# Patient Record
Sex: MALE | Race: ASIAN | NOT HISPANIC OR LATINO
[De-identification: names, ages, dates, MRNs, and addresses within clinical notes are randomized per-mention and may not be internally consistent; named-entity substitution may affect disease eponyms.]

---

## 2017-01-10 ENCOUNTER — APPOINTMENT (OUTPATIENT)
Dept: NEUROLOGY | Facility: CLINIC | Age: 77
End: 2017-01-10

## 2017-01-27 ENCOUNTER — APPOINTMENT (OUTPATIENT)
Dept: NEUROLOGY | Facility: CLINIC | Age: 77
End: 2017-01-27

## 2017-01-27 VITALS
WEIGHT: 148 LBS | DIASTOLIC BLOOD PRESSURE: 77 MMHG | HEIGHT: 65 IN | SYSTOLIC BLOOD PRESSURE: 152 MMHG | BODY MASS INDEX: 24.66 KG/M2 | HEART RATE: 71 BPM

## 2017-01-27 DIAGNOSIS — M48.06 SPINAL STENOSIS, LUMBAR REGION: ICD-10-CM

## 2018-08-09 ENCOUNTER — RESULT REVIEW (OUTPATIENT)
Age: 78
End: 2018-08-09

## 2018-08-09 ENCOUNTER — OUTPATIENT (OUTPATIENT)
Dept: OUTPATIENT SERVICES | Facility: HOSPITAL | Age: 78
LOS: 1 days | End: 2018-08-09
Payer: MEDICARE

## 2018-08-09 ENCOUNTER — APPOINTMENT (OUTPATIENT)
Dept: CT IMAGING | Facility: IMAGING CENTER | Age: 78
End: 2018-08-09
Payer: MEDICARE

## 2018-08-09 DIAGNOSIS — Z00.8 ENCOUNTER FOR OTHER GENERAL EXAMINATION: ICD-10-CM

## 2018-08-09 PROCEDURE — 74177 CT ABD & PELVIS W/CONTRAST: CPT

## 2018-08-09 PROCEDURE — 74177 CT ABD & PELVIS W/CONTRAST: CPT | Mod: 26

## 2018-08-09 PROCEDURE — 82565 ASSAY OF CREATININE: CPT

## 2018-09-28 ENCOUNTER — APPOINTMENT (OUTPATIENT)
Dept: HEMATOLOGY ONCOLOGY | Facility: CLINIC | Age: 78
End: 2018-09-28
Payer: MEDICARE

## 2018-09-28 ENCOUNTER — LABORATORY RESULT (OUTPATIENT)
Age: 78
End: 2018-09-28

## 2018-09-28 VITALS
WEIGHT: 148 LBS | SYSTOLIC BLOOD PRESSURE: 122 MMHG | BODY MASS INDEX: 24.66 KG/M2 | DIASTOLIC BLOOD PRESSURE: 78 MMHG | HEART RATE: 72 BPM | OXYGEN SATURATION: 97 % | HEIGHT: 65 IN

## 2018-09-28 DIAGNOSIS — I10 ESSENTIAL (PRIMARY) HYPERTENSION: ICD-10-CM

## 2018-09-28 DIAGNOSIS — Z82.49 FAMILY HISTORY OF ISCHEMIC HEART DISEASE AND OTHER DISEASES OF THE CIRCULATORY SYSTEM: ICD-10-CM

## 2018-09-28 PROCEDURE — 85025 COMPLETE CBC W/AUTO DIFF WBC: CPT

## 2018-09-28 PROCEDURE — 99205 OFFICE O/P NEW HI 60 MIN: CPT

## 2019-09-19 ENCOUNTER — INPATIENT (INPATIENT)
Facility: HOSPITAL | Age: 79
LOS: 9 days | Discharge: HOME | End: 2019-09-29
Attending: INTERNAL MEDICINE | Admitting: INTERNAL MEDICINE
Payer: MEDICARE

## 2019-09-19 VITALS
HEART RATE: 89 BPM | TEMPERATURE: 98 F | RESPIRATION RATE: 19 BRPM | SYSTOLIC BLOOD PRESSURE: 154 MMHG | OXYGEN SATURATION: 100 % | DIASTOLIC BLOOD PRESSURE: 87 MMHG

## 2019-09-19 NOTE — ED ADULT TRIAGE NOTE - CHIEF COMPLAINT QUOTE
pt sent in by cardiologist for elevated wbc (161) and low hgb from labs drawn today, patient states that he has been feeling weak (generalized weakness) over the past few days with leg swelling over past few months, no chest pain, sob, dizziness, n/v/fevers. pmh CLL 2012, htn.

## 2019-09-20 DIAGNOSIS — R14.0 ABDOMINAL DISTENSION (GASEOUS): ICD-10-CM

## 2019-09-20 DIAGNOSIS — C91.90 LYMPHOID LEUKEMIA, UNSPECIFIED NOT HAVING ACHIEVED REMISSION: ICD-10-CM

## 2019-09-20 DIAGNOSIS — Z87.19 PERSONAL HISTORY OF OTHER DISEASES OF THE DIGESTIVE SYSTEM: ICD-10-CM

## 2019-09-20 DIAGNOSIS — Z29.9 ENCOUNTER FOR PROPHYLACTIC MEASURES, UNSPECIFIED: ICD-10-CM

## 2019-09-20 DIAGNOSIS — E87.1 HYPO-OSMOLALITY AND HYPONATREMIA: ICD-10-CM

## 2019-09-20 DIAGNOSIS — R60.0 LOCALIZED EDEMA: ICD-10-CM

## 2019-09-20 DIAGNOSIS — D72.829 ELEVATED WHITE BLOOD CELL COUNT, UNSPECIFIED: ICD-10-CM

## 2019-09-20 DIAGNOSIS — I10 ESSENTIAL (PRIMARY) HYPERTENSION: ICD-10-CM

## 2019-09-20 DIAGNOSIS — K74.60 UNSPECIFIED CIRRHOSIS OF LIVER: ICD-10-CM

## 2019-09-20 LAB
ALBUMIN SERPL ELPH-MCNC: 2.6 G/DL — LOW (ref 3.3–5)
ALBUMIN SERPL ELPH-MCNC: 3.1 G/DL — LOW (ref 3.3–5)
ALP SERPL-CCNC: 160 U/L — HIGH (ref 40–120)
ALP SERPL-CCNC: 186 U/L — HIGH (ref 40–120)
ALT FLD-CCNC: 66 U/L — HIGH (ref 4–41)
ALT FLD-CCNC: 78 U/L — HIGH (ref 4–41)
AMMONIA BLD-MCNC: 41 UMOL/L — SIGNIFICANT CHANGE UP (ref 11–55)
ANION GAP SERPL CALC-SCNC: 10 MMO/L — SIGNIFICANT CHANGE UP (ref 7–14)
ANION GAP SERPL CALC-SCNC: 14 MMO/L — SIGNIFICANT CHANGE UP (ref 7–14)
ANISOCYTOSIS BLD QL: SLIGHT — SIGNIFICANT CHANGE UP
APTT BLD: 27.9 SEC — SIGNIFICANT CHANGE UP (ref 27.5–36.3)
APTT BLD: 28.6 SEC — SIGNIFICANT CHANGE UP (ref 27.5–36.3)
AST SERPL-CCNC: 50 U/L — HIGH (ref 4–40)
AST SERPL-CCNC: 67 U/L — HIGH (ref 4–40)
BASE EXCESS BLDV CALC-SCNC: -2.4 MMOL/L — SIGNIFICANT CHANGE UP
BASE EXCESS BLDV CALC-SCNC: 2 MMOL/L — SIGNIFICANT CHANGE UP
BASOPHILS # BLD AUTO: 0.08 K/UL — SIGNIFICANT CHANGE UP (ref 0–0.2)
BASOPHILS NFR BLD AUTO: 0.1 % — SIGNIFICANT CHANGE UP (ref 0–2)
BASOPHILS NFR SPEC: 0 % — SIGNIFICANT CHANGE UP (ref 0–2)
BILIRUB SERPL-MCNC: 0.7 MG/DL — SIGNIFICANT CHANGE UP (ref 0.2–1.2)
BILIRUB SERPL-MCNC: 0.7 MG/DL — SIGNIFICANT CHANGE UP (ref 0.2–1.2)
BLD GP AB SCN SERPL QL: NEGATIVE — SIGNIFICANT CHANGE UP
BLOOD GAS VENOUS - CREATININE: 0.82 MG/DL — SIGNIFICANT CHANGE UP (ref 0.5–1.3)
BLOOD GAS VENOUS - FIO2: 21 — SIGNIFICANT CHANGE UP
BUN SERPL-MCNC: 12 MG/DL — SIGNIFICANT CHANGE UP (ref 7–23)
BUN SERPL-MCNC: 14 MG/DL — SIGNIFICANT CHANGE UP (ref 7–23)
CALCIUM SERPL-MCNC: 7.4 MG/DL — LOW (ref 8.4–10.5)
CALCIUM SERPL-MCNC: 8.4 MG/DL — SIGNIFICANT CHANGE UP (ref 8.4–10.5)
CHLORIDE BLDV-SCNC: 95 MMOL/L — LOW (ref 96–108)
CHLORIDE SERPL-SCNC: 89 MMOL/L — LOW (ref 98–107)
CHLORIDE SERPL-SCNC: 92 MMOL/L — LOW (ref 98–107)
CHLORIDE UR-SCNC: < 20 MMOL/L — SIGNIFICANT CHANGE UP
CO2 SERPL-SCNC: 22 MMOL/L — SIGNIFICANT CHANGE UP (ref 22–31)
CO2 SERPL-SCNC: 22 MMOL/L — SIGNIFICANT CHANGE UP (ref 22–31)
CREAT SERPL-MCNC: 0.68 MG/DL — SIGNIFICANT CHANGE UP (ref 0.5–1.3)
CREAT SERPL-MCNC: 0.75 MG/DL — SIGNIFICANT CHANGE UP (ref 0.5–1.3)
DACRYOCYTES BLD QL SMEAR: SLIGHT — SIGNIFICANT CHANGE UP
EOSINOPHIL # BLD AUTO: 0.04 K/UL — SIGNIFICANT CHANGE UP (ref 0–0.5)
EOSINOPHIL NFR BLD AUTO: 0 % — SIGNIFICANT CHANGE UP (ref 0–6)
EOSINOPHIL NFR FLD: 0 % — SIGNIFICANT CHANGE UP (ref 0–6)
FIBRINOGEN PPP-MCNC: 544.7 MG/DL — HIGH (ref 350–510)
GAS PNL BLDV: 122 MMOL/L — LOW (ref 136–146)
GAS PNL BLDV: 123 MMOL/L — LOW (ref 136–146)
GLUCOSE BLDV-MCNC: 135 MG/DL — HIGH (ref 70–99)
GLUCOSE BLDV-MCNC: 160 MG/DL — HIGH (ref 70–99)
GLUCOSE SERPL-MCNC: 131 MG/DL — HIGH (ref 70–99)
GLUCOSE SERPL-MCNC: 161 MG/DL — HIGH (ref 70–99)
HAPTOGLOB SERPL-MCNC: 157 MG/DL — SIGNIFICANT CHANGE UP (ref 34–200)
HCO3 BLDV-SCNC: 22 MMOL/L — SIGNIFICANT CHANGE UP (ref 20–27)
HCO3 BLDV-SCNC: 25 MMOL/L — SIGNIFICANT CHANGE UP (ref 20–27)
HCT VFR BLD CALC: 23.4 % — LOW (ref 39–50)
HCT VFR BLD CALC: 26.4 % — LOW (ref 39–50)
HCT VFR BLDV CALC: 22.5 % — LOW (ref 39–51)
HCT VFR BLDV CALC: 24.6 % — LOW (ref 39–51)
HGB BLD-MCNC: 6.8 G/DL — CRITICAL LOW (ref 13–17)
HGB BLD-MCNC: 7.7 G/DL — LOW (ref 13–17)
HGB BLDV-MCNC: 7.2 G/DL — LOW (ref 13–17)
HGB BLDV-MCNC: 7.9 G/DL — LOW (ref 13–17)
HYPOCHROMIA BLD QL: SIGNIFICANT CHANGE UP
IMM GRANULOCYTES NFR BLD AUTO: 0.3 % — SIGNIFICANT CHANGE UP (ref 0–1.5)
INR BLD: 1.18 — HIGH (ref 0.88–1.17)
INR BLD: 1.25 — HIGH (ref 0.88–1.17)
LACTATE BLDV-MCNC: 1 MMOL/L — SIGNIFICANT CHANGE UP (ref 0.5–2)
LACTATE BLDV-MCNC: 2.4 MMOL/L — HIGH (ref 0.5–2)
LDH SERPL L TO P-CCNC: 158 U/L — SIGNIFICANT CHANGE UP (ref 135–225)
LDH SERPL L TO P-CCNC: 236 U/L — HIGH (ref 135–225)
LYMPHOCYTES # BLD AUTO: 131.9 K/UL — HIGH (ref 1–3.3)
LYMPHOCYTES # BLD AUTO: 93.5 % — HIGH (ref 13–44)
LYMPHOCYTES NFR SPEC AUTO: 87 % — HIGH (ref 13–44)
MACROCYTES BLD QL: SLIGHT — SIGNIFICANT CHANGE UP
MAGNESIUM SERPL-MCNC: 1.8 MG/DL — SIGNIFICANT CHANGE UP (ref 1.6–2.6)
MANUAL SMEAR VERIFICATION: SIGNIFICANT CHANGE UP
MCHC RBC-ENTMCNC: 21.1 PG — LOW (ref 27–34)
MCHC RBC-ENTMCNC: 21.2 PG — LOW (ref 27–34)
MCHC RBC-ENTMCNC: 29.1 % — LOW (ref 32–36)
MCHC RBC-ENTMCNC: 29.2 % — LOW (ref 32–36)
MCV RBC AUTO: 72.4 FL — LOW (ref 80–100)
MCV RBC AUTO: 72.7 FL — LOW (ref 80–100)
MICROCYTES BLD QL: SLIGHT — SIGNIFICANT CHANGE UP
MONOCYTES # BLD AUTO: 0.94 K/UL — HIGH (ref 0–0.9)
MONOCYTES NFR BLD AUTO: 0.7 % — LOW (ref 2–14)
MONOCYTES NFR BLD: 1 % — LOW (ref 2–9)
MORPHOLOGY BLD-IMP: SIGNIFICANT CHANGE UP
NEUTROPHIL AB SER-ACNC: 7 % — LOW (ref 43–77)
NEUTROPHILS # BLD AUTO: 7.66 K/UL — HIGH (ref 1.8–7.4)
NEUTROPHILS NFR BLD AUTO: 5.4 % — LOW (ref 43–77)
NRBC # BLD: 0 /100WBC — SIGNIFICANT CHANGE UP
NRBC # FLD: 0 K/UL — SIGNIFICANT CHANGE UP (ref 0–0)
NRBC # FLD: 0.02 K/UL — SIGNIFICANT CHANGE UP (ref 0–0)
OSMOLALITY UR: 701 MOSMO/KG — SIGNIFICANT CHANGE UP (ref 50–1200)
OTHER - HEMATOLOGY %: 5 — SIGNIFICANT CHANGE UP
PCO2 BLDV: 40 MMHG — LOW (ref 41–51)
PCO2 BLDV: 43 MMHG — SIGNIFICANT CHANGE UP (ref 41–51)
PH BLDV: 7.36 PH — SIGNIFICANT CHANGE UP (ref 7.32–7.43)
PH BLDV: 7.4 PH — SIGNIFICANT CHANGE UP (ref 7.32–7.43)
PHOSPHATE SERPL-MCNC: 2.2 MG/DL — LOW (ref 2.5–4.5)
PLATELET # BLD AUTO: 180 K/UL — SIGNIFICANT CHANGE UP (ref 150–400)
PLATELET # BLD AUTO: 222 K/UL — SIGNIFICANT CHANGE UP (ref 150–400)
PLATELET COUNT - ESTIMATE: NORMAL — SIGNIFICANT CHANGE UP
PMV BLD: 12.2 FL — SIGNIFICANT CHANGE UP (ref 7–13)
PMV BLD: SIGNIFICANT CHANGE UP FL (ref 7–13)
PO2 BLDV: 26 MMHG — LOW (ref 35–40)
PO2 BLDV: < 24 MMHG — LOW (ref 35–40)
POIKILOCYTOSIS BLD QL AUTO: SLIGHT — SIGNIFICANT CHANGE UP
POLYCHROMASIA BLD QL SMEAR: SLIGHT — SIGNIFICANT CHANGE UP
POTASSIUM BLDV-SCNC: 3.9 MMOL/L — SIGNIFICANT CHANGE UP (ref 3.4–4.5)
POTASSIUM BLDV-SCNC: 4.1 MMOL/L — SIGNIFICANT CHANGE UP (ref 3.4–4.5)
POTASSIUM SERPL-MCNC: 4.7 MMOL/L — SIGNIFICANT CHANGE UP (ref 3.5–5.3)
POTASSIUM SERPL-MCNC: 5.1 MMOL/L — SIGNIFICANT CHANGE UP (ref 3.5–5.3)
POTASSIUM SERPL-SCNC: 4.7 MMOL/L — SIGNIFICANT CHANGE UP (ref 3.5–5.3)
POTASSIUM SERPL-SCNC: 5.1 MMOL/L — SIGNIFICANT CHANGE UP (ref 3.5–5.3)
POTASSIUM UR-SCNC: 66.3 MMOL/L — SIGNIFICANT CHANGE UP
PROT SERPL-MCNC: 5.5 G/DL — LOW (ref 6–8.3)
PROT SERPL-MCNC: 6.5 G/DL — SIGNIFICANT CHANGE UP (ref 6–8.3)
PROTHROM AB SERPL-ACNC: 13.2 SEC — HIGH (ref 9.8–13.1)
PROTHROM AB SERPL-ACNC: 14 SEC — HIGH (ref 9.8–13.1)
RBC # BLD: 3.23 M/UL — LOW (ref 4.2–5.8)
RBC # BLD: 3.63 M/UL — LOW (ref 4.2–5.8)
RBC # FLD: 18.2 % — HIGH (ref 10.3–14.5)
RBC # FLD: 18.4 % — HIGH (ref 10.3–14.5)
RH IG SCN BLD-IMP: POSITIVE — SIGNIFICANT CHANGE UP
RH IG SCN BLD-IMP: POSITIVE — SIGNIFICANT CHANGE UP
SAO2 % BLDV: 20.7 % — LOW (ref 60–85)
SAO2 % BLDV: 32.5 % — LOW (ref 60–85)
SCHISTOCYTES BLD QL AUTO: SLIGHT — SIGNIFICANT CHANGE UP
SMUDGE CELLS # BLD: PRESENT — SIGNIFICANT CHANGE UP
SODIUM SERPL-SCNC: 124 MMOL/L — LOW (ref 135–145)
SODIUM SERPL-SCNC: 125 MMOL/L — LOW (ref 135–145)
SODIUM UR-SCNC: 38 MMOL/L — SIGNIFICANT CHANGE UP
TARGETS BLD QL SMEAR: SLIGHT — SIGNIFICANT CHANGE UP
URATE SERPL-MCNC: 3 MG/DL — LOW (ref 3.4–8.8)
WBC # BLD: 141 K/UL — CRITICAL HIGH (ref 3.8–10.5)
WBC # BLD: 97.11 K/UL — CRITICAL HIGH (ref 3.8–10.5)
WBC # FLD AUTO: 141 K/UL — CRITICAL HIGH (ref 3.8–10.5)
WBC # FLD AUTO: 97.11 K/UL — CRITICAL HIGH (ref 3.8–10.5)

## 2019-09-20 PROCEDURE — 88189 FLOWCYTOMETRY/READ 16 & >: CPT

## 2019-09-20 PROCEDURE — 99222 1ST HOSP IP/OBS MODERATE 55: CPT | Mod: GC

## 2019-09-20 PROCEDURE — 71046 X-RAY EXAM CHEST 2 VIEWS: CPT | Mod: 26

## 2019-09-20 PROCEDURE — 93970 EXTREMITY STUDY: CPT | Mod: 26

## 2019-09-20 PROCEDURE — 74177 CT ABD & PELVIS W/CONTRAST: CPT | Mod: 26

## 2019-09-20 PROCEDURE — 99223 1ST HOSP IP/OBS HIGH 75: CPT

## 2019-09-20 RX ORDER — ACETAMINOPHEN 500 MG
650 TABLET ORAL ONCE
Refills: 0 | Status: COMPLETED | OUTPATIENT
Start: 2019-09-20 | End: 2019-09-20

## 2019-09-20 RX ORDER — METRONIDAZOLE 500 MG
500 TABLET ORAL EVERY 8 HOURS
Refills: 0 | Status: DISCONTINUED | OUTPATIENT
Start: 2019-09-20 | End: 2019-09-20

## 2019-09-20 RX ORDER — METRONIDAZOLE 500 MG
TABLET ORAL
Refills: 0 | Status: DISCONTINUED | OUTPATIENT
Start: 2019-09-20 | End: 2019-09-20

## 2019-09-20 RX ORDER — FERROUS SULFATE 325(65) MG
325 TABLET ORAL DAILY
Refills: 0 | Status: DISCONTINUED | OUTPATIENT
Start: 2019-09-20 | End: 2019-09-27

## 2019-09-20 RX ORDER — FAMOTIDINE 10 MG/ML
20 INJECTION INTRAVENOUS ONCE
Refills: 0 | Status: COMPLETED | OUTPATIENT
Start: 2019-09-20 | End: 2019-09-20

## 2019-09-20 RX ORDER — ONDANSETRON 8 MG/1
4 TABLET, FILM COATED ORAL ONCE
Refills: 0 | Status: COMPLETED | OUTPATIENT
Start: 2019-09-20 | End: 2019-09-20

## 2019-09-20 RX ORDER — FUROSEMIDE 40 MG
20 TABLET ORAL ONCE
Refills: 0 | Status: DISCONTINUED | OUTPATIENT
Start: 2019-09-20 | End: 2019-09-20

## 2019-09-20 RX ORDER — ALLOPURINOL 300 MG
300 TABLET ORAL DAILY
Refills: 0 | Status: DISCONTINUED | OUTPATIENT
Start: 2019-09-21 | End: 2019-09-23

## 2019-09-20 RX ORDER — PANTOPRAZOLE SODIUM 20 MG/1
40 TABLET, DELAYED RELEASE ORAL DAILY
Refills: 0 | Status: DISCONTINUED | OUTPATIENT
Start: 2019-09-20 | End: 2019-09-24

## 2019-09-20 RX ORDER — SODIUM CHLORIDE 9 MG/ML
1000 INJECTION, SOLUTION INTRAVENOUS
Refills: 0 | Status: DISCONTINUED | OUTPATIENT
Start: 2019-09-20 | End: 2019-09-24

## 2019-09-20 RX ORDER — LACTOBACILLUS ACIDOPHILUS 100MM CELL
1 CAPSULE ORAL
Refills: 0 | Status: DISCONTINUED | OUTPATIENT
Start: 2019-09-20 | End: 2019-09-29

## 2019-09-20 RX ORDER — INFLUENZA VIRUS VACCINE 15; 15; 15; 15 UG/.5ML; UG/.5ML; UG/.5ML; UG/.5ML
0.5 SUSPENSION INTRAMUSCULAR ONCE
Refills: 0 | Status: DISCONTINUED | OUTPATIENT
Start: 2019-09-20 | End: 2019-09-29

## 2019-09-20 RX ORDER — MORPHINE SULFATE 50 MG/1
4 CAPSULE, EXTENDED RELEASE ORAL ONCE
Refills: 0 | Status: DISCONTINUED | OUTPATIENT
Start: 2019-09-20 | End: 2019-09-20

## 2019-09-20 RX ORDER — HEPARIN SODIUM 5000 [USP'U]/ML
5000 INJECTION INTRAVENOUS; SUBCUTANEOUS EVERY 8 HOURS
Refills: 0 | Status: DISCONTINUED | OUTPATIENT
Start: 2019-09-20 | End: 2019-09-29

## 2019-09-20 RX ORDER — FUROSEMIDE 40 MG
40 TABLET ORAL ONCE
Refills: 0 | Status: COMPLETED | OUTPATIENT
Start: 2019-09-20 | End: 2019-09-20

## 2019-09-20 RX ORDER — ONDANSETRON 8 MG/1
4 TABLET, FILM COATED ORAL EVERY 6 HOURS
Refills: 0 | Status: DISCONTINUED | OUTPATIENT
Start: 2019-09-20 | End: 2019-09-29

## 2019-09-20 RX ORDER — ALLOPURINOL 300 MG
300 TABLET ORAL ONCE
Refills: 0 | Status: COMPLETED | OUTPATIENT
Start: 2019-09-20 | End: 2019-09-20

## 2019-09-20 RX ORDER — METRONIDAZOLE 500 MG
500 TABLET ORAL ONCE
Refills: 0 | Status: DISCONTINUED | OUTPATIENT
Start: 2019-09-20 | End: 2019-09-20

## 2019-09-20 RX ORDER — FUROSEMIDE 40 MG
40 TABLET ORAL DAILY
Refills: 0 | Status: DISCONTINUED | OUTPATIENT
Start: 2019-09-20 | End: 2019-09-25

## 2019-09-20 RX ORDER — LOSARTAN POTASSIUM 100 MG/1
50 TABLET, FILM COATED ORAL DAILY
Refills: 0 | Status: DISCONTINUED | OUTPATIENT
Start: 2019-09-20 | End: 2019-09-29

## 2019-09-20 RX ORDER — CIPROFLOXACIN LACTATE 400MG/40ML
400 VIAL (ML) INTRAVENOUS EVERY 12 HOURS
Refills: 0 | Status: DISCONTINUED | OUTPATIENT
Start: 2019-09-20 | End: 2019-09-24

## 2019-09-20 RX ORDER — SODIUM CHLORIDE 9 MG/ML
2000 INJECTION INTRAMUSCULAR; INTRAVENOUS; SUBCUTANEOUS ONCE
Refills: 0 | Status: COMPLETED | OUTPATIENT
Start: 2019-09-20 | End: 2019-09-20

## 2019-09-20 RX ORDER — PANTOPRAZOLE SODIUM 20 MG/1
40 TABLET, DELAYED RELEASE ORAL
Refills: 0 | Status: DISCONTINUED | OUTPATIENT
Start: 2019-09-20 | End: 2019-09-20

## 2019-09-20 RX ORDER — METRONIDAZOLE 500 MG
500 TABLET ORAL EVERY 12 HOURS
Refills: 0 | Status: DISCONTINUED | OUTPATIENT
Start: 2019-09-20 | End: 2019-09-24

## 2019-09-20 RX ADMIN — Medication 650 MILLIGRAM(S): at 03:11

## 2019-09-20 RX ADMIN — PANTOPRAZOLE SODIUM 40 MILLIGRAM(S): 20 TABLET, DELAYED RELEASE ORAL at 11:38

## 2019-09-20 RX ADMIN — MORPHINE SULFATE 4 MILLIGRAM(S): 50 CAPSULE, EXTENDED RELEASE ORAL at 06:03

## 2019-09-20 RX ADMIN — MORPHINE SULFATE 4 MILLIGRAM(S): 50 CAPSULE, EXTENDED RELEASE ORAL at 05:48

## 2019-09-20 RX ADMIN — HEPARIN SODIUM 5000 UNIT(S): 5000 INJECTION INTRAVENOUS; SUBCUTANEOUS at 22:16

## 2019-09-20 RX ADMIN — SODIUM CHLORIDE 75 MILLILITER(S): 9 INJECTION, SOLUTION INTRAVENOUS at 11:13

## 2019-09-20 RX ADMIN — Medication 63.75 MILLIMOLE(S): at 11:38

## 2019-09-20 RX ADMIN — Medication 650 MILLIGRAM(S): at 02:11

## 2019-09-20 RX ADMIN — Medication 40 MILLIGRAM(S): at 17:36

## 2019-09-20 RX ADMIN — ONDANSETRON 4 MILLIGRAM(S): 8 TABLET, FILM COATED ORAL at 02:11

## 2019-09-20 RX ADMIN — Medication 30 MILLILITER(S): at 02:11

## 2019-09-20 RX ADMIN — HEPARIN SODIUM 5000 UNIT(S): 5000 INJECTION INTRAVENOUS; SUBCUTANEOUS at 17:36

## 2019-09-20 RX ADMIN — Medication 300 MILLIGRAM(S): at 04:32

## 2019-09-20 RX ADMIN — SODIUM CHLORIDE 2000 MILLILITER(S): 9 INJECTION INTRAMUSCULAR; INTRAVENOUS; SUBCUTANEOUS at 03:20

## 2019-09-20 RX ADMIN — Medication 200 MILLIGRAM(S): at 17:36

## 2019-09-20 RX ADMIN — FAMOTIDINE 20 MILLIGRAM(S): 10 INJECTION INTRAVENOUS at 02:11

## 2019-09-20 RX ADMIN — Medication 100 MILLIGRAM(S): at 13:02

## 2019-09-20 NOTE — PROGRESS NOTE ADULT - SUBJECTIVE AND OBJECTIVE BOX
Patient is a 79y old  Male who presents with a chief complaint of abdominal pain, nausea, abdominal distention, rising leukocytosis (20 Sep 2019 10:26)      SUBJECTIVE / OVERNIGHT EVENTS:  C/o nausea and belching  Review of Systems:   CONSTITUTIONAL: No fever, weight loss, or fatigue  EYES: No eye pain, visual disturbances, or discharge  ENMT:  No difficulty hearing, tinnitus, vertigo; No sinus or throat pain  NECK: No pain or stiffness  BREASTS: No pain, masses, or nipple discharge  RESPIRATORY: No cough, wheezing, chills or hemoptysis; No shortness of breath  CARDIOVASCULAR: No chest pain, palpitations, dizziness, or leg swelling  GASTROINTESTINAL: No abdominal or epigastric pain. No vomiting, or hematemesis; No diarrhea or constipation. No melena or hematochezia.  GENITOURINARY: No dysuria, frequency, hematuria, or incontinence  NEUROLOGICAL: No headaches, memory loss, loss of strength, numbness, or tremors  SKIN: No itching, burning, rashes, or lesions   LYMPH NODES: No enlarged glands  ENDOCRINE: No heat or cold intolerance; No hair loss  MUSCULOSKELETAL: No joint pain or swelling; No muscle, back, or extremity pain  PSYCHIATRIC: No depression, anxiety, mood swings, or difficulty sleeping  HEME/LYMPH: No easy bruising, or bleeding gums  ALLERY AND IMMUNOLOGIC: No hives or eczema    MEDICATIONS  (STANDING):  ciprofloxacin   IVPB 400 milliGRAM(s) IV Intermittent every 12 hours  dextrose 5% + sodium chloride 0.9%. 1000 milliLiter(s) (75 mL/Hr) IV Continuous <Continuous>  ferrous    sulfate 325 milliGRAM(s) Oral daily  furosemide    Tablet 40 milliGRAM(s) Oral daily  heparin  Injectable 5000 Unit(s) SubCutaneous every 8 hours  lactobacillus acidophilus 1 Tablet(s) Oral two times a day  losartan 50 milliGRAM(s) Oral daily  metroNIDAZOLE  IVPB 500 milliGRAM(s) IV Intermittent every 12 hours  pantoprazole  Injectable 40 milliGRAM(s) IV Push daily    MEDICATIONS  (PRN):  ondansetron Injectable 4 milliGRAM(s) IV Push every 6 hours PRN Nausea      PHYSICAL EXAM:  Vital Signs Last 24 Hrs  T(C): 37 (20 Sep 2019 15:10), Max: 37 (20 Sep 2019 01:29)  T(F): 98.6 (20 Sep 2019 15:10), Max: 98.6 (20 Sep 2019 01:29)  HR: 81 (20 Sep 2019 15:10) (74 - 92)  BP: 147/79 (20 Sep 2019 15:10) (129/64 - 175/73)  BP(mean): --  RR: 20 (20 Sep 2019 15:10) (16 - 20)  SpO2: 100% (20 Sep 2019 15:10) (96% - 100%)  I&O's Summary    GENERAL: NAD, well-developed  HEAD:  Atraumatic, Normocephalic  EYES: EOMI, PERRLA, conjunctiva and sclera clear  NECK: Supple, No JVD  CHEST/LUNG: Clear to auscultation bilaterally; No wheeze  HEART: Regular rate and rhythm; No murmurs, rubs, or gallops  ABDOMEN: Soft, Nontender, distended; Bowel sounds present  EXTREMITIES:  2+ Peripheral Pulses, No clubbing, cyanosis, or edema  PSYCH: AAOx3  NEUROLOGY: non-focal  SKIN: No rashes or lesions    LABS:  CAPILLARY BLOOD GLUCOSE                              6.8    97.11 )-----------( 180      ( 20 Sep 2019 06:35 )             23.4     09-20    124<L>  |  92<L>  |  12  ----------------------------<  131<H>  4.7   |  22  |  0.68    Ca    7.4<L>      20 Sep 2019 06:32  Phos  2.2     09-20  Mg     1.8     09-20    TPro  5.5<L>  /  Alb  2.6<L>  /  TBili  0.7  /  DBili  x   /  AST  50<H>  /  ALT  66<H>  /  AlkPhos  160<H>  09-20    PT/INR - ( 20 Sep 2019 06:35 )   PT: 14.0 SEC;   INR: 1.25          PTT - ( 20 Sep 2019 06:35 )  PTT:27.9 SEC          RADIOLOGY & ADDITIONAL TESTS:    Imaging Personally Reviewed:    Consultant(s) Notes Reviewed:      Care Discussed with Consultants/Other Providers:

## 2019-09-20 NOTE — ED PROVIDER NOTE - CLINICAL SUMMARY MEDICAL DECISION MAKING FREE TEXT BOX
[facility] PGY2- hx of CLL for years, concern for conversion to ALL, with recent faituge and large interval increase in leukocytes   VSS, appears well clinically  will admit

## 2019-09-20 NOTE — H&P ADULT - PROBLEM SELECTOR PLAN 8
chronic, last serum Na 131 on 8/21/19  trend Na, check urine lytes/osm, c/w lasix, avoid hypotonic fluid

## 2019-09-20 NOTE — H&P ADULT - PROBLEM SELECTOR PLAN 1
-pt with abdominal distention and possible ileus w/o obstruction on CT  -NPO, IVF, IV Protonix, zofran prn  -GI consult (emailed)  -check GI PCR and c. diff (pt reports intermittent diarrhea, last BM soft yesterday)    -he

## 2019-09-20 NOTE — ED ADULT NURSE NOTE - NSIMPLEMENTINTERV_GEN_ALL_ED
Implemented All Fall Risk Interventions:  [place] to call system. Call bell, personal items and telephone within reach. Instruct patient to call for assistance. Room bathroom lighting operational. Non-slip footwear when patient is off stretcher. Physically safe environment: no spills, clutter or unnecessary equipment. Stretcher in lowest position, wheels locked, appropriate side rails in place. Provide visual cue, wrist band, yellow gown, etc. Monitor gait and stability. Monitor for mental status changes and reorient to person, place, and time. Review medications for side effects contributing to fall risk. Reinforce activity limits and safety measures with patient and family.

## 2019-09-20 NOTE — CONSULT NOTE ADULT - ASSESSMENT
80 y/o male with h/o HTN, gastritis, liver cirrhosis, CLL diagnosed in 2012 (hematologist Dr. Dean, on observation, not on chemo), who presents with 1 day history of abdominal distention, mid-left sided abdominal pain, nausea, CT no obstruction, possible focal ileus, WBC elevated to 141K, now down trending to 97K. Acute rise in WBC r/o sepsis vs. progression of CLL. 80 y/o male with h/o HTN, gastritis, liver cirrhosis, CLL diagnosed in 2012 (hematologist Dr. Dean, on observation, not on chemo), who presents with 1 day history of abdominal distention, mid-left sided abdominal pain, nausea, with CT consistent with possible focal ileus, WBC elevated to 141K, now down trending to 97K. Acute rise in WBC r/o sepsis vs. progression of CLL.     1. Abdominal Distension, Pain, Nausea, secondary to paralytic ileus vs mechanical obstruction (unlikely), due to CLL related reason, vs electrolyte disturbance, vs infectious etiology  -Trend electrolytes  -R/o infection: pending CDiff, GI PCR,   -Conservative management: IV Fluids, NPO, IV Protonix, Zofran PRN    2. CLL  -Recommendations as per Dr. Dean    3. Liver cirrhosis  -elevated PT/INR, hypoalbuminemia and elevated LFT d/t chronic liver disease  -has remote  h/o occasional wine 10 years ago  -no ascites on CT, check viral hepatitis panel 78 y/o male with h/o HTN, gastritis, liver cirrhosis, CLL diagnosed in 2012 (hematologist Dr. Dean, on observation, not on chemo), who presents with 1 day history of abdominal distention, mid-left sided abdominal pain, nausea, with CT consistent with possible focal ileus, WBC elevated to 141K, now down trending to 97K. Acute rise in WBC r/o sepsis vs. progression of CLL.     1. Abdominal Distension, Pain, Nausea, secondary to paralytic ileus. Mechanical obstruction unlikely.  Ileus likely due to CLL related etiology vs electrolyte disturbance, vs infectious etiology  2. Hyponatremia.  3. CLL.  4. Cirrhosis on imaging, remote alcohol use.  May represent infiltrative process.  Rule out viral, autoimmune etiology.    Recs:  - Monitor CBC, CMP, mag, phos daily.  - Correct hyponatremia, Keep K>4, Mag>2  - Follow up C.diff  - NPO with IV fluids  - Serial abdominal exams--> if improving, can start clear liquids  - If nausea or vomiting, consider NG tube for decompression  - Ambulation as tolerated  - Check viral hepatitis serologies, SHERMAN, anti-smooth muscle, antimitochondrial antibodies  - will follow

## 2019-09-20 NOTE — H&P ADULT - HISTORY OF PRESENT ILLNESS
78 y/o male with h/o HTN, gastritis, liver cirrhosis, CLL diagnosed in 2012 (hematologist Dr. Dean, on observation, not on chemo), who presents with 1 day history of abdominal distention, mid-left sided abdominal pain, nausea without vomiting, denies fever/chills/rectal bleeding. Patient reports he went to his cardiologist Dr. Burch to evaluate his leg edema, had echo done yesterday 9/19 which was normal LVEF, but blood work showed rising WBC and was advised to come to the hospital. He denies headache, visual disturbance. He reports fatigue, feeling weak and poor intake for the past day. He denies chest pain/sob/dizziness on presentation, but felt a little dizzy after he was given morphine for pain. He reports he had colonoscopy about 10 years ago that was unremarkable, last EGD 2 years ago showed gastritis.  He reports his last BM was yesterday, soft, but he has intermittent loose BM.     In the hospital, WBC noted to be 141K -->97K, anemia Hgb 7.7-->6.8, elevated PT/INR 1.5, elevated LFT noted ast/alt 50/66, lactate 2.4-->1, CT showed possible focal ileus left hemiabdomen, no obstruction or inflammation. Patient was seen by his hematologist Dr. Dean who recommended GI consult and 2 units of blood transfusion.       Patient's wife at bedside, states pt has thalassemia, his normal WBC is around 40-60K, his blood work from 8/21/19 showed WBC 69K, hgb 9.1, b12>2000, ferritin 193 , tsat 31%, haptoglobin 91, retic count 2.1, elevated LFT ast/alt 50/66, alk phos 169, albumin 3.5, also hyponatremia Na 131, b12>2000.

## 2019-09-20 NOTE — H&P ADULT - PROBLEM SELECTOR PLAN 6
-denies h/o viral hepatitis, remote  h/o occasional wine 10 years ago  -no ascites on CT  -outpt f/u GI/PCP, case d/w patient's PCP Dr. Carballo -elevated PT/INR, hypoalbuminemia and elevated LFT d/t chronic liver disease  -denies h/o viral hepatitis, remote  h/o occasional wine 10 years ago  -no ascites on CT, check viral hepatitis panel  -outpt f/u GI/PCP, case d/w patient's PCP Dr. Carballo

## 2019-09-20 NOTE — H&P ADULT - ASSESSMENT
80 y/o male with h/o HTN, gastritis, liver cirrhosis, CLL diagnosed in 2012 (hematologist Dr. Dean, on observation, not on chemo), who presents with 1 day history of abdominal distention, mid-left sided abdominal pain, nausea, CT no obstruction, possible focal ileus, WBC elevated to 141K, now down trending to 97K. Acute rise in WBC r/o sepsis vs. progression of CLL.

## 2019-09-20 NOTE — CONSULT NOTE ADULT - SUBJECTIVE AND OBJECTIVE BOX
Patient is a 79y old  Male who presents with a chief complaint of increasing abdominal distension, pain abd for a few days, nausea yesterday, no visible bleeding, had a BM yesterday, no fevers or chills. Has melissa weaker for 3 weeks, stopped going for a walk, eating less for last couple of weeks. No CP, SOB, palpitations, dizziness or lightheadedness. Rest of the detailed ROS unremarkable.    Was sent her by cardiologist with WBC 160K (baseline 50-60K)    PAST MEDICAL & SURGICAL HISTORY:  CLL (chronic lymphocytic leukemia) - has been on observation, never treated (saw Dr Rossi for a 2nd opinion)  Cirrhosis of the liver    SHx: Never smoked, vegetarian, used to drink wine frequently.          No Known Allergies      FAMILY HISTORY: None applicable      Vital Signs Last 24 Hrs  T(C): 37 (20 Sep 2019 05:47), Max: 37 (20 Sep 2019 01:29)  T(F): 98.6 (20 Sep 2019 05:47), Max: 98.6 (20 Sep 2019 01:29)  HR: 74 (20 Sep 2019 07:32) (74 - 92)  BP: 129/64 (20 Sep 2019 07:32) (129/64 - 175/73)  BP(mean): --  RR: 18 (20 Sep 2019 07:32) (16 - 19)  SpO2: 100% (20 Sep 2019 07:32) (96% - 100%)                          6.8    97.11 )-----------( 180      ( 20 Sep 2019 06:35 )             23.4       09-20    124<L>  |  92<L>  |  12  ----------------------------<  131<H>  4.7   |  22  |  0.68    Ca    7.4<L>      20 Sep 2019 06:32  Phos  2.2     09-20  Mg     1.8     09-20    TPro  5.5<L>  /  Alb  2.6<L>  /  TBili  0.7  /  DBili  x   /  AST  50<H>  /  ALT  66<H>  /  AlkPhos  160<H>  09-20      PT/INR - ( 20 Sep 2019 06:35 )   PT: 14.0 SEC;   INR: 1.25          PTT - ( 20 Sep 2019 06:35 )  PTT:27.9 SEC      CT a/p: FINDINGS:    LOWER CHEST: Small leftand trace right pleural effusions with adjacent   compressive atelectasis. Coronary artery calcifications.    LIVER: Nodule contour suggestive of cirrhosis.  BILE DUCTS: Normal caliber.  GALLBLADDER: Within normal limits.  SPLEEN: Within normal limits.  PANCREAS: Within normal limits.  ADRENALS: Within normal limits.  KIDNEYS/URETERS: Within normal limits.    BLADDER: Within normal limits.  REPRODUCTIVE ORGANS: Prostate is small in size.    BOWEL: No bowel obstruction or inflammation. Focal, mildly distended   loops of small bowel in the left hemiabdomen without transition point may   represent a focal paralytic ileus. Colonic diverticulosis without   diverticulitis. Appendix is normal.  PERITONEUM: No ascites.  VESSELS Atherosclerotic changes of the aorta and its branches.  RETROPERITONEUM/LYMPH NODES: No lymphadenopathy.    ABDOMINAL WALL: Small fat-containing umbilical hernia.  BONES: Degenerative changes of the spine.    IMPRESSION:     No bowel obstruction or inflammation. Focal, mildly distended loops of   small bowel in the left hemiabdomen may represent a focal paralytic   ileus.               CRISTIAN MUIR M.D., RADIOLOGY RESIDENT  This document has been electronically signed.  TAYLOR WOODS M.D., ATTENDING RADIOLOGIST  Thisdocument has been electronically signed. Sep 20 2019  5:52AM Patient is a 79y old  Male who presents with a chief complaint of increasing abdominal distension, pain abd for a few days, nausea yesterday, no visible bleeding, had a BM yesterday, no fevers or chills. Has melissa weaker for 3 weeks, stopped going for a walk, eating less for last couple of weeks. No CP, SOB, palpitations, dizziness or lightheadedness. Rest of the detailed ROS unremarkable.    Was sent her by cardiologist with WBC 160K (baseline 50-60K)    PAST MEDICAL & SURGICAL HISTORY:  CLL (chronic lymphocytic leukemia) - has been on observation, never treated (saw Dr Rossi for a 2nd opinion)  Cirrhosis of the liver    SHx: Never smoked, vegetarian, used to drink wine frequently.          No Known Allergies      FAMILY HISTORY: None applicable      Vital Signs Last 24 Hrs  T(C): 37 (20 Sep 2019 05:47), Max: 37 (20 Sep 2019 01:29)  T(F): 98.6 (20 Sep 2019 05:47), Max: 98.6 (20 Sep 2019 01:29)  HR: 74 (20 Sep 2019 07:32) (74 - 92)  BP: 129/64 (20 Sep 2019 07:32) (129/64 - 175/73)  BP(mean): --  RR: 18 (20 Sep 2019 07:32) (16 - 19)  SpO2: 100% (20 Sep 2019 07:32) (96% - 100%)                          6.8    97.11 )-----------( 180      ( 20 Sep 2019 06:35 )             23.4       09-20    124<L>  |  92<L>  |  12  ----------------------------<  131<H>  4.7   |  22  |  0.68    Ca    7.4<L>      20 Sep 2019 06:32  Phos  2.2     09-20  Mg     1.8     09-20    TPro  5.5<L>  /  Alb  2.6<L>  /  TBili  0.7  /  DBili  x   /  AST  50<H>  /  ALT  66<H>  /  AlkPhos  160<H>  09-20      PT/INR - ( 20 Sep 2019 06:35 )   PT: 14.0 SEC;   INR: 1.25          PTT - ( 20 Sep 2019 06:35 )  PTT:27.9 SEC      CT a/p: FINDINGS:    LOWER CHEST: Small leftand trace right pleural effusions with adjacent   compressive atelectasis. Coronary artery calcifications.    LIVER: Nodule contour suggestive of cirrhosis.  BILE DUCTS: Normal caliber.  GALLBLADDER: Within normal limits.  SPLEEN: Within normal limits.  PANCREAS: Within normal limits.  ADRENALS: Within normal limits.  KIDNEYS/URETERS: Within normal limits.    BLADDER: Within normal limits.  REPRODUCTIVE ORGANS: Prostate is small in size.    BOWEL: No bowel obstruction or inflammation. Focal, mildly distended   loops of small bowel in the left hemiabdomen without transition point may   represent a focal paralytic ileus. Colonic diverticulosis without   diverticulitis. Appendix is normal.  PERITONEUM: No ascites.  VESSELS Atherosclerotic changes of the aorta and its branches.  RETROPERITONEUM/LYMPH NODES: No lymphadenopathy.    ABDOMINAL WALL: Small fat-containing umbilical hernia.  BONES: Degenerative changes of the spine.    IMPRESSION:     No bowel obstruction or inflammation. Focal, mildly distended loops of   small bowel in the left hemiabdomen may represent a focal paralytic   ileus.               CRISTIAN MUIR M.D., RADIOLOGY RESIDENT  This document has been electronically signed.  TAYLOR WOODS M.D., ATTENDING RADIOLOGIST  Thisdocument has been electronically signed. Sep 20 2019  5:52AM      PBS: RBCs are mainly microcytic, hypochromic, moderate polychromasia, some cells with basophilic stippling, mild rouleoux formation, a couple of tear drop cells, no nucleated red cells.  WBCs increased, very many smudge cells, other cells are increased mature and small lymphocytes, no blasts and no significant large cells seen. no immature cells.  plts adequate

## 2019-09-20 NOTE — CONSULT NOTE ADULT - SUBJECTIVE AND OBJECTIVE BOX
Chief Complaint:  Patient is a 79y old  Male who presents with a three week history of lethargy, decreased appetite, and nausea, abd pain and distension. His abd distension and pain started yesterday night and worsened this morning. Past medical history is positive for CLL, diagnosed in 2012?, and Cirrhosis. He localized his pain to his LLQ and LUQ, and characterizes it as crampy and consistent throughout the night. He tried to take Tylenol for this pain but that did not provide relief, and he said once he received morphine in the ED, his  pain has subsided somewhat. He has been having a decreased appetite, but denied weight loss, for the past few weeks. He has also had nausea for the past three weeks, but he has not vomited. His last bowel movement was yesterday afternoon, he noted that it was small, soft, and non bloody. He denies chest pain, fevers, chills, diarrhea, vomiting. He follows with a GI: Dr. Anthony, and he notes his last colonoscopy was over ten years ago, was normal, and his last EGD was two years ago showing gastritis.      HPI:    Allergies:  No Known Allergies      Home Medications:  Losartan  Furosemide    Hospital Medications:  ciprofloxacin   IVPB 400 milliGRAM(s) IV Intermittent every 12 hours  dextrose 5% + sodium chloride 0.9%. 1000 milliLiter(s) IV Continuous <Continuous>  ferrous    sulfate 325 milliGRAM(s) Oral daily  furosemide    Tablet 40 milliGRAM(s) Oral daily  furosemide   Injectable 40 milliGRAM(s) IV Push once  heparin  Injectable 5000 Unit(s) SubCutaneous every 8 hours  losartan 50 milliGRAM(s) Oral daily  metroNIDAZOLE  IVPB      ondansetron Injectable 4 milliGRAM(s) IV Push every 6 hours PRN  pantoprazole    Tablet 40 milliGRAM(s) Oral before breakfast      PMHX/PSHX:  HTN (hypertension)  H/O gastritis  CLL (chronic lymphocytic leukemia)  No significant past surgical history      Family history:  FH: CHF (congestive heart failure)      Social History:     ROS:     General:  No wt loss, fevers, chills, night sweats, fatigue,   Eyes:  Good vision, no reported pain  ENT:  No sore throat, pain, runny nose, dysphagia  CV:  No pain, palpitations, hypo/hypertension  Resp:  No dyspnea, cough, tachypnea, wheezing  GI:  See HPI  Endocrine:  No polyuria, polydipsia, cold/heat intolerance  Skin:  No rash, edema      PHYSICAL EXAM:     Vital Signs:  Vital Signs Last 24 Hrs  T(C): 37 (20 Sep 2019 05:47), Max: 37 (20 Sep 2019 01:29)  T(F): 98.6 (20 Sep 2019 05:47), Max: 98.6 (20 Sep 2019 01:29)  HR: 74 (20 Sep 2019 07:32) (74 - 92)  BP: 129/64 (20 Sep 2019 07:32) (129/64 - 175/73)  BP(mean): --  RR: 18 (20 Sep 2019 07:32) (16 - 19)  SpO2: 100% (20 Sep 2019 07:32) (96% - 100%)  Daily     Daily     GENERAL:  Appears stated age, well-groomed, well-nourished, no distress  CHEST:  Full & symmetric excursion, no increased effort, breath sounds clear  HEART:  Regular rhythm, S1, S2, no murmur/rub/S3/S4  ABDOMEN:  Hard, distended, tender to palpation in LUQ and LLQ, hypoactive bowel sounds in all four quadrants, no masses      LABS:                        6.8    97.11 )-----------( 180      ( 20 Sep 2019 06:35 )             23.4     09-20    124<L>  |  92<L>  |  12  ----------------------------<  131<H>  4.7   |  22  |  0.68    Ca    7.4<L>      20 Sep 2019 06:32  Phos  2.2     09-20  Mg     1.8     09-20    TPro  5.5<L>  /  Alb  2.6<L>  /  TBili  0.7  /  DBili  x   /  AST  50<H>  /  ALT  66<H>  /  AlkPhos  160<H>  09-20    LIVER FUNCTIONS - ( 20 Sep 2019 06:32 )  Alb: 2.6 g/dL / Pro: 5.5 g/dL / ALK PHOS: 160 u/L / ALT: 66 u/L / AST: 50 u/L / GGT: x           PT/INR - ( 20 Sep 2019 06:35 )   PT: 14.0 SEC;   INR: 1.25          PTT - ( 20 Sep 2019 06:35 )  PTT:27.9 SEC        Imaging:    BOWEL: No bowel obstruction or inflammation. Focal, mildly distended   loops of small bowel in the left hemiabdomen without transition point may   represent a focal paralytic ileus. Colonic diverticulosis without   diverticulitis. Appendix is normal.  PERITONEUM: No ascites.  VESSELS Atherosclerotic changes of the aorta and its branches.  RETROPERITONEUM/LYMPH NODES: No lymphadenopathy.    ABDOMINAL WALL: Small fat-containing umbilical hernia.  BONES: Degenerative changes of the spine.    IMPRESSION:     No bowel obstruction or inflammation. Focal, mildly distended loops of   small bowel in the left hemiabdomen may represent a focal paralytic   ileus. Chief Complaint: abdominal pain      HPI:  Patient is a 79y old  Male who presents with a three week history of lethargy, decreased appetite, and nausea, abd pain and distension. His abd distension and pain started yesterday night and worsened this morning. Past medical history is positive for CLL, diagnosed in 2012?, and Cirrhosis. He localized his pain to his LLQ and LUQ, and characterizes it as crampy and consistent throughout the night. He tried to take Tylenol for this pain but that did not provide relief, and he said once he received morphine in the ED, his  pain has subsided somewhat. He has been having a decreased appetite, but denied weight loss, for the past few weeks. He has also had nausea for the past three weeks, but he has not vomited. His last bowel movement was yesterday afternoon, he noted that it was small, soft, and non bloody. He denies chest pain, fevers, chills, diarrhea, vomiting. He follows with a GI: Dr. Anthony, and he notes his last colonoscopy was over ten years ago, was normal, and his last EGD was two years ago showing gastritis.  Allergies:  No Known Allergies      Home Medications:  Losartan  Furosemide    Hospital Medications:  ciprofloxacin   IVPB 400 milliGRAM(s) IV Intermittent every 12 hours  dextrose 5% + sodium chloride 0.9%. 1000 milliLiter(s) IV Continuous <Continuous>  ferrous    sulfate 325 milliGRAM(s) Oral daily  furosemide    Tablet 40 milliGRAM(s) Oral daily  furosemide   Injectable 40 milliGRAM(s) IV Push once  heparin  Injectable 5000 Unit(s) SubCutaneous every 8 hours  losartan 50 milliGRAM(s) Oral daily  metroNIDAZOLE  IVPB      ondansetron Injectable 4 milliGRAM(s) IV Push every 6 hours PRN  pantoprazole    Tablet 40 milliGRAM(s) Oral before breakfast      PMHX/PSHX:  HTN (hypertension)  H/O gastritis  CLL (chronic lymphocytic leukemia)  No significant past surgical history      Family history:  FH: CHF (congestive heart failure)      Social History: no smoking     ROS:     General:  No wt loss, fevers, chills, night sweats, fatigue,   Eyes:  Good vision, no reported pain  ENT:  No sore throat, pain, runny nose, dysphagia  CV:  No pain, palpitations, hypo/hypertension  Resp:  No dyspnea, cough, tachypnea, wheezing  GI:  See HPI  Endocrine:  No polyuria, polydipsia, cold/heat intolerance  Skin:  No rash, edema      PHYSICAL EXAM:     Vital Signs:  Vital Signs Last 24 Hrs  T(C): 37 (20 Sep 2019 05:47), Max: 37 (20 Sep 2019 01:29)  T(F): 98.6 (20 Sep 2019 05:47), Max: 98.6 (20 Sep 2019 01:29)  HR: 74 (20 Sep 2019 07:32) (74 - 92)  BP: 129/64 (20 Sep 2019 07:32) (129/64 - 175/73)  BP(mean): --  RR: 18 (20 Sep 2019 07:32) (16 - 19)  SpO2: 100% (20 Sep 2019 07:32) (96% - 100%)  Daily     Daily     GENERAL:  Appears stated age, well-groomed, well-nourished, no distress  CHEST:  Full & symmetric excursion, no increased effort, breath sounds clear  HEART:  Regular rhythm, S1, S2, no murmur/rub/S3/S4  ABDOMEN:  Hard, distended, tender to palpation in LUQ and LLQ, hypoactive bowel sounds in all four quadrants, no masses      LABS:                        6.8    97.11 )-----------( 180      ( 20 Sep 2019 06:35 )             23.4     09-20    124<L>  |  92<L>  |  12  ----------------------------<  131<H>  4.7   |  22  |  0.68    Ca    7.4<L>      20 Sep 2019 06:32  Phos  2.2     09-20  Mg     1.8     09-20    TPro  5.5<L>  /  Alb  2.6<L>  /  TBili  0.7  /  DBili  x   /  AST  50<H>  /  ALT  66<H>  /  AlkPhos  160<H>  09-20    LIVER FUNCTIONS - ( 20 Sep 2019 06:32 )  Alb: 2.6 g/dL / Pro: 5.5 g/dL / ALK PHOS: 160 u/L / ALT: 66 u/L / AST: 50 u/L / GGT: x           PT/INR - ( 20 Sep 2019 06:35 )   PT: 14.0 SEC;   INR: 1.25          PTT - ( 20 Sep 2019 06:35 )  PTT:27.9 SEC        Imaging:    BOWEL: No bowel obstruction or inflammation. Focal, mildly distended   loops of small bowel in the left hemiabdomen without transition point may   represent a focal paralytic ileus. Colonic diverticulosis without   diverticulitis. Appendix is normal.  PERITONEUM: No ascites.  VESSELS Atherosclerotic changes of the aorta and its branches.  RETROPERITONEUM/LYMPH NODES: No lymphadenopathy.    ABDOMINAL WALL: Small fat-containing umbilical hernia.  BONES: Degenerative changes of the spine.    IMPRESSION:     No bowel obstruction or inflammation. Focal, mildly distended loops of   small bowel in the left hemiabdomen may represent a focal paralytic   ileus. Chief Complaint: abdominal pain      HPI:  Patient is a 79y old  Male who presents with a three week history of lethargy, decreased appetite, and nausea, abd pain and distension. His abd distension and pain started yesterday night and worsened this morning. Past medical history is positive for CLL, diagnosed in 2012?, and Cirrhosis. He localized his pain to his LLQ and LUQ, and characterizes it as crampy and consistent throughout the night. He tried to take Tylenol for this pain but that did not provide relief, and he said once he received morphine in the ED, his  pain has subsided somewhat. He has been having a decreased appetite, but denied weight loss, for the past few weeks. He has also had nausea for the past three weeks, but he has not vomited. His last bowel movement was yesterday afternoon, he noted that it was small, soft, and non bloody. He denies chest pain, fevers, chills, diarrhea, vomiting. He follows with a GI: Dr. Anthony, and he notes his last colonoscopy was over ten years ago, was normal, and his last EGD was two years ago showing gastritis.  Allergies:  No Known Allergies      Home Medications:  Losartan  Furosemide    Hospital Medications:  ciprofloxacin   IVPB 400 milliGRAM(s) IV Intermittent every 12 hours  dextrose 5% + sodium chloride 0.9%. 1000 milliLiter(s) IV Continuous <Continuous>  ferrous    sulfate 325 milliGRAM(s) Oral daily  furosemide    Tablet 40 milliGRAM(s) Oral daily  furosemide   Injectable 40 milliGRAM(s) IV Push once  heparin  Injectable 5000 Unit(s) SubCutaneous every 8 hours  losartan 50 milliGRAM(s) Oral daily  metroNIDAZOLE  IVPB      ondansetron Injectable 4 milliGRAM(s) IV Push every 6 hours PRN  pantoprazole    Tablet 40 milliGRAM(s) Oral before breakfast      PMHX/PSHX:  HTN (hypertension)  H/O gastritis  CLL (chronic lymphocytic leukemia)  No significant past surgical history      Family history:  FH: CHF (congestive heart failure)      Social History: no smoking, illicit drugs or alcohol use    ROS:     General:  No wt loss, fevers, chills, night sweats, fatigue,   Eyes:  Good vision, no reported pain  ENT:  No sore throat, pain, runny nose, dysphagia  CV:  No pain, palpitations, hypo/hypertension  Resp:  No dyspnea, cough, tachypnea, wheezing  GI:  See HPI  Endocrine:  No polyuria, polydipsia, cold/heat intolerance  Skin:  No rash, edema  Pertinent ROS as per HPI, otherwise unremarkable      PHYSICAL EXAM:     Vital Signs:  Vital Signs Last 24 Hrs  T(C): 37 (20 Sep 2019 05:47), Max: 37 (20 Sep 2019 01:29)  T(F): 98.6 (20 Sep 2019 05:47), Max: 98.6 (20 Sep 2019 01:29)  HR: 74 (20 Sep 2019 07:32) (74 - 92)  BP: 129/64 (20 Sep 2019 07:32) (129/64 - 175/73)  BP(mean): --  RR: 18 (20 Sep 2019 07:32) (16 - 19)  SpO2: 100% (20 Sep 2019 07:32) (96% - 100%)      GENERAL:  Appears stated age, well-groomed, well-nourished, no distress  HEENT: NCAT  NECK: supple  CHEST:  Full & symmetric excursion, no increased effort, breath sounds clear  HEART:  Regular rhythm, S1, S2, no murmur/rub/S3/S4  ABDOMEN:  Firm, distended, tender to palpation in LUQ and LLQ, hypoactive bowel sounds in all four quadrants, no masses  EXT: no clubbing, cyanosis  SKIN: no rash, normal turgor  NEURO: No focal deficits, A&O x 3  PSYCH: Normal affect      LABS:                        6.8    97.11 )-----------( 180      ( 20 Sep 2019 06:35 )             23.4     09-20    124<L>  |  92<L>  |  12  ----------------------------<  131<H>  4.7   |  22  |  0.68    Ca    7.4<L>      20 Sep 2019 06:32  Phos  2.2     09-20  Mg     1.8     09-20    TPro  5.5<L>  /  Alb  2.6<L>  /  TBili  0.7  /  DBili  x   /  AST  50<H>  /  ALT  66<H>  /  AlkPhos  160<H>  09-20    LIVER FUNCTIONS - ( 20 Sep 2019 06:32 )  Alb: 2.6 g/dL / Pro: 5.5 g/dL / ALK PHOS: 160 u/L / ALT: 66 u/L / AST: 50 u/L / GGT: x           PT/INR - ( 20 Sep 2019 06:35 )   PT: 14.0 SEC;   INR: 1.25          PTT - ( 20 Sep 2019 06:35 )  PTT:27.9 SEC        Imaging:    BOWEL: No bowel obstruction or inflammation. Focal, mildly distended   loops of small bowel in the left hemiabdomen without transition point may   represent a focal paralytic ileus. Colonic diverticulosis without   diverticulitis. Appendix is normal.  PERITONEUM: No ascites.  VESSELS Atherosclerotic changes of the aorta and its branches.  RETROPERITONEUM/LYMPH NODES: No lymphadenopathy.    ABDOMINAL WALL: Small fat-containing umbilical hernia.  BONES: Degenerative changes of the spine.    IMPRESSION:     No bowel obstruction or inflammation. Focal, mildly distended loops of   small bowel in the left hemiabdomen may represent a focal paralytic   ileus.

## 2019-09-20 NOTE — H&P ADULT - NSHPREVIEWOFSYSTEMS_GEN_ALL_CORE
CONSTITUTIONAL: No fever, weight loss, + Fatigue  EYES: No eye pain, visual disturbances, or discharge  ENMT:  No difficulty hearing, tinnitus, vertigo; No sinus or throat pain  NECK: No pain or stiffness  BREASTS: No pain, masses, or nipple discharge  RESPIRATORY: No cough, wheezing, chills or hemoptysis; No shortness of breath  CARDIOVASCULAR: No chest pain, palpitations, dizziness, or leg swelling  GASTROINTESTINAL: + abdominal or epigastric pain. +nausea, NO vomiting, or hematemesis; Intermittent loose BM, No diarrhea or constipation. No melena or hematochezia.  GENITOURINARY: No dysuria, frequency, hematuria, or incontinence  NEUROLOGICAL: No headaches, memory loss, loss of strength, numbness, or tremors  SKIN: No itching, burning, rashes, or lesions   LYMPH NODES: No enlarged glands  ENDOCRINE: No heat or cold intolerance; No hair loss  MUSCULOSKELETAL: No muscle or back pain  PSYCHIATRIC: No depression, anxiety, mood swings, or difficulty sleeping  HEME/LYMPH: No easy bruising, or bleeding gums  ALLERGY AND IMMUNOLOGIC: No hives or eczema

## 2019-09-20 NOTE — ED PROVIDER NOTE - PHYSICAL EXAMINATION
PHYSICAL EXAM:  GENERAL: NAD, well-groomed, well-developed  HEAD:  Atraumatic, Normocephalic  EYES: EOMI, PERRLA, conjunctiva and sclera clear  ENMT: No tonsillar erythema, exudates, or enlargement; Moist mucous membranes  NECK: Supple, No JVD  HEART: Regular rate and rhythm; No murmurs, rubs, or gallops  RESPIRATORY: CTA B/L, No W/R/R  ABDOMEN: soft, mild distension, no masses   BACK: no cva or midline tenderness, normal ROM  NEURO: A&Ox3, nonfocal, moving all extremities  EXTREMITIES: 1+ edema, b/l, normal ROM, no cyanosis, no wounds   SKIN: warm, dry, normal color, no rash PHYSICAL EXAM:  GENERAL: NAD, well-groomed, well-developed  HEAD:  Atraumatic, Normocephalic  EYES: EOMI, PERRLA, conjunctiva and sclera clear  ENMT: No tonsillar erythema, exudates, or enlargement; Moist mucous membranes  NECK: Supple, No JVD  HEART: Regular rate and rhythm; No murmurs, rubs, or gallops  RESPIRATORY: CTA B/L, No W/R/R  ABDOMEN: soft, mild distension, no masses, mild diffuse tenderness  BACK: no cva or midline tenderness, normal ROM  NEURO: A&Ox3, nonfocal, moving all extremities  EXTREMITIES: 1+ edema, b/l, normal ROM, no cyanosis, no wounds   SKIN: warm, dry, normal color, no rash

## 2019-09-20 NOTE — PROGRESS NOTE ADULT - ASSESSMENT
78 y/o male with h/o HTN, gastritis, liver cirrhosis, CLL diagnosed in 2012 (hematologist Dr. Dean, on observation, not on chemo), who presents with 1 day history of abdominal distention, mid-left sided abdominal pain, nausea, CT no obstruction, possible focal ileus, WBC elevated to 141K, now down trending to 97K. Acute rise in WBC r/o sepsis vs. progression of CLL.

## 2019-09-20 NOTE — CONSULT NOTE ADULT - ASSESSMENT
79M with CLL, on observation, here with acute increase in WBC, LD slightly high, abdominal symptoms as above, no significant adenopathy on exam or CT a/p, no ascites, WBC decreasing suggesting reactive nature from some inflammatory response, decreasing hgb. will recommend:  _ NPO  - on allopurinol  - on IVF  - transfuse 2 units of LD, irradiated PRBCs  - keep Hgb ~ 8  - obtain stool guaiac, iron studies, B12, folate, retic, hapto  - GI evaluation  - flow cytometry on PB - took the blood to the lab  - venodynes for DVT prophylaxis at this time  - all other supportive Rx  - spoke to the tele PA [id number]. and Dr Reddy, pt and his wife  - please call for any questions [phone]

## 2019-09-20 NOTE — CHART NOTE - NSCHARTNOTEFT_GEN_A_CORE
DIscussed patient care with attending Dr. Reddy, NGT placed successfully. patient feels nauseous but currently no signs of vomiting. +bowel sounds, +abdomen distention, abdomen xray ordered to confirm placement.

## 2019-09-20 NOTE — ED PROVIDER NOTE - ATTENDING CONTRIBUTION TO CARE
80 y/o M with h/o CLL sent for leukocytosis.  Pt reports about 1 month ago, he started to develop lower extremity edema.  He was seen by his PMD at the time, started on lasix and referred to cardiology.  He reports 3 weeks of progressive fatigue and generalized weakness.  For the past 2 days reports poor appetite in setting of nausea and abd pain.  No fever, chills, cp, sob, cough, back pain, vomiting, diarrhea, urinary sxs, rash.  No dark or bloody stools.  Pt is lying comfortably in stretcher, awake and alert, nontoxic.  VSS.  Lungs cta bl.  Cards nl S1/S2, RRR, no MRG.  Abd soft distended diffusely tender no rebound or guarding.  2+ pitting pedal edema, no unilateral calf swelling.  Pt with known CLL - WBC 1 month prior in 60s, now >100.  Plan for labs, ekg, ct abd pel, admit for heme work up.

## 2019-09-20 NOTE — ED PROVIDER NOTE - PROGRESS NOTE DETAILS
Ean PGY2- WBC 141k, call from Dr. Dean, requesting IVF, allupurinol, LDH, will see in am, admit to Dr. Lennon, requesting call on cell when CT abd resulted- [phone] Ean PGY2- admitted to [facility] Reddy, Dr. Dean aware of CT and LDH results, no acute disease on CT

## 2019-09-20 NOTE — ED PROVIDER NOTE - OBJECTIVE STATEMENT
79 yoM, PMHx of CLL for 7 years presents to ED from cardiology office with concern for leukocytosis 160k. Last known WBC was 60k. Was being worked up by cardiologist for fatigue and maybe BLE edema, normal echo today but lab work resulted this evening and wife was called to being pt in. Feels tired but no pain. No fever or cough. Mild abd bloating. No neuro sx.  Heme: Dr Stahl

## 2019-09-20 NOTE — H&P ADULT - PROBLEM SELECTOR PLAN 3
-acute rise in WBC, case d/w Dr. Dean  -allopurinol, trend uric acid   -anemic 7.7-->6.8, last hgb 9.1 (8/21/19 outpt)  -transfuse 2 units of pRBC (irradiated, leukocyte depleted), iv lasix 40 mg x1 in between units

## 2019-09-20 NOTE — H&P ADULT - PROBLEM SELECTOR PLAN 7
c/w lasix  pt reports he had echo done yesterday 9/19 unremarkable, denies h/o chf  check leg duplex c/w lasix, could be d/t liver cirrhosis/hypoalbuminemia  pt reports he had echo done yesterday 9/19 unremarkable, denies h/o chf  check leg duplex

## 2019-09-20 NOTE — H&P ADULT - NSHPOUTPATIENTPROVIDERS_GEN_ALL_CORE
PCP Dr. Carballo [phone] (work), cell [phone], cardiologist Dr. Dandy Burch, hematologist Dr. Dean

## 2019-09-20 NOTE — H&P ADULT - NSHPLABSRESULTS_GEN_ALL_CORE
LABS:                        6.8    97.11 )-----------( 180      ( 20 Sep 2019 06:35 )             23.4     09-20    124<L>  |  92<L>  |  12  ----------------------------<  131<H>  4.7   |  22  |  0.68    Ca    7.4<L>      20 Sep 2019 06:32  Phos  2.2     09-20  Mg     1.8     09-20    TPro  5.5<L>  /  Alb  2.6<L>  /  TBili  0.7  /  DBili  x   /  AST  50<H>  /  ALT  66<H>  /  AlkPhos  160<H>  09-20      PT/INR - ( 20 Sep 2019 06:35 )   PT: 14.0 SEC;   INR: 1.25          PTT - ( 20 Sep 2019 06:35 )  PTT:27.9 SEC      [facility] 09-20 @ 06:35  pH: 7.36/pCO2: 40 /pO2: 26/HCO3: 22/lactate: 1.0  [facility] 09-20 @ 01:06  pH: 7.40/pCO2: 43 /pO2: < 24/HCO3: 25/lactate: 2.4      EKG:    RADIOLOGY & ADDITIONAL TESTS:  < from: CT Abdomen and Pelvis w/ IV Cont (09.20.19 @ 05:11) >      LOWER CHEST: Small leftand trace right pleural effusions with adjacent   compressive atelectasis. Coronary artery calcifications.    LIVER: Nodule contour suggestive of cirrhosis.  BILE DUCTS: Normal caliber.  GALLBLADDER: Within normal limits.  SPLEEN: Within normal limits.  PANCREAS: Within normal limits.  ADRENALS: Within normal limits.  KIDNEYS/URETERS: Within normal limits.    BLADDER: Within normal limits.  REPRODUCTIVE ORGANS: Prostate is small in size.    BOWEL: No bowel obstruction or inflammation. Focal, mildly distended   loops of small bowel in the left hemiabdomen without transition point may   represent a focal paralytic ileus. Colonic diverticulosis without   diverticulitis. Appendix is normal.  PERITONEUM: No ascites.  VESSELS Atherosclerotic changes of the aorta and its branches.  RETROPERITONEUM/LYMPH NODES: No lymphadenopathy.    ABDOMINAL WALL: Small fat-containing umbilical hernia.  BONES: Degenerative changes of the spine.    IMPRESSION:     No bowel obstruction or inflammation. Focal, mildly distended loops of   small bowel in the left hemiabdomen may represent a focal paralytic   ileus.

## 2019-09-20 NOTE — H&P ADULT - PROBLEM SELECTOR PLAN 2
-acute rise in WBC from 69K on 8/21/19 to 141K on 9/20  -r/o reactive process or sepsis vs. CLL progression, send blood/urine cultures, CXR, GI PCR  -case d/w Dr. Dean, peripheral flow cytometry doesn't suggest CLL progression, need to r/o sepsis/reactive process  -lactate 2.4 -->1, however, pt is afebrile and nontoxic appearing  -empiric cipro/flagyl for now

## 2019-09-20 NOTE — H&P ADULT - NSHPSOCIALHISTORY_GEN_ALL_CORE
Substance abuse: DENIES  Tobacco: DENIES, NEVER SMOKER  Alcohol use: DRANK OCCASIONAL WINE IN PAST, QUIT ALCOHOL 10 YEARS AGO   with wife

## 2019-09-20 NOTE — H&P ADULT - NSHPPHYSICALEXAM_GEN_ALL_CORE
Vital Signs Last 24 Hrs  T(C): 37 (20 Sep 2019 05:47), Max: 37 (20 Sep 2019 01:29)  T(F): 98.6 (20 Sep 2019 05:47), Max: 98.6 (20 Sep 2019 01:29)  HR: 74 (20 Sep 2019 07:32) (74 - 92)  BP: 129/64 (20 Sep 2019 07:32) (129/64 - 175/73)  BP(mean): --  RR: 18 (20 Sep 2019 07:32) (16 - 19)  SpO2: 100% (20 Sep 2019 07:32) (96% - 100%)  CAPILLARY BLOOD GLUCOSE        I&O's Summary      PHYSICAL EXAM:  GENERAL: NAD, well-developed  HEAD:  Atraumatic, Normocephalic  EYES: EOMI, PERRLA, conjunctiva and sclera clear  NECK: Supple, No JVD  CHEST/LUNG: Clear to auscultation bilaterally; No wheeze  HEART: Regular rate and rhythm; No murmurs, rubs, or gallops  ABDOMEN: Soft, distended, mid-left sided mild tenderness to palpation, Bowel sounds present  EXTREMITIES:  2+ Peripheral Pulses, No clubbing, cyanosis, 2+ pitting leg edema  PSYCH: AAOx3  NEUROLOGY: non-focal  SKIN: No rashes or lesions

## 2019-09-20 NOTE — ED ADULT NURSE NOTE - OBJECTIVE STATEMENT
Patient received with wife for weakness and states that his "WBC is high and RBC are low." Denies any pain/cp/sob. Patient warm to touch, afebrile. Skin intact. Will ctm. LAC#[id number] Report endorsed to primary RN.

## 2019-09-21 LAB
ALBUMIN SERPL ELPH-MCNC: 2.8 G/DL — LOW (ref 3.3–5)
ALP SERPL-CCNC: 140 U/L — HIGH (ref 40–120)
ALT FLD-CCNC: 56 U/L — HIGH (ref 4–41)
AMMONIA BLD-MCNC: 38 UMOL/L — SIGNIFICANT CHANGE UP (ref 11–55)
ANION GAP SERPL CALC-SCNC: 10 MMO/L — SIGNIFICANT CHANGE UP (ref 7–14)
APPEARANCE UR: CLEAR — SIGNIFICANT CHANGE UP
AST SERPL-CCNC: 40 U/L — SIGNIFICANT CHANGE UP (ref 4–40)
BACTERIA # UR AUTO: NEGATIVE — SIGNIFICANT CHANGE UP
BASOPHILS # BLD AUTO: 0.08 K/UL — SIGNIFICANT CHANGE UP (ref 0–0.2)
BASOPHILS NFR BLD AUTO: 0.1 % — SIGNIFICANT CHANGE UP (ref 0–2)
BILIRUB SERPL-MCNC: 1.8 MG/DL — HIGH (ref 0.2–1.2)
BILIRUB UR-MCNC: NEGATIVE — SIGNIFICANT CHANGE UP
BLOOD UR QL VISUAL: NEGATIVE — SIGNIFICANT CHANGE UP
BUN SERPL-MCNC: 13 MG/DL — SIGNIFICANT CHANGE UP (ref 7–23)
CALCIUM SERPL-MCNC: 7.9 MG/DL — LOW (ref 8.4–10.5)
CHLORIDE SERPL-SCNC: 93 MMOL/L — LOW (ref 98–107)
CO2 SERPL-SCNC: 22 MMOL/L — SIGNIFICANT CHANGE UP (ref 22–31)
COLOR SPEC: YELLOW — SIGNIFICANT CHANGE UP
CREAT SERPL-MCNC: 0.72 MG/DL — SIGNIFICANT CHANGE UP (ref 0.5–1.3)
EOSINOPHIL # BLD AUTO: 0.05 K/UL — SIGNIFICANT CHANGE UP (ref 0–0.5)
EOSINOPHIL NFR BLD AUTO: 0 % — SIGNIFICANT CHANGE UP (ref 0–6)
FERRITIN SERPL-MCNC: 337.3 NG/ML — SIGNIFICANT CHANGE UP (ref 30–400)
FOLATE SERPL-MCNC: > 20 NG/ML — HIGH (ref 4.7–20)
GLUCOSE SERPL-MCNC: 133 MG/DL — HIGH (ref 70–99)
GLUCOSE UR-MCNC: NEGATIVE — SIGNIFICANT CHANGE UP
HAPTOGLOB SERPL-MCNC: 152 MG/DL — SIGNIFICANT CHANGE UP (ref 34–200)
HAV IGM SER-ACNC: NONREACTIVE — SIGNIFICANT CHANGE UP
HBV CORE IGM SER-ACNC: REACTIVE — SIGNIFICANT CHANGE UP
HBV SURFACE AG SER-ACNC: REACTIVE — SIGNIFICANT CHANGE UP
HCT VFR BLD CALC: 30.2 % — LOW (ref 39–50)
HCV AB S/CO SERPL IA: 0.17 S/CO — SIGNIFICANT CHANGE UP (ref 0–0.99)
HCV AB SERPL-IMP: SIGNIFICANT CHANGE UP
HGB BLD-MCNC: 9.7 G/DL — LOW (ref 13–17)
HYALINE CASTS # UR AUTO: NEGATIVE — SIGNIFICANT CHANGE UP
IMM GRANULOCYTES NFR BLD AUTO: 0.2 % — SIGNIFICANT CHANGE UP (ref 0–1.5)
IRON SATN MFR SERPL: 213 UG/DL — SIGNIFICANT CHANGE UP (ref 155–535)
IRON SATN MFR SERPL: 72 UG/DL — SIGNIFICANT CHANGE UP (ref 45–165)
KETONES UR-MCNC: NEGATIVE — SIGNIFICANT CHANGE UP
LEUKOCYTE ESTERASE UR-ACNC: NEGATIVE — SIGNIFICANT CHANGE UP
LYMPHOCYTES # BLD AUTO: 92 % — HIGH (ref 13–44)
LYMPHOCYTES # BLD AUTO: 98.88 K/UL — HIGH (ref 1–3.3)
MANUAL SMEAR VERIFICATION: SIGNIFICANT CHANGE UP
MCHC RBC-ENTMCNC: 23.7 PG — LOW (ref 27–34)
MCHC RBC-ENTMCNC: 32.1 % — SIGNIFICANT CHANGE UP (ref 32–36)
MCV RBC AUTO: 73.7 FL — LOW (ref 80–100)
MONOCYTES # BLD AUTO: 1.09 K/UL — HIGH (ref 0–0.9)
MONOCYTES NFR BLD AUTO: 1 % — LOW (ref 2–14)
NEUTROPHILS # BLD AUTO: 7.14 K/UL — SIGNIFICANT CHANGE UP (ref 1.8–7.4)
NEUTROPHILS NFR BLD AUTO: 6.7 % — LOW (ref 43–77)
NITRITE UR-MCNC: NEGATIVE — SIGNIFICANT CHANGE UP
NRBC # FLD: 0 K/UL — SIGNIFICANT CHANGE UP (ref 0–0)
PH UR: 6 — SIGNIFICANT CHANGE UP (ref 5–8)
PLATELET # BLD AUTO: 145 K/UL — LOW (ref 150–400)
PMV BLD: SIGNIFICANT CHANGE UP FL (ref 7–13)
POTASSIUM SERPL-MCNC: 4.2 MMOL/L — SIGNIFICANT CHANGE UP (ref 3.5–5.3)
POTASSIUM SERPL-SCNC: 4.2 MMOL/L — SIGNIFICANT CHANGE UP (ref 3.5–5.3)
PROT SERPL-MCNC: 6.1 G/DL — SIGNIFICANT CHANGE UP (ref 6–8.3)
PROT UR-MCNC: 30 — SIGNIFICANT CHANGE UP
RBC # BLD: 4.1 M/UL — LOW (ref 4.2–5.8)
RBC # FLD: 19.7 % — HIGH (ref 10.3–14.5)
RBC CASTS # UR COMP ASSIST: HIGH (ref 0–?)
RETICS #: 98 K/UL — SIGNIFICANT CHANGE UP (ref 25–125)
RETICS/RBC NFR: 2.4 % — SIGNIFICANT CHANGE UP (ref 0.5–2.5)
SODIUM SERPL-SCNC: 125 MMOL/L — LOW (ref 135–145)
SP GR SPEC: 1.03 — SIGNIFICANT CHANGE UP (ref 1–1.04)
SPECIMEN SOURCE: SIGNIFICANT CHANGE UP
SPECIMEN SOURCE: SIGNIFICANT CHANGE UP
SQUAMOUS # UR AUTO: SIGNIFICANT CHANGE UP
UIBC SERPL-MCNC: 141.1 UG/DL — SIGNIFICANT CHANGE UP (ref 110–370)
UROBILINOGEN FLD QL: NORMAL — SIGNIFICANT CHANGE UP
VIT B12 SERPL-MCNC: 2000 PG/ML — HIGH (ref 200–900)
WBC # BLD: 107.5 K/UL — CRITICAL HIGH (ref 3.8–10.5)
WBC # FLD AUTO: 107.5 K/UL — CRITICAL HIGH (ref 3.8–10.5)
WBC UR QL: SIGNIFICANT CHANGE UP (ref 0–?)

## 2019-09-21 PROCEDURE — 71045 X-RAY EXAM CHEST 1 VIEW: CPT | Mod: 26

## 2019-09-21 RX ORDER — FUROSEMIDE 40 MG
40 TABLET ORAL ONCE
Refills: 0 | Status: COMPLETED | OUTPATIENT
Start: 2019-09-21 | End: 2019-09-22

## 2019-09-21 RX ORDER — TAMSULOSIN HYDROCHLORIDE 0.4 MG/1
0.4 CAPSULE ORAL AT BEDTIME
Refills: 0 | Status: DISCONTINUED | OUTPATIENT
Start: 2019-09-21 | End: 2019-09-29

## 2019-09-21 RX ADMIN — HEPARIN SODIUM 5000 UNIT(S): 5000 INJECTION INTRAVENOUS; SUBCUTANEOUS at 14:11

## 2019-09-21 RX ADMIN — PANTOPRAZOLE SODIUM 40 MILLIGRAM(S): 20 TABLET, DELAYED RELEASE ORAL at 14:12

## 2019-09-21 RX ADMIN — HEPARIN SODIUM 5000 UNIT(S): 5000 INJECTION INTRAVENOUS; SUBCUTANEOUS at 05:46

## 2019-09-21 RX ADMIN — HEPARIN SODIUM 5000 UNIT(S): 5000 INJECTION INTRAVENOUS; SUBCUTANEOUS at 23:04

## 2019-09-21 RX ADMIN — SODIUM CHLORIDE 75 MILLILITER(S): 9 INJECTION, SOLUTION INTRAVENOUS at 14:19

## 2019-09-21 RX ADMIN — Medication 100 MILLIGRAM(S): at 14:12

## 2019-09-21 RX ADMIN — Medication 200 MILLIGRAM(S): at 19:04

## 2019-09-21 RX ADMIN — Medication 100 MILLIGRAM(S): at 01:15

## 2019-09-21 RX ADMIN — SODIUM CHLORIDE 75 MILLILITER(S): 9 INJECTION, SOLUTION INTRAVENOUS at 19:03

## 2019-09-21 RX ADMIN — Medication 200 MILLIGRAM(S): at 05:46

## 2019-09-21 NOTE — PROGRESS NOTE ADULT - ASSESSMENT
79M with CLL, on observation, here with acute increase in WBC, LD slightly high, abdominal symptoms as above, no significant adenopathy on exam or CT a/p, no ascites, WBC decreasing suggesting reactive nature from some inflammatory response, decreasing hgb. will recommend:  _ NPO, discussed in detail with him and his wife, will retry NGT if he has n/v  - on allopurinol  - Hgb slightly better after transfusion and pt is feeling a bit stronger  - keep Hgb ~ 8  - obtain stool guaiac,  - GI evaluation appreciated, f/u recommendations  - flow cytometry on PB - no evidence of transformation to Serrato's  - venodynes for DVT prophylaxis at this time  - obtain CT chest  for above chest symptoms  -  evaluation for urinary symptoms  - monitor CBC/diff and CMP (LFTs)  - all other supportive Rx  - spoke to the NP, pt and his wife in detail  - please call for any questions [phone]

## 2019-09-21 NOTE — PROGRESS NOTE ADULT - SUBJECTIVE AND OBJECTIVE BOX
Pt is slightly better but has new symptoms of increasing hesitancy during urination, cough with little phlegm, and some SOB. Could not tolerate the NGT and does not want it. No fevers or chills and no n/v at this time. No BM or flatus. Rest of the ROS unchanged to unremarkable.       Meds:  allopurinol 300 milliGRAM(s) Oral daily  ciprofloxacin   IVPB 400 milliGRAM(s) IV Intermittent every 12 hours  dextrose 5% + sodium chloride 0.9%. 1000 milliLiter(s) IV Continuous <Continuous>  ferrous    sulfate 325 milliGRAM(s) Oral daily  furosemide    Tablet 40 milliGRAM(s) Oral daily  heparin  Injectable 5000 Unit(s) SubCutaneous every 8 hours  influenza   Vaccine 0.5 milliLiter(s) IntraMuscular once  lactobacillus acidophilus 1 Tablet(s) Oral two times a day  losartan 50 milliGRAM(s) Oral daily  metroNIDAZOLE  IVPB 500 milliGRAM(s) IV Intermittent every 12 hours  ondansetron Injectable 4 milliGRAM(s) IV Push every 6 hours PRN  pantoprazole  Injectable 40 milliGRAM(s) IV Push daily      Vital Signs Last 24 Hrs  T(C): 36.7 (21 Sep 2019 10:14), Max: 37.6 (20 Sep 2019 23:30)  T(F): 98 (21 Sep 2019 10:14), Max: 99.6 (20 Sep 2019 23:30)  HR: 79 (21 Sep 2019 10:14) (77 - 91)  BP: 159/87 (21 Sep 2019 10:14) (130/54 - 159/87)  BP(mean): --  RR: 17 (21 Sep 2019 10:14) (16 - 20)  SpO2: 98% (21 Sep 2019 10:14) (97% - 100%)                          9.7    107.50 )-----------( 145      ( 21 Sep 2019 03:20 )             30.2     Hapto, B12, folate, retic all acceptable, ferritin > 200.      09-21    125<L>  |  93<L>  |  13  ----------------------------<  133<H>  4.2   |  22  |  0.72    Ca    7.9<L>      21 Sep 2019 03:20  Phos  2.2     09-20  Mg     1.8     09-20    TPro  6.1  /  Alb  2.8<L>  /  TBili  1.8<H>  /  DBili  x   /  AST  40  /  ALT  56<H>  /  AlkPhos  140<H>  09-21              PT/INR - ( 20 Sep 2019 06:35 )   PT: 14.0 SEC;   INR: 1.25          PTT - ( 20 Sep 2019 06:35 )  PTT:27.9 SEC    venous dopplers: IMPRESSION:     No evidence of deep venous thrombosis in either lower extremity.                        CELIA HILARIO M.D., ATTENDING RADIOLOGIST  This document has been electronically signed. Sep 21 2019  8:12AM    Flow cytometry on PB: Discussed in detail with Dr Schmidt yesterday : TM Interpretation:   Flow Cytometry Final Report  ________________________________________________________________________  Specimen: Peripheral blood  Collected: 09/20/2019 12:30  Received: 09/20/2019 13:07  Processed: 09/20/2019 13:15  Reported: 09/20/2019 17:20  Accession #: [id number]  Surgical Pathology Number:  ________________________________________________________________________  CLINICAL DATA: R/O CLL    ________________________________________________________________________  CD45/Side Scatter Differential  ________________________________________________________________________  DIAGNOSIS:  Peripheral blood:       - Monotypic B-cells (80% of total), positive for dim kappa, CD20, CD19, CD5, CD23; negative  FMC-7, CD38 and CD10 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    Please see interpretation.    INTERPRETATION:  MORPHOLOGY: SMEAR: Not available for review at this time.    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  Suggest CLL FISH panel which includes FISH for t(11;14) and to  evaluate for prognostic factors associated with CLL.    Note: The case was discussed with Dr. Dean on 9/20/19  ___    ______________________________________________________________________  Viability ................. 80 %    Values reported are based on the lymphocyte gate. (Bright CD45 positive; low side scatter, low  forward scatter).  85% of cells.    CD45 .......... 100 %  CD2 ........... 2 %  CD3 ........... 1 %  CD5 ........... 99 %  CD7 ........... 1 %  CD4 ........... 1 %  CD8 ........... <1 %  CD16 .......... 1 %  CD56........... 1 %  CD57 .......... <1 %  CD10 .......... 1 %  CD19 .......... 99 %  CD20 .......... 99%  CD23 .......... 95 %  FMC-7 ......... 3 %  CD5+/CD19+ .... 99 %  CD38+/CD19+ .... <1 %  CD19/kappa ......... 94 %  CD19/lambda ........ <1 %  HLA-DR ........ 99 %  CD38 .......... 2 %  TCR A/B ....... 1 %  TCR G/D ....... <1 %    Results reported are based on an open gate.  FMC-7 ......... .  CD14 .......... <1 %  CD64 .......... <1 %  CD34 .......... <1 %   ......... <1 %  CD11b ......... 11 %  CD13 .......... 11 %  CD15 .......... 2 %  CD33 .......... 2 %        Verified By: Sharon Schmidt M.D., MD  (Electronic Signature)    This test was developed and its performance characteristics determined by the Flow Cytometry  Laboratory at [facility]. It has not been cleared or approved by the U.S. Food and Drug  Administration.  The FDA has determined that such clearance or approval is not necessary. This test is used for  clinical purposes. It should not be regarded as investigational or for research. This laboratory is  certified under the Clinical Laboratory Improvement Amendment of 1988 ("CLIA") as qualified to  perform high complexity clinical testing.    _ (09.20.19 @ 13:07)

## 2019-09-21 NOTE — CHART NOTE - NSCHARTNOTEFT_GEN_A_CORE
as per Dr. Dean : urology for urine hesitancy: Bladder scan < 50 flomax added : no need to call urology at this time as per medical attending   9/21 : NGT replaced f/u CXR   if no improvement please consult sx as per medical attending

## 2019-09-21 NOTE — PROGRESS NOTE ADULT - SUBJECTIVE AND OBJECTIVE BOX
Patient is a 79y old  Male who presents with a chief complaint of abdominal pain, nausea, abdominal distention, rising leukocytosis (20 Sep 2019 10:26)      SUBJECTIVE / OVERNIGHT EVENTS:  NGT in place. Afebrile  Review of Systems:   CONSTITUTIONAL: No fever, weight loss, or fatigue  EYES: No eye pain, visual disturbances, or discharge  ENMT:  No difficulty hearing, tinnitus, vertigo; No sinus or throat pain  NECK: No pain or stiffness  BREASTS: No pain, masses, or nipple discharge  RESPIRATORY: No cough, wheezing, chills or hemoptysis; No shortness of breath  CARDIOVASCULAR: No chest pain, palpitations, dizziness, or leg swelling  GASTROINTESTINAL: No abdominal or epigastric pain. No vomiting, or hematemesis; No diarrhea or constipation. No melena or hematochezia.  GENITOURINARY: No dysuria, frequency, hematuria, or incontinence  NEUROLOGICAL: No headaches, memory loss, loss of strength, numbness, or tremors  SKIN: No itching, burning, rashes, or lesions   LYMPH NODES: No enlarged glands  ENDOCRINE: No heat or cold intolerance; No hair loss  MUSCULOSKELETAL: No joint pain or swelling; No muscle, back, or extremity pain  PSYCHIATRIC: No depression, anxiety, mood swings, or difficulty sleeping  HEME/LYMPH: No easy bruising, or bleeding gums  ALLERY AND IMMUNOLOGIC: No hives or eczema    MEDICATIONS  (STANDING):  ciprofloxacin   IVPB 400 milliGRAM(s) IV Intermittent every 12 hours  dextrose 5% + sodium chloride 0.9%. 1000 milliLiter(s) (75 mL/Hr) IV Continuous <Continuous>  ferrous    sulfate 325 milliGRAM(s) Oral daily  furosemide    Tablet 40 milliGRAM(s) Oral daily  heparin  Injectable 5000 Unit(s) SubCutaneous every 8 hours  lactobacillus acidophilus 1 Tablet(s) Oral two times a day  losartan 50 milliGRAM(s) Oral daily  metroNIDAZOLE  IVPB 500 milliGRAM(s) IV Intermittent every 12 hours  pantoprazole  Injectable 40 milliGRAM(s) IV Push daily    MEDICATIONS  (PRN):  ondansetron Injectable 4 milliGRAM(s) IV Push every 6 hours PRN Nausea      PHYSICAL EXAM:  Vital Signs Last 24 Hrs  T(C): 37 (20 Sep 2019 15:10), Max: 37 (20 Sep 2019 01:29)  T(F): 98.6 (20 Sep 2019 15:10), Max: 98.6 (20 Sep 2019 01:29)  HR: 81 (20 Sep 2019 15:10) (74 - 92)  BP: 147/79 (20 Sep 2019 15:10) (129/64 - 175/73)  BP(mean): --  RR: 20 (20 Sep 2019 15:10) (16 - 20)  SpO2: 100% (20 Sep 2019 15:10) (96% - 100%)  I&O's Summary    GENERAL: NAD, well-developed  HEAD:  Atraumatic, Normocephalic  EYES: EOMI, PERRLA, conjunctiva and sclera clear  NECK: Supple, No JVD  CHEST/LUNG: Clear to auscultation bilaterally; No wheeze  HEART: Regular rate and rhythm; No murmurs, rubs, or gallops  ABDOMEN: Soft, Nontender, distended; Bowel sounds present  EXTREMITIES:  2+ Peripheral Pulses, No clubbing, cyanosis, or edema  PSYCH: AAOx3  NEUROLOGY: non-focal  SKIN: No rashes or lesions    LABS:  CAPILLARY BLOOD GLUCOSE                              6.8    97.11 )-----------( 180      ( 20 Sep 2019 06:35 )             23.4     09-20    124<L>  |  92<L>  |  12  ----------------------------<  131<H>  4.7   |  22  |  0.68    Ca    7.4<L>      20 Sep 2019 06:32  Phos  2.2     09-20  Mg     1.8     09-20    TPro  5.5<L>  /  Alb  2.6<L>  /  TBili  0.7  /  DBili  x   /  AST  50<H>  /  ALT  66<H>  /  AlkPhos  160<H>  09-20    PT/INR - ( 20 Sep 2019 06:35 )   PT: 14.0 SEC;   INR: 1.25          PTT - ( 20 Sep 2019 06:35 )  PTT:27.9 SEC          RADIOLOGY & ADDITIONAL TESTS:    Imaging Personally Reviewed:    Consultant(s) Notes Reviewed:      Care Discussed with Consultants/Other Providers:

## 2019-09-22 LAB
ALBUMIN SERPL ELPH-MCNC: 2.7 G/DL — LOW (ref 3.3–5)
ALP SERPL-CCNC: 144 U/L — HIGH (ref 40–120)
ALT FLD-CCNC: 51 U/L — HIGH (ref 4–41)
ANION GAP SERPL CALC-SCNC: 10 MMO/L — SIGNIFICANT CHANGE UP (ref 7–14)
AST SERPL-CCNC: 49 U/L — HIGH (ref 4–40)
BACTERIA UR CULT: SIGNIFICANT CHANGE UP
BILIRUB SERPL-MCNC: 1.3 MG/DL — HIGH (ref 0.2–1.2)
BUN SERPL-MCNC: 15 MG/DL — SIGNIFICANT CHANGE UP (ref 7–23)
CALCIUM SERPL-MCNC: 7.8 MG/DL — LOW (ref 8.4–10.5)
CHLORIDE SERPL-SCNC: 98 MMOL/L — SIGNIFICANT CHANGE UP (ref 98–107)
CO2 SERPL-SCNC: 23 MMOL/L — SIGNIFICANT CHANGE UP (ref 22–31)
CREAT SERPL-MCNC: 0.77 MG/DL — SIGNIFICANT CHANGE UP (ref 0.5–1.3)
GLUCOSE SERPL-MCNC: 109 MG/DL — HIGH (ref 70–99)
HCT VFR BLD CALC: 33.4 % — LOW (ref 39–50)
HGB BLD-MCNC: 10.3 G/DL — LOW (ref 13–17)
MAGNESIUM SERPL-MCNC: 1.9 MG/DL — SIGNIFICANT CHANGE UP (ref 1.6–2.6)
MCHC RBC-ENTMCNC: 23 PG — LOW (ref 27–34)
MCHC RBC-ENTMCNC: 30.8 % — LOW (ref 32–36)
MCV RBC AUTO: 74.6 FL — LOW (ref 80–100)
NRBC # FLD: 0 K/UL — SIGNIFICANT CHANGE UP (ref 0–0)
PHOSPHATE SERPL-MCNC: 2 MG/DL — LOW (ref 2.5–4.5)
PLATELET # BLD AUTO: 174 K/UL — SIGNIFICANT CHANGE UP (ref 150–400)
PMV BLD: SIGNIFICANT CHANGE UP FL (ref 7–13)
POTASSIUM SERPL-MCNC: 4.4 MMOL/L — SIGNIFICANT CHANGE UP (ref 3.5–5.3)
POTASSIUM SERPL-SCNC: 4.4 MMOL/L — SIGNIFICANT CHANGE UP (ref 3.5–5.3)
PROT SERPL-MCNC: 6.1 G/DL — SIGNIFICANT CHANGE UP (ref 6–8.3)
RBC # BLD: 4.48 M/UL — SIGNIFICANT CHANGE UP (ref 4.2–5.8)
RBC # FLD: 20.3 % — HIGH (ref 10.3–14.5)
SODIUM SERPL-SCNC: 131 MMOL/L — LOW (ref 135–145)
SPECIMEN SOURCE: SIGNIFICANT CHANGE UP
WBC # BLD: 115.92 K/UL — CRITICAL HIGH (ref 3.8–10.5)
WBC # FLD AUTO: 115.92 K/UL — CRITICAL HIGH (ref 3.8–10.5)

## 2019-09-22 PROCEDURE — 71250 CT THORAX DX C-: CPT | Mod: 26

## 2019-09-22 RX ORDER — BENZOCAINE AND MENTHOL 5; 1 G/100ML; G/100ML
1 LIQUID ORAL EVERY 8 HOURS
Refills: 0 | Status: DISCONTINUED | OUTPATIENT
Start: 2019-09-22 | End: 2019-09-29

## 2019-09-22 RX ORDER — POTASSIUM PHOSPHATE, MONOBASIC POTASSIUM PHOSPHATE, DIBASIC 236; 224 MG/ML; MG/ML
15 INJECTION, SOLUTION INTRAVENOUS ONCE
Refills: 0 | Status: COMPLETED | OUTPATIENT
Start: 2019-09-22 | End: 2019-09-22

## 2019-09-22 RX ADMIN — POTASSIUM PHOSPHATE, MONOBASIC POTASSIUM PHOSPHATE, DIBASIC 62.5 MILLIMOLE(S): 236; 224 INJECTION, SOLUTION INTRAVENOUS at 11:56

## 2019-09-22 RX ADMIN — Medication 100 MILLIGRAM(S): at 19:00

## 2019-09-22 RX ADMIN — TAMSULOSIN HYDROCHLORIDE 0.4 MILLIGRAM(S): 0.4 CAPSULE ORAL at 21:14

## 2019-09-22 RX ADMIN — Medication 40 MILLIGRAM(S): at 01:09

## 2019-09-22 RX ADMIN — HEPARIN SODIUM 5000 UNIT(S): 5000 INJECTION INTRAVENOUS; SUBCUTANEOUS at 13:43

## 2019-09-22 RX ADMIN — Medication 100 MILLIGRAM(S): at 07:03

## 2019-09-22 RX ADMIN — BENZOCAINE AND MENTHOL 1 LOZENGE: 5; 1 LIQUID ORAL at 09:50

## 2019-09-22 RX ADMIN — BENZOCAINE AND MENTHOL 1 LOZENGE: 5; 1 LIQUID ORAL at 19:10

## 2019-09-22 RX ADMIN — Medication 100 MILLIGRAM(S): at 01:16

## 2019-09-22 RX ADMIN — HEPARIN SODIUM 5000 UNIT(S): 5000 INJECTION INTRAVENOUS; SUBCUTANEOUS at 07:02

## 2019-09-22 RX ADMIN — Medication 325 MILLIGRAM(S): at 22:09

## 2019-09-22 RX ADMIN — PANTOPRAZOLE SODIUM 40 MILLIGRAM(S): 20 TABLET, DELAYED RELEASE ORAL at 12:00

## 2019-09-22 RX ADMIN — Medication 200 MILLIGRAM(S): at 07:02

## 2019-09-22 RX ADMIN — HEPARIN SODIUM 5000 UNIT(S): 5000 INJECTION INTRAVENOUS; SUBCUTANEOUS at 21:10

## 2019-09-22 NOTE — PROGRESS NOTE ADULT - SUBJECTIVE AND OBJECTIVE BOX
Patient is a 79y old  Male who presents with a chief complaint of abdominal pain, nausea, abdominal distention, rising leukocytosis (20 Sep 2019 10:26)      SUBJECTIVE / OVERNIGHT EVENTS:  NGT to intermittent suctioning, 500 cc fluid was evacuated.  Abdomen is distended  Review of Systems:   CONSTITUTIONAL: No fever, weight loss, or fatigue  EYES: No eye pain, visual disturbances, or discharge  ENMT:  No difficulty hearing, tinnitus, vertigo; No sinus or throat pain  NECK: No pain or stiffness  BREASTS: No pain, masses, or nipple discharge  RESPIRATORY: No cough, wheezing, chills or hemoptysis; No shortness of breath  CARDIOVASCULAR: No chest pain, palpitations, dizziness, or leg swelling  GASTROINTESTINAL: No abdominal or epigastric pain. No vomiting, or hematemesis; No diarrhea or constipation. No melena or hematochezia. passing gas, but is less in frequency  GENITOURINARY: No dysuria, frequency, hematuria, or incontinence  NEUROLOGICAL: No headaches, memory loss, loss of strength, numbness, or tremors  SKIN: No itching, burning, rashes, or lesions   LYMPH NODES: No enlarged glands  ENDOCRINE: No heat or cold intolerance; No hair loss  MUSCULOSKELETAL: No joint pain or swelling; No muscle, back, or extremity pain  PSYCHIATRIC: No depression, anxiety, mood swings, or difficulty sleeping  HEME/LYMPH: No easy bruising, or bleeding gums  ALLERY AND IMMUNOLOGIC: No hives or eczema    MEDICATIONS  (STANDING):  ciprofloxacin   IVPB 400 milliGRAM(s) IV Intermittent every 12 hours  dextrose 5% + sodium chloride 0.9%. 1000 milliLiter(s) (75 mL/Hr) IV Continuous <Continuous>  ferrous    sulfate 325 milliGRAM(s) Oral daily  furosemide    Tablet 40 milliGRAM(s) Oral daily  heparin  Injectable 5000 Unit(s) SubCutaneous every 8 hours  lactobacillus acidophilus 1 Tablet(s) Oral two times a day  losartan 50 milliGRAM(s) Oral daily  metroNIDAZOLE  IVPB 500 milliGRAM(s) IV Intermittent every 12 hours  pantoprazole  Injectable 40 milliGRAM(s) IV Push daily    MEDICATIONS  (PRN):  ondansetron Injectable 4 milliGRAM(s) IV Push every 6 hours PRN Nausea      PHYSICAL EXAM:  Vital Signs Last 24 Hrs  T(C): 37 (20 Sep 2019 15:10), Max: 37 (20 Sep 2019 01:29)  T(F): 98.6 (20 Sep 2019 15:10), Max: 98.6 (20 Sep 2019 01:29)  HR: 81 (20 Sep 2019 15:10) (74 - 92)  BP: 147/79 (20 Sep 2019 15:10) (129/64 - 175/73)  BP(mean): --  RR: 20 (20 Sep 2019 15:10) (16 - 20)  SpO2: 100% (20 Sep 2019 15:10) (96% - 100%)  I&O's Summary    GENERAL: NAD, well-developed  HEAD:  Atraumatic, Normocephalic  EYES: EOMI, PERRLA, conjunctiva and sclera clear  NECK: Supple, No JVD  CHEST/LUNG: Clear to auscultation bilaterally; No wheeze  HEART: Regular rate and rhythm; No murmurs, rubs, or gallops  ABDOMEN: Soft, Nontender, distended; Bowel sounds sluggish  EXTREMITIES:  2+ Peripheral Pulses, No clubbing, cyanosis, or edema  PSYCH: AAOx3  NEUROLOGY: non-focal  SKIN: No rashes or lesions    LABS:  CAPILLARY BLOOD GLUCOSE                              6.8    97.11 )-----------( 180      ( 20 Sep 2019 06:35 )             23.4     09-20    124<L>  |  92<L>  |  12  ----------------------------<  131<H>  4.7   |  22  |  0.68    Ca    7.4<L>      20 Sep 2019 06:32  Phos  2.2     09-20  Mg     1.8     09-20    TPro  5.5<L>  /  Alb  2.6<L>  /  TBili  0.7  /  DBili  x   /  AST  50<H>  /  ALT  66<H>  /  AlkPhos  160<H>  09-20    PT/INR - ( 20 Sep 2019 06:35 )   PT: 14.0 SEC;   INR: 1.25          PTT - ( 20 Sep 2019 06:35 )  PTT:27.9 SEC          RADIOLOGY & ADDITIONAL TESTS:    Imaging Personally Reviewed:    Consultant(s) Notes Reviewed:      Care Discussed with Consultants/Other Providers:

## 2019-09-22 NOTE — CONSULT NOTE ADULT - ASSESSMENT
80 y/o male with h/o HTN, gastritis, liver cirrhosis, CLL diagnosed in 2012 (hematologist Dr. Dean, on observation, not on chemo), who presents with 1 day history of abdominal distention, mid-left sided abdominal pain, nausea, with CT consistent with possible focal ileus, cirrhotic liver.  NG tube was placed and abdominal distension has improved.  Work up of cirrhosis was sent including Hepatitis profile- Hepatitis BsAg came back positive, Hep B core IgM also reactive   Patient and his wife said he was diagnosed with cirrhosis 2 years ago.    Labs also showed: INR: 1.25, PLT: 174, ALP: 144, AST: 49, ALT: 51, T. 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 DDx: CLL related etiology vs electrolyte disturbance, vs infectious etiology  # Hyponatremia, hypophosphatemia .  # CLL.    Recommendations:  - trend CBC, INR, CMP   - send hepatitis B PCR, hepatitis BeAg and Hepatitis BeAb  - check Hepatitis D serology,    - avoid all hepatotoxic agents  - obtain abdominal x ray  - serial abdominal exams  -  continue with NG tube for decompression  - ambulation as tolerated  - follow up, SHERMAN, anti-smooth muscle, antimitochondrial antibodies results  - will follow 80 y/o male with h/o HTN, gastritis, liver cirrhosis, CLL diagnosed in 2012 (hematologist Dr. Dean, on observation, not on chemo), who presents with 1 day history of abdominal distention, mid-left sided abdominal pain, nausea, with CT consistent with possible focal ileus, cirrhotic liver.  NG tube was placed and abdominal distension has improved.  Work up of cirrhosis was sent including Hepatitis profile- Hepatitis BsAg came back positive, Hep B core IgM also reactive   Patient and his wife said he was diagnosed with cirrhosis 2 years ago.    Labs also showed: INR: 1.25, PLT: 174, ALP: 144, AST: 49, ALT: 51, T. Bili: 1.3    Impression:  # Hepatitis B  # Cirrhosis: Suspect chronic Hepatitis B infection vs. autoimmune disease. Not alcoholic liver disease. MELD-Na _21        - varices: unknown        - ascites: none        - SPE: none        - HCC: CT scan negative  # Paralytic ileus. DDx: CLL related etiology vs electrolyte disturbance, vs infectious etiology  # Hyponatremia, hypophosphatemia .  # CLL: Not currently on chemotherapy.     Recommendations:  - send hepatitis B PCR, Hepatitis BeAg and Hepatitis BAb, Hepatitis D serology  - trend CBC, INR, CMP daily for MELD-Na   - serial abdominal exams and continue with NG tube for decompression  - follow up, SHERMAN, anti-smooth muscle, antimitochondrial antibodies results  - avoid all hepatotoxic agents  - will follow continue Hepatology

## 2019-09-22 NOTE — PHYSICAL THERAPY INITIAL EVALUATION ADULT - PERTINENT HX OF CURRENT PROBLEM, REHAB EVAL
patient presents with abdominal distention. PMH includes HTN, gastritis, liver cirrhosis, CLL diagnosed in 2012

## 2019-09-22 NOTE — CONSULT NOTE ADULT - SUBJECTIVE AND OBJECTIVE BOX
Chief Complaint:      HPI:  79 year old man with medical history of HTN, gastritis, liver cirrhosis, CLL diagnosed in  (hematologist Dr. Dean, on observation, not on chemo), who presents with 1 day history of abdominal distention, mid-left sided abdominal pain, nausea without vomiting.    Patient reports he went to his cardiologist Dr. Burch to evaluate his leg edema, had echo done yesterday  which was normal LVEF, but blood work showed rising WBC and was advised to come to the hospital. He denies headache, visual disturbance. He reports fatigue, feeling weak and poor intake for the past day. He denies chest pain/sob/dizziness on presentation, but felt a little dizzy after he was given morphine for pain. He reports he had colonoscopy about 10 years ago that was unremarkable, last EGD 2 years ago showed gastritis.  He reports his last BM was yesterday, soft, but he has intermittent loose BM.     In the hospital, WBC noted to be 141K -->97K, anemia Hgb 7.7-->6.8, elevated PT/INR 1.5, elevated LFT noted ast/alt 50/66, lactate 2.4-->1, CT showed possible focal ileus left wade abdomen, no obstruction or inflammation. Patient was seen by his hematologist Dr. Dean who recommended GI consult and 2 units of blood transfusion.       Patient's wife at bedside, states pt has thalassemia, his normal WBC is around 40-60K, his blood work from 19 showed WBC 69K, hgb 9.1, b12>2000, ferritin 193 , tsat 31%, haptoglobin 91, retic count 2.1, elevated LFT ast/alt 50/66, alk phos 169, albumin 3.5, also hyponatremia Na 131, b12>2000.  Allergies:    Last Colonoscopy: as above  Last Endoscopy: as above    Allergies:  No Known Allergies      Home Medications:    Hospital Medications:  allopurinol 300 milliGRAM(s) Oral daily  benzocaine 15 mG/menthol 3.6 mG (Sugar-Free) Lozenge 1 Lozenge Oral every 8 hours PRN  ciprofloxacin   IVPB 400 milliGRAM(s) IV Intermittent every 12 hours  dextrose 5% + sodium chloride 0.9%. 1000 milliLiter(s) IV Continuous <Continuous>  ferrous    sulfate 325 milliGRAM(s) Oral daily  furosemide    Tablet 40 milliGRAM(s) Oral daily  guaiFENesin    Syrup 200 milliGRAM(s) Oral every 6 hours PRN  heparin  Injectable 5000 Unit(s) SubCutaneous every 8 hours  influenza   Vaccine 0.5 milliLiter(s) IntraMuscular once  lactobacillus acidophilus 1 Tablet(s) Oral two times a day  losartan 50 milliGRAM(s) Oral daily  metroNIDAZOLE  IVPB 500 milliGRAM(s) IV Intermittent every 12 hours  ondansetron Injectable 4 milliGRAM(s) IV Push every 6 hours PRN  pantoprazole  Injectable 40 milliGRAM(s) IV Push daily  tamsulosin 0.4 milliGRAM(s) Oral at bedtime      PMHX/PSHX:  HTN (hypertension)  H/O gastritis  CLL (chronic lymphocytic leukemia)  s/p EGD      Family history:  FH: CHF (congestive heart failure)      Social History: no smoking    ROS:   General:  No fevers, chills or night sweats.  ENT:  No sore throat or dysphagia  CV:  No pain or palpitations  Resp:  No dyspnea, cough, wheezing  GI:  as above  Skin:  No rash or edema      PHYSICAL EXAM:   GENERAL:  NAD, Appears stated age  HEENT:  NC/AT,  conjunctivae clear and pink, sclera -anicteric  CHEST:  CTA B/L, Normal effort  HEART:  RRR S1/S2, No murmurs  ABDOMEN:  Soft, non-tender, distended, normoactive bowel sounds,  no masses   EXTREMITIES:  Edema  SKIN:  Warm & Dry. No rash or erythema  NEURO:  Alert, oriented    Vital Signs:  Vital Signs Last 24 Hrs  T(C): 36.8 (22 Sep 2019 21:14), Max: 36.9 (22 Sep 2019 06:08)  T(F): 98.2 (22 Sep 2019 21:14), Max: 98.4 (22 Sep 2019 06:08)  HR: 85 (22 Sep 2019 21:14) (83 - 89)  BP: 172/88 (22 Sep 2019 21:14) (157/95 - 172/88)  BP(mean): --  RR: 17 (22 Sep 2019 21:14) (17 - 21)  SpO2: 97% (22 Sep 2019 21:14) (97% - 99%)  Daily Height in cm: 165.1 (22 Sep 2019 10:08)    Daily     LABS:                        10.3   115.92 )-----------( 174      ( 22 Sep 2019 06:00 )             33.4     Mean Cell Volume: 74.6 fL (- @ 06:00)        131<L>  |  98  |  15  ----------------------------<  109<H>  4.4   |  23  |  0.77    Ca    7.8<L>      22 Sep 2019 06:00  Phos  2.0       Mg     1.9         TPro  6.1  /  Alb  2.7<L>  /  TBili  1.3<H>  /  DBili  x   /  AST  49<H>  /  ALT  51<H>  /  AlkPhos  144<H>      LIVER FUNCTIONS - ( 22 Sep 2019 06:00 )  Alb: 2.7 g/dL / Pro: 6.1 g/dL / ALK PHOS: 144 u/L / ALT: 51 u/L / AST: 49 u/L / GGT: x             Urinalysis Basic - ( 21 Sep 2019 04:05 )    Color: YELLOW / Appearance: CLEAR / S.031 / pH: 6.0  Gluc: NEGATIVE / Ketone: NEGATIVE  / Bili: NEGATIVE / Urobili: NORMAL   Blood: NEGATIVE / Protein: 30 / Nitrite: NEGATIVE   Leuk Esterase: NEGATIVE / RBC: 6-10 / WBC 3-5   Sq Epi: OCC / Non Sq Epi: x / Bacteria: NEGATIVE                              10.3   115.92 )-----------( 174      ( 22 Sep 2019 06:00 )             33.4                         9.7    107.50 )-----------( 145      ( 21 Sep 2019 03:20 )             30.2                         6.8    97.11 )-----------( 180      ( 20 Sep 2019 06:35 )             23.4                         7.7    141.00 )-----------( 222      ( 20 Sep 2019 01:06 )             26.4     Imaging:

## 2019-09-22 NOTE — CONSULT NOTE ADULT - ATTENDING COMMENTS
Patient was seen and examined with Hepatology team on rounds today. Agree with above.   An 78 y/o man with h/o HTN, gastritis, liver cirrhosis Dx 2 years ago, CLL diagnosed in 2012 not on chemo presented with abdominal distention, mid-left sided abdominal pain, nausea, with CT consistent with possible focal ileus. Abdominal distention improved after NGT decompression. Hepatitis BsAg result was positive.   Labs and abdominal imaging reviewed.   Would recommend-HBV DNA, start anti-HBV viral therapy given patient is cirrhotic, serial abdominal X-ray, daily MELD-Na
I have seen and evaluated the patient with the GI Fellow and GI Team.  I agree with the findings, formulation and plan of care as documented in the resident / fellows note and edited where appropriate.

## 2019-09-22 NOTE — PROGRESS NOTE ADULT - SUBJECTIVE AND OBJECTIVE BOX
Events since yesterday noted, pt has NGT, feels a little better and less pressure over the abdomen, no n/v, passed very little gas, no fevers or chills, feels tired still and can not sit in the chair for long. ROS is otherwise OK.      Meds:  allopurinol 300 milliGRAM(s) Oral daily  benzocaine 15 mG/menthol 3.6 mG (Sugar-Free) Lozenge 1 Lozenge Oral every 8 hours PRN  ciprofloxacin   IVPB 400 milliGRAM(s) IV Intermittent every 12 hours  dextrose 5% + sodium chloride 0.9%. 1000 milliLiter(s) IV Continuous <Continuous>  ferrous    sulfate 325 milliGRAM(s) Oral daily  furosemide    Tablet 40 milliGRAM(s) Oral daily  heparin  Injectable 5000 Unit(s) SubCutaneous every 8 hours  influenza   Vaccine 0.5 milliLiter(s) IntraMuscular once  lactobacillus acidophilus 1 Tablet(s) Oral two times a day  losartan 50 milliGRAM(s) Oral daily  metroNIDAZOLE  IVPB 500 milliGRAM(s) IV Intermittent every 12 hours  ondansetron Injectable 4 milliGRAM(s) IV Push every 6 hours PRN  pantoprazole  Injectable 40 milliGRAM(s) IV Push daily  tamsulosin 0.4 milliGRAM(s) Oral at bedtime      Vital Signs Last 24 Hrs  T(C): 36.8 (22 Sep 2019 10:08), Max: 37.2 (21 Sep 2019 17:56)  T(F): 98.3 (22 Sep 2019 10:08), Max: 99 (21 Sep 2019 17:56)  HR: 88 (22 Sep 2019 10:08) (77 - 95)  BP: 160/83 (22 Sep 2019 10:08) (147/79 - 160/83)  BP(mean): --  RR: 18 (22 Sep 2019 10:08) (17 - 21)  SpO2: 98% (22 Sep 2019 10:08) (97% - 98%)                          10.3   115.92 )-----------( 174      ( 22 Sep 2019 06:00 )             33.4       09-22    131<L>  |  98  |  15  ----------------------------<  109<H>  4.4   |  23  |  0.77    Ca    7.8<L>      22 Sep 2019 06:00  Phos  2.0     09-22  Mg     1.9     09-22    TPro  6.1  /  Alb  2.7<L>  /  TBili  1.3<H>  /  DBili  x   /  AST  49<H>  /  ALT  51<H>  /  AlkPhos  144<H>  09-22    Hepatitis B S ag and core ab ++    CxR: IMPRESSION:  Enteric tube inserted extending below diaphragm into left hemiabdomen   with distal end curved toward midline and tip over mid epigastric region.    Low lung volumes with bibasilar consolidative changes left greater than   right, infection/pneumonia in the proper clinical context cannot be   excluded.    Grossly clear upper lungs. No pneumothorax.    Stable cardiac and mediastinal silhouettes.    Trachea midline.                  LEOBARDO OWENS M.D., ATTENDING RADIOLOGIST  This document has been electronically signed. Sep 22 2019 11:27AM

## 2019-09-22 NOTE — PROGRESS NOTE ADULT - ASSESSMENT
79M with CLL, on observation, here with acute increase in WBC, LD slightly high, abdominal symptoms as above, no significant adenopathy on exam or CT a/p, no ascites, WBC decreasing suggesting reactive nature from some inflammatory response, decreasing hgb. will recommend:  - obtain hepatology consult - no prior such history and no h/o transfusion in the past  - CT chest if PNA, change abx and call ID  - no evidence of Serrato's transformation  _ NPO, NGT  - on allopurinol  - Hgb slightly better after transfusion and pt is feeling a bit stronger  - keep Hgb ~ 8  - obtain stool guaiac - no bowel movement since admission  - GI f/u  - venodynes for DVT prophylaxis at this time  -  evaluation for urinary symptoms  - monitor CBC/diff and CMP (LFTs)  - all other supportive Rx  - spoke to the NP, pt and his wife in detail  - please call for any questions [phone]

## 2019-09-23 LAB
ALBUMIN SERPL ELPH-MCNC: 2.7 G/DL — LOW (ref 3.3–5)
ALP SERPL-CCNC: 142 U/L — HIGH (ref 40–120)
ALT FLD-CCNC: 45 U/L — HIGH (ref 4–41)
ANION GAP SERPL CALC-SCNC: 11 MMO/L — SIGNIFICANT CHANGE UP (ref 7–14)
AST SERPL-CCNC: 44 U/L — HIGH (ref 4–40)
BASOPHILS # BLD AUTO: 0.15 K/UL — SIGNIFICANT CHANGE UP (ref 0–0.2)
BASOPHILS NFR BLD AUTO: 0.2 % — SIGNIFICANT CHANGE UP (ref 0–2)
BILIRUB SERPL-MCNC: 1 MG/DL — SIGNIFICANT CHANGE UP (ref 0.2–1.2)
BUN SERPL-MCNC: 18 MG/DL — SIGNIFICANT CHANGE UP (ref 7–23)
CALCIUM SERPL-MCNC: 7.8 MG/DL — LOW (ref 8.4–10.5)
CHLORIDE SERPL-SCNC: 102 MMOL/L — SIGNIFICANT CHANGE UP (ref 98–107)
CO2 SERPL-SCNC: 22 MMOL/L — SIGNIFICANT CHANGE UP (ref 22–31)
CREAT SERPL-MCNC: 0.74 MG/DL — SIGNIFICANT CHANGE UP (ref 0.5–1.3)
EOSINOPHIL # BLD AUTO: 0.04 K/UL — SIGNIFICANT CHANGE UP (ref 0–0.5)
EOSINOPHIL NFR BLD AUTO: 0 % — SIGNIFICANT CHANGE UP (ref 0–6)
GLUCOSE SERPL-MCNC: 107 MG/DL — HIGH (ref 70–99)
HBV SURFACE AB SER-ACNC: 7.2 MLU/ML — LOW
HBV SURFACE AB SER-ACNC: NONREACTIVE — SIGNIFICANT CHANGE UP
HCT VFR BLD CALC: 32.9 % — LOW (ref 39–50)
HGB BLD-MCNC: 10.3 G/DL — LOW (ref 13–17)
IMM GRANULOCYTES NFR BLD AUTO: 0.2 % — SIGNIFICANT CHANGE UP (ref 0–1.5)
LYMPHOCYTES # BLD AUTO: 84.06 K/UL — HIGH (ref 1–3.3)
LYMPHOCYTES # BLD AUTO: 93.8 % — HIGH (ref 13–44)
MAGNESIUM SERPL-MCNC: 2.1 MG/DL — SIGNIFICANT CHANGE UP (ref 1.6–2.6)
MANUAL SMEAR VERIFICATION: SIGNIFICANT CHANGE UP
MCHC RBC-ENTMCNC: 23.7 PG — LOW (ref 27–34)
MCHC RBC-ENTMCNC: 31.3 % — LOW (ref 32–36)
MCV RBC AUTO: 75.6 FL — LOW (ref 80–100)
MONOCYTES # BLD AUTO: 0.13 K/UL — SIGNIFICANT CHANGE UP (ref 0–0.9)
MONOCYTES NFR BLD AUTO: 0.1 % — LOW (ref 2–14)
NEUTROPHILS # BLD AUTO: 5.09 K/UL — SIGNIFICANT CHANGE UP (ref 1.8–7.4)
NEUTROPHILS NFR BLD AUTO: 5.7 % — LOW (ref 43–77)
NRBC # FLD: 0.21 K/UL — SIGNIFICANT CHANGE UP (ref 0–0)
PHOSPHATE SERPL-MCNC: 2 MG/DL — LOW (ref 2.5–4.5)
PLATELET # BLD AUTO: 137 K/UL — LOW (ref 150–400)
PMV BLD: 10.3 FL — SIGNIFICANT CHANGE UP (ref 7–13)
POTASSIUM SERPL-MCNC: 4 MMOL/L — SIGNIFICANT CHANGE UP (ref 3.5–5.3)
POTASSIUM SERPL-SCNC: 4 MMOL/L — SIGNIFICANT CHANGE UP (ref 3.5–5.3)
PROT SERPL-MCNC: 6 G/DL — SIGNIFICANT CHANGE UP (ref 6–8.3)
RBC # BLD: 4.35 M/UL — SIGNIFICANT CHANGE UP (ref 4.2–5.8)
RBC # FLD: 20.9 % — HIGH (ref 10.3–14.5)
SODIUM SERPL-SCNC: 135 MMOL/L — SIGNIFICANT CHANGE UP (ref 135–145)
WBC # BLD: 89.61 K/UL — CRITICAL HIGH (ref 3.8–10.5)
WBC # FLD AUTO: 89.61 K/UL — CRITICAL HIGH (ref 3.8–10.5)

## 2019-09-23 PROCEDURE — 99223 1ST HOSP IP/OBS HIGH 75: CPT | Mod: GC

## 2019-09-23 PROCEDURE — 99232 SBSQ HOSP IP/OBS MODERATE 35: CPT | Mod: GC

## 2019-09-23 RX ORDER — IPRATROPIUM/ALBUTEROL SULFATE 18-103MCG
3 AEROSOL WITH ADAPTER (GRAM) INHALATION ONCE
Refills: 0 | Status: COMPLETED | OUTPATIENT
Start: 2019-09-23 | End: 2019-09-23

## 2019-09-23 RX ORDER — LANOLIN ALCOHOL/MO/W.PET/CERES
5 CREAM (GRAM) TOPICAL AT BEDTIME
Refills: 0 | Status: DISCONTINUED | OUTPATIENT
Start: 2019-09-23 | End: 2019-09-23

## 2019-09-23 RX ORDER — LANOLIN ALCOHOL/MO/W.PET/CERES
3 CREAM (GRAM) TOPICAL AT BEDTIME
Refills: 0 | Status: DISCONTINUED | OUTPATIENT
Start: 2019-09-23 | End: 2019-09-29

## 2019-09-23 RX ADMIN — Medication 51 MILLIMOLE(S): at 20:14

## 2019-09-23 RX ADMIN — TAMSULOSIN HYDROCHLORIDE 0.4 MILLIGRAM(S): 0.4 CAPSULE ORAL at 21:32

## 2019-09-23 RX ADMIN — HEPARIN SODIUM 5000 UNIT(S): 5000 INJECTION INTRAVENOUS; SUBCUTANEOUS at 21:32

## 2019-09-23 RX ADMIN — Medication 40 MILLIGRAM(S): at 05:47

## 2019-09-23 RX ADMIN — Medication 3 MILLIGRAM(S): at 21:32

## 2019-09-23 RX ADMIN — Medication 3 MILLILITER(S): at 09:50

## 2019-09-23 RX ADMIN — Medication 100 MILLIGRAM(S): at 18:30

## 2019-09-23 RX ADMIN — Medication 100 MILLIGRAM(S): at 05:48

## 2019-09-23 RX ADMIN — HEPARIN SODIUM 5000 UNIT(S): 5000 INJECTION INTRAVENOUS; SUBCUTANEOUS at 13:43

## 2019-09-23 RX ADMIN — LOSARTAN POTASSIUM 50 MILLIGRAM(S): 100 TABLET, FILM COATED ORAL at 05:48

## 2019-09-23 RX ADMIN — Medication 1 TABLET(S): at 05:48

## 2019-09-23 RX ADMIN — PANTOPRAZOLE SODIUM 40 MILLIGRAM(S): 20 TABLET, DELAYED RELEASE ORAL at 12:35

## 2019-09-23 RX ADMIN — HEPARIN SODIUM 5000 UNIT(S): 5000 INJECTION INTRAVENOUS; SUBCUTANEOUS at 05:47

## 2019-09-23 RX ADMIN — BENZOCAINE AND MENTHOL 1 LOZENGE: 5; 1 LIQUID ORAL at 05:47

## 2019-09-23 NOTE — CONSULT NOTE ADULT - SUBJECTIVE AND OBJECTIVE BOX
Patient is a 79y old  Male who presents with a chief complaint of abdominal pain, nausea, abdominal distention, rising leukocytosis (23 Sep 2019 07:27)      HPI:  Mr. Soria is an79 y/o gentleman with h/o HTN, gastritis, liver cirrhosis, CLL diagnosed in 2012 (hematologist Dr. Dean, on observation, not on chemo), who presents with 1 day history of abdominal distention, mid-left sided abdominal pain, nausea without vomiting, denies fever/chills/rectal bleeding. Patient reports he went to his cardiologist Dr. Burch to evaluate his leg edema, had echo done yesterday 9/19 which was normal LVEF, but blood work showed rising WBC and was advised to come to the hospital. He denies headache, visual disturbance. He reports fatigue, feeling weak and poor intake for the past day. He denies chest pain/sob/dizziness on presentation, but felt a little dizzy after he was given morphine for pain. He reports he had colonoscopy about 10 years ago that was unremarkable, last EGD 2 years ago showed gastritis.  He reports his last BM was yesterday, soft, but he has intermittent loose BM.     In the hospital, WBC noted to be 141K -->97K, anemia Hgb 7.7-->6.8, elevated PT/INR 1.5, elevated LFT noted ast/alt 50/66, lactate 2.4-->1, CT showed possible focal ileus left hemiabdomen, no obstruction or inflammation. Patient was seen by his hematologist Dr. Dean who recommended GI consult and 2 units of blood transfusion.       Patient's wife at bedside, states pt has thalassemia, his normal WBC is around 40-60K, his blood work from 8/21/19 showed WBC 69K, hgb 9.1, b12>2000, ferritin 193 , tsat 31%, haptoglobin 91, retic count 2.1, elevated LFT ast/alt 50/66, alk phos 169, albumin 3.5, also hyponatremia Na 131, b12>2000. (20 Sep 2019 09:51). Nephrology consulted for hyponatremia and hypophosphatemia.       PAST MEDICAL & SURGICAL HISTORY:  HTN (hypertension)  H/O gastritis  CLL (chronic lymphocytic leukemia)  No significant past surgical history      MEDICATIONS  (STANDING):  ciprofloxacin   IVPB 400 milliGRAM(s) IV Intermittent every 12 hours  dextrose 5% + sodium chloride 0.9%. 1000 milliLiter(s) (75 mL/Hr) IV Continuous <Continuous>  ferrous    sulfate 325 milliGRAM(s) Oral daily  furosemide    Tablet 40 milliGRAM(s) Oral daily  heparin  Injectable 5000 Unit(s) SubCutaneous every 8 hours  influenza   Vaccine 0.5 milliLiter(s) IntraMuscular once  lactobacillus acidophilus 1 Tablet(s) Oral two times a day  losartan 50 milliGRAM(s) Oral daily  melatonin 3 milliGRAM(s) Oral at bedtime  metroNIDAZOLE  IVPB 500 milliGRAM(s) IV Intermittent every 12 hours  pantoprazole  Injectable 40 milliGRAM(s) IV Push daily  tamsulosin 0.4 milliGRAM(s) Oral at bedtime      Allergies  No Known Allergies    SOCIAL HISTORY:  Denies alcohol or tobacco abuse. Retired pharmacist and researcher PhD. Son a pharmacist in [employer], [place].     FAMILY HISTORY:  FH: CHF (congestive heart failure): MOTHER HAD CHF  No kidney disease in family.    REVIEW OF SYSTEMS:  CONSTITUTIONAL: Has weakness, no fevers or no chills  EYES/ENT: No visual changes  NECK: No pain or stiffness  RESPIRATORY: No cough, wheezing, hemoptysis. No shortness of breath  CARDIOVASCULAR: No chest pain or palpitations  GASTROINTESTINAL: Has abdominal or epigastric pain. Has nausea, vomiting, or hematemesis. No diarrhea or constipation. No melena or hematochezia  GENITOURINARY: No dysuria, frequency or hematuria. No stones or infection  NEUROLOGICAL: No numbness or weakness  SKIN: No itching, burning, rashes, or lesions   All other review of systems is negative unless indicated above    VITAL:  T(C): , Max: 37 (09-23-19 @ 05:45)  T(F): , Max: 98.6 (09-23-19 @ 05:45)  HR: 90 (09-23-19 @ 09:44)  BP: 155/77 (09-23-19 @ 09:44)  RR: 17 (09-23-19 @ 09:44)  SpO2: 95% (09-23-19 @ 09:44)    PHYSICAL EXAM:  General: NAD, Alert, Pleasant.   HEENT: NCAT, PERRLA  Neck: Supple, No JVD  Respiratory: CTA-b/l  Cardiovascular: RRR s1s2, no m/r/g  Gastrointestinal: +BS, soft, NT/ND  Extremities: No peripheral edema b/l  Neurological: No focal deficits; strength grossly intact  Psychiatric: Normal mood, normal affect  Back: no CVAT b/l  Skin: No rashes, no nevi      LABS:                        10.3   89.61 )-----------( 137      ( 23 Sep 2019 06:25 )             32.9     Na(135)/K(4.0)/Cl(102)/HCO3(22)/BUN(18)/Cr(0.74)Glu(107)/Ca(7.8)/Mg(2.1)/PO4(2.0)    09-23 @ 06:25  Na(131)/K(4.4)/Cl(98)/HCO3(23)/BUN(15)/Cr(0.77)Glu(109)/Ca(7.8)/Mg(1.9)/PO4(2.0)    09-22 @ 06:00  Na(125)/K(4.2)/Cl(93)/HCO3(22)/BUN(13)/Cr(0.72)Glu(133)/Ca(7.9)/Mg(--)/PO4(--)    09-21 @ 03:20      09-23    135  |  102  |  18  ----------------------------<  107<H>  4.0   |  22  |  0.74    Ca    7.8<L>      23 Sep 2019 06:25  Phos  2.0     09-23  Mg     2.1     09-23    TPro  6.0  /  Alb  2.7<L>  /  TBili  1.0  /  DBili  x   /  AST  44<H>  /  ALT  45<H>  /  AlkPhos  142<H>  09-23          IMAGING:  XAM:  XR CHEST PORTABLE URGENT 1V      EXAM:  XR CHEST PORTABLE URGENT 1V        PROCEDURE DATE:  Sep 21 2019         INTERPRETATION:  CLINICAL INDICATION: gastritis; ileus; enteric tube   placement    EXAM:  Portable frontal views of the chest from 9/21/2019 at 1824 and 2234.   Compared to prior study from 9/20/2019.    IMPRESSION:  Enteric tube inserted extending below diaphragm into left hemiabdomen   with distal end curved toward midline and tip over mid epigastric region.    Low lung volumes with bibasilar consolidative changes left greater than   right, infection/pneumonia in the proper clinical context cannot be   excluded.    Grossly clear upper lungs. No pneumothorax.    Stable cardiac and mediastinal silhouettes.    Trachea midline.        LEOBARDO OWENS M.D., ATTENDING RADIOLOGIST  This document has been electronically signed. Sep 22 2019 11:27AM

## 2019-09-23 NOTE — CONSULT NOTE ADULT - ASSESSMENT
ASSESSMENT:  Mr. Soria is an 80 y/o gentleman with h/o HTN, gastritis, liver cirrhosis, CLL diagnosed in 2012 (hematologist Dr. Dean, on observation, not on chemo), who presents with 1 day history of abdominal distention, mid-left sided abdominal pain, nausea, with CT consistent with possible focal ileus, WBC elevated to 141K, now down trending to 97K. Acute rise in WBC r/o sepsis vs. progression of CLL. Nephrology consulted for hyponatremia and hypophosphatemia.     1. Hyponatremia resolved. Chronic, last serum Na 131 on 8/21/19. On 9/22 Serum sodium was 131.  2. Hypophosphatemia.   3. Euvolemic.   4. Abdominal distension. SBO suspected. NPO, IVF, IV Protonix, Zofran. NGT is out.       PLAN:  1. Continue with Dextrose 5% + NaCl 0.9%. 75 mL/HrIV Continuous.   2. Trend sodium,  Check urine sodium and urine osm if his serum sodium goes low.   3. BMP daily.   4. Give sodium phosphate 15 mmol x 1 dose. Ordered.   5. Defer to team for abdominal distention.   6. Avoid hypotonic Saline.       Thank you for involving [facility] in this patient's care.    With warm regards,    Keyla Knox, DO  [employer]  [phone]

## 2019-09-23 NOTE — PROGRESS NOTE ADULT - ASSESSMENT
78 y/o male with h/o HTN, gastritis, liver cirrhosis, CLL diagnosed in 2012 (hematologist Dr. Dean, on observation, not on chemo), who presents with 1 day history of abdominal distention, mid-left sided abdominal pain, nausea, with CT consistent with possible focal ileus. Pt also found to have leukocytosis    1. Abdominal Distension, Pain, Nausea- Likely secondary to paralytic ileus. CT negative for mechanical obstruction.   2. Hyponatremia.  3. CLL.  4. Cirrhosis on imaging 78 y/o male with h/o HTN, gastritis, liver cirrhosis, CLL diagnosed in 2012 (hematologist Dr. Dean, on observation, not on chemo), who presents with 1 day history of abdominal distention, mid-left sided abdominal pain, nausea, with CT consistent with possible focal ileus. Pt also found to have leukocytosis    1. Abdominal Distension, Pain, Nausea- Likely secondary to paralytic ileus. CT negative for mechanical obstruction.   2. Hyponatremia- Resolved  3. CLL.  4. Cirrhosis on imaging    Recommendations:  -Check daily CBC, BMP  -Correct electrolyte abnormalities  -Serial abdominal exam  -Encourage ambulation, out of bed to chair  -Hold iron supplementation for now as it is constipating  -Can start Miralax BID   -Monitor BMs (please document)

## 2019-09-23 NOTE — PROVIDER CONTACT NOTE (OTHER) - ASSESSMENT
anterior coarse lung sounds, generalized weakness and fatigue w/ movement, O2 sats WDL, NGT dislodged

## 2019-09-23 NOTE — CHART NOTE - NSCHARTNOTEFT_GEN_A_CORE
Patient seen and examined today by Hepatology Fellow and Attending - initially evaluated by On-call Gastroenterology Fellow on 09/22/2019. NGT unintentionally removed this morning. Patient reports abdominal distention improved and he is passing some gas/belching. No nausea or vomiting today however patient remains NPO. No leg swelling or shortness of breath. On exam, abdomen is distended but soft, non-tender with adequate bowel sounds in all 4 quadrants. Please note following updates to recommendations:    - okay to advance diet to clear liquids  - continue serial abdominal exams  - send hepatitis B PCR to determine viral load  - if viral load is elevated and all other etiologies of cirrhosis are ruled out, recommend treatment with Viread for chronic Hepatitis B         Jacqueline Lundberg PGY-4  Gastroenterology Fellow  Pager #[phone] or [phone]  Pager #[phone] 5pm-7am on weekdays, and on weekends Patient seen and examined today by Hepatology Fellow and Attending - initially evaluated by On-call Gastroenterology Fellow on 09/22/2019. NGT unintentionally removed this morning. Patient reports abdominal distention improved and he is passing some gas/belching. No nausea or vomiting today however patient remains NPO. No leg swelling or shortness of breath. On exam, abdomen is distended but soft, non-tender with adequate bowel sounds in all 4 quadrants. Please note following updates to recommendations:    - okay to advance diet to clear liquids  - continue serial abdominal exams  - send hepatitis B PCR to determine viral load  - if viral load is detected, and all other etiologies of cirrhosis are ruled out, recommend treatment with Viread for chronic Hepatitis B         Jacqueline Lundberg PGY-4  Gastroenterology Fellow  Pager #[phone] or [phone]  Pager #[phone] 5pm-7am on weekdays, and on weekends

## 2019-09-23 NOTE — PROGRESS NOTE ADULT - ASSESSMENT
79M with CLL, on observation, here with acute increase in WBC, LD slightly high, abdominal symptoms as above, no significant adenopathy on exam or CT a/p, no ascites, WBC decreasing suggesting reactive nature from some inflammatory response, decreasing hgb. will recommend:  - obtain hepatology consult - no prior such history and no h/o transfusion in the past  - f/u on CT chest if PNA, change abx and call ID  - no evidence of Serrato's transformation  _ NPO, NGT - remove NGT when possible  - discontinue allopurinol for now  - melatonin for sleeping.  - Hgb slightly better after transfusion and pt is feeling a bit stronger  - keep Hgb ~ 8  - obtain stool guaiac - no bowel movement since admission  - GI f/u  - venodynes for DVT prophylaxis at this time  -  evaluation for urinary symptoms, if they continue  - monitor CBC/diff and CMP (LFTs)  - all other supportive Rx  - spoke to the NP, pt and his wife in detail yesterday and also Dr Reddy  - please call for any questions [phone]

## 2019-09-23 NOTE — PROGRESS NOTE ADULT - SUBJECTIVE AND OBJECTIVE BOX
Patient is a 79y old  Male who presents with a chief complaint of abdominal pain, nausea, abdominal distention, rising leukocytosis (20 Sep 2019 10:26)      SUBJECTIVE / OVERNIGHT EVENTS:  Abdomen distension is less today  Review of Systems:   CONSTITUTIONAL: No fever, weight loss, or fatigue  EYES: No eye pain, visual disturbances, or discharge  ENMT:  No difficulty hearing, tinnitus, vertigo; No sinus or throat pain  NECK: No pain or stiffness  BREASTS: No pain, masses, or nipple discharge  RESPIRATORY: No cough, wheezing, chills or hemoptysis; No shortness of breath  CARDIOVASCULAR: No chest pain, palpitations, dizziness, or leg swelling  GASTROINTESTINAL: No abdominal or epigastric pain. No vomiting, or hematemesis; No diarrhea or constipation. No melena or hematochezia. passing gas, but is less in frequency  GENITOURINARY: No dysuria, frequency, hematuria, or incontinence  NEUROLOGICAL: No headaches, memory loss, loss of strength, numbness, or tremors  SKIN: No itching, burning, rashes, or lesions   LYMPH NODES: No enlarged glands  ENDOCRINE: No heat or cold intolerance; No hair loss  MUSCULOSKELETAL: No joint pain or swelling; No muscle, back, or extremity pain  PSYCHIATRIC: No depression, anxiety, mood swings, or difficulty sleeping  HEME/LYMPH: No easy bruising, or bleeding gums  ALLERY AND IMMUNOLOGIC: No hives or eczema    MEDICATIONS  (STANDING):  ciprofloxacin   IVPB 400 milliGRAM(s) IV Intermittent every 12 hours  dextrose 5% + sodium chloride 0.9%. 1000 milliLiter(s) (75 mL/Hr) IV Continuous <Continuous>  ferrous    sulfate 325 milliGRAM(s) Oral daily  furosemide    Tablet 40 milliGRAM(s) Oral daily  heparin  Injectable 5000 Unit(s) SubCutaneous every 8 hours  lactobacillus acidophilus 1 Tablet(s) Oral two times a day  losartan 50 milliGRAM(s) Oral daily  metroNIDAZOLE  IVPB 500 milliGRAM(s) IV Intermittent every 12 hours  pantoprazole  Injectable 40 milliGRAM(s) IV Push daily    MEDICATIONS  (PRN):  ondansetron Injectable 4 milliGRAM(s) IV Push every 6 hours PRN Nausea      PHYSICAL EXAM:  Vital Signs Last 24 Hrs  T(C): 37 (20 Sep 2019 15:10), Max: 37 (20 Sep 2019 01:29)  T(F): 98.6 (20 Sep 2019 15:10), Max: 98.6 (20 Sep 2019 01:29)  HR: 81 (20 Sep 2019 15:10) (74 - 92)  BP: 147/79 (20 Sep 2019 15:10) (129/64 - 175/73)  BP(mean): --  RR: 20 (20 Sep 2019 15:10) (16 - 20)  SpO2: 100% (20 Sep 2019 15:10) (96% - 100%)  I&O's Summary    GENERAL: NAD, well-developed  HEAD:  Atraumatic, Normocephalic  EYES: EOMI, PERRLA, conjunctiva and sclera clear  NECK: Supple, No JVD  CHEST/LUNG: Clear to auscultation bilaterally; No wheeze  HEART: Regular rate and rhythm; No murmurs, rubs, or gallops  ABDOMEN: Soft, Nontender, distended; Bowel sounds sluggish  EXTREMITIES:  2+ Peripheral Pulses, No clubbing, cyanosis, or edema  PSYCH: AAOx3  NEUROLOGY: non-focal  SKIN: No rashes or lesions    LABS:  CAPILLARY BLOOD GLUCOSE                              6.8    97.11 )-----------( 180      ( 20 Sep 2019 06:35 )             23.4     09-20    124<L>  |  92<L>  |  12  ----------------------------<  131<H>  4.7   |  22  |  0.68    Ca    7.4<L>      20 Sep 2019 06:32  Phos  2.2     09-20  Mg     1.8     09-20    TPro  5.5<L>  /  Alb  2.6<L>  /  TBili  0.7  /  DBili  x   /  AST  50<H>  /  ALT  66<H>  /  AlkPhos  160<H>  09-20    PT/INR - ( 20 Sep 2019 06:35 )   PT: 14.0 SEC;   INR: 1.25          PTT - ( 20 Sep 2019 06:35 )  PTT:27.9 SEC          RADIOLOGY & ADDITIONAL TESTS:    Imaging Personally Reviewed:    Consultant(s) Notes Reviewed:      Care Discussed with Consultants/Other Providers:

## 2019-09-23 NOTE — PROGRESS NOTE ADULT - SUBJECTIVE AND OBJECTIVE BOX
Chief Complaint:  Patient is a 79y old  Male who presents with a chief complaint of abdominal pain, nausea, abdominal distention, rising leukocytosis (23 Sep 2019 10:15)      Interval Events: Pt continues to be passing gas but otherwise no BMs. NGT yielded about 500cc. over 24 hours. Pt denies nausea, vomiting, abd pain.   ROS: All 12 point system except listed above were otherwise negative.    Allergies:  No Known Allergies        Hospital Medications:  benzocaine 15 mG/menthol 3.6 mG (Sugar-Free) Lozenge 1 Lozenge Oral every 8 hours PRN  ciprofloxacin   IVPB 400 milliGRAM(s) IV Intermittent every 12 hours  dextrose 5% + sodium chloride 0.9%. 1000 milliLiter(s) IV Continuous <Continuous>  ferrous    sulfate 325 milliGRAM(s) Oral daily  furosemide    Tablet 40 milliGRAM(s) Oral daily  guaiFENesin    Syrup 200 milliGRAM(s) Oral every 6 hours PRN  heparin  Injectable 5000 Unit(s) SubCutaneous every 8 hours  influenza   Vaccine 0.5 milliLiter(s) IntraMuscular once  lactobacillus acidophilus 1 Tablet(s) Oral two times a day  losartan 50 milliGRAM(s) Oral daily  melatonin 3 milliGRAM(s) Oral at bedtime  metroNIDAZOLE  IVPB 500 milliGRAM(s) IV Intermittent every 12 hours  ondansetron Injectable 4 milliGRAM(s) IV Push every 6 hours PRN  pantoprazole  Injectable 40 milliGRAM(s) IV Push daily  sodium phosphate IVPB 15 milliMole(s) IV Intermittent once  tamsulosin 0.4 milliGRAM(s) Oral at bedtime      PMHX/PSHX:  HTN (hypertension)  H/O gastritis  CLL (chronic lymphocytic leukemia)  No significant past surgical history      Family history:  FH: CHF (congestive heart failure)    There is no family history of peptic ulcer disease, gastric cancer, colon polyps, colon cancer, celiac disease, biliary, hepatic, or pancreatic disease.  None of the female relatives have breast, uterine, or ovarian cancer.     PHYSICAL EXAM:   Vital Signs:  Vital Signs Last 24 Hrs  T(C): 36.7 (23 Sep 2019 09:44), Max: 37 (23 Sep 2019 05:45)  T(F): 98 (23 Sep 2019 09:44), Max: 98.6 (23 Sep 2019 05:45)  HR: 90 (23 Sep 2019 09:44) (85 - 90)  BP: 155/77 (23 Sep 2019 09:44) (150/74 - 172/88)  BP(mean): --  RR: 17 (23 Sep 2019 09:44) (16 - 18)  SpO2: 95% (23 Sep 2019 09:44) (95% - 99%)  Daily     Daily     GENERAL:  Appears stated age, well-groomed  HEENT:  NC/AT,  conjunctivae clear and pink, sclera -anicteric  CHEST:  Full & symmetric excursion, no increased effort, breath sounds clear  HEART:  Regular rhythm, S1, S2, no murmur/rub/S3/S4, no abdominal bruit, no edema  ABDOMEN:  Soft, non-tender, distended, hypoactive bowel sounds in all four quadrants,  no masses   EXTREMITIES:  no cyanosis,clubbing or edema  SKIN:  No rash/erythema/ecchymoses  NEURO:  Alert, oriented, no asterixis  PSYCH: Alert and oriented    LABS:                        10.3   89.61 )-----------( 137      ( 23 Sep 2019 06:25 )             32.9     Mean Cell Volume: 75.6 fL (09-23-19 @ 06:25)    09-23    135  |  102  |  18  ----------------------------<  107<H>  4.0   |  22  |  0.74    Ca    7.8<L>      23 Sep 2019 06:25  Phos  2.0     09-23  Mg     2.1     09-23    TPro  6.0  /  Alb  2.7<L>  /  TBili  1.0  /  DBili  x   /  AST  44<H>  /  ALT  45<H>  /  AlkPhos  142<H>  09-23    LIVER FUNCTIONS - ( 23 Sep 2019 06:25 )  Alb: 2.7 g/dL / Pro: 6.0 g/dL / ALK PHOS: 142 u/L / ALT: 45 u/L / AST: 44 u/L / GGT: x                                       10.3   89.61 )-----------( 137      ( 23 Sep 2019 06:25 )             32.9                         10.3   115.92 )-----------( 174      ( 22 Sep 2019 06:00 )             33.4                         9.7    107.50 )-----------( 145      ( 21 Sep 2019 03:20 )             30.2     Imaging:

## 2019-09-23 NOTE — OCCUPATIONAL THERAPY INITIAL EVALUATION ADULT - PERTINENT HX OF CURRENT PROBLEM, REHAB EVAL
78 y/o male with h/o HTN, gastritis, liver cirrhosis, CLL diagnosed in 2012 (hematologist Dr. Dean, on observation, not on chemo), who presents with 1 day history of abdominal distention, mid-left sided abdominal pain, nausea without vomiting. Pt reports he went to his cardiologist to evaluate his leg edema, had echo done yesterday 9/19 which was normal LVEF, but blood work showed rising WBC and was advised to come to the [facility].

## 2019-09-23 NOTE — PROGRESS NOTE ADULT - SUBJECTIVE AND OBJECTIVE BOX
Events since yesterday noted, pt feels OK, passed a little gas and went for CT chest late at night. No fevers or chills, very slight abdominal discomfort, no n/v, still has cough, no wheezing and ROS is otherwise unremarkable to unchanged. Can not sleep.      Meds:  allopurinol 300 milliGRAM(s) Oral daily  benzocaine 15 mG/menthol 3.6 mG (Sugar-Free) Lozenge 1 Lozenge Oral every 8 hours PRN  ciprofloxacin   IVPB 400 milliGRAM(s) IV Intermittent every 12 hours  dextrose 5% + sodium chloride 0.9%. 1000 milliLiter(s) IV Continuous <Continuous>  ferrous    sulfate 325 milliGRAM(s) Oral daily  furosemide    Tablet 40 milliGRAM(s) Oral daily  guaiFENesin    Syrup 200 milliGRAM(s) Oral every 6 hours PRN  heparin  Injectable 5000 Unit(s) SubCutaneous every 8 hours  influenza   Vaccine 0.5 milliLiter(s) IntraMuscular once  lactobacillus acidophilus 1 Tablet(s) Oral two times a day  losartan 50 milliGRAM(s) Oral daily  metroNIDAZOLE  IVPB 500 milliGRAM(s) IV Intermittent every 12 hours  ondansetron Injectable 4 milliGRAM(s) IV Push every 6 hours PRN  pantoprazole  Injectable 40 milliGRAM(s) IV Push daily  tamsulosin 0.4 milliGRAM(s) Oral at bedtime      Vital Signs Last 24 Hrs  T(C): 37 (23 Sep 2019 05:45), Max: 37 (23 Sep 2019 05:45)  T(F): 98.6 (23 Sep 2019 05:45), Max: 98.6 (23 Sep 2019 05:45)  HR: 86 (23 Sep 2019 05:45) (85 - 89)  BP: 160/84 (23 Sep 2019 05:45) (150/74 - 172/88)  BP(mean): --  RR: 16 (23 Sep 2019 05:45) (16 - 18)  SpO2: 98% (23 Sep 2019 05:45) (95% - 99%)                          10.3   115.92 )-----------( 174      ( 22 Sep 2019 06:00 )             33.4       09-22    131<L>  |  98  |  15  ----------------------------<  109<H>  4.4   |  23  |  0.77    Ca    7.8<L>      22 Sep 2019 06:00  Phos  2.0     09-22  Mg     1.9     09-22    TPro  6.1  /  Alb  2.7<L>  /  TBili  1.3<H>  /  DBili  x   /  AST  49<H>  /  ALT  51<H>  /  AlkPhos  144<H>  09-22          CT chest: pending

## 2019-09-24 LAB
ALBUMIN SERPL ELPH-MCNC: 2.3 G/DL — LOW (ref 3.3–5)
ALP SERPL-CCNC: 112 U/L — SIGNIFICANT CHANGE UP (ref 40–120)
ALT FLD-CCNC: 33 U/L — SIGNIFICANT CHANGE UP (ref 4–41)
ANION GAP SERPL CALC-SCNC: 11 MMO/L — SIGNIFICANT CHANGE UP (ref 7–14)
ANISOCYTOSIS BLD QL: SLIGHT — SIGNIFICANT CHANGE UP
AST SERPL-CCNC: 42 U/L — HIGH (ref 4–40)
BASOPHILS # BLD AUTO: 0.15 K/UL — SIGNIFICANT CHANGE UP (ref 0–0.2)
BASOPHILS NFR BLD AUTO: 0.2 % — SIGNIFICANT CHANGE UP (ref 0–2)
BASOPHILS NFR SPEC: 1 % — SIGNIFICANT CHANGE UP (ref 0–2)
BILIRUB SERPL-MCNC: 0.8 MG/DL — SIGNIFICANT CHANGE UP (ref 0.2–1.2)
BUN SERPL-MCNC: 17 MG/DL — SIGNIFICANT CHANGE UP (ref 7–23)
CALCIUM SERPL-MCNC: 7.7 MG/DL — LOW (ref 8.4–10.5)
CHLORIDE SERPL-SCNC: 103 MMOL/L — SIGNIFICANT CHANGE UP (ref 98–107)
CO2 SERPL-SCNC: 23 MMOL/L — SIGNIFICANT CHANGE UP (ref 22–31)
CREAT SERPL-MCNC: 0.8 MG/DL — SIGNIFICANT CHANGE UP (ref 0.5–1.3)
DACRYOCYTES BLD QL SMEAR: SLIGHT — SIGNIFICANT CHANGE UP
EOSINOPHIL # BLD AUTO: 0.07 K/UL — SIGNIFICANT CHANGE UP (ref 0–0.5)
EOSINOPHIL NFR BLD AUTO: 0.1 % — SIGNIFICANT CHANGE UP (ref 0–6)
EOSINOPHIL NFR FLD: 0 % — SIGNIFICANT CHANGE UP (ref 0–6)
GLUCOSE SERPL-MCNC: 114 MG/DL — HIGH (ref 70–99)
HCT VFR BLD CALC: 28.8 % — LOW (ref 39–50)
HGB BLD-MCNC: 9 G/DL — LOW (ref 13–17)
HYPOCHROMIA BLD QL: SIGNIFICANT CHANGE UP
IMM GRANULOCYTES NFR BLD AUTO: 0.2 % — SIGNIFICANT CHANGE UP (ref 0–1.5)
LYMPHOCYTES # BLD AUTO: 61.06 K/UL — HIGH (ref 1–3.3)
LYMPHOCYTES # BLD AUTO: 93.4 % — HIGH (ref 13–44)
LYMPHOCYTES NFR SPEC AUTO: 88 % — HIGH (ref 13–44)
MAGNESIUM SERPL-MCNC: 1.9 MG/DL — SIGNIFICANT CHANGE UP (ref 1.6–2.6)
MANUAL SMEAR VERIFICATION: SIGNIFICANT CHANGE UP
MCHC RBC-ENTMCNC: 23.7 PG — LOW (ref 27–34)
MCHC RBC-ENTMCNC: 31.3 % — LOW (ref 32–36)
MCV RBC AUTO: 76 FL — LOW (ref 80–100)
MICROCYTES BLD QL: SIGNIFICANT CHANGE UP
MITOCHONDRIA AB SER-ACNC: SIGNIFICANT CHANGE UP
MONOCYTES # BLD AUTO: 0.11 K/UL — SIGNIFICANT CHANGE UP (ref 0–0.9)
MONOCYTES NFR BLD AUTO: 0.2 % — LOW (ref 2–14)
MONOCYTES NFR BLD: 2 % — SIGNIFICANT CHANGE UP (ref 2–9)
NEUTROPHIL AB SER-ACNC: 6 % — LOW (ref 43–77)
NEUTROPHILS # BLD AUTO: 3.87 K/UL — SIGNIFICANT CHANGE UP (ref 1.8–7.4)
NEUTROPHILS NFR BLD AUTO: 5.9 % — LOW (ref 43–77)
NRBC # BLD: 0 /100WBC — SIGNIFICANT CHANGE UP
NRBC # FLD: 0 K/UL — SIGNIFICANT CHANGE UP (ref 0–0)
OVALOCYTES BLD QL SMEAR: SLIGHT — SIGNIFICANT CHANGE UP
PHOSPHATE SERPL-MCNC: 2.4 MG/DL — LOW (ref 2.5–4.5)
PLATELET # BLD AUTO: 126 K/UL — LOW (ref 150–400)
PLATELET COUNT - ESTIMATE: SIGNIFICANT CHANGE UP
PMV BLD: 10.4 FL — SIGNIFICANT CHANGE UP (ref 7–13)
POIKILOCYTOSIS BLD QL AUTO: SLIGHT — SIGNIFICANT CHANGE UP
POTASSIUM SERPL-MCNC: 3.8 MMOL/L — SIGNIFICANT CHANGE UP (ref 3.5–5.3)
POTASSIUM SERPL-SCNC: 3.8 MMOL/L — SIGNIFICANT CHANGE UP (ref 3.5–5.3)
PROT SERPL-MCNC: 5.4 G/DL — LOW (ref 6–8.3)
RBC # BLD: 3.79 M/UL — LOW (ref 4.2–5.8)
RBC # FLD: 21.2 % — HIGH (ref 10.3–14.5)
SMOOTH MUSCLE AB SER-ACNC: SIGNIFICANT CHANGE UP
SMUDGE CELLS # BLD: PRESENT — SIGNIFICANT CHANGE UP
SODIUM SERPL-SCNC: 137 MMOL/L — SIGNIFICANT CHANGE UP (ref 135–145)
VARIANT LYMPHS # BLD: 3 % — SIGNIFICANT CHANGE UP
WBC # BLD: 65.37 K/UL — CRITICAL HIGH (ref 3.8–10.5)
WBC # FLD AUTO: 65.37 K/UL — CRITICAL HIGH (ref 3.8–10.5)

## 2019-09-24 PROCEDURE — 99232 SBSQ HOSP IP/OBS MODERATE 35: CPT | Mod: GC

## 2019-09-24 RX ORDER — IPRATROPIUM/ALBUTEROL SULFATE 18-103MCG
3 AEROSOL WITH ADAPTER (GRAM) INHALATION ONCE
Refills: 0 | Status: COMPLETED | OUTPATIENT
Start: 2019-09-24 | End: 2019-09-24

## 2019-09-24 RX ORDER — FUROSEMIDE 40 MG
20 TABLET ORAL DAILY
Refills: 0 | Status: DISCONTINUED | OUTPATIENT
Start: 2019-09-24 | End: 2019-09-25

## 2019-09-24 RX ORDER — SODIUM,POTASSIUM PHOSPHATES 278-250MG
1 POWDER IN PACKET (EA) ORAL
Refills: 0 | Status: COMPLETED | OUTPATIENT
Start: 2019-09-24 | End: 2019-09-24

## 2019-09-24 RX ORDER — PANTOPRAZOLE SODIUM 20 MG/1
40 TABLET, DELAYED RELEASE ORAL
Refills: 0 | Status: DISCONTINUED | OUTPATIENT
Start: 2019-09-24 | End: 2019-09-29

## 2019-09-24 RX ORDER — FUROSEMIDE 40 MG
20 TABLET ORAL ONCE
Refills: 0 | Status: COMPLETED | OUTPATIENT
Start: 2019-09-24 | End: 2019-09-24

## 2019-09-24 RX ORDER — BENZOCAINE AND MENTHOL 5; 1 G/100ML; G/100ML
1 LIQUID ORAL
Refills: 0 | Status: DISCONTINUED | OUTPATIENT
Start: 2019-09-24 | End: 2019-09-24

## 2019-09-24 RX ADMIN — TAMSULOSIN HYDROCHLORIDE 0.4 MILLIGRAM(S): 0.4 CAPSULE ORAL at 21:31

## 2019-09-24 RX ADMIN — Medication 1 TABLET(S): at 05:12

## 2019-09-24 RX ADMIN — Medication 1 TABLET(S): at 13:16

## 2019-09-24 RX ADMIN — Medication 200 MILLIGRAM(S): at 05:13

## 2019-09-24 RX ADMIN — HEPARIN SODIUM 5000 UNIT(S): 5000 INJECTION INTRAVENOUS; SUBCUTANEOUS at 05:12

## 2019-09-24 RX ADMIN — Medication 40 MILLIGRAM(S): at 05:12

## 2019-09-24 RX ADMIN — HEPARIN SODIUM 5000 UNIT(S): 5000 INJECTION INTRAVENOUS; SUBCUTANEOUS at 21:30

## 2019-09-24 RX ADMIN — Medication 100 MILLIGRAM(S): at 05:12

## 2019-09-24 RX ADMIN — Medication 1 TABLET(S): at 18:05

## 2019-09-24 RX ADMIN — LOSARTAN POTASSIUM 50 MILLIGRAM(S): 100 TABLET, FILM COATED ORAL at 05:12

## 2019-09-24 RX ADMIN — PANTOPRAZOLE SODIUM 40 MILLIGRAM(S): 20 TABLET, DELAYED RELEASE ORAL at 13:19

## 2019-09-24 RX ADMIN — HEPARIN SODIUM 5000 UNIT(S): 5000 INJECTION INTRAVENOUS; SUBCUTANEOUS at 13:17

## 2019-09-24 RX ADMIN — Medication 3 MILLIGRAM(S): at 21:30

## 2019-09-24 RX ADMIN — Medication 20 MILLIGRAM(S): at 17:10

## 2019-09-24 RX ADMIN — Medication 200 MILLIGRAM(S): at 05:21

## 2019-09-24 RX ADMIN — Medication 3 MILLILITER(S): at 16:42

## 2019-09-24 NOTE — PROGRESS NOTE ADULT - SUBJECTIVE AND OBJECTIVE BOX
Chief Complaint:  Patient is a 79y old  Male who presents with a chief complaint of abdominal pain, nausea, abdominal distention, rising leukocytosis (24 Sep 2019 12:54)      Interval Events: Pt had no BMs but continues to be passing gas. Pt denies nausea, vomiting, abd pain. PT had clear liquid diet and tolerated that well.   ROS: All 12 point system except listed above were otherwise negative.    Allergies:  No Known Allergies        Hospital Medications:  benzocaine 15 mG/menthol 3.6 mG (Sugar-Free) Lozenge 1 Lozenge Oral every 8 hours PRN  ciprofloxacin   IVPB 400 milliGRAM(s) IV Intermittent every 12 hours  dextrose 5% + sodium chloride 0.9%. 1000 milliLiter(s) IV Continuous <Continuous>  ferrous    sulfate 325 milliGRAM(s) Oral daily  furosemide    Tablet 40 milliGRAM(s) Oral daily  guaiFENesin    Syrup 200 milliGRAM(s) Oral every 6 hours PRN  heparin  Injectable 5000 Unit(s) SubCutaneous every 8 hours  influenza   Vaccine 0.5 milliLiter(s) IntraMuscular once  lactobacillus acidophilus 1 Tablet(s) Oral two times a day  losartan 50 milliGRAM(s) Oral daily  melatonin 3 milliGRAM(s) Oral at bedtime  metroNIDAZOLE  IVPB 500 milliGRAM(s) IV Intermittent every 12 hours  ondansetron Injectable 4 milliGRAM(s) IV Push every 6 hours PRN  pantoprazole    Tablet 40 milliGRAM(s) Oral before breakfast  potassium acid phosphate/sodium acid phosphate tablet (K-PHOS No. 2) 1 Tablet(s) Oral four times a day with meals  tamsulosin 0.4 milliGRAM(s) Oral at bedtime      PMHX/PSHX:  HTN (hypertension)  H/O gastritis  CLL (chronic lymphocytic leukemia)  No significant past surgical history      Family history:  FH: CHF (congestive heart failure)    There is no family history of peptic ulcer disease, gastric cancer, colon polyps, colon cancer, celiac disease, biliary, hepatic, or pancreatic disease.  None of the female relatives have breast, uterine, or ovarian cancer.     PHYSICAL EXAM:   Vital Signs:  Vital Signs Last 24 Hrs  T(C): 36.7 (24 Sep 2019 14:30), Max: 37.3 (24 Sep 2019 05:09)  T(F): 98 (24 Sep 2019 14:30), Max: 99.2 (24 Sep 2019 05:09)  HR: 100 (24 Sep 2019 14:30) (81 - 100)  BP: 149/72 (24 Sep 2019 14:30) (139/69 - 158/81)  BP(mean): --  RR: 18 (24 Sep 2019 14:30) (16 - 20)  SpO2: 97% (24 Sep 2019 14:30) (95% - 99%)  Daily     Daily     GENERAL:  Appears stated age, well-groomed  HEENT:  NC/AT,  conjunctivae clear and pink, sclera -anicteric  CHEST:  Full & symmetric excursion, no increased effort, breath sounds clear  HEART:  Regular rhythm, S1, S2, no murmur/rub/S3/S4, no abdominal bruit, no edema  ABDOMEN:  Soft, non-tender, distended, hypoactive bowel sounds in all four quadrants,  no masses   EXTREMITIES:  no cyanosis,clubbing or edema  SKIN:  No rash/erythema/ecchymoses  NEURO:  Alert, oriented, no asterixis  PSYCH: Alert and oriented    LABS:                        9.0    65.37 )-----------( 126      ( 24 Sep 2019 07:30 )             28.8     Mean Cell Volume: 76.0 fL (09-24-19 @ 07:30)    09-24    137  |  103  |  17  ----------------------------<  114<H>  3.8   |  23  |  0.80    Ca    7.7<L>      24 Sep 2019 07:30  Phos  2.4     09-24  Mg     1.9     09-24    TPro  5.4<L>  /  Alb  2.3<L>  /  TBili  0.8  /  DBili  x   /  AST  42<H>  /  ALT  33  /  AlkPhos  112  09-24    LIVER FUNCTIONS - ( 24 Sep 2019 07:30 )  Alb: 2.3 g/dL / Pro: 5.4 g/dL / ALK PHOS: 112 u/L / ALT: 33 u/L / AST: 42 u/L / GGT: x                                       9.0    65.37 )-----------( 126      ( 24 Sep 2019 07:30 )             28.8                         10.3   89.61 )-----------( 137      ( 23 Sep 2019 06:25 )             32.9                         10.3   115.92 )-----------( 174      ( 22 Sep 2019 06:00 )             33.4     Imaging:

## 2019-09-24 NOTE — PROGRESS NOTE ADULT - ASSESSMENT
ASSESSMENT/PLAN  ASSESSMENT:  Mr. Soria is an 80 y/o gentleman with h/o HTN, gastritis, liver cirrhosis, CLL diagnosed in 2012 (hematologist Dr. Dean, on observation, not on chemo), who presents with 1 day history of abdominal distention, mid-left sided abdominal pain, nausea, with CT consistent with possible focal ileus, WBC elevated to 141K, now down trending to 65.37K. Acute rise in WBC r/o sepsis vs. progression of CLL. Nephrology consulted for hyponatremia and hypophosphatemia.     1. Hyponatremia resolved. Chronic, last serum Na 131 on 8/21/19. On 9/22 Serum sodium was 131.Today his serum sodium is 137.   2. Hypophosphatemia. Now 2.4 after 15 mmol of sodium phosphate in 250 cc of NS.   3. Euvolemic.   4. Abdominal distension. SBO suspected. on clears, IVF, IV Protonix, Zofran. NGT is out.       PLAN:  1. Continue with Dextrose 5% + NaCl 0.9%. 75 mL/Hr till he starts eating regular diet. May discontinue in am on 9/25/19.   2. Trend sodium, check urine sodium and urine osm if his serum sodium goes low. Currently doing well.   3. BMP daily.   4. Will give again sodium phosphate 30 mmol x 1 dose in NS of 250 cc special order. Ordered.   5. Defer to team for abdominal distention.   6. Avoid hypotonic saline.  7. Will continue to follow until his electrolyte derangements resolve. So far hyponatremia resolved.        Thank you for involving [employer] in this patient's care.    With warm regards,    Keyla Knox, DO  [facility]  [phone]

## 2019-09-24 NOTE — PROGRESS NOTE ADULT - SUBJECTIVE AND OBJECTIVE BOX
No pain, no shortness of breath. Slightly confused.     Review of systems: All 10 points ROS was obtained except as above.     benzocaine 15 mG/menthol 3.6 mG (Sugar-Free) Lozenge 1 Lozenge Oral every 8 hours PRN  ciprofloxacin   IVPB 400 milliGRAM(s) IV Intermittent every 12 hours  dextrose 5% + sodium chloride 0.9%. 1000 milliLiter(s) IV Continuous <Continuous>  ferrous    sulfate 325 milliGRAM(s) Oral daily  furosemide    Tablet 40 milliGRAM(s) Oral daily  guaiFENesin    Syrup 200 milliGRAM(s) Oral every 6 hours PRN  heparin  Injectable 5000 Unit(s) SubCutaneous every 8 hours  influenza   Vaccine 0.5 milliLiter(s) IntraMuscular once  lactobacillus acidophilus 1 Tablet(s) Oral two times a day  losartan 50 milliGRAM(s) Oral daily  melatonin 3 milliGRAM(s) Oral at bedtime  metroNIDAZOLE  IVPB 500 milliGRAM(s) IV Intermittent every 12 hours  ondansetron Injectable 4 milliGRAM(s) IV Push every 6 hours PRN  pantoprazole  Injectable 40 milliGRAM(s) IV Push daily  potassium acid phosphate/sodium acid phosphate tablet (K-PHOS No. 2) 1 Tablet(s) Oral four times a day with meals  tamsulosin 0.4 milliGRAM(s) Oral at bedtime      VITAL:  T(C): , Max: 37.3 (09-24-19 @ 05:09)  T(F): , Max: 99.2 (09-24-19 @ 05:09)  HR: 81 (09-24-19 @ 10:19)  BP: 139/69 (09-24-19 @ 10:19)  RR: 18 (09-24-19 @ 10:19)  SpO2: 95% (09-24-19 @ 10:19)    09-23-19 @ 07:01  -  09-24-19 @ 07:00  --------------------------------------------------------  IN: 2150 mL / OUT: 1280 mL / NET: 870 mL    09-24-19 @ 07:01  -  09-24-19 @ 12:22  --------------------------------------------------------  IN: 300 mL / OUT: 200 mL / NET: 100 mL        PHYSICAL EXAM:  General: NAD; Alert  HEENT:  NCAT; PERRLA  Neck: No JVD; supple  Respiratory: CTA-b/l  Cardiac: RRR s1s2  Gastrointestinal: BS+, soft, NT/ND  Urologic: No veloz  Extremities: No peripheral edema      LABS:                          9.0    65.37 )-----------( 126      ( 24 Sep 2019 07:30 )             28.8     Na(137)/K(3.8)/Cl(103)/HCO3(23)/BUN(17)/Cr(0.80)Glu(114)/Ca(7.7)/Mg(1.9)/PO4(2.4)    09-24 @ 07:30  Na(135)/K(4.0)/Cl(102)/HCO3(22)/BUN(18)/Cr(0.74)Glu(107)/Ca(7.8)/Mg(2.1)/PO4(2.0)    09-23 @ 06:25  Na(131)/K(4.4)/Cl(98)/HCO3(23)/BUN(15)/Cr(0.77)Glu(109)/Ca(7.8)/Mg(1.9)/PO4(2.0)    09-22 @ 06:00      09-24    137  |  103  |  17  ----------------------------<  114<H>  3.8   |  23  |  0.80    Ca    7.7<L>      24 Sep 2019 07:30  Phos  2.4     09-24  Mg     1.9     09-24    TPro  5.4<L>  /  Alb  2.3<L>  /  TBili  0.8  /  DBili  x   /  AST  42<H>  /  ALT  33  /  AlkPhos  112  09-24

## 2019-09-24 NOTE — PROGRESS NOTE ADULT - SUBJECTIVE AND OBJECTIVE BOX
Patient is a 79y old  Male who presents with a chief complaint of abdominal pain, nausea, abdominal distention, rising leukocytosis (20 Sep 2019 10:26)      SUBJECTIVE / OVERNIGHT EVENTS:  Abdomen distension is less today  ambulatory  Tolerating fluids  Review of Systems:   CONSTITUTIONAL: No fever, weight loss, or fatigue  EYES: No eye pain, visual disturbances, or discharge  ENMT:  No difficulty hearing, tinnitus, vertigo; No sinus or throat pain  NECK: No pain or stiffness  BREASTS: No pain, masses, or nipple discharge  RESPIRATORY: No cough, wheezing, chills or hemoptysis; No shortness of breath  CARDIOVASCULAR: No chest pain, palpitations, dizziness, or leg swelling  GASTROINTESTINAL: No abdominal or epigastric pain. No vomiting, or hematemesis; No diarrhea or constipation. No melena or hematochezia. passing gas,  Distension improved slightly.  GENITOURINARY: No dysuria, frequency, hematuria, or incontinence  NEUROLOGICAL: No headaches, memory loss, loss of strength, numbness, or tremors  SKIN: No itching, burning, rashes, or lesions   LYMPH NODES: No enlarged glands  ENDOCRINE: No heat or cold intolerance; No hair loss  MUSCULOSKELETAL: No joint pain or swelling; No muscle, back, or extremity pain  PSYCHIATRIC: No depression, anxiety, mood swings, or difficulty sleeping  HEME/LYMPH: No easy bruising, or bleeding gums  ALLERY AND IMMUNOLOGIC: No hives or eczema    MEDICATIONS  (STANDING):  ciprofloxacin   IVPB 400 milliGRAM(s) IV Intermittent every 12 hours  dextrose 5% + sodium chloride 0.9%. 1000 milliLiter(s) (75 mL/Hr) IV Continuous <Continuous>  ferrous    sulfate 325 milliGRAM(s) Oral daily  furosemide    Tablet 40 milliGRAM(s) Oral daily  heparin  Injectable 5000 Unit(s) SubCutaneous every 8 hours  lactobacillus acidophilus 1 Tablet(s) Oral two times a day  losartan 50 milliGRAM(s) Oral daily  metroNIDAZOLE  IVPB 500 milliGRAM(s) IV Intermittent every 12 hours  pantoprazole  Injectable 40 milliGRAM(s) IV Push daily    MEDICATIONS  (PRN):  ondansetron Injectable 4 milliGRAM(s) IV Push every 6 hours PRN Nausea      PHYSICAL EXAM:  Vital Signs Last 24 Hrs  T(C): 37 (20 Sep 2019 15:10), Max: 37 (20 Sep 2019 01:29)  T(F): 98.6 (20 Sep 2019 15:10), Max: 98.6 (20 Sep 2019 01:29)  HR: 81 (20 Sep 2019 15:10) (74 - 92)  BP: 147/79 (20 Sep 2019 15:10) (129/64 - 175/73)  BP(mean): --  RR: 20 (20 Sep 2019 15:10) (16 - 20)  SpO2: 100% (20 Sep 2019 15:10) (96% - 100%)  I&O's Summary    GENERAL: NAD, well-developed  HEAD:  Atraumatic, Normocephalic  EYES: EOMI, PERRLA, conjunctiva and sclera clear  NECK: Supple, No JVD  CHEST/LUNG: Clear to auscultation bilaterally; No wheeze  HEART: Regular rate and rhythm; No murmurs, rubs, or gallops  ABDOMEN: Soft, Nontender, distended; Bowel sounds sluggish  EXTREMITIES:  2+ Peripheral Pulses, No clubbing, cyanosis, or edema  PSYCH: AAOx3  NEUROLOGY: non-focal  SKIN: No rashes or lesions    LABS:  CAPILLARY BLOOD GLUCOSE                              6.8    97.11 )-----------( 180      ( 20 Sep 2019 06:35 )             23.4     09-20    124<L>  |  92<L>  |  12  ----------------------------<  131<H>  4.7   |  22  |  0.68    Ca    7.4<L>      20 Sep 2019 06:32  Phos  2.2     09-20  Mg     1.8     09-20    TPro  5.5<L>  /  Alb  2.6<L>  /  TBili  0.7  /  DBili  x   /  AST  50<H>  /  ALT  66<H>  /  AlkPhos  160<H>  09-20    PT/INR - ( 20 Sep 2019 06:35 )   PT: 14.0 SEC;   INR: 1.25          PTT - ( 20 Sep 2019 06:35 )  PTT:27.9 SEC          RADIOLOGY & ADDITIONAL TESTS:    Imaging Personally Reviewed:    Consultant(s) Notes Reviewed:      Care Discussed with Consultants/Other Providers:

## 2019-09-24 NOTE — PROGRESS NOTE ADULT - ASSESSMENT
80 y/o male with h/o HTN, gastritis, liver cirrhosis, CLL diagnosed in 2012 (hematologist Dr. Dean, on observation, not on chemo), who presents with 1 day history of abdominal distention, mid-left sided abdominal pain, nausea, with CT consistent with possible focal ileus. Pt also found to have leukocytosis    1. Abdominal Distension, Pain, Nausea- Likely secondary to paralytic ileus. CT negative for mechanical obstruction.   2. Hyponatremia- Resolved  3. CLL.  4. Cirrhosis on imaging    Recommendations:  -Check daily CBC, BMP  -Correct electrolyte abnormalities  -Serial abdominal exam  -Encourage ambulation, out of bed to chair  -Hold iron supplementation for now as it is constipating  -Start Miralax BID   -Monitor BMs (please document) 78 y/o male with h/o HTN, gastritis, liver cirrhosis, CLL diagnosed in 2012 (hematologist Dr. Dean, on observation, not on chemo), who presents with 1 day history of abdominal distention, mid-left sided abdominal pain, nausea, with CT consistent with possible focal ileus. Pt also found to have leukocytosis    1. Abdominal Distension, Pain, Nausea- Likely secondary to paralytic ileus. CT negative for mechanical obstruction.   2. Hyponatremia- Resolved  3. CLL.  4. Cirrhosis on imaging    Recommendations:  -Check daily CBC, BMP  -Correct electrolyte abnormalities  -Serial abdominal exam  -AXR to reassess bowel distention  -Encourage ambulation, out of bed to chair  -Hold iron supplementation for now as it is constipating  -Start Miralax BID   -Monitor BMs (please document)

## 2019-09-24 NOTE — PROGRESS NOTE ADULT - SUBJECTIVE AND OBJECTIVE BOX
We are following the patient for chronic Hepatitis B infection with cirrhosis      GI HPI Today:  Patient feels good today, no complaints except not having BM for 4 days now.   Pt passing flatus but not much. No vomiting, no nausea.     PAST MEDICAL & SURGICAL HISTORY  HTN (hypertension)  H/O gastritis  CLL (chronic lymphocytic leukemia)  No significant past surgical history      ALLERGIES:  No Known Allergies      MEDICATIONS:  MEDICATIONS  (STANDING):  ciprofloxacin   IVPB 400 milliGRAM(s) IV Intermittent every 12 hours  dextrose 5% + sodium chloride 0.9%. 1000 milliLiter(s) (75 mL/Hr) IV Continuous <Continuous>  ferrous    sulfate 325 milliGRAM(s) Oral daily  furosemide    Tablet 40 milliGRAM(s) Oral daily  heparin  Injectable 5000 Unit(s) SubCutaneous every 8 hours  influenza   Vaccine 0.5 milliLiter(s) IntraMuscular once  lactobacillus acidophilus 1 Tablet(s) Oral two times a day  losartan 50 milliGRAM(s) Oral daily  melatonin 3 milliGRAM(s) Oral at bedtime  metroNIDAZOLE  IVPB 500 milliGRAM(s) IV Intermittent every 12 hours  pantoprazole  Injectable 40 milliGRAM(s) IV Push daily  potassium acid phosphate/sodium acid phosphate tablet (K-PHOS No. 2) 1 Tablet(s) Oral four times a day with meals  tamsulosin 0.4 milliGRAM(s) Oral at bedtime    MEDICATIONS  (PRN):  benzocaine 15 mG/menthol 3.6 mG (Sugar-Free) Lozenge 1 Lozenge Oral every 8 hours PRN Sore Throat  guaiFENesin    Syrup 200 milliGRAM(s) Oral every 6 hours PRN Cough  ondansetron Injectable 4 milliGRAM(s) IV Push every 6 hours PRN Nausea      REVIEW OF SYSTEMS  General:  No fevers  Eyes:  No reported pain   ENT:  No sore throat   NECK: No stiffness   CV:  No chest pain   Resp:  No shortness of breath  GI:  See HPI  :  No dysuria  Muscle: ++ weakness  Neuro:  No tingling  Endocrine:  No polyuria  Heme:  No ecchymosis        VITALS:   T(F): 98.4 (09-24 @ 10:19), Max: 99.2 (09-24 @ 05:09)  HR: 81 (09-24 @ 10:19) (77 - 96)  BP: 139/69 (09-24 @ 10:19) (136/85 - 172/88)  BP(mean): --  RR: 18 (09-24 @ 10:19) (16 - 21)  SpO2: 95% (09-24 @ 10:19) (95% - 99%)        PHYSICAL EXAM:  GENERAL:  Appears in no distress  HEENT:  Conjunctivae Anicteric   CHEST:  Full & symmetric excursion  HEART:  NS1, S2,   ABDOMEN:  Soft, non-tender, ++ distended with tympanism on percussion  SKIN:  No rash  NEURO:  Alert         Blood Work :                        9.0    65.37 )-----------( 126      ( 24 Sep 2019 07:30 )             28.8       09-24    137  |  103  |  17  ----------------------------<  114<H>  3.8   |  23  |  0.80    Ca    7.7<L>      24 Sep 2019 07:30  Phos  2.4     09-24  Mg     1.9     09-24      CBC -  ( 24 Sep 2019 07:30 )  Hemoglobin : 9.0    CBC -  ( 23 Sep 2019 06:25 )  Hemoglobin : 10.3    CBC -  ( 22 Sep 2019 06:00 )  Hemoglobin : 10.3    CBC -  ( 21 Sep 2019 03:20 )  Hemoglobin : 9.7      LIVER FUNCTIONS - ( 24 Sep 2019 07:30 )  Alb: 2.3 [3.3 - 5.0] / Pro: 5.4 [6.0 - 8.3] / ALK PHOS: 112 [40 - 120] / ALT: 33 [4 - 41] / AST: 42 [4 - 40] / GGT: x     LIVER FUNCTIONS - ( 23 Sep 2019 06:25 )  Alb: 2.7 [3.3 - 5.0] / Pro: 6.0 [6.0 - 8.3] / ALK PHOS: 142 [40 - 120] / ALT: 45 [4 - 41] / AST: 44 [4 - 40] / GGT: x     LIVER FUNCTIONS - ( 22 Sep 2019 06:00 )  Alb: 2.7 [3.3 - 5.0] / Pro: 6.1 [6.0 - 8.3] / ALK PHOS: 144 [40 - 120] / ALT: 51 [4 - 41] / AST: 49 [4 - 40] / GGT: x     LIVER FUNCTIONS - ( 21 Sep 2019 03:20 )  Alb: 2.8 [3.3 - 5.0] / Pro: 6.1 [6.0 - 8.3] / ALK PHOS: 140 [40 - 120] / ALT: 56 [4 - 41] / AST: 40 [4 - 40] / GGT: x     LIVER FUNCTIONS - ( 20 Sep 2019 06:32 )  Alb: 2.6 [3.3 - 5.0] / Pro: 5.5 [6.0 - 8.3] / ALK PHOS: 160 [40 - 120] / ALT: 66 [4 - 41] / AST: 50 [4 - 40] / GGT: x     LIVER FUNCTIONS - ( 20 Sep 2019 01:06 )  Alb: 3.1 [3.3 - 5.0] / Pro: 6.5 [6.0 - 8.3] / ALK PHOS: 186 [40 - 120] / ALT: 78 [4 - 41] / AST: 67 [4 - 40] / GGT: x

## 2019-09-25 LAB
ANA TITR SER: NEGATIVE — SIGNIFICANT CHANGE UP
ANION GAP SERPL CALC-SCNC: 13 MMO/L — SIGNIFICANT CHANGE UP (ref 7–14)
BACTERIA BLD CULT: SIGNIFICANT CHANGE UP
BACTERIA BLD CULT: SIGNIFICANT CHANGE UP
BASOPHILS # BLD AUTO: 0.07 K/UL — SIGNIFICANT CHANGE UP (ref 0–0.2)
BASOPHILS NFR BLD AUTO: 0.1 % — SIGNIFICANT CHANGE UP (ref 0–2)
BUN SERPL-MCNC: 15 MG/DL — SIGNIFICANT CHANGE UP (ref 7–23)
CALCIUM SERPL-MCNC: 8.3 MG/DL — LOW (ref 8.4–10.5)
CHLORIDE SERPL-SCNC: 100 MMOL/L — SIGNIFICANT CHANGE UP (ref 98–107)
CO2 SERPL-SCNC: 25 MMOL/L — SIGNIFICANT CHANGE UP (ref 22–31)
CREAT SERPL-MCNC: 0.87 MG/DL — SIGNIFICANT CHANGE UP (ref 0.5–1.3)
EOSINOPHIL # BLD AUTO: 0.06 K/UL — SIGNIFICANT CHANGE UP (ref 0–0.5)
EOSINOPHIL NFR BLD AUTO: 0.1 % — SIGNIFICANT CHANGE UP (ref 0–6)
GLUCOSE SERPL-MCNC: 107 MG/DL — HIGH (ref 70–99)
HBV SURFACE AB SER-ACNC: NONREACTIVE — SIGNIFICANT CHANGE UP
HCT VFR BLD CALC: 32.9 % — LOW (ref 39–50)
HGB BLD-MCNC: 10.2 G/DL — LOW (ref 13–17)
IMM GRANULOCYTES NFR BLD AUTO: 0.1 % — SIGNIFICANT CHANGE UP (ref 0–1.5)
LYMPHOCYTES # BLD AUTO: 74.28 K/UL — HIGH (ref 1–3.3)
LYMPHOCYTES # BLD AUTO: 94.6 % — HIGH (ref 13–44)
MAGNESIUM SERPL-MCNC: 1.8 MG/DL — SIGNIFICANT CHANGE UP (ref 1.6–2.6)
MCHC RBC-ENTMCNC: 23.4 PG — LOW (ref 27–34)
MCHC RBC-ENTMCNC: 31 % — LOW (ref 32–36)
MCV RBC AUTO: 75.5 FL — LOW (ref 80–100)
MONOCYTES # BLD AUTO: 0.1 K/UL — SIGNIFICANT CHANGE UP (ref 0–0.9)
MONOCYTES NFR BLD AUTO: 0.1 % — LOW (ref 2–14)
NEUTROPHILS # BLD AUTO: 3.93 K/UL — SIGNIFICANT CHANGE UP (ref 1.8–7.4)
NEUTROPHILS NFR BLD AUTO: 5 % — LOW (ref 43–77)
NRBC # FLD: 0 K/UL — SIGNIFICANT CHANGE UP (ref 0–0)
PHOSPHATE SERPL-MCNC: 2.3 MG/DL — LOW (ref 2.5–4.5)
PLATELET # BLD AUTO: 158 K/UL — SIGNIFICANT CHANGE UP (ref 150–400)
PMV BLD: 10.8 FL — SIGNIFICANT CHANGE UP (ref 7–13)
POTASSIUM SERPL-MCNC: 3.6 MMOL/L — SIGNIFICANT CHANGE UP (ref 3.5–5.3)
POTASSIUM SERPL-SCNC: 3.6 MMOL/L — SIGNIFICANT CHANGE UP (ref 3.5–5.3)
RBC # BLD: 4.36 M/UL — SIGNIFICANT CHANGE UP (ref 4.2–5.8)
RBC # FLD: 21.2 % — HIGH (ref 10.3–14.5)
SODIUM SERPL-SCNC: 138 MMOL/L — SIGNIFICANT CHANGE UP (ref 135–145)
WBC # BLD: 78.55 K/UL — CRITICAL HIGH (ref 3.8–10.5)
WBC # FLD AUTO: 78.55 K/UL — CRITICAL HIGH (ref 3.8–10.5)

## 2019-09-25 PROCEDURE — 99232 SBSQ HOSP IP/OBS MODERATE 35: CPT | Mod: GC

## 2019-09-25 PROCEDURE — 74018 RADEX ABDOMEN 1 VIEW: CPT | Mod: 26

## 2019-09-25 RX ORDER — SODIUM CHLORIDE 9 MG/ML
1000 INJECTION, SOLUTION INTRAVENOUS
Refills: 0 | Status: DISCONTINUED | OUTPATIENT
Start: 2019-09-25 | End: 2019-09-26

## 2019-09-25 RX ORDER — POLYETHYLENE GLYCOL 3350 17 G/17G
17 POWDER, FOR SOLUTION ORAL
Refills: 0 | Status: DISCONTINUED | OUTPATIENT
Start: 2019-09-25 | End: 2019-09-29

## 2019-09-25 RX ADMIN — POLYETHYLENE GLYCOL 3350 17 GRAM(S): 17 POWDER, FOR SOLUTION ORAL at 17:11

## 2019-09-25 RX ADMIN — Medication 20 MILLIGRAM(S): at 05:32

## 2019-09-25 RX ADMIN — PANTOPRAZOLE SODIUM 40 MILLIGRAM(S): 20 TABLET, DELAYED RELEASE ORAL at 05:31

## 2019-09-25 RX ADMIN — Medication 3 MILLIGRAM(S): at 21:30

## 2019-09-25 RX ADMIN — BENZOCAINE AND MENTHOL 1 LOZENGE: 5; 1 LIQUID ORAL at 14:53

## 2019-09-25 RX ADMIN — Medication 85 MILLIMOLE(S): at 11:58

## 2019-09-25 RX ADMIN — LOSARTAN POTASSIUM 50 MILLIGRAM(S): 100 TABLET, FILM COATED ORAL at 05:31

## 2019-09-25 RX ADMIN — HEPARIN SODIUM 5000 UNIT(S): 5000 INJECTION INTRAVENOUS; SUBCUTANEOUS at 05:32

## 2019-09-25 RX ADMIN — TAMSULOSIN HYDROCHLORIDE 0.4 MILLIGRAM(S): 0.4 CAPSULE ORAL at 21:30

## 2019-09-25 RX ADMIN — Medication 1 TABLET(S): at 05:31

## 2019-09-25 RX ADMIN — Medication 325 MILLIGRAM(S): at 11:57

## 2019-09-25 RX ADMIN — HEPARIN SODIUM 5000 UNIT(S): 5000 INJECTION INTRAVENOUS; SUBCUTANEOUS at 14:53

## 2019-09-25 RX ADMIN — HEPARIN SODIUM 5000 UNIT(S): 5000 INJECTION INTRAVENOUS; SUBCUTANEOUS at 21:30

## 2019-09-25 RX ADMIN — Medication 1 TABLET(S): at 17:11

## 2019-09-25 NOTE — PROGRESS NOTE ADULT - ASSESSMENT
79 M hx of HTN, gastritis, liver cirrhosis, CLL diagnosed in 2012 (hematologist Dr. Dean, on observation, not on chemo), who presents with 1 day history of abdominal distention, mid-left sided abdominal pain, nausea, with CT consistent with possible focal ileus. Pt also found to have leukocytosis    Impression:  # Abdominal Distension, Pain, Nausea- Likely secondary to paralytic ileus. CT negative for mechanical obstruction.   # Hyponatremia- Resolved  # CLL.  # Cirrhosis on imaging    Recommendations:  - Serial abdominal exam  - AXR to reassess bowel distention  -Check daily CBC, BMP and correct electrolytes  -Encourage ambulation, out of bed to chair  -Hold iron supplementation for now as it is constipating  -Start Miralax BID   -Monitor BMs (please document)     Rebecca Banks MD  Gastroenterology Fellow  [phone]  Please page on call fellow on weekends and after 5pm on weekdays 79 M hx of HTN, gastritis, liver cirrhosis, CLL diagnosed in 2012 (hematologist Dr. Dean, on observation, not on chemo), who presents with 1 day history of abdominal distention, mid-left sided abdominal pain, nausea, with CT consistent with possible focal ileus. Pt also found to have leukocytosis    Impression:  # Abdominal Distension, Pain, Nausea- Likely secondary to paralytic ileus. CT negative for mechanical obstruction.   # Hyponatremia- Resolved  # CLL.  # Cirrhosis on imaging    Recommendations:  -Serial abdominal exam  -AXR to reassess bowel distention  -Check daily CBC, BMP and correct electrolytes (including phos)  -Encourage ambulation, out of bed to chair  -Hold iron supplementation for now as it is constipating  -Start Miralax BID   -Monitor BMs (please document)     Rebecca Banks MD  Gastroenterology Fellow  [phone]  Please page on call fellow on weekends and after 5pm on weekdays

## 2019-09-25 NOTE — PROGRESS NOTE ADULT - ASSESSMENT
ASSESSMENT/PLAN    ASSESSMENT:  Mr. Soria is an 78 y/o gentleman with h/o HTN, gastritis, liver cirrhosis, CLL diagnosed in 2012 (hematologist Dr. Dean, on observation, not on chemo), who presents with 1 day history of abdominal distention, mid-left sided abdominal pain, nausea, with CT consistent with possible focal ileus, WBC elevated to 141K, now trending to 78.55K. Acute rise in WBC r/o sepsis vs. progression of CLL. Nephrology consulted for hyponatremia and hypophosphatemia and fluid status.     1. Hyponatremia resolved. Chronic, last serum Na 131 on 8/21/19. On 9/22 Serum sodium was 131.Today his serum sodium is 138.   2. Hypophosphatemia. Now 2.3 after kphos had 2 doses. Not going to help as he has distention and ileus will need IV.  3. Intravascularly depleted. .   4. Abdominal distension. SBO suspected. on clears, IVF, IV Protonix, Zofran. NGT is out.   5. Magnesium is acceptable.       PLAN:  1. Start with Dextrose 5% + NaCl 0.9%. 75 mL/Hr until he starts eating a regular diet.   2. Trend sodium, check urine sodium and urine osm if his serum sodium goes low. Currently doing well.   3. BMP daily.   4. Will give again sodium phosphate 30 mmol x 1 dose in NS of 250 cc special order. Ordered. Yesterday he did not get this as it was discontinued.   5. Defer to team for abdominal distention.   6. Avoid hypotonic saline.  7. Will continue to follow until his electrolyte derangements resolve. So far hyponatremia resolved.   8. He is intravascularly depleted and on furosemide IV and Po discontinued both until his tachycardia and volume status improves.        Thank you for involving [employer] in this patient's care.    With warm regards,    Keyal Knox, DO  [employer]  [phone]

## 2019-09-25 NOTE — PROGRESS NOTE ADULT - SUBJECTIVE AND OBJECTIVE BOX
No pain, no shortness of breath. Slightly confused.     Review of systems: All 10 points ROS was obtained except as above.     benzocaine 15 mG/menthol 3.6 mG (Sugar-Free) Lozenge 1 Lozenge Oral every 8 hours PRN  ferrous    sulfate 325 milliGRAM(s) Oral daily  furosemide    Tablet 40 milliGRAM(s) Oral daily  furosemide   Injectable 20 milliGRAM(s) IV Push daily  guaiFENesin    Syrup 200 milliGRAM(s) Oral every 6 hours PRN  heparin  Injectable 5000 Unit(s) SubCutaneous every 8 hours  influenza   Vaccine 0.5 milliLiter(s) IntraMuscular once  lactobacillus acidophilus 1 Tablet(s) Oral two times a day  losartan 50 milliGRAM(s) Oral daily  melatonin 3 milliGRAM(s) Oral at bedtime  ondansetron Injectable 4 milliGRAM(s) IV Push every 6 hours PRN  pantoprazole    Tablet 40 milliGRAM(s) Oral before breakfast  polyethylene glycol 3350 17 Gram(s) Oral two times a day  sodium phosphate IVPB 30 milliMole(s) IV Intermittent once  tamsulosin 0.4 milliGRAM(s) Oral at bedtime      VITAL:  T(C): , Max: 37.4 (09-24-19 @ 19:10)  T(F): , Max: 99.3 (09-24-19 @ 19:10)  HR: 93 (09-25-19 @ 09:53)  BP: 114/69 (09-25-19 @ 09:53)  BP(mean): --  RR: 17 (09-25-19 @ 09:53)  SpO2: 94% (09-25-19 @ 09:53)  Wt(kg): --    09-24-19 @ 07:01  -  09-25-19 @ 07:00  --------------------------------------------------------  IN: 1170 mL / OUT: 975 mL / NET: 195 mL        PHYSICAL EXAM:  General: NAD; Alert  HEENT:  NCAT; PERRLA  Neck: No JVD; supple  Respiratory: CTA-b/l  Cardiac: Tachycardic s1s2  Gastrointestinal: BS+, soft, NT/ND  Urologic: No veloz  Extremities: No peripheral edema      LABS:                          10.2   78.55 )-----------( 158      ( 25 Sep 2019 07:10 )             32.9     Na(138)/K(3.6)/Cl(100)/HCO3(25)/BUN(15)/Cr(0.87)Glu(107)/Ca(8.3)/Mg(1.8)/PO4(2.3)    09-25 @ 07:10  Na(137)/K(3.8)/Cl(103)/HCO3(23)/BUN(17)/Cr(0.80)Glu(114)/Ca(7.7)/Mg(1.9)/PO4(2.4)    09-24 @ 07:30  Na(135)/K(4.0)/Cl(102)/HCO3(22)/BUN(18)/Cr(0.74)Glu(107)/Ca(7.8)/Mg(2.1)/PO4(2.0)    09-23 @ 06:25    09-25    138  |  100  |  15  ----------------------------<  107<H>  3.6   |  25  |  0.87    Ca    8.3<L>      25 Sep 2019 07:10  Phos  2.3     09-25  Mg     1.8     09-25    TPro  5.4<L>  /  Alb  2.3<L>  /  TBili  0.8  /  DBili  x   /  AST  42<H>  /  ALT  33  /  AlkPhos  112  09-24

## 2019-09-25 NOTE — PROGRESS NOTE ADULT - ASSESSMENT
78 y/o male with h/o HTN, gastritis, liver cirrhosis, CLL diagnosed in 2012 (hematologist Dr. Dean, on observation, not on chemo), who presents with 1 day history of abdominal distention, mid-left sided abdominal pain, nausea, with CT consistent with possible focal ileus, cirrhotic liver.  NG tube was placed and abdominal distension has improved.  Work up of cirrhosis was sent including Hepatitis profile- Hepatitis BsAg came back positive, Hep B core IgM also reactive   Patient and his wife said he was diagnosed with cirrhosis 2 years ago.        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 Pt will benefit from treatment with tenofovir given he is cirrhotic without strong competing diagnosis  # Cirrhosis: Suspect chronic Hepatitis B infection vs. other CLD . MELD-Na _21 on 9/22        - varices: unknown        - ascites: none        - SPE: none        - HCC: CT scan negative  # Paralytic ileus. DDx: CLL related etiology vs electrolyte disturbance, vs infectious etiology  # Hyponatremia, hypophosphatemia .  # CLL: Not currently on chemotherapy.     Recommendations:  - send hepatitis B PCR, Hepatitis BeAg and Hepatitis BAb, Hepatitis D serology  - please get repeat portable abdominal Xray, given pt still distended and no BM   - trend CBC, INR, CMP daily for MELD-Na   - follow up, SHERMAN, SPEP  - avoid all hepatotoxic agents  - will follow     Cheikh Callie Richard  GI Fellow  Pager# [phone]

## 2019-09-25 NOTE — PROGRESS NOTE ADULT - SUBJECTIVE AND OBJECTIVE BOX
Patient is a 79y old  Male who presents with a chief complaint of abdominal pain, nausea, abdominal distention, rising leukocytosis (20 Sep 2019 10:26)      SUBJECTIVE / OVERNIGHT EVENTS:  Abdomen distension improved  ambulatory  Tolerating fluids  Review of Systems:   CONSTITUTIONAL: No fever, weight loss, or fatigue  EYES: No eye pain, visual disturbances, or discharge  ENMT:  No difficulty hearing, tinnitus, vertigo; No sinus or throat pain  NECK: No pain or stiffness  BREASTS: No pain, masses, or nipple discharge  RESPIRATORY: No cough, wheezing, chills or hemoptysis; No shortness of breath  CARDIOVASCULAR: No chest pain, palpitations, dizziness, or leg swelling  GASTROINTESTINAL: No abdominal or epigastric pain. No vomiting, or hematemesis; No diarrhea or constipation. No melena or hematochezia. passing gas,  Distension improved slightly.  GENITOURINARY: No dysuria, frequency, hematuria, or incontinence  NEUROLOGICAL: No headaches, memory loss, loss of strength, numbness, or tremors  SKIN: No itching, burning, rashes, or lesions   LYMPH NODES: No enlarged glands  ENDOCRINE: No heat or cold intolerance; No hair loss  MUSCULOSKELETAL: No joint pain or swelling; No muscle, back, or extremity pain  PSYCHIATRIC: No depression, anxiety, mood swings, or difficulty sleeping  HEME/LYMPH: No easy bruising, or bleeding gums  ALLERY AND IMMUNOLOGIC: No hives or eczema    MEDICATIONS  (STANDING):  ciprofloxacin   IVPB 400 milliGRAM(s) IV Intermittent every 12 hours  dextrose 5% + sodium chloride 0.9%. 1000 milliLiter(s) (75 mL/Hr) IV Continuous <Continuous>  ferrous    sulfate 325 milliGRAM(s) Oral daily  furosemide    Tablet 40 milliGRAM(s) Oral daily  heparin  Injectable 5000 Unit(s) SubCutaneous every 8 hours  lactobacillus acidophilus 1 Tablet(s) Oral two times a day  losartan 50 milliGRAM(s) Oral daily  metroNIDAZOLE  IVPB 500 milliGRAM(s) IV Intermittent every 12 hours  pantoprazole  Injectable 40 milliGRAM(s) IV Push daily    MEDICATIONS  (PRN):  ondansetron Injectable 4 milliGRAM(s) IV Push every 6 hours PRN Nausea      PHYSICAL EXAM:  Vital Signs Last 24 Hrs  T(C): 37 (20 Sep 2019 15:10), Max: 37 (20 Sep 2019 01:29)  T(F): 98.6 (20 Sep 2019 15:10), Max: 98.6 (20 Sep 2019 01:29)  HR: 81 (20 Sep 2019 15:10) (74 - 92)  BP: 147/79 (20 Sep 2019 15:10) (129/64 - 175/73)  BP(mean): --  RR: 20 (20 Sep 2019 15:10) (16 - 20)  SpO2: 100% (20 Sep 2019 15:10) (96% - 100%)  I&O's Summary    GENERAL: NAD, well-developed  HEAD:  Atraumatic, Normocephalic  EYES: EOMI, PERRLA, conjunctiva and sclera clear  NECK: Supple, No JVD  CHEST/LUNG: Clear to auscultation bilaterally; No wheeze  HEART: Regular rate and rhythm; No murmurs, rubs, or gallops  ABDOMEN: Soft, Nontender, distended; Bowel sounds sluggish  EXTREMITIES:  2+ Peripheral Pulses, No clubbing, cyanosis, or edema  PSYCH: AAOx3  NEUROLOGY: non-focal  SKIN: No rashes or lesions    LABS:  CAPILLARY BLOOD GLUCOSE                              6.8    97.11 )-----------( 180      ( 20 Sep 2019 06:35 )             23.4     09-20    124<L>  |  92<L>  |  12  ----------------------------<  131<H>  4.7   |  22  |  0.68    Ca    7.4<L>      20 Sep 2019 06:32  Phos  2.2     09-20  Mg     1.8     09-20    TPro  5.5<L>  /  Alb  2.6<L>  /  TBili  0.7  /  DBili  x   /  AST  50<H>  /  ALT  66<H>  /  AlkPhos  160<H>  09-20    PT/INR - ( 20 Sep 2019 06:35 )   PT: 14.0 SEC;   INR: 1.25          PTT - ( 20 Sep 2019 06:35 )  PTT:27.9 SEC          RADIOLOGY & ADDITIONAL TESTS:    Imaging Personally Reviewed:    Consultant(s) Notes Reviewed:      Care Discussed with Consultants/Other Providers:

## 2019-09-25 NOTE — PROGRESS NOTE ADULT - SUBJECTIVE AND OBJECTIVE BOX
Pt is feeling a little better, passing a little more gas, no bowel movement, tolerated some water yesterday, no abdominal pain, no n/v and ROS is otherwise unremarkable. Ambulated yesterday somewhat.  ABx stopped. Passing lots of urine.      Meds:  benzocaine 15 mG/menthol 3.6 mG (Sugar-Free) Lozenge 1 Lozenge Oral every 8 hours PRN  ferrous    sulfate 325 milliGRAM(s) Oral daily  furosemide    Tablet 40 milliGRAM(s) Oral daily  furosemide   Injectable 20 milliGRAM(s) IV Push daily  guaiFENesin    Syrup 200 milliGRAM(s) Oral every 6 hours PRN  heparin  Injectable 5000 Unit(s) SubCutaneous every 8 hours  influenza   Vaccine 0.5 milliLiter(s) IntraMuscular once  lactobacillus acidophilus 1 Tablet(s) Oral two times a day  losartan 50 milliGRAM(s) Oral daily  melatonin 3 milliGRAM(s) Oral at bedtime  ondansetron Injectable 4 milliGRAM(s) IV Push every 6 hours PRN  pantoprazole    Tablet 40 milliGRAM(s) Oral before breakfast  tamsulosin 0.4 milliGRAM(s) Oral at bedtime      Vital Signs Last 24 Hrs  T(C): 37.3 (25 Sep 2019 05:25), Max: 37.4 (24 Sep 2019 19:10)  T(F): 99.1 (25 Sep 2019 05:25), Max: 99.3 (24 Sep 2019 19:10)  HR: 95 (25 Sep 2019 05:25) (81 - 100)  BP: 155/87 (25 Sep 2019 05:25) (139/69 - 155/87)  BP(mean): --  RR: 18 (25 Sep 2019 05:25) (16 - 18)  SpO2: 96% (25 Sep 2019 05:25) (95% - 100%)                          9.0    65.37 )-----------( 126      ( 24 Sep 2019 07:30 )             28.8       09-24    137  |  103  |  17  ----------------------------<  114<H>  3.8   |  23  |  0.80    Ca    7.7<L>      24 Sep 2019 07:30  Phos  2.4     09-24  Mg     1.9     09-24    TPro  5.4<L>  /  Alb  2.3<L>  /  TBili  0.8  /  DBili  x   /  AST  42<H>  /  ALT  33  /  AlkPhos  112  09-24        CT chest: IMPRESSION: Small bilateral pleural effusions, left greater than right   with interval progression since September20, 2019.    Interlobular septal thickening representing pulmonary edema.    Small amount of ascites new since September 20, 2019.    Nonspecific 2 mm left upper lobe pulmonary nodules.                  CATE LIRA M.D., RADIOLOGY RESIDENT  This document has been electronically signed.  MARI IBRAHIM M.D. ATTENDING RADIOLOGIST  This document has been electronically signed. Sep 23 2019  2:45PM

## 2019-09-25 NOTE — PROGRESS NOTE ADULT - SUBJECTIVE AND OBJECTIVE BOX
We are following the patient for Hep B      GI HPI Today:  No complaints. No BM but passing flatus, Abdominal x ray not performed yet   Tolerating clear liquid diet     PAST MEDICAL & SURGICAL HISTORY  HTN (hypertension)  H/O gastritis  CLL (chronic lymphocytic leukemia)  No significant past surgical history      ALLERGIES:  No Known Allergies      MEDICATIONS:  MEDICATIONS  (STANDING):  ferrous    sulfate 325 milliGRAM(s) Oral daily  furosemide    Tablet 40 milliGRAM(s) Oral daily  furosemide   Injectable 20 milliGRAM(s) IV Push daily  heparin  Injectable 5000 Unit(s) SubCutaneous every 8 hours  influenza   Vaccine 0.5 milliLiter(s) IntraMuscular once  lactobacillus acidophilus 1 Tablet(s) Oral two times a day  losartan 50 milliGRAM(s) Oral daily  melatonin 3 milliGRAM(s) Oral at bedtime  pantoprazole    Tablet 40 milliGRAM(s) Oral before breakfast  tamsulosin 0.4 milliGRAM(s) Oral at bedtime    MEDICATIONS  (PRN):  benzocaine 15 mG/menthol 3.6 mG (Sugar-Free) Lozenge 1 Lozenge Oral every 8 hours PRN Sore Throat  guaiFENesin    Syrup 200 milliGRAM(s) Oral every 6 hours PRN Cough  ondansetron Injectable 4 milliGRAM(s) IV Push every 6 hours PRN Nausea      REVIEW OF SYSTEMS  General:  No fevers  Eyes:  No reported pain   ENT:  No sore throat   NECK: No stiffness   CV:  No chest pain   Resp:  No shortness of breath  GI:  See HPI  :  No dysuria  Muscle:  ++  weakness  Neuro:  No tingling  Endocrine:  No polyuria  Heme:  No ecchymosis        VITALS:   T(F): 99.1 (09-25 @ 05:25), Max: 99.3 (09-24 @ 19:10)  HR: 95 (09-25 @ 05:25) (81 - 100)  BP: 155/87 (09-25 @ 05:25) (136/85 - 172/88)  BP(mean): --  RR: 18 (09-25 @ 05:25) (16 - 20)  SpO2: 96% (09-25 @ 05:25) (95% - 100%)        PHYSICAL EXAM:  GENERAL:  Appears in no distress  HEENT:  Conjunctivae Anicteric   CHEST:  Full & symmetric excursion  HEART:  NS1, S2,   ABDOMEN:  Soft, non-tender, ++ distended, normoactive bowel sounds  EXTREMITIES  no edema  SKIN:  No rash  NEURO:  Alert, oriented X 3          Blood Work :                        9.0    65.37 )-----------( 126      ( 24 Sep 2019 07:30 )             28.8       09-24    137  |  103  |  17  ----------------------------<  114<H>  3.8   |  23  |  0.80    Ca    7.7<L>      24 Sep 2019 07:30  Phos  2.4     09-24  Mg     1.9     09-24      CBC -  ( 24 Sep 2019 07:30 )  Hemoglobin : 9.0    CBC -  ( 23 Sep 2019 06:25 )  Hemoglobin : 10.3    CBC -  ( 22 Sep 2019 06:00 )  Hemoglobin : 10.3      LIVER FUNCTIONS - ( 24 Sep 2019 07:30 )  Alb: 2.3 [3.3 - 5.0] / Pro: 5.4 [6.0 - 8.3] / ALK PHOS: 112 [40 - 120] / ALT: 33 [4 - 41] / AST: 42 [4 - 40] / GGT: x     LIVER FUNCTIONS - ( 23 Sep 2019 06:25 )  Alb: 2.7 [3.3 - 5.0] / Pro: 6.0 [6.0 - 8.3] / ALK PHOS: 142 [40 - 120] / ALT: 45 [4 - 41] / AST: 44 [4 - 40] / GGT: x     LIVER FUNCTIONS - ( 22 Sep 2019 06:00 )  Alb: 2.7 [3.3 - 5.0] / Pro: 6.1 [6.0 - 8.3] / ALK PHOS: 144 [40 - 120] / ALT: 51 [4 - 41] / AST: 49 [4 - 40] / GGT: x     LIVER FUNCTIONS - ( 21 Sep 2019 03:20 )  Alb: 2.8 [3.3 - 5.0] / Pro: 6.1 [6.0 - 8.3] / ALK PHOS: 140 [40 - 120] / ALT: 56 [4 - 41] / AST: 40 [4 - 40] / GGT: x     LIVER FUNCTIONS - ( 20 Sep 2019 06:32 )  Alb: 2.6 [3.3 - 5.0] / Pro: 5.5 [6.0 - 8.3] / ALK PHOS: 160 [40 - 120] / ALT: 66 [4 - 41] / AST: 50 [4 - 40] / GGT: x     LIVER FUNCTIONS - ( 20 Sep 2019 01:06 )  Alb: 3.1 [3.3 - 5.0] / Pro: 6.5 [6.0 - 8.3] / ALK PHOS: 186 [40 - 120] / ALT: 78 [4 - 41] / AST: 67 [4 - 40] / GGT: x

## 2019-09-25 NOTE — PROGRESS NOTE ADULT - SUBJECTIVE AND OBJECTIVE BOX
Chief Complaint:  Patient is a 79y old  Male who presents with a chief complaint of abdominal pain, nausea, abdominal distention, rising leukocytosis (25 Sep 2019 08:54)    Interval Events:   No acute overnight events.  Abdominal distention stable  Passing flatus, but no BMs yet    Allergies:  No Known Allergies    Hospital Medications:  benzocaine 15 mG/menthol 3.6 mG (Sugar-Free) Lozenge 1 Lozenge Oral every 8 hours PRN  ferrous    sulfate 325 milliGRAM(s) Oral daily  furosemide    Tablet 40 milliGRAM(s) Oral daily  furosemide   Injectable 20 milliGRAM(s) IV Push daily  guaiFENesin    Syrup 200 milliGRAM(s) Oral every 6 hours PRN  heparin  Injectable 5000 Unit(s) SubCutaneous every 8 hours  influenza   Vaccine 0.5 milliLiter(s) IntraMuscular once  lactobacillus acidophilus 1 Tablet(s) Oral two times a day  losartan 50 milliGRAM(s) Oral daily  melatonin 3 milliGRAM(s) Oral at bedtime  ondansetron Injectable 4 milliGRAM(s) IV Push every 6 hours PRN  pantoprazole    Tablet 40 milliGRAM(s) Oral before breakfast  tamsulosin 0.4 milliGRAM(s) Oral at bedtime    PMHX/PSHX:  HTN (hypertension)  H/O gastritis  CLL (chronic lymphocytic leukemia)  No significant past surgical history    ROS:   General:  No fevers, chills  ENT:  No sore throat or dysphagia  CV:  No pain or palpitations  Resp:  No dyspnea, cough or  wheezing  GI:  No pain, No nausea, No vomitingNo rectal bleeding, No tarry stools,  Skin:  No rash or edema    PHYSICAL EXAM:   Vital Signs:  Vital Signs Last 24 Hrs  T(C): 37.3 (25 Sep 2019 05:25), Max: 37.4 (24 Sep 2019 19:10)  T(F): 99.1 (25 Sep 2019 05:25), Max: 99.3 (24 Sep 2019 19:10)  HR: 95 (25 Sep 2019 05:25) (81 - 100)  BP: 155/87 (25 Sep 2019 05:25) (139/69 - 155/87)  BP(mean): --  RR: 18 (25 Sep 2019 05:25) (16 - 18)  SpO2: 96% (25 Sep 2019 05:25) (95% - 100%)  Daily     Daily     GENERAL:  NAD, Appears stated age  HEENT:  NC/AT,  conjunctivae clear and pink, sclera -anicteric  CHEST:  Normal Effort, Breath sounds clear  HEART:  RRR, S1 + S2, no murmurs  ABDOMEN:  Soft, non-tender, + distended, hypoactive BS  EXTEREMITIES:  no cyanosis  SKIN:  Warm & Dry.  NEURO:  Alert, oriented    LABS:                        10.2   78.55 )-----------( 158      ( 25 Sep 2019 07:10 )             32.9     Mean Cell Volume: 75.5 fL (09-25-19 @ 07:10)    09-25    138  |  100  |  15  ----------------------------<  107<H>  3.6   |  25  |  0.87    Ca    8.3<L>      25 Sep 2019 07:10  Phos  2.3     09-25  Mg     1.8     09-25    TPro  5.4<L>  /  Alb  2.3<L>  /  TBili  0.8  /  DBili  x   /  AST  42<H>  /  ALT  33  /  AlkPhos  112  09-24    LIVER FUNCTIONS - ( 24 Sep 2019 07:30 )  Alb: 2.3 g/dL / Pro: 5.4 g/dL / ALK PHOS: 112 u/L / ALT: 33 u/L / AST: 42 u/L / GGT: x                                10.2   78.55 )-----------( 158      ( 25 Sep 2019 07:10 )             32.9                         9.0    65.37 )-----------( 126      ( 24 Sep 2019 07:30 )             28.8                         10.3   89.61 )-----------( 137      ( 23 Sep 2019 06:25 )             32.9       Imaging:

## 2019-09-25 NOTE — PROGRESS NOTE ADULT - ASSESSMENT
79M with CLL, on observation, here with acute increase in WBC, LD slightly high, abdominal symptoms as above, no significant adenopathy on exam or CT a/p, no ascites, WBC decreasing suggesting reactive nature from some inflammatory response, decreasing hgb. will recommend:  - f/u on hepatology recommendations  - on diuresis for the congestion  - no evidence of Serrato's transformation - WBC has decreased significantly since admission, suggesting reactive nature  - slow PO intake as tolerated  - melatonin for sleeping.  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

## 2019-09-26 LAB
ALBUMIN SERPL ELPH-MCNC: 2.4 G/DL — LOW (ref 3.3–5)
ALP SERPL-CCNC: 107 U/L — SIGNIFICANT CHANGE UP (ref 40–120)
ALT FLD-CCNC: 33 U/L — SIGNIFICANT CHANGE UP (ref 4–41)
ANION GAP SERPL CALC-SCNC: 9 MMO/L — SIGNIFICANT CHANGE UP (ref 7–14)
AST SERPL-CCNC: 40 U/L — SIGNIFICANT CHANGE UP (ref 4–40)
BASOPHILS # BLD AUTO: 0.18 K/UL — SIGNIFICANT CHANGE UP (ref 0–0.2)
BASOPHILS NFR BLD AUTO: 0.2 % — SIGNIFICANT CHANGE UP (ref 0–2)
BILIRUB SERPL-MCNC: 0.7 MG/DL — SIGNIFICANT CHANGE UP (ref 0.2–1.2)
BUN SERPL-MCNC: 15 MG/DL — SIGNIFICANT CHANGE UP (ref 7–23)
CALCIUM SERPL-MCNC: 7.6 MG/DL — LOW (ref 8.4–10.5)
CHLORIDE SERPL-SCNC: 102 MMOL/L — SIGNIFICANT CHANGE UP (ref 98–107)
CO2 SERPL-SCNC: 26 MMOL/L — SIGNIFICANT CHANGE UP (ref 22–31)
CREAT SERPL-MCNC: 0.73 MG/DL — SIGNIFICANT CHANGE UP (ref 0.5–1.3)
EOSINOPHIL # BLD AUTO: 0.04 K/UL — SIGNIFICANT CHANGE UP (ref 0–0.5)
EOSINOPHIL NFR BLD AUTO: 0.1 % — SIGNIFICANT CHANGE UP (ref 0–6)
GLUCOSE SERPL-MCNC: 125 MG/DL — HIGH (ref 70–99)
HBV DNA # SERPL NAA+PROBE: SIGNIFICANT CHANGE UP IU/ML
HBV DNA SERPL NAA+PROBE-LOG#: 7.23 — SIGNIFICANT CHANGE UP
HCT VFR BLD CALC: 30.9 % — LOW (ref 39–50)
HGB BLD-MCNC: 9.6 G/DL — LOW (ref 13–17)
IMM GRANULOCYTES NFR BLD AUTO: 0.1 % — SIGNIFICANT CHANGE UP (ref 0–1.5)
LYMPHOCYTES # BLD AUTO: 69.94 K/UL — HIGH (ref 1–3.3)
LYMPHOCYTES # BLD AUTO: 94.7 % — HIGH (ref 13–44)
MAGNESIUM SERPL-MCNC: 1.8 MG/DL — SIGNIFICANT CHANGE UP (ref 1.6–2.6)
MANUAL SMEAR VERIFICATION: SIGNIFICANT CHANGE UP
MCHC RBC-ENTMCNC: 23.2 PG — LOW (ref 27–34)
MCHC RBC-ENTMCNC: 31.1 % — LOW (ref 32–36)
MCV RBC AUTO: 74.8 FL — LOW (ref 80–100)
MONOCYTES # BLD AUTO: 0.12 K/UL — SIGNIFICANT CHANGE UP (ref 0–0.9)
MONOCYTES NFR BLD AUTO: 0.2 % — LOW (ref 2–14)
NEUTROPHILS # BLD AUTO: 3.48 K/UL — SIGNIFICANT CHANGE UP (ref 1.8–7.4)
NEUTROPHILS NFR BLD AUTO: 4.7 % — LOW (ref 43–77)
NRBC # FLD: 0 K/UL — SIGNIFICANT CHANGE UP (ref 0–0)
PHOSPHATE SERPL-MCNC: 2.2 MG/DL — LOW (ref 2.5–4.5)
PLATELET # BLD AUTO: 146 K/UL — LOW (ref 150–400)
PMV BLD: SIGNIFICANT CHANGE UP FL (ref 7–13)
POTASSIUM SERPL-MCNC: 3.4 MMOL/L — LOW (ref 3.5–5.3)
POTASSIUM SERPL-SCNC: 3.4 MMOL/L — LOW (ref 3.5–5.3)
PROT SERPL-MCNC: 5.5 G/DL — LOW (ref 6–8.3)
RBC # BLD: 4.13 M/UL — LOW (ref 4.2–5.8)
RBC # FLD: 21.2 % — HIGH (ref 10.3–14.5)
SODIUM SERPL-SCNC: 137 MMOL/L — SIGNIFICANT CHANGE UP (ref 135–145)
WBC # BLD: 73.87 K/UL — CRITICAL HIGH (ref 3.8–10.5)
WBC # FLD AUTO: 73.87 K/UL — CRITICAL HIGH (ref 3.8–10.5)

## 2019-09-26 PROCEDURE — 99232 SBSQ HOSP IP/OBS MODERATE 35: CPT | Mod: GC

## 2019-09-26 RX ORDER — POTASSIUM PHOSPHATE, MONOBASIC POTASSIUM PHOSPHATE, DIBASIC 236; 224 MG/ML; MG/ML
15 INJECTION, SOLUTION INTRAVENOUS ONCE
Refills: 0 | Status: COMPLETED | OUTPATIENT
Start: 2019-09-26 | End: 2019-09-26

## 2019-09-26 RX ORDER — POTASSIUM CHLORIDE 20 MEQ
40 PACKET (EA) ORAL EVERY 4 HOURS
Refills: 0 | Status: COMPLETED | OUTPATIENT
Start: 2019-09-26 | End: 2019-09-26

## 2019-09-26 RX ADMIN — SODIUM CHLORIDE 75 MILLILITER(S): 9 INJECTION, SOLUTION INTRAVENOUS at 09:56

## 2019-09-26 RX ADMIN — Medication 1 TABLET(S): at 17:44

## 2019-09-26 RX ADMIN — HEPARIN SODIUM 5000 UNIT(S): 5000 INJECTION INTRAVENOUS; SUBCUTANEOUS at 13:47

## 2019-09-26 RX ADMIN — HEPARIN SODIUM 5000 UNIT(S): 5000 INJECTION INTRAVENOUS; SUBCUTANEOUS at 05:13

## 2019-09-26 RX ADMIN — Medication 40 MILLIEQUIVALENT(S): at 13:47

## 2019-09-26 RX ADMIN — Medication 3 MILLIGRAM(S): at 21:41

## 2019-09-26 RX ADMIN — POTASSIUM PHOSPHATE, MONOBASIC POTASSIUM PHOSPHATE, DIBASIC 62.5 MILLIMOLE(S): 236; 224 INJECTION, SOLUTION INTRAVENOUS at 19:12

## 2019-09-26 RX ADMIN — HEPARIN SODIUM 5000 UNIT(S): 5000 INJECTION INTRAVENOUS; SUBCUTANEOUS at 21:41

## 2019-09-26 RX ADMIN — LOSARTAN POTASSIUM 50 MILLIGRAM(S): 100 TABLET, FILM COATED ORAL at 05:12

## 2019-09-26 RX ADMIN — POLYETHYLENE GLYCOL 3350 17 GRAM(S): 17 POWDER, FOR SOLUTION ORAL at 09:55

## 2019-09-26 RX ADMIN — BENZOCAINE AND MENTHOL 1 LOZENGE: 5; 1 LIQUID ORAL at 05:13

## 2019-09-26 RX ADMIN — TAMSULOSIN HYDROCHLORIDE 0.4 MILLIGRAM(S): 0.4 CAPSULE ORAL at 21:41

## 2019-09-26 RX ADMIN — Medication 325 MILLIGRAM(S): at 11:54

## 2019-09-26 RX ADMIN — POTASSIUM PHOSPHATE, MONOBASIC POTASSIUM PHOSPHATE, DIBASIC 62.5 MILLIMOLE(S): 236; 224 INJECTION, SOLUTION INTRAVENOUS at 10:13

## 2019-09-26 RX ADMIN — Medication 1 TABLET(S): at 05:13

## 2019-09-26 RX ADMIN — PANTOPRAZOLE SODIUM 40 MILLIGRAM(S): 20 TABLET, DELAYED RELEASE ORAL at 05:12

## 2019-09-26 RX ADMIN — Medication 40 MILLIEQUIVALENT(S): at 10:14

## 2019-09-26 NOTE — PROGRESS NOTE ADULT - SUBJECTIVE AND OBJECTIVE BOX
No pain, no shortness of breath    Review of systems: All 10 points ROS was obtained except as above.     benzocaine 15 mG/menthol 3.6 mG (Sugar-Free) Lozenge 1 Lozenge Oral every 8 hours PRN  dextrose 5% + sodium chloride 0.9%. 1000 milliLiter(s) IV Continuous <Continuous>  ferrous    sulfate 325 milliGRAM(s) Oral daily  guaiFENesin    Syrup 200 milliGRAM(s) Oral every 6 hours PRN  heparin  Injectable 5000 Unit(s) SubCutaneous every 8 hours  influenza   Vaccine 0.5 milliLiter(s) IntraMuscular once  lactobacillus acidophilus 1 Tablet(s) Oral two times a day  losartan 50 milliGRAM(s) Oral daily  melatonin 3 milliGRAM(s) Oral at bedtime  ondansetron Injectable 4 milliGRAM(s) IV Push every 6 hours PRN  pantoprazole    Tablet 40 milliGRAM(s) Oral before breakfast  polyethylene glycol 3350 17 Gram(s) Oral two times a day  potassium phosphate IVPB 15 milliMole(s) IV Intermittent once  tamsulosin 0.4 milliGRAM(s) Oral at bedtime      VITAL:  T(C): , Max: 37 (09-26-19 @ 10:00)  T(F): , Max: 98.6 (09-26-19 @ 10:00)  HR: 96 (09-26-19 @ 17:29)  BP: 160/82 (09-26-19 @ 17:29)  BP(mean): --  RR: 17 (09-26-19 @ 17:29)  SpO2: 99% (09-26-19 @ 17:29)  Wt(kg): --    09-25-19 @ 07:01  -  09-26-19 @ 07:00  --------------------------------------------------------  IN: 2310 mL / OUT: 1050 mL / NET: 1260 mL    09-26-19 @ 07:01  -  09-26-19 @ 17:56  --------------------------------------------------------  IN: 700 mL / OUT: 250 mL / NET: 450 mL        PHYSICAL EXAM:  General: NAD; Alert  HEENT:  NCAT; PERRLA  Neck: No JVD; supple  Respiratory: CTA-b/l  Cardiac: RRR s1s2  Gastrointestinal: BS+, soft, NT/ND  Urologic: No veloz  Extremities: No peripheral edema      LABS:                          9.6    73.87 )-----------( 146      ( 26 Sep 2019 07:35 )             30.9     Na(137)/K(3.4)/Cl(102)/HCO3(26)/BUN(15)/Cr(0.73)Glu(125)/Ca(7.6)/Mg(1.8)/PO4(2.2)    09-26 @ 07:35  Na(138)/K(3.6)/Cl(100)/HCO3(25)/BUN(15)/Cr(0.87)Glu(107)/Ca(8.3)/Mg(1.8)/PO4(2.3)    09-25 @ 07:10  Na(137)/K(3.8)/Cl(103)/HCO3(23)/BUN(17)/Cr(0.80)Glu(114)/Ca(7.7)/Mg(1.9)/PO4(2.4)    09-24 @ 07:30    09-26    137  |  102  |  15  ----------------------------<  125<H>  3.4<L>   |  26  |  0.73    Ca    7.6<L>      26 Sep 2019 07:35  Phos  2.2     09-26  Mg     1.8     09-26    TPro  5.5<L>  /  Alb  2.4<L>  /  TBili  0.7  /  DBili  x   /  AST  40  /  ALT  33  /  AlkPhos  107  09-26

## 2019-09-26 NOTE — PROGRESS NOTE ADULT - ASSESSMENT
ASSESSMENT/PLAN    ASSESSMENT:  Mr. Soria is an 78 y/o gentleman with h/o HTN, gastritis, liver cirrhosis, CLL diagnosed in 2012 (hematologist Dr. Dean, on observation, not on chemo), who presents with 1 day history of abdominal distention, mid-left sided abdominal pain, nausea, with CT consistent with possible focal ileus, WBC elevated to 141K, now trending to 78.55K. Acute rise in WBC r/o sepsis vs. progression of CLL. Nephrology consulted for hyponatremia and hypophosphatemia and fluid status.     1. Hyponatremia resolved. Chronic, last serum Na 131 on 8/21/19. On 9/22 Serum sodium was 131.Today his serum sodium is 137.   2. Hypophosphatemia. Now 2.2 after sodium phosphate 30 mmol x 1 dose. Also noted k phos today of 15 mmol.  3. Euvolemic.   4. Abdominal distension. SBO suspected. on clears, IVF, IV Protonix, Zofran. NGT is out.   5. Magnesium is acceptable.       PLAN:  1. Discontinue his Dextrose 5% + NaCl 0.9%. 75 mL/Hr now.  2. Trend sodium, check urine sodium and urine osm if his serum sodium goes low. Currently doing well.   3. BMP daily.   4. Will give again potassium phosphate 15 mmol x 1 dose.  5. Defer to team for abdominal distention.   6. Avoid hypotonic saline.  7. Will continue to follow until his electrolyte derangements resolve. So far hyponatremia resolved.   8. Hold furosemide for tonight.    Thank you for involving [employer] in this patient's care.    With warm regards,    Keyla Knox, DO  [employer]  [phone]

## 2019-09-26 NOTE — PROGRESS NOTE ADULT - SUBJECTIVE AND OBJECTIVE BOX
Chief Complaint:  Patient is a 79y old  Male who presents with a chief complaint of abdominal pain, nausea, abdominal distention, rising leukocytosis (25 Sep 2019 13:47)    Interval Events:   No acute overnight events. Patient denies any specific complaints.     Allergies:  No Known Allergies    Hospital Medications:  benzocaine 15 mG/menthol 3.6 mG (Sugar-Free) Lozenge 1 Lozenge Oral every 8 hours PRN  dextrose 5% + sodium chloride 0.9%. 1000 milliLiter(s) IV Continuous <Continuous>  ferrous    sulfate 325 milliGRAM(s) Oral daily  guaiFENesin    Syrup 200 milliGRAM(s) Oral every 6 hours PRN  heparin  Injectable 5000 Unit(s) SubCutaneous every 8 hours  influenza   Vaccine 0.5 milliLiter(s) IntraMuscular once  lactobacillus acidophilus 1 Tablet(s) Oral two times a day  losartan 50 milliGRAM(s) Oral daily  melatonin 3 milliGRAM(s) Oral at bedtime  ondansetron Injectable 4 milliGRAM(s) IV Push every 6 hours PRN  pantoprazole    Tablet 40 milliGRAM(s) Oral before breakfast  polyethylene glycol 3350 17 Gram(s) Oral two times a day  tamsulosin 0.4 milliGRAM(s) Oral at bedtime    PMHX/PSHX:  HTN (hypertension)  H/O gastritis  CLL (chronic lymphocytic leukemia)  No significant past surgical history    ROS:   General:  No fevers or chills  ENT:  No sore throat or dysphagia  CV:  No pain or palpitations  Resp:  No dyspnea, cough or  wheezing  GI:  No pain, No nausea, No vomiting, No rectal bleeding, No tarry stools,  Skin:  No rash or edema    PHYSICAL EXAM:   Vital Signs:  Vital Signs Last 24 Hrs  T(C): 36.8 (26 Sep 2019 17:29), Max: 37 (26 Sep 2019 10:00)  T(F): 98.2 (26 Sep 2019 17:29), Max: 98.6 (26 Sep 2019 10:00)  HR: 96 (26 Sep 2019 17:29) (86 - 96)  BP: 160/82 (26 Sep 2019 17:29) (135/82 - 160/82)  BP(mean): --  RR: 17 (26 Sep 2019 17:29) (17 - 18)  SpO2: 99% (26 Sep 2019 17:29) (93% - 99%)  Daily     Daily     GENERAL:  NAD, Appears stated age  HEENT:  NC/AT,  conjunctivae clear and pink, sclera -anicteric  CHEST:  Normal Effort, Breath sounds clear  HEART:  RRR, S1 + S2, no murmurs  ABDOMEN:  Soft, non-tender, non-distended, BS+  EXTEREMITIES:  no cyanosis  SKIN:  Warm & Dry.  NEURO:  Alert, oriented    LABS:                        9.6    73.87 )-----------( 146      ( 26 Sep 2019 07:35 )             30.9     Mean Cell Volume: 74.8 fL (09-26-19 @ 07:35)    09-26    137  |  102  |  15  ----------------------------<  125<H>  3.4<L>   |  26  |  0.73    Ca    7.6<L>      26 Sep 2019 07:35  Phos  2.2     09-26  Mg     1.8     09-26    TPro  5.5<L>  /  Alb  2.4<L>  /  TBili  0.7  /  DBili  x   /  AST  40  /  ALT  33  /  AlkPhos  107  09-26    LIVER FUNCTIONS - ( 26 Sep 2019 07:35 )  Alb: 2.4 g/dL / Pro: 5.5 g/dL / ALK PHOS: 107 u/L / ALT: 33 u/L / AST: 40 u/L / GGT: x                               9.6    73.87 )-----------( 146      ( 26 Sep 2019 07:35 )             30.9                         10.2   78.55 )-----------( 158      ( 25 Sep 2019 07:10 )             32.9                         9.0    65.37 )-----------( 126      ( 24 Sep 2019 07:30 )             28.8       Imaging:

## 2019-09-26 NOTE — PROGRESS NOTE ADULT - ASSESSMENT
79 M hx of HTN, gastritis, liver cirrhosis, CLL diagnosed in 2012 (hematologist Dr. Dean, on observation, not on chemo), who presents with 1 day history of abdominal distention, mid-left sided abdominal pain, nausea, with CT consistent with possible focal ileus. Pt also found to have leukocytosis    Impression:  # Abdominal Distension, Pain, Nausea- Likely secondary to paralytic ileus. CT negative for mechanical obstruction.   # Hyponatremia- Resolved  # CLL.  # Cirrhosis on imaging    Recommendations:  - Serial abdominal exam  - Check daily CBC, BMP and correct electrolytes (including phos)  - Encourage ambulation, out of bed to chair  - Hold iron supplementation for now as it is constipating  - Start Miralax BID   - Monitor BMs (please document)     Rebecca Banks MD  Gastroenterology Fellow  [phone]  Please page on call fellow on weekends and after 5pm on weekdays

## 2019-09-26 NOTE — PROGRESS NOTE ADULT - ASSESSMENT
79M with CLL, on observation, here with acute increase in WBC, LD slightly high, abdominal symptoms as above, no significant adenopathy on exam or CT a/p, no ascites, WBC decreasing suggesting reactive nature from some inflammatory response, decreasing hgb. will recommend:  - f/u on GI and hepatology recommendations  - on diuresis for the congestion  - no evidence of Serrato's transformation - WBC has decreased significantly since admission, suggesting reactive increase on admission  - slow PO intake as tolerated  - melatonin for sleeping.  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

## 2019-09-26 NOTE — PROGRESS NOTE ADULT - SUBJECTIVE AND OBJECTIVE BOX
Pt has been doing a little better, had a small BM but piles are irritating him and affecting his activity. Tolerating clear liquids OK. No pain abdomen. No CP, SOB and rest of the ROS unchanged.      Meds:  benzocaine 15 mG/menthol 3.6 mG (Sugar-Free) Lozenge 1 Lozenge Oral every 8 hours PRN  ferrous    sulfate 325 milliGRAM(s) Oral daily  guaiFENesin    Syrup 200 milliGRAM(s) Oral every 6 hours PRN  heparin  Injectable 5000 Unit(s) SubCutaneous every 8 hours  influenza   Vaccine 0.5 milliLiter(s) IntraMuscular once  lactobacillus acidophilus 1 Tablet(s) Oral two times a day  losartan 50 milliGRAM(s) Oral daily  melatonin 3 milliGRAM(s) Oral at bedtime  ondansetron Injectable 4 milliGRAM(s) IV Push every 6 hours PRN  pantoprazole    Tablet 40 milliGRAM(s) Oral before breakfast  polyethylene glycol 3350 17 Gram(s) Oral two times a day  potassium phosphate IVPB 15 milliMole(s) IV Intermittent once  tamsulosin 0.4 milliGRAM(s) Oral at bedtime      Vital Signs Last 24 Hrs  T(C): 36.8 (26 Sep 2019 17:29), Max: 37 (26 Sep 2019 10:00)  T(F): 98.2 (26 Sep 2019 17:29), Max: 98.6 (26 Sep 2019 10:00)  HR: 96 (26 Sep 2019 17:29) (86 - 96)  BP: 160/82 (26 Sep 2019 17:29) (135/82 - 160/82)  BP(mean): --  RR: 17 (26 Sep 2019 17:29) (17 - 18)  SpO2: 99% (26 Sep 2019 17:29) (93% - 99%)                          9.6    73.87 )-----------( 146      ( 26 Sep 2019 07:35 )             30.9       09-26    137  |  102  |  15  ----------------------------<  125<H>  3.4<L>   |  26  |  0.73    Ca    7.6<L>      26 Sep 2019 07:35  Phos  2.2     09-26  Mg     1.8     09-26    TPro  5.5<L>  /  Alb  2.4<L>  /  TBili  0.7  /  DBili  x   /  AST  40  /  ALT  33  /  AlkPhos  107  09-26

## 2019-09-27 LAB
ALBUMIN SERPL ELPH-MCNC: 2.3 G/DL — LOW (ref 3.3–5)
ALP SERPL-CCNC: 104 U/L — SIGNIFICANT CHANGE UP (ref 40–120)
ALT FLD-CCNC: 35 U/L — SIGNIFICANT CHANGE UP (ref 4–41)
ANION GAP SERPL CALC-SCNC: 7 MMO/L — SIGNIFICANT CHANGE UP (ref 7–14)
AST SERPL-CCNC: 46 U/L — HIGH (ref 4–40)
BASOPHILS # BLD AUTO: 0.09 K/UL — SIGNIFICANT CHANGE UP (ref 0–0.2)
BASOPHILS NFR BLD AUTO: 0.1 % — SIGNIFICANT CHANGE UP (ref 0–2)
BILIRUB SERPL-MCNC: 0.6 MG/DL — SIGNIFICANT CHANGE UP (ref 0.2–1.2)
BUN SERPL-MCNC: 12 MG/DL — SIGNIFICANT CHANGE UP (ref 7–23)
CALCIUM SERPL-MCNC: 7.5 MG/DL — LOW (ref 8.4–10.5)
CHLORIDE SERPL-SCNC: 105 MMOL/L — SIGNIFICANT CHANGE UP (ref 98–107)
CO2 SERPL-SCNC: 22 MMOL/L — SIGNIFICANT CHANGE UP (ref 22–31)
CREAT SERPL-MCNC: 0.75 MG/DL — SIGNIFICANT CHANGE UP (ref 0.5–1.3)
EOSINOPHIL # BLD AUTO: 0.06 K/UL — SIGNIFICANT CHANGE UP (ref 0–0.5)
EOSINOPHIL NFR BLD AUTO: 0.1 % — SIGNIFICANT CHANGE UP (ref 0–6)
GLUCOSE SERPL-MCNC: 109 MG/DL — HIGH (ref 70–99)
HCT VFR BLD CALC: 30.2 % — LOW (ref 39–50)
HGB BLD-MCNC: 9.3 G/DL — LOW (ref 13–17)
IMM GRANULOCYTES NFR BLD AUTO: 0.2 % — SIGNIFICANT CHANGE UP (ref 0–1.5)
LYMPHOCYTES # BLD AUTO: 72.68 K/UL — HIGH (ref 1–3.3)
LYMPHOCYTES # BLD AUTO: 95 % — HIGH (ref 13–44)
MAGNESIUM SERPL-MCNC: 1.7 MG/DL — SIGNIFICANT CHANGE UP (ref 1.6–2.6)
MANUAL SMEAR VERIFICATION: SIGNIFICANT CHANGE UP
MCHC RBC-ENTMCNC: 23.2 PG — LOW (ref 27–34)
MCHC RBC-ENTMCNC: 30.8 % — LOW (ref 32–36)
MCV RBC AUTO: 75.3 FL — LOW (ref 80–100)
MONOCYTES # BLD AUTO: 0.11 K/UL — SIGNIFICANT CHANGE UP (ref 0–0.9)
MONOCYTES NFR BLD AUTO: 0.1 % — LOW (ref 2–14)
NEUTROPHILS # BLD AUTO: 3.41 K/UL — SIGNIFICANT CHANGE UP (ref 1.8–7.4)
NEUTROPHILS NFR BLD AUTO: 4.5 % — LOW (ref 43–77)
NRBC # FLD: 0 K/UL — SIGNIFICANT CHANGE UP (ref 0–0)
PHOSPHATE SERPL-MCNC: 2 MG/DL — LOW (ref 2.5–4.5)
PLATELET # BLD AUTO: 108 K/UL — LOW (ref 150–400)
PMV BLD: SIGNIFICANT CHANGE UP FL (ref 7–13)
POTASSIUM SERPL-MCNC: 3.9 MMOL/L — SIGNIFICANT CHANGE UP (ref 3.5–5.3)
POTASSIUM SERPL-SCNC: 3.9 MMOL/L — SIGNIFICANT CHANGE UP (ref 3.5–5.3)
PROT SERPL-MCNC: 5.4 G/DL — LOW (ref 6–8.3)
RBC # BLD: 4.01 M/UL — LOW (ref 4.2–5.8)
RBC # FLD: 21.2 % — HIGH (ref 10.3–14.5)
SODIUM SERPL-SCNC: 134 MMOL/L — LOW (ref 135–145)
WBC # BLD: 76.47 K/UL — CRITICAL HIGH (ref 3.8–10.5)
WBC # FLD AUTO: 76.47 K/UL — CRITICAL HIGH (ref 3.8–10.5)

## 2019-09-27 PROCEDURE — 99232 SBSQ HOSP IP/OBS MODERATE 35: CPT | Mod: GC

## 2019-09-27 RX ORDER — MAGNESIUM SULFATE 500 MG/ML
2 VIAL (ML) INJECTION ONCE
Refills: 0 | Status: COMPLETED | OUTPATIENT
Start: 2019-09-27 | End: 2019-09-27

## 2019-09-27 RX ORDER — TENOFOVIR DISOPROXIL FUMARATE 300 MG/1
300 TABLET, FILM COATED ORAL DAILY
Refills: 0 | Status: DISCONTINUED | OUTPATIENT
Start: 2019-09-27 | End: 2019-09-29

## 2019-09-27 RX ORDER — POTASSIUM PHOSPHATE, MONOBASIC POTASSIUM PHOSPHATE, DIBASIC 236; 224 MG/ML; MG/ML
30 INJECTION, SOLUTION INTRAVENOUS ONCE
Refills: 0 | Status: DISCONTINUED | OUTPATIENT
Start: 2019-09-27 | End: 2019-09-27

## 2019-09-27 RX ORDER — FUROSEMIDE 40 MG
20 TABLET ORAL DAILY
Refills: 0 | Status: DISCONTINUED | OUTPATIENT
Start: 2019-09-27 | End: 2019-09-29

## 2019-09-27 RX ADMIN — Medication 1 TABLET(S): at 05:56

## 2019-09-27 RX ADMIN — Medication 85 MILLIMOLE(S): at 14:42

## 2019-09-27 RX ADMIN — TENOFOVIR DISOPROXIL FUMARATE 300 MILLIGRAM(S): 300 TABLET, FILM COATED ORAL at 17:28

## 2019-09-27 RX ADMIN — HEPARIN SODIUM 5000 UNIT(S): 5000 INJECTION INTRAVENOUS; SUBCUTANEOUS at 05:56

## 2019-09-27 RX ADMIN — HEPARIN SODIUM 5000 UNIT(S): 5000 INJECTION INTRAVENOUS; SUBCUTANEOUS at 21:58

## 2019-09-27 RX ADMIN — LOSARTAN POTASSIUM 50 MILLIGRAM(S): 100 TABLET, FILM COATED ORAL at 05:56

## 2019-09-27 RX ADMIN — POLYETHYLENE GLYCOL 3350 17 GRAM(S): 17 POWDER, FOR SOLUTION ORAL at 05:56

## 2019-09-27 RX ADMIN — PANTOPRAZOLE SODIUM 40 MILLIGRAM(S): 20 TABLET, DELAYED RELEASE ORAL at 05:56

## 2019-09-27 RX ADMIN — Medication 3 MILLIGRAM(S): at 21:58

## 2019-09-27 RX ADMIN — TAMSULOSIN HYDROCHLORIDE 0.4 MILLIGRAM(S): 0.4 CAPSULE ORAL at 21:58

## 2019-09-27 RX ADMIN — Medication 20 MILLIGRAM(S): at 11:38

## 2019-09-27 RX ADMIN — Medication 1 TABLET(S): at 17:29

## 2019-09-27 RX ADMIN — HEPARIN SODIUM 5000 UNIT(S): 5000 INJECTION INTRAVENOUS; SUBCUTANEOUS at 15:24

## 2019-09-27 RX ADMIN — Medication 50 GRAM(S): at 11:38

## 2019-09-27 NOTE — PROGRESS NOTE ADULT - ASSESSMENT
ASSESSMENT:  Mr. Soria is an 78 y/o gentleman with h/o HTN, gastritis, liver cirrhosis, CLL diagnosed in 2012 (hematologist Dr. Dean, on observation, not on chemo), who presents with 1 day history of abdominal distention, mid-left sided abdominal pain, nausea, with CT consistent with possible focal ileus, WBC elevated to 141K, now trending to 76.47 K. Acute rise in WBC r/o sepsis vs. CLL progression. Nephrology consulted for hyponatremia and hypophosphatemia and fluid status.     1. Hyponatremia that is mild. Chronic, last serum Na 131 on 8/21/19. On 9/26 Serum sodium was 137.Today his serum sodium is 134. He is drinking a lot more fluids.    2. Hypophosphatemia. Now phosphorus is 2.0  Kphos IV of 15 mmol x 2 yesterday.  3. Euvolemic and at goal. .   4. Abdominal distension. SBO suspected. On liquid diet, Protonix, Zofran.   5.Hypomagnesmia.       PLAN:  1. Give Magnesium of 2 grams IV x one dose.   2. Trend sodium, check urine sodium and urine osm if his serum sodium.  3. BMP daily.   4. Will give again sodium  phosphate of  30 mmol x 1 dose.  5. Defer to team for abdominal distention.   6. Avoid hypotonic saline.  7. Will continue to follow until his electrolyte derangements resolve.   8. Start lasix of 20 mg po daily.      Thank you for involving [facility] in this patient's care.    With warm regards,    Keyla Knox, DO  [facility]  [phone]

## 2019-09-27 NOTE — PROGRESS NOTE ADULT - ASSESSMENT
79 M hx of HTN, gastritis, liver cirrhosis, CLL diagnosed in 2012 (hematologist Dr. Dean, on observation, not on chemo), who presents with 1 day history of abdominal distention, mid-left sided abdominal pain, nausea, with CT consistent with possible focal ileus. Pt also found to have leukocytosis    Impression:  # Abdominal Distension, Pain, Nausea- Likely secondary to paralytic ileus. CT negative for mechanical obstruction. Passing flatus with small liquid bowel movements. 2 bowel movements recorded yesterday  # Hyponatremia- Resolved  # CLL  # Cirrhosis on imaging    Recommendations:  - Serial abdominal exam  - Check daily CBC, BMP and correct electrolytes (including phos)  - Encourage ambulation, out of bed to chair  - Start Miralax BID   - Monitor BMs (please document)     Rebecca Banks MD  Gastroenterology Fellow  [phone]  Please page on call fellow on weekends and after 5pm on weekdays

## 2019-09-27 NOTE — PROGRESS NOTE ADULT - ASSESSMENT
79M with CLL, on observation, here with acute increase in WBC, LD slightly high, abdominal symptoms as above, no significant adenopathy on exam or CT a/p, no ascites, WBC decreasing suggesting reactive nature from some inflammatory response, decreasing hgb. will recommend:  - discontinue iron for now.  - f/u on GI and hepatology recommendations  - on diuresis for the congestion  - no evidence of Serrato's transformation - WBC has decreased significantly since admission, suggesting reactive increase on admission  - slow advance in PO intake as tolerated  - melatonin for sleeping.  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

## 2019-09-27 NOTE — DIETITIAN INITIAL EVALUATION ADULT. - SIGNS/SYMPTOMS
<75% nutrition needs >7days, weight loss 2-3% over 1 week suboptimal po intake PTA and NPO/clear liquids status x 7 days during this admission.

## 2019-09-27 NOTE — DIETITIAN INITIAL EVALUATION ADULT. - PROBLEM SELECTOR PLAN 7
c/w lasix, could be d/t liver cirrhosis/hypoalbuminemia  pt reports he had echo done yesterday 9/19 unremarkable, denies h/o chf  check leg duplex

## 2019-09-27 NOTE — DIETITIAN INITIAL EVALUATION ADULT. - PERTINENT MEDS FT
furosemide    Tablet  losartan  melatonin  pantoprazole    Tablet  polyethylene glycol 3350  tamsulosin

## 2019-09-27 NOTE — PROGRESS NOTE ADULT - SUBJECTIVE AND OBJECTIVE BOX
Patient is a 79y old  Male who presents with a chief complaint of abdominal pain, nausea, abdominal distention, rising leukocytosis (20 Sep 2019 10:26)      SUBJECTIVE / OVERNIGHT EVENTS:  Abdomen distension improved  ambulatory  Tolerating fluids for two days  Review of Systems:   CONSTITUTIONAL: No fever, weight loss, or fatigue  EYES: No eye pain, visual disturbances, or discharge  ENMT:  No difficulty hearing, tinnitus, vertigo; No sinus or throat pain  NECK: No pain or stiffness  BREASTS: No pain, masses, or nipple discharge  RESPIRATORY: No cough, wheezing, chills or hemoptysis; No shortness of breath  CARDIOVASCULAR: No chest pain, palpitations, dizziness, or leg swelling  GASTROINTESTINAL: No abdominal or epigastric pain. No vomiting, or hematemesis; No diarrhea or constipation. No melena or hematochezia. passing gas,  Distension improved slightly.  GENITOURINARY: No dysuria, frequency, hematuria, or incontinence  NEUROLOGICAL: No headaches, memory loss, loss of strength, numbness, or tremors  SKIN: No itching, burning, rashes, or lesions   LYMPH NODES: No enlarged glands  ENDOCRINE: No heat or cold intolerance; No hair loss  MUSCULOSKELETAL: No joint pain or swelling; No muscle, back, or extremity pain  PSYCHIATRIC: No depression, anxiety, mood swings, or difficulty sleeping  HEME/LYMPH: No easy bruising, or bleeding gums  ALLERY AND IMMUNOLOGIC: No hives or eczema    MEDICATIONS  (STANDING):  ciprofloxacin   IVPB 400 milliGRAM(s) IV Intermittent every 12 hours  dextrose 5% + sodium chloride 0.9%. 1000 milliLiter(s) (75 mL/Hr) IV Continuous <Continuous>  ferrous    sulfate 325 milliGRAM(s) Oral daily  furosemide    Tablet 40 milliGRAM(s) Oral daily  heparin  Injectable 5000 Unit(s) SubCutaneous every 8 hours  lactobacillus acidophilus 1 Tablet(s) Oral two times a day  losartan 50 milliGRAM(s) Oral daily  metroNIDAZOLE  IVPB 500 milliGRAM(s) IV Intermittent every 12 hours  pantoprazole  Injectable 40 milliGRAM(s) IV Push daily    MEDICATIONS  (PRN):  ondansetron Injectable 4 milliGRAM(s) IV Push every 6 hours PRN Nausea      PHYSICAL EXAM:  Vital Signs Last 24 Hrs  T(C): 37 (20 Sep 2019 15:10), Max: 37 (20 Sep 2019 01:29)  T(F): 98.6 (20 Sep 2019 15:10), Max: 98.6 (20 Sep 2019 01:29)  HR: 81 (20 Sep 2019 15:10) (74 - 92)  BP: 147/79 (20 Sep 2019 15:10) (129/64 - 175/73)  BP(mean): --  RR: 20 (20 Sep 2019 15:10) (16 - 20)  SpO2: 100% (20 Sep 2019 15:10) (96% - 100%)  I&O's Summary    GENERAL: NAD, well-developed  HEAD:  Atraumatic, Normocephalic  EYES: EOMI, PERRLA, conjunctiva and sclera clear  NECK: Supple, No JVD  CHEST/LUNG: Clear to auscultation bilaterally; No wheeze  HEART: Regular rate and rhythm; No murmurs, rubs, or gallops  ABDOMEN: Soft, Nontender, distended; Bowel sounds sluggish  EXTREMITIES:  2+ Peripheral Pulses, No clubbing, cyanosis, or edema  PSYCH: AAOx3  NEUROLOGY: non-focal  SKIN: No rashes or lesions    LABS:  CAPILLARY BLOOD GLUCOSE                              6.8    97.11 )-----------( 180      ( 20 Sep 2019 06:35 )             23.4     09-20    124<L>  |  92<L>  |  12  ----------------------------<  131<H>  4.7   |  22  |  0.68    Ca    7.4<L>      20 Sep 2019 06:32  Phos  2.2     09-20  Mg     1.8     09-20    TPro  5.5<L>  /  Alb  2.6<L>  /  TBili  0.7  /  DBili  x   /  AST  50<H>  /  ALT  66<H>  /  AlkPhos  160<H>  09-20    PT/INR - ( 20 Sep 2019 06:35 )   PT: 14.0 SEC;   INR: 1.25          PTT - ( 20 Sep 2019 06:35 )  PTT:27.9 SEC          RADIOLOGY & ADDITIONAL TESTS:    Imaging Personally Reviewed:    Consultant(s) Notes Reviewed:      Care Discussed with Consultants/Other Providers:

## 2019-09-27 NOTE — PROGRESS NOTE ADULT - SUBJECTIVE AND OBJECTIVE BOX
Pt is feeling a little stronger, passed some gas last night, passing good amount of urine and denies any n/v, pain abd and none other active symptoms on ROS.  Wife is with him.       Meds:  benzocaine 15 mG/menthol 3.6 mG (Sugar-Free) Lozenge 1 Lozenge Oral every 8 hours PRN  ferrous    sulfate 325 milliGRAM(s) Oral daily  guaiFENesin    Syrup 200 milliGRAM(s) Oral every 6 hours PRN  heparin  Injectable 5000 Unit(s) SubCutaneous every 8 hours  influenza   Vaccine 0.5 milliLiter(s) IntraMuscular once  lactobacillus acidophilus 1 Tablet(s) Oral two times a day  losartan 50 milliGRAM(s) Oral daily  melatonin 3 milliGRAM(s) Oral at bedtime  ondansetron Injectable 4 milliGRAM(s) IV Push every 6 hours PRN  pantoprazole    Tablet 40 milliGRAM(s) Oral before breakfast  polyethylene glycol 3350 17 Gram(s) Oral two times a day  tamsulosin 0.4 milliGRAM(s) Oral at bedtime      Vital Signs Last 24 Hrs  T(C): 36.9 (27 Sep 2019 05:57), Max: 37 (26 Sep 2019 10:00)  T(F): 98.5 (27 Sep 2019 05:57), Max: 98.6 (26 Sep 2019 10:00)  HR: 84 (27 Sep 2019 05:57) (84 - 96)  BP: 151/72 (27 Sep 2019 05:57) (146/77 - 160/82)  BP(mean): --  RR: 18 (27 Sep 2019 05:57) (16 - 18)  SpO2: 98% (27 Sep 2019 05:57) (93% - 99%)                          9.6    73.87 )-----------( 146      ( 26 Sep 2019 07:35 )             30.9       09-26    137  |  102  |  15  ----------------------------<  125<H>  3.4<L>   |  26  |  0.73    Ca    7.6<L>      26 Sep 2019 07:35  Phos  2.2     09-26  Mg     1.8     09-26    TPro  5.5<L>  /  Alb  2.4<L>  /  TBili  0.7  /  DBili  x   /  AST  40  /  ALT  33  /  AlkPhos  107  09-26              ferritin 337

## 2019-09-27 NOTE — PROGRESS NOTE ADULT - SUBJECTIVE AND OBJECTIVE BOX
Chief Complaint:  Patient is a 79y old  Male who presents with a chief complaint of abdominal pain, nausea, abdominal distention, rising leukocytosis (27 Sep 2019 10:20)      Interval Events: HBV DNA/viral load markedly elevated (> 16 million). No new complaints.     Allergies:  No Known Allergies      Hospital Medications:  benzocaine 15 mG/menthol 3.6 mG (Sugar-Free) Lozenge 1 Lozenge Oral every 8 hours PRN  furosemide    Tablet 20 milliGRAM(s) Oral daily  guaiFENesin    Syrup 200 milliGRAM(s) Oral every 6 hours PRN  heparin  Injectable 5000 Unit(s) SubCutaneous every 8 hours  influenza   Vaccine 0.5 milliLiter(s) IntraMuscular once  lactobacillus acidophilus 1 Tablet(s) Oral two times a day  losartan 50 milliGRAM(s) Oral daily  melatonin 3 milliGRAM(s) Oral at bedtime  ondansetron Injectable 4 milliGRAM(s) IV Push every 6 hours PRN  pantoprazole    Tablet 40 milliGRAM(s) Oral before breakfast  polyethylene glycol 3350 17 Gram(s) Oral two times a day  potassium phosphate IVPB 30 milliMole(s) IV Intermittent once  sodium phosphate IVPB 30 milliMole(s) IV Intermittent once  tamsulosin 0.4 milliGRAM(s) Oral at bedtime      PMHX/PSHX:  HTN (hypertension)  H/O gastritis  CLL (chronic lymphocytic leukemia)  No significant past surgical history      Family history:  FH: CHF (congestive heart failure)      ROS:     General:  No wt loss, fevers, chills, night sweats, fatigue,   Eyes:  Good vision, no reported pain  ENT:  No sore throat, pain, runny nose, dysphagia  CV:  No pain, palpitations, hypo/hypertension  Resp:  No dyspnea, cough, tachypnea, wheezing  GI:  See HPI  :  No pain, bleeding, incontinence, nocturia  Muscle:  No pain, weakness  Neuro:  No weakness, tingling, memory problems  Psych:  No fatigue, insomnia, mood problems, depression  Endocrine:  No polyuria, polydipsia, cold/heat intolerance  Heme:  No petechiae, ecchymosis, easy bruisability  Skin:  No rash, edema      PHYSICAL EXAM:     GENERAL:  Appears stated age, well-groomed, well-nourished, no distress  HEENT:  NC/AT,  conjunctivae clear, sclera -anicteric  CHEST:  Full & symmetric excursion, no increased effort, breath sounds clear  HEART:  Regular rhythm, S1, S2, no murmur/rub/S3/S4,  no edema  ABDOMEN:  Soft, non-tender, non-distended, normoactive bowel sounds,  no masses ,no hepato-splenomegaly,   EXTREMITIES:  no cyanosis,clubbing or edema  SKIN:  No rash/erythema/ecchymoses/petechiae/wounds/abscess/warm/dry  NEURO:  Alert, oriented    Vital Signs:  Vital Signs Last 24 Hrs  T(C): 36.5 (27 Sep 2019 10:46), Max: 36.9 (26 Sep 2019 22:29)  T(F): 97.7 (27 Sep 2019 10:46), Max: 98.5 (27 Sep 2019 05:57)  HR: 88 (27 Sep 2019 10:46) (84 - 96)  BP: 141/79 (27 Sep 2019 10:46) (141/79 - 160/82)  BP(mean): --  RR: 20 (27 Sep 2019 10:46) (16 - 20)  SpO2: 99% (27 Sep 2019 10:46) (95% - 99%)  Daily     LABS:                        9.3    76.47 )-----------( 108      ( 27 Sep 2019 06:55 )             30.2     09-27    134<L>  |  105  |  12  ----------------------------<  109<H>  3.9   |  22  |  0.75    Ca    7.5<L>      27 Sep 2019 06:55  Phos  2.0     09-27  Mg     1.7     09-27    TPro  5.4<L>  /  Alb  2.3<L>  /  TBili  0.6  /  DBili  x   /  AST  46<H>  /  ALT  35  /  AlkPhos  104  09-27    LIVER FUNCTIONS - ( 27 Sep 2019 06:55 )  Alb: 2.3 g/dL / Pro: 5.4 g/dL / ALK PHOS: 104 u/L / ALT: 35 u/L / AST: 46 u/L / GGT: x                   Imaging:

## 2019-09-27 NOTE — DIETITIAN INITIAL EVALUATION ADULT. - ETIOLOGY
patient meets criteria for moderate malnutrition in the context of acute illness related to physiological causes

## 2019-09-27 NOTE — PROGRESS NOTE ADULT - SUBJECTIVE AND OBJECTIVE BOX
Chief Complaint:  Patient is a 79y old  Male who presents with a chief complaint of abdominal pain, nausea, abdominal distention, rising leukocytosis (27 Sep 2019 08:07)    Interval Events:   No acute overnight events. Patient denies any specific complaints.   Passing flatus with small liquid bowel movements    Allergies:  No Known Allergies    Hospital Medications:  benzocaine 15 mG/menthol 3.6 mG (Sugar-Free) Lozenge 1 Lozenge Oral every 8 hours PRN  guaiFENesin    Syrup 200 milliGRAM(s) Oral every 6 hours PRN  heparin  Injectable 5000 Unit(s) SubCutaneous every 8 hours  influenza   Vaccine 0.5 milliLiter(s) IntraMuscular once  lactobacillus acidophilus 1 Tablet(s) Oral two times a day  losartan 50 milliGRAM(s) Oral daily  melatonin 3 milliGRAM(s) Oral at bedtime  ondansetron Injectable 4 milliGRAM(s) IV Push every 6 hours PRN  pantoprazole    Tablet 40 milliGRAM(s) Oral before breakfast  polyethylene glycol 3350 17 Gram(s) Oral two times a day  tamsulosin 0.4 milliGRAM(s) Oral at bedtime    PMHX/PSHX:  HTN (hypertension)  H/O gastritis  CLL (chronic lymphocytic leukemia)  No significant past surgical history    ROS:   General:  No fevers or chills  ENT:  No sore throat or dysphagia  CV:  No pain or palpitations  Resp:  No dyspnea, cough or  wheezing  GI:  No pain, No nausea, No vomiting,  No rectal bleeding, No tarry stools,  Skin:  No rash or edema    PHYSICAL EXAM:   Vital Signs:  Vital Signs Last 24 Hrs  T(C): 36.9 (27 Sep 2019 05:57), Max: 37 (26 Sep 2019 10:00)  T(F): 98.5 (27 Sep 2019 05:57), Max: 98.6 (26 Sep 2019 10:00)  HR: 84 (27 Sep 2019 05:57) (84 - 96)  BP: 151/72 (27 Sep 2019 05:57) (146/77 - 160/82)  BP(mean): --  RR: 18 (27 Sep 2019 05:57) (16 - 18)  SpO2: 98% (27 Sep 2019 05:57) (93% - 99%)  Daily     Daily     GENERAL:  NAD, Appears stated age  HEENT:  NC/AT,  conjunctivae clear and pink, sclera -anicteric  CHEST:  Normal Effort, Breath sounds clear  HEART:  RRR, S1 + S2, no murmurs  ABDOMEN:  Soft, non-tender, non-distended, BS+  EXTEREMITIES:  no cyanosis  SKIN:  Warm & Dry.  NEURO:  Alert, oriented      LABS:                        9.3    x     )-----------( x        ( 27 Sep 2019 06:55 )             30.2     Mean Cell Volume: 75.3 fL (09-27-19 @ 06:55)    09-26    137  |  102  |  15  ----------------------------<  125<H>  3.4<L>   |  26  |  0.73    Ca    7.6<L>      26 Sep 2019 07:35  Phos  2.2     09-26  Mg     1.8     09-26    TPro  5.5<L>  /  Alb  2.4<L>  /  TBili  0.7  /  DBili  x   /  AST  40  /  ALT  33  /  AlkPhos  107  09-26    LIVER FUNCTIONS - ( 26 Sep 2019 07:35 )  Alb: 2.4 g/dL / Pro: 5.5 g/dL / ALK PHOS: 107 u/L / ALT: 33 u/L / AST: 40 u/L / GGT: x                               9.3    x     )-----------( x        ( 27 Sep 2019 06:55 )             30.2                         9.6    73.87 )-----------( 146      ( 26 Sep 2019 07:35 )             30.9                         10.2   78.55 )-----------( 158      ( 25 Sep 2019 07:10 )             32.9       Imaging:

## 2019-09-27 NOTE — DIETITIAN INITIAL EVALUATION ADULT. - PROBLEM SELECTOR PLAN 6
-elevated PT/INR, hypoalbuminemia and elevated LFT d/t chronic liver disease  -denies h/o viral hepatitis, remote  h/o occasional wine 10 years ago  -no ascites on CT, check viral hepatitis panel  -outpt f/u GI/PCP, case d/w patient's PCP Dr. Carballo

## 2019-09-27 NOTE — PROGRESS NOTE ADULT - SUBJECTIVE AND OBJECTIVE BOX
No pain, no shortness of breath    Review of systems: All 10 points ROS was obtained except as above.     benzocaine 15 mG/menthol 3.6 mG (Sugar-Free) Lozenge 1 Lozenge Oral every 8 hours PRN  guaiFENesin    Syrup 200 milliGRAM(s) Oral every 6 hours PRN  heparin  Injectable 5000 Unit(s) SubCutaneous every 8 hours  influenza   Vaccine 0.5 milliLiter(s) IntraMuscular once  lactobacillus acidophilus 1 Tablet(s) Oral two times a day  losartan 50 milliGRAM(s) Oral daily  melatonin 3 milliGRAM(s) Oral at bedtime  ondansetron Injectable 4 milliGRAM(s) IV Push every 6 hours PRN  pantoprazole    Tablet 40 milliGRAM(s) Oral before breakfast  polyethylene glycol 3350 17 Gram(s) Oral two times a day  potassium phosphate IVPB 30 milliMole(s) IV Intermittent once  sodium phosphate IVPB 30 milliMole(s) IV Intermittent once  tamsulosin 0.4 milliGRAM(s) Oral at bedtime      VITAL:  T(C): , Max: 36.9 (09-26-19 @ 22:29)  T(F): , Max: 98.5 (09-27-19 @ 05:57)  HR: 84 (09-27-19 @ 05:57)  BP: 151/72 (09-27-19 @ 05:57)  BP(mean): --  RR: 18 (09-27-19 @ 05:57)  SpO2: 98% (09-27-19 @ 05:57)  Wt(kg): --    09-26-19 @ 07:01  -  09-27-19 @ 07:00  --------------------------------------------------------  IN: 1250 mL / OUT: 1650 mL / NET: -400 mL        PHYSICAL EXAM:  General: NAD; Alert  HEENT:  NCAT; PERRLA  Neck: No JVD; supple  Respiratory: CTA-b/l  Cardiac: RRR s1s2  Gastrointestinal: BS minimal with distention.   Urologic: No veloz  Extremities: No peripheral edema      LABS:                          9.3    76.47 )-----------( 108      ( 27 Sep 2019 06:55 )             30.2     Na(134)/K(3.9)/Cl(105)/HCO3(22)/BUN(12)/Cr(0.75)Glu(109)/Ca(7.5)/Mg(1.7)/PO4(2.0)    09-27 @ 06:55  Na(137)/K(3.4)/Cl(102)/HCO3(26)/BUN(15)/Cr(0.73)Glu(125)/Ca(7.6)/Mg(1.8)/PO4(2.2)    09-26 @ 07:35  Na(138)/K(3.6)/Cl(100)/HCO3(25)/BUN(15)/Cr(0.87)Glu(107)/Ca(8.3)/Mg(1.8)/PO4(2.3)    09-25 @ 07:10      09-27    134<L>  |  105  |  12  ----------------------------<  109<H>  3.9   |  22  |  0.75    Ca    7.5<L>      27 Sep 2019 06:55  Phos  2.0     09-27  Mg     1.7     09-27    TPro  5.4<L>  /  Alb  2.3<L>  /  TBili  0.6  /  DBili  x   /  AST  46<H>  /  ALT  35  /  AlkPhos  104  09-27

## 2019-09-27 NOTE — CHART NOTE - NSCHARTNOTEFT_GEN_A_CORE
NUTRITION SERVICES     Upon Nutritional Assessment by the Registered Dietitian your patient was determined to meet criteria/ has evidence of the following diagnosis/diagnoses:  [ ] Mild Protein Calorie Malnutrition   [X ] Moderate Protein Calorie Malnutrition   [ ] Severe Protein Calorie Malnutrition   [ ] Unspecified Protein Calorie Malnutrition   [ ] Underweight / BMI <19  [ ] Morbid Obesity / BMI >40    Findings as based on:  •  Comprehensive nutritional assessment and consultation    Please refer to Initial Dietitian Evaluation or Nutrition Follow-up via documents section of [employer] EMR for further recommendations.

## 2019-09-27 NOTE — PROGRESS NOTE ADULT - ASSESSMENT
Impression:    # Chronic Hepatitis B: HBV DNA >16 million with non-reactive/low titer Hep B surface Ab   # Cirrhosis: Suspect chronic Hepatitis B infection vs. autoimmune disease (anti-SMA 1:80). Low suspicion for alcoholic liver disease. MELD-Na 21 (09/20/19)        - Varices: No prior screening EGD.         - Ascites: None on CT A/P this admission.         - SPE: None        - HCC: No suspicion lesions on CT A/P this admission.   # Paralytic ileus. DDx: CLL related etiology vs electrolyte disturbance, vs infectious etiology  # CLL: Not currently on chemotherapy.   # Hyponatremia, hypophosphatemia .    Recommendations:  - start Viread for chronic Hepatitis B/high HBV viral load given all other etiologies of cirrhosis have been ruled out  - f/u Hepatitis Be Ag and Hep Delta serology  - trend CBC, INR, CMP daily for MELD-Na   - screening upper endoscopy for varices outpatient  - follow up in Hepatology Clinic: [phone] ([facility]) or [phone] ([facility])      Plan to be discussed with Attending    Jacqueline Lundberg PGY-4  Gastroenterology Fellow  Pager #[phone] or [phone]  Pager #[phone] 5pm-7am on weekdays, and on weekends Impression:    # Chronic Hepatitis B: HBV DNA >16 million with non-reactive/low titer Hep B surface Ab   # Cirrhosis: Suspect chronic Hepatitis B infection vs. autoimmune disease (anti-SMA 1:80). Low suspicion for alcoholic liver disease. MELD-Na 21 (09/20/19)        - Varices: No prior screening EGD.         - Ascites: None on CT A/P this admission.         - SPE: None        - HCC: No suspicion lesions on CT A/P this admission.   # Paralytic ileus. DDx: CLL related etiology vs electrolyte disturbance, vs infectious etiology  # CLL: Not currently on chemotherapy.   # Hyponatremia, hypophosphatemia .    Recommendations:  - start Viread for chronic Hepatitis B/high HBV viral load given all other etiologies of cirrhosis have been ruled out  - f/u Hepatitis Be Ag and Hep Delta serology  - trend CBC, INR, CMP daily for MELD-Na   - screening upper endoscopy for varices outpatient  - follow up in Hepatology Clinic: [phone] (Faculty Practice) or [phone] ([facility])      Jacqueline Lundberg PGY-4  Gastroenterology Fellow  Pager #[phone] or [phone]  Pager #[phone] 5pm-7am on weekdays, and on weekends Impression:    # Chronic Hepatitis B: HBV DNA >16 million with non-reactive/low titer Hep B surface Ab   # Cirrhosis: Suspect chronic Hepatitis B infection vs. autoimmune disease (anti-SMA 1:80). Low suspicion for alcoholic liver disease. MELD-Na 21 (09/20/19)        - Varices: No prior screening EGD.         - Ascites: None on CT A/P this admission.         - SPE: None        - HCC: No suspicion lesions on CT A/P this admission.   # Paralytic ileus. DDx: CLL related etiology vs electrolyte disturbance, vs infectious etiology  # CLL: Not currently on chemotherapy.   # Hyponatremia, hypophosphatemia .    Recommendations:  - start Viread for chronic Hepatitis B/high HBV viral load given all other etiologies of cirrhosis have been ruled out  - f/u Hepatitis Be Ag and Hep Delta serology  - trend CBC, INR, CMP daily for MELD-Na   - screening upper endoscopy for varices outpatient  - follow up in Hepatology Clinic: [phone] ([facility]) or [phone] ([facility])  - Hepatology will sign off      Jacqueline Lundberg PGY-4  Gastroenterology Fellow  Pager #[phone] or [phone]  Pager #[phone] 5pm-7am on weekdays, and on weekends Impression:    # Chronic Hepatitis B: HBV DNA >16 million with non-reactive/low titer Hep B surface Ab   # Cirrhosis: Suspect chronic Hepatitis B infection vs. autoimmune disease (anti-SMA 1:80). Low suspicion for alcoholic liver disease. MELD-Na 21 (09/20/19)        - Varices: No prior screening EGD.         - Ascites: None on CT A/P this admission.         - SPE: None        - HCC: No suspicion lesions on CT A/P this admission.   # Paralytic ileus. DDx: CLL related etiology vs electrolyte disturbance, vs infectious etiology  # CLL: Not currently on chemotherapy.   # Hyponatremia, hypophosphatemia .    Recommendations:  - start Viread for chronic Hepatitis B/high HBV viral load given Patient is cirrhotic and all other etiologies of cirrhosis have been ruled out  - f/u Hepatitis Be Ag and Hep Delta serology  - trend CBC, INR, CMP daily for MELD-Na   - screening upper endoscopy for varices outpatient  - follow up in Hepatology Clinic: [phone] ([facility]) or [phone] ([facility])  - Hepatology will sign off      Jacqueline Lundberg PGY-4  Gastroenterology Fellow  Pager #[phone] or [phone]  Pager #[phone] 5pm-7am on weekdays, and on weekends

## 2019-09-27 NOTE — DIETITIAN INITIAL EVALUATION ADULT. - PERTINENT LABORATORY DATA
09-27 Na134 mmol/L<L> Glu 109 mg/dL<H> K+ 3.9 mmol/L Cr  0.75 mg/dL BUN 12 mg/dL 09-27 Phos 2.0 mg/dL<L> 09-27 Alb 2.3 g/dL<L>

## 2019-09-27 NOTE — DIETITIAN INITIAL EVALUATION ADULT. - ADD RECOMMEND
1. Monitor weights, labs, BM's, skin integrity, p.o. intake. 3. Please Encourage po intake, assist with meals and menu selections, provide alternatives PRN. 1. Monitor weights, labs, BM's, skin integrity, p.o. intake. 2. Please Encourage po intake, assist with meals and menu selections, provide alternatives PRN.

## 2019-09-27 NOTE — DIETITIAN INITIAL EVALUATION ADULT. - OTHER INFO
80 y/o Male with medical history of HTN, gastritis, liver, cirrhosis, CLL diagnosed in 2012, presented with abdominal pain, nausea per chart review. Met with patient and family at bedside. Patient now tolerating clear liquids diet, tolerating well. Consuming 100% of tray. Patient had 1 loose bowel movement today per RN. Patient denies any nausea/vomiting or difficulty chewing and swallowing at this time. Observes No beef. Reports usual weight to be 150-153lbs and now this admission weight of 148.1lbs on 9/22 noted. Weight loss (2-3%) likely over 1 week PTA 2/2 illness, nausea, abdominal pain and diminished appetite and po intake per wife at bedside. Requesting for diet to be advanced as patient is tolerating diet well. Pending advancement to Low fiber diet as discussed with ADS. Also suggested to add po supplement Ensure Enlive as diet advanced for added calories and protein. Encouraged small frequent po intakes of nutrient and protein dense foods and PO supplement between meals. RDN remains available, patient made aware.

## 2019-09-28 ENCOUNTER — TRANSCRIPTION ENCOUNTER (OUTPATIENT)
Age: 79
End: 2019-09-28

## 2019-09-28 LAB
ALBUMIN SERPL ELPH-MCNC: 2.2 G/DL — LOW (ref 3.3–5)
ALP SERPL-CCNC: 105 U/L — SIGNIFICANT CHANGE UP (ref 40–120)
ALT FLD-CCNC: 39 U/L — SIGNIFICANT CHANGE UP (ref 4–41)
ANION GAP SERPL CALC-SCNC: 9 MMO/L — SIGNIFICANT CHANGE UP (ref 7–14)
APTT BLD: 66.5 SEC — HIGH (ref 27.5–36.3)
AST SERPL-CCNC: 50 U/L — HIGH (ref 4–40)
BASOPHILS # BLD AUTO: 0.14 K/UL — SIGNIFICANT CHANGE UP (ref 0–0.2)
BASOPHILS NFR BLD AUTO: 0.2 % — SIGNIFICANT CHANGE UP (ref 0–2)
BILIRUB SERPL-MCNC: 0.7 MG/DL — SIGNIFICANT CHANGE UP (ref 0.2–1.2)
BUN SERPL-MCNC: 10 MG/DL — SIGNIFICANT CHANGE UP (ref 7–23)
CALCIUM SERPL-MCNC: 7.6 MG/DL — LOW (ref 8.4–10.5)
CHLORIDE SERPL-SCNC: 101 MMOL/L — SIGNIFICANT CHANGE UP (ref 98–107)
CO2 SERPL-SCNC: 23 MMOL/L — SIGNIFICANT CHANGE UP (ref 22–31)
CREAT SERPL-MCNC: 0.76 MG/DL — SIGNIFICANT CHANGE UP (ref 0.5–1.3)
EOSINOPHIL # BLD AUTO: 0.08 K/UL — SIGNIFICANT CHANGE UP (ref 0–0.5)
EOSINOPHIL NFR BLD AUTO: 0.1 % — SIGNIFICANT CHANGE UP (ref 0–6)
GLUCOSE SERPL-MCNC: 96 MG/DL — SIGNIFICANT CHANGE UP (ref 70–99)
HCT VFR BLD CALC: 28.3 % — LOW (ref 39–50)
HDV AB SER-ACNC: NEGATIVE — SIGNIFICANT CHANGE UP
HGB BLD-MCNC: 8.8 G/DL — LOW (ref 13–17)
IMM GRANULOCYTES NFR BLD AUTO: 0.2 % — SIGNIFICANT CHANGE UP (ref 0–1.5)
INR BLD: 1.17 — SIGNIFICANT CHANGE UP (ref 0.88–1.17)
LYMPHOCYTES # BLD AUTO: 76.27 K/UL — HIGH (ref 1–3.3)
LYMPHOCYTES # BLD AUTO: 93.8 % — HIGH (ref 13–44)
MAGNESIUM SERPL-MCNC: 1.9 MG/DL — SIGNIFICANT CHANGE UP (ref 1.6–2.6)
MANUAL SMEAR VERIFICATION: SIGNIFICANT CHANGE UP
MCHC RBC-ENTMCNC: 23.2 PG — LOW (ref 27–34)
MCHC RBC-ENTMCNC: 31.1 % — LOW (ref 32–36)
MCV RBC AUTO: 74.7 FL — LOW (ref 80–100)
MONOCYTES # BLD AUTO: 0.74 K/UL — SIGNIFICANT CHANGE UP (ref 0–0.9)
MONOCYTES NFR BLD AUTO: 0.9 % — LOW (ref 2–14)
MORPHOLOGY BLD-IMP: SIGNIFICANT CHANGE UP
NEUTROPHILS # BLD AUTO: 3.98 K/UL — SIGNIFICANT CHANGE UP (ref 1.8–7.4)
NEUTROPHILS NFR BLD AUTO: 4.8 % — LOW (ref 43–77)
NRBC # FLD: 0 K/UL — SIGNIFICANT CHANGE UP (ref 0–0)
PHOSPHATE SERPL-MCNC: 2.3 MG/DL — LOW (ref 2.5–4.5)
PLATELET # BLD AUTO: 136 K/UL — LOW (ref 150–400)
PLATELET COUNT - ESTIMATE: SIGNIFICANT CHANGE UP
PMV BLD: SIGNIFICANT CHANGE UP FL (ref 7–13)
POTASSIUM SERPL-MCNC: 3.5 MMOL/L — SIGNIFICANT CHANGE UP (ref 3.5–5.3)
POTASSIUM SERPL-SCNC: 3.5 MMOL/L — SIGNIFICANT CHANGE UP (ref 3.5–5.3)
PROT SERPL-MCNC: 5.2 G/DL — LOW (ref 6–8.3)
PROTHROM AB SERPL-ACNC: 13.1 SEC — SIGNIFICANT CHANGE UP (ref 9.8–13.1)
RBC # BLD: 3.79 M/UL — LOW (ref 4.2–5.8)
RBC # FLD: 21.2 % — HIGH (ref 10.3–14.5)
SODIUM SERPL-SCNC: 133 MMOL/L — LOW (ref 135–145)
WBC # BLD: 81.34 K/UL — CRITICAL HIGH (ref 3.8–10.5)
WBC # FLD AUTO: 81.34 K/UL — CRITICAL HIGH (ref 3.8–10.5)

## 2019-09-28 RX ORDER — SODIUM,POTASSIUM PHOSPHATES 278-250MG
2 POWDER IN PACKET (EA) ORAL ONCE
Refills: 0 | Status: COMPLETED | OUTPATIENT
Start: 2019-09-28 | End: 2019-09-28

## 2019-09-28 RX ADMIN — Medication 20 MILLIGRAM(S): at 05:36

## 2019-09-28 RX ADMIN — Medication 2 PACKET(S): at 21:49

## 2019-09-28 RX ADMIN — BENZOCAINE AND MENTHOL 1 LOZENGE: 5; 1 LIQUID ORAL at 21:48

## 2019-09-28 RX ADMIN — Medication 1 TABLET(S): at 05:36

## 2019-09-28 RX ADMIN — TAMSULOSIN HYDROCHLORIDE 0.4 MILLIGRAM(S): 0.4 CAPSULE ORAL at 21:48

## 2019-09-28 RX ADMIN — Medication 2 PACKET(S): at 17:04

## 2019-09-28 RX ADMIN — HEPARIN SODIUM 5000 UNIT(S): 5000 INJECTION INTRAVENOUS; SUBCUTANEOUS at 21:48

## 2019-09-28 RX ADMIN — PANTOPRAZOLE SODIUM 40 MILLIGRAM(S): 20 TABLET, DELAYED RELEASE ORAL at 05:36

## 2019-09-28 RX ADMIN — Medication 3 MILLIGRAM(S): at 21:48

## 2019-09-28 RX ADMIN — HEPARIN SODIUM 5000 UNIT(S): 5000 INJECTION INTRAVENOUS; SUBCUTANEOUS at 13:15

## 2019-09-28 RX ADMIN — LOSARTAN POTASSIUM 50 MILLIGRAM(S): 100 TABLET, FILM COATED ORAL at 05:37

## 2019-09-28 RX ADMIN — HEPARIN SODIUM 5000 UNIT(S): 5000 INJECTION INTRAVENOUS; SUBCUTANEOUS at 05:37

## 2019-09-28 RX ADMIN — TENOFOVIR DISOPROXIL FUMARATE 300 MILLIGRAM(S): 300 TABLET, FILM COATED ORAL at 13:19

## 2019-09-28 RX ADMIN — Medication 1 TABLET(S): at 17:04

## 2019-09-28 NOTE — DISCHARGE NOTE PROVIDER - NSDCCPCAREPLAN_GEN_ALL_CORE_FT
PRINCIPAL DISCHARGE DIAGNOSIS  Diagnosis: CLL (chronic lymphocytic leukemia)  Assessment and Plan of Treatment: Follow up with Dr Dean as outpatient      SECONDARY DISCHARGE DIAGNOSES  Diagnosis: Hepatitis B  Assessment and Plan of Treatment: Continue viread.    Diagnosis: HTN (hypertension)  Assessment and Plan of Treatment: Continue losartan. PRINCIPAL DISCHARGE DIAGNOSIS  Diagnosis: CLL (chronic lymphocytic leukemia)  Assessment and Plan of Treatment: You were seen at [facility] and noted to have a more elevated White blood cell count compared to your baseline. You had CT scans which did not show and disease in your abdomen and no Pneumonia. You still were treated with a short course of antibiotics and the count improved (84,000 upon discharge). You were seen by Hematolgoy while hospitalized.   Follow up with Dr Dean as outpatient. Please call the number in the discharge paperwork to schedule a follow-up appointment within one week.   Please return for any abnromal bleeding, easy bruising, persistent fevers of more than 100.4F for greater than 2 days, confusion, fainting or any other concerns.      SECONDARY DISCHARGE DIAGNOSES  Diagnosis: Hepatitis B  Assessment and Plan of Treatment: While being seen at [facility] you were found to have hepatitis B. You were evaluated by Hepatology Team who started you on a medication and had a CT scan not showing any Liver masses.   You are to continue the antiviral agent called Viread.  You are to follow up in [facility]. Please call [phone] or [phone] for an appointment for an appointment within one week.   You are to return to the hospital for any worsening abdominal pain, vomiting blood, black tarry stool, worsening yellowing of your skin, persistent vomiting and inability to keep down fluids to maintain hydration, confusion, or any other concerns.    Diagnosis: HTN (hypertension)  Assessment and Plan of Treatment: While you were hospitalized you had no active issues with your hypertension.   You are to continue Losartan with no other changes to your medications.   You should return to the hospital for chest pain, difficulty breathing, blood in your urine, or any other concerns.

## 2019-09-28 NOTE — PROGRESS NOTE ADULT - PROBLEM SELECTOR PLAN 4
c/w losartan with holding parameters
c/w losartan with holding parameters  SOB with cough: CT Chest shows pleural effusion and PVC. Lasix 20 mg started
c/w losartan with holding parameters  SOB with cough: CT Chest shows pleural effusion and PVC. Lasix 20 mg started.

## 2019-09-28 NOTE — PROGRESS NOTE ADULT - PROBLEM SELECTOR PLAN 2
FU CBC
FU CBC daily
Improved 97.11
Improved today.  No positive cultures, will stop antibiotics and monitor
FU CBC
FU CBC

## 2019-09-28 NOTE — DISCHARGE NOTE PROVIDER - PROVIDER TOKENS
PROVIDER:[TOKEN:[[id number]]],PROVIDER:[TOKEN:[[id number]]],FREE:[LAST:[Hepatology follow up],PHONE:[(   )    -],FAX:[(   )    -],ADDRESS:[Hepatology Clinic: [phone] (Faculty Practice) or [phone] ([facility])]]

## 2019-09-28 NOTE — PROGRESS NOTE ADULT - NSHPATTENDINGPLANDISCUSS_GEN_ALL_CORE
Patient, Onc. GI
Dr. Banks
Patient, Onc. GI
Dr. Zhang
Dr. Zhang (GI fellow) and Dr. Reddy (primary team)
Patient, Onc. GI

## 2019-09-28 NOTE — PROGRESS NOTE ADULT - ASSESSMENT
ASSESSMENT:  Mr. Soria is an 80 y/o gentleman with h/o HTN, gastritis, liver cirrhosis, CLL diagnosed in 2012 (hematologist Dr. Dean, on observation, not on chemo), who presents with 1 day history of abdominal distention, mid-left sided abdominal pain, nausea, with CT consistent with possible focal ileus, WBC elevated to 141K, now trending to 76.47 K. Acute rise in WBC r/o sepsis vs. CLL progression. Nephrology consulted for hyponatremia and hypophosphatemia and fluid status.     1. Hyponatremia that is mild. Chronic, last serum Na 131 on 8/21/19. Today his serum sodium is 133. He is drinking a lot more fluids.    2. Hypophosphatemia. Now phosphorus is 2.3  S/p Kphos IV of 30 mmol x 2 yesterday.  3. Euvolemic and at goal. .   4. Abdominal distension. SBO suspected. On liquid diet, Protonix, Zofran.   5.Hypomagnesmia. S/p Mag sulfate 2gram IV x 1 yesterday    PLAN:  1. Give PhosNaK 2 packets x 2 doses   2 Trend sodium, check urine sodium and urine osm if his serum sodium.  3. BMP daily.   4. Defer to team for abdominal distention.   5. Avoid hypotonic saline.  6. Will continue to follow until his electrolyte derangements resolve.   7. Continue lasix of 20 mg po daily.     D/W Dr. Kala Bridges, NP-C  [employer]  [phone] ASSESSMENT:  Mr. Soria is an 78 y/o gentleman with h/o HTN, gastritis, liver cirrhosis, CLL diagnosed in 2012 (hematologist Dr. Dean, on observation, not on chemo), who presents with 1 day history of abdominal distention, mid-left sided abdominal pain, nausea, with CT consistent with possible focal ileus, WBC elevated to 141K, now trending to 76.47 K. Acute rise in WBC r/o sepsis vs. CLL progression. Nephrology consulted for hyponatremia and hypophosphatemia and fluid status.     1. Hyponatremia that is mild. Chronic, last serum Na 131 on 8/21/19. Today his serum sodium is 133. He is drinking a lot more fluids.    2. Hypophosphatemia. Now phosphorus is 2.3  S/p Kphos IV of 30 mmol x 2 yesterday.  3. Euvolemic and at goal. .   4. Abdominal distension. SBO suspected. On liquid diet, Protonix, Zofran.   5. Hypomagnesmia. S/p Mag sulfate 2gram IV x 1 yesterday    PLAN:  1. Give PhosNaK 2 packets x 2 doses   2 Trend sodium, check urine sodium and urine osm if his serum sodium.  3. BMP daily.   4. Defer to team for abdominal distention.   5. Avoid hypotonic saline.  6. Will continue to follow until his electrolyte derangements resolve.   7. Continue lasix of 20 mg po daily.     D/W Dr. Kala Bridges, NP-C  [employer]  [phone] ASSESSMENT:  Mr. Soria is an 78 y/o gentleman with h/o HTN, gastritis, liver cirrhosis, CLL diagnosed in 2012 (hematologist Dr. Dean, on observation, not on chemo), who presents with 1 day history of abdominal distention, mid-left sided abdominal pain, nausea, with CT consistent with possible focal ileus, WBC elevated to 141K, now trending to 76.47 K. Acute rise in WBC r/o sepsis vs. CLL progression. Nephrology consulted for hyponatremia and hypophosphatemia and fluid status.     1. Hyponatremia that is mild. Chronic, last serum Na 131 on 8/21/19. Today his serum sodium is 133. He is drinking a lot more fluids.    2. Hypophosphatemia. Now phosphorus is 2.3  S/p Kphos IV of 30 mmol x 2 yesterday.  3. Euvolemic and at goal. .   4. Abdominal distension. SBO suspected. On liquid diet, Protonix, Zofran.   5. Hypomagnesmia. S/p Mag sulfate 2gram IV x 1 yesterday    PLAN:  1. PhosNaK 2 packets x 2 doses   2 Trend sodium, check urine sodium and urine osm if his serum sodium.  3. BMP daily.   4. Defer to team for abdominal distention.   5. Avoid hypotonic saline.  6. Will continue to follow until his electrolyte derangements resolve.   7. Continue lasix of 20 mg po daily.     D/W Dr. Kala Bridges, NP-C  [employer]  [phone]

## 2019-09-28 NOTE — PROGRESS NOTE ADULT - SUBJECTIVE AND OBJECTIVE BOX
Patient is a 79y old  Male who presents with a chief complaint of abdominal pain, nausea, abdominal distention, rising leukocytosis (20 Sep 2019 10:26)      SUBJECTIVE / OVERNIGHT EVENTS:  Abdomen distension improved ambulatory  Tolerating solid food  Review of Systems:   CONSTITUTIONAL: No fever, weight loss, or fatigue  EYES: No eye pain, visual disturbances, or discharge  ENMT:  No difficulty hearing, tinnitus, vertigo; No sinus or throat pain  NECK: No pain or stiffness  BREASTS: No pain, masses, or nipple discharge  RESPIRATORY: No cough, wheezing, chills or hemoptysis; No shortness of breath  CARDIOVASCULAR: No chest pain, palpitations, dizziness, or leg swelling  GASTROINTESTINAL: No abdominal or epigastric pain. No vomiting, or hematemesis; No diarrhea or constipation. No melena or hematochezia. passing gas,  Distension improved slightly.  GENITOURINARY: No dysuria, frequency, hematuria, or incontinence  NEUROLOGICAL: No headaches, memory loss, loss of strength, numbness, or tremors  SKIN: No itching, burning, rashes, or lesions   LYMPH NODES: No enlarged glands  ENDOCRINE: No heat or cold intolerance; No hair loss  MUSCULOSKELETAL: No joint pain or swelling; No muscle, back, or extremity pain  PSYCHIATRIC: No depression, anxiety, mood swings, or difficulty sleeping  HEME/LYMPH: No easy bruising, or bleeding gums  ALLERY AND IMMUNOLOGIC: No hives or eczema    MEDICATIONS  (STANDING):  ciprofloxacin   IVPB 400 milliGRAM(s) IV Intermittent every 12 hours  dextrose 5% + sodium chloride 0.9%. 1000 milliLiter(s) (75 mL/Hr) IV Continuous <Continuous>  ferrous    sulfate 325 milliGRAM(s) Oral daily  furosemide    Tablet 40 milliGRAM(s) Oral daily  heparin  Injectable 5000 Unit(s) SubCutaneous every 8 hours  lactobacillus acidophilus 1 Tablet(s) Oral two times a day  losartan 50 milliGRAM(s) Oral daily  metroNIDAZOLE  IVPB 500 milliGRAM(s) IV Intermittent every 12 hours  pantoprazole  Injectable 40 milliGRAM(s) IV Push daily    MEDICATIONS  (PRN):  ondansetron Injectable 4 milliGRAM(s) IV Push every 6 hours PRN Nausea      PHYSICAL EXAM:  Vital Signs Last 24 Hrs  T(C): 37 (20 Sep 2019 15:10), Max: 37 (20 Sep 2019 01:29)  T(F): 98.6 (20 Sep 2019 15:10), Max: 98.6 (20 Sep 2019 01:29)  HR: 81 (20 Sep 2019 15:10) (74 - 92)  BP: 147/79 (20 Sep 2019 15:10) (129/64 - 175/73)  BP(mean): --  RR: 20 (20 Sep 2019 15:10) (16 - 20)  SpO2: 100% (20 Sep 2019 15:10) (96% - 100%)  I&O's Summary    GENERAL: NAD, well-developed  HEAD:  Atraumatic, Normocephalic  EYES: EOMI, PERRLA, conjunctiva and sclera clear  NECK: Supple, No JVD  CHEST/LUNG: Clear to auscultation bilaterally; No wheeze  HEART: Regular rate and rhythm; No murmurs, rubs, or gallops  ABDOMEN: Soft, Nontender, distended; Bowel sounds sluggish  EXTREMITIES:  2+ Peripheral Pulses, No clubbing, cyanosis, +++ leg and scrotal edema  PSYCH: AAOx3  NEUROLOGY: non-focal  SKIN: No rashes or lesions    LABS:  CAPILLARY BLOOD GLUCOSE                              6.8    97.11 )-----------( 180      ( 20 Sep 2019 06:35 )             23.4     09-20    124<L>  |  92<L>  |  12  ----------------------------<  131<H>  4.7   |  22  |  0.68    Ca    7.4<L>      20 Sep 2019 06:32  Phos  2.2     09-20  Mg     1.8     09-20    TPro  5.5<L>  /  Alb  2.6<L>  /  TBili  0.7  /  DBili  x   /  AST  50<H>  /  ALT  66<H>  /  AlkPhos  160<H>  09-20    PT/INR - ( 20 Sep 2019 06:35 )   PT: 14.0 SEC;   INR: 1.25          PTT - ( 20 Sep 2019 06:35 )  PTT:27.9 SEC          RADIOLOGY & ADDITIONAL TESTS:    Imaging Personally Reviewed:    Consultant(s) Notes Reviewed:      Care Discussed with Consultants/Other Providers:

## 2019-09-28 NOTE — PROGRESS NOTE ADULT - ATTENDING COMMENTS
Patient was seen and examined with hepatology team on rounds. Agree with above.
GI consulted for abdominal distention/pain with CT concerning for possible ileus. Symptoms slowly improving; remains distended, but now passing flatus. Etiology likely multifactorial, including electrolyte abnormalities on admission. Would ensure electrolytes corrected as needed and patient ambulates or is OOB as tolerated.
Patient was seen and examined with Hepatology team on rounds. Agree with above.
Patient with ileus on admission. Passing flatus, but remains distended without BM. Expect likely 2/2 severe hyponatremia on admission. Possible underlying infection vs worsening CLL on admission given severe leukocytosis (now improved).   No obstruction seen on CT.   Continue supportive care. Would obtain AXR to reassess bowel gas.   Discussed with primary attending, Dr. Reddy.
Patient was seen and examined with hepatology team on rounds. Agree with above.
Anasarca: From low albumin. On lasix. ambulation encouraged

## 2019-09-28 NOTE — PROGRESS NOTE ADULT - PROBLEM SELECTOR PROBLEM 1
Abdominal distension

## 2019-09-28 NOTE — DISCHARGE NOTE PROVIDER - CARE PROVIDER_API CALL
Monika Carballo)  Internal Medicine  [address], [address]  [place] [address]  Phone: [phone]  Fax: [phone]  Follow Up Time:     Neil Dean)  Hematology; Medical Oncology  [facility]  [place] [address]  Phone: [phone]  Fax: [phone]  Follow Up Time:     Hepatology follow up,   Hepatology Clinic: [phone] (Faculty Practice) or [phone] ([facility])  Phone: (   )    -  Fax: (   )    -  Follow Up Time:

## 2019-09-28 NOTE — PROGRESS NOTE ADULT - ASSESSMENT
79M with CLL, on observation, here with acute increase in WBC, LD slightly high, abdominal symptoms as above, no significant adenopathy on exam or CT a/p, no ascites, WBC decreasing suggesting reactive nature from some inflammatory response, decreasing hgb. will recommend:  - iron discontinued  - f/u on GI and hepatology recommendations  - on diuresis for the congestion, monitor and replace potasium  - will need cardiology f/u - can be done as out pt  - no evidence of Serrato's transformation - WBC has decreased significantly since admission, suggesting reactive increase on admission  - melatonin for sleeping.  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

## 2019-09-28 NOTE — DISCHARGE NOTE PROVIDER - HOSPITAL COURSE
78 y/o male with h/o HTN, gastritis, liver cirrhosis, CLL diagnosed in 2012 (hematologist Dr. Dean, on observation, not on chemo), who presents with 1 day history of abdominal distention, mid-left sided abdominal pain, nausea, CT no obstruction, possible focal ileus, WBC elevated to 141K, now down trending. Acute rise in WBC r/o sepsis vs. progression of CLL.         Abdominal distension    - with abdominal distention Suspect ileus/ SBO    - IVF     - PPI     - house GI cs    - improved    - Advanced diet as tolerated        CLL    - f/u with Dr Dean as outpatient         HTN     - losartan         H/O gastritis    - PPI         Liver cirrhosis    - no ascites on CT, Hep B Positive    - Hepatology evaluation    - viread        Leg edema    - US no DVT    - lasix         hypoNa    - renal cs         PT eval: Rehab 80 y/o male with h/o HTN, gastritis, liver cirrhosis, CLL diagnosed in 2012 (hematologist Dr. Dean, on observation, not on chemo), who presents with 1 day history of abdominal distention, mid-left sided abdominal pain, nausea, CT no obstruction, possible focal ileus, WBC elevated to 141K, now down trending. Acute rise in WBC r/o sepsis vs. progression of CLL. During hospitalization patient was evaluated by Hepatology and was foun to be Hep B positive and started on medication for chronic Hep B infection. because of concern for possible infection patient had CT chest not showing pneumonia. Patient was continued on Abx for 5 days and completed a short course with improvement in his WBC count. Patient had improvement in his WBC count and was tolerating PO on discharge. Patient was evaluated by PT who recommended initlally Rehab but on last assessment after treatment recommended home. Patient was discharged in stable condition and with normal vitals and improved WBC count with instructions for follow-up with Hepatology, hematology, and with return instructions.,

## 2019-09-28 NOTE — PROGRESS NOTE ADULT - PROBLEM SELECTOR PLAN 7
c/w lasix, could be d/t liver cirrhosis/hypoalbuminemia  pt reports he had echo done on 9/19 unremarkable, Lasix as above
c/w lasix, could be d/t liver cirrhosis/hypoalbuminemia  pt reports he had echo done on 9/19 unremarkable, denies h/o chf  check leg duplex
c/w lasix, could be d/t liver cirrhosis/hypoalbuminemia  pt reports he had echo done on 9/19 unremarkable, Lasix as above
c/w lasix, could be d/t liver cirrhosis/hypoalbuminemia  pt reports he had echo done on 9/19 unremarkable, denies h/o chf  check leg duplex
c/w lasix, could be d/t liver cirrhosis/hypoalbuminemia  pt reports he had echo done yesterday 9/19 unremarkable, denies h/o chf  check leg duplex
c/w lasix, could be d/t liver cirrhosis/hypoalbuminemia  pt reports he had echo done yesterday 9/19 unremarkable, denies h/o chf  check leg duplex
c/w lasix, could be d/t liver cirrhosis/hypoalbuminemia  pt reports he had echo done on 9/19 unremarkable, Lasix as above

## 2019-09-28 NOTE — PROGRESS NOTE ADULT - PROBLEM SELECTOR PLAN 6
-elevated PT/INR, hypoalbuminemia and elevated LFT d/t chronic liver disease  --no ascites on CT, Hep B Positive,   Hepatology evaluation noted.
-elevated PT/INR, hypoalbuminemia and elevated LFT d/t chronic liver disease  --no ascites on CT, Hep B Positive, will need hepatology evaluation in AM
-elevated PT/INR, hypoalbuminemia and elevated LFT d/t chronic liver disease  --no ascites on CT, Hep B Positive,   Hepatology evaluation noted
-elevated PT/INR, hypoalbuminemia and elevated LFT d/t chronic liver disease  --no ascites on CT, Hep B Positive,   Hepatology evaluation noted
-elevated PT/INR, hypoalbuminemia and elevated LFT d/t chronic liver disease  --no ascites on CT, Hep B Positive,   Hepatology evaluation noted.
-elevated PT/INR, hypoalbuminemia and elevated LFT d/t chronic liver disease  --no ascites on CT, Hep B Positive,   Hepatology evaluation noted.
-elevated PT/INR, hypoalbuminemia and elevated LFT d/t chronic liver disease  -denies h/o viral hepatitis, remote  h/o occasional wine 10 years ago  -no ascites on CT, check viral hepatitis panel  -outpt f/u GI/PCP, case d/w patient's PCP Dr. Carballo
-elevated PT/INR, hypoalbuminemia and elevated LFT d/t chronic liver disease  -denies h/o viral hepatitis, remote  h/o occasional wine 10 years ago  -no ascites on CT, check viral hepatitis panel  -outpt f/u GI/PCP, case d/w patient's PCP Dr. Carballo
-elevated PT/INR, hypoalbuminemia and elevated LFT d/t chronic liver disease  --no ascites on CT, Hep B Positive,   Hepatology evaluation noted.

## 2019-09-28 NOTE — PROGRESS NOTE ADULT - PROBLEM SELECTOR PROBLEM 5
H/O gastritis

## 2019-09-28 NOTE — PROGRESS NOTE ADULT - PROBLEM SELECTOR PLAN 1
-pt with abdominal distention SBO suspected.  -Advance diet as per GI  IVF, IV Protonix, zofran prn  GI FU noted
-pt with abdominal distention Suspect ileus/ SBO. Improving slowly  IVF, IV Protonix, zofran prn  GI FU noted
-pt with abdominal distention Suspect ileus/ SBO. Improving slowly  IVF, IV Protonix, zofran prn  GI FU noted  needs Rx for Hep B  Needs laxatives for severe constipation  Advance diet as tolerated  DC planning for AM
-pt with abdominal distention Suspect ileus/ SBO. Improviong slowly  -Advance diet as per GI  IVF, IV Protonix, zofran prn  GI FU noted
-pt with abdominal distention and possible ileus w/o obstruction on CT  -NPO, IVF, IV Protonix, zofran prn  GI Consult noted  NGT for decompression
-pt with abdominal distention and possible ileus w/o obstruction on CT  -NPO, IVF, IV Protonix, zofran prn  GI FU noted  NGT for decompression
-pt with abdominal distention Suspect ileus/ SBO. Improving slowly  IVF, IV Protonix, zofran prn  GI FU noted  needs Rx for Hep B  Needs laxatives for severe constipation
-pt with abdominal distention Suspect ileus/ SBO. Improving slowly  IVF, IV Protonix, zofran prn  GI FU noted  needs Rx for Hep B  Needs laxatives for severe constipation  Advance diet as tolerated
-pt with abdominal distention SBO suspected.  -NPO, IVF, IV Protonix, zofran prn  GI Consult noted  NGT for decompression

## 2019-09-28 NOTE — PROGRESS NOTE ADULT - PROBLEM SELECTOR PLAN 3
Hem FU noted
-acute rise in WBC, case d/w Dr. Dean  -allopurinol, trend uric acid
-acute rise in WBC, case d/w Dr. Dean  -allopurinol, trend uric acid   -anemic 7.7-->6.8, last hgb 9.1 (8/21/19 outpt)  -transfuse 2 units of pRBC (irradiated, leukocyte depleted), iv lasix 40 mg x1 in between units
Hem FU noted
Hem FU noted
Hem FU noted.

## 2019-09-28 NOTE — PROGRESS NOTE ADULT - PROBLEM SELECTOR PLAN 8
Renal FU noted  Stop IV fluids, Ser Na is WNL now..
Renal FU noted  Stop IV fluids, Ser Na is WNL now..
chronic, last serum Na 131 on 8/21/19  trend Na, check urine lytes/osm, c/w lasix, avoid hypotonic fluid  Renal eval in AM for hyponatremia
Renal FU noted  Stop IV fluids, Ser Na is WNL now
Renal FU noted  Stop IV fluids, Ser Na is WNL now..
Renal FU noted  Stop IV fluids, Ser Na is WNL now..
chronic, last serum Na 131 on 8/21/19  trend Na, check urine lytes/osm, c/w lasix, avoid hypotonic fluid
chronic, last serum Na 131 on 8/21/19  trend Na, check urine lytes/osm, c/w lasix, avoid hypotonic fluid
chronic, last serum Na 131 on 8/21/19  trend Na, check urine lytes/osm, c/w lasix, avoid hypotonic fluid  Renal eval in AM for hyponatremia

## 2019-09-28 NOTE — PROGRESS NOTE ADULT - GIT ABD PE PAL DETAILS PC
mild/distended/bowel sounds hypoactive
distended/mild, better than before
much less distended than before
tender/distended
bowel sounds hypoactive/distended
distended

## 2019-09-28 NOTE — PROGRESS NOTE ADULT - SUBJECTIVE AND OBJECTIVE BOX
Pt has been having swelling of the thighs and scrotum, on lasix and passes urine after that. No pain abd and distension of abd is less, no CP, had slight SOB and wheezing earlier, not now. No fevers or chills. Willing to eat eggs, chicken and fish now. Having BM now.      Meds:  benzocaine 15 mG/menthol 3.6 mG (Sugar-Free) Lozenge 1 Lozenge Oral every 8 hours PRN  furosemide    Tablet 20 milliGRAM(s) Oral daily  guaiFENesin    Syrup 200 milliGRAM(s) Oral every 6 hours PRN  heparin  Injectable 5000 Unit(s) SubCutaneous every 8 hours  influenza   Vaccine 0.5 milliLiter(s) IntraMuscular once  lactobacillus acidophilus 1 Tablet(s) Oral two times a day  losartan 50 milliGRAM(s) Oral daily  melatonin 3 milliGRAM(s) Oral at bedtime  ondansetron Injectable 4 milliGRAM(s) IV Push every 6 hours PRN  pantoprazole    Tablet 40 milliGRAM(s) Oral before breakfast  polyethylene glycol 3350 17 Gram(s) Oral two times a day  tamsulosin 0.4 milliGRAM(s) Oral at bedtime  tenofovir disoproxil fumarate (VIREAD) 300 milliGRAM(s) Oral daily      Vital Signs Last 24 Hrs  T(C): 36.9 (28 Sep 2019 05:17), Max: 36.9 (27 Sep 2019 21:56)  T(F): 98.4 (28 Sep 2019 05:17), Max: 98.5 (27 Sep 2019 21:56)  HR: 89 (28 Sep 2019 05:17) (86 - 97)  BP: 153/74 (28 Sep 2019 05:17) (153/74 - 159/90)  BP(mean): --  RR: 16 (28 Sep 2019 05:17) (16 - 18)  SpO2: 98% (28 Sep 2019 05:17) (97% - 98%)                          8.8    81.34 )-----------( 136      ( 28 Sep 2019 07:34 )             28.3       09-28    133<L>  |  101  |  10  ----------------------------<  96  3.5   |  23  |  0.76    Ca    7.6<L>      28 Sep 2019 07:34  Phos  2.3     09-28  Mg     1.9     09-28    TPro  5.2<L>  /  Alb  2.2<L>  /  TBili  0.7  /  DBili  x   /  AST  50<H>  /  ALT  39  /  AlkPhos  105  09-28              PT/INR - ( 28 Sep 2019 07:35 )   PT: 13.1 SEC;   INR: 1.17          PTT - ( 28 Sep 2019 07:35 )  PTT:66.5 SEC

## 2019-09-28 NOTE — PROGRESS NOTE ADULT - SUBJECTIVE AND OBJECTIVE BOX
NEPHROLOGY       Patient seen and examined.    MEDICATIONS  (STANDING):  furosemide    Tablet 20 milliGRAM(s) Oral daily  heparin  Injectable 5000 Unit(s) SubCutaneous every 8 hours  influenza   Vaccine 0.5 milliLiter(s) IntraMuscular once  lactobacillus acidophilus 1 Tablet(s) Oral two times a day  losartan 50 milliGRAM(s) Oral daily  melatonin 3 milliGRAM(s) Oral at bedtime  pantoprazole    Tablet 40 milliGRAM(s) Oral before breakfast  polyethylene glycol 3350 17 Gram(s) Oral two times a day  tamsulosin 0.4 milliGRAM(s) Oral at bedtime  tenofovir disoproxil fumarate (VIREAD) 300 milliGRAM(s) Oral daily    VITALS:  T(C): , Max: 36.9 (09-27-19 @ 21:56)  T(F): , Max: 98.5 (09-27-19 @ 21:56)  HR: 89 (09-28-19 @ 05:17)  BP: 153/74 (09-28-19 @ 05:17)  RR: 16 (09-28-19 @ 05:17)  SpO2: 98% (09-28-19 @ 05:17)    I and O's:    09-27 @ 07:01  -  09-28 @ 07:00  --------------------------------------------------------  IN: 1200 mL / OUT: 1150 mL / NET: 50 mL    09-28 @ 07:01  -  09-28 @ 10:10  --------------------------------------------------------  IN: 240 mL / OUT: 0 mL / NET: 240 mL    PHYSICAL EXAM:  General: NAD; Alert  HEENT:  NCAT; PERRLA  Neck: No JVD; supple  Respiratory: CTA-b/l  Cardiac: RRR s1s2  Gastrointestinal: BS minimal with distention.   Urologic: No veloz  Extremities: No peripheral edema    LABS:                        8.8    81.34 )-----------( 136      ( 28 Sep 2019 07:34 )             28.3     09-28    133<L>  |  101  |  10  ----------------------------<  96  3.5   |  23  |  0.76    Ca    7.6<L>      28 Sep 2019 07:34  Phos  2.3     09-28  Mg     1.9     09-28    TPro  5.2<L>  /  Alb  2.2<L>  /  TBili  0.7  /  DBili  x   /  AST  50<H>  /  ALT  39  /  AlkPhos  105  09-28 NEPHROLOGY       Patient seen and examined. Pt seen ambulating with assistance from family to chair, reports feeling better, no complaints offered.     MEDICATIONS  (STANDING):  furosemide    Tablet 20 milliGRAM(s) Oral daily  heparin  Injectable 5000 Unit(s) SubCutaneous every 8 hours  influenza   Vaccine 0.5 milliLiter(s) IntraMuscular once  lactobacillus acidophilus 1 Tablet(s) Oral two times a day  losartan 50 milliGRAM(s) Oral daily  melatonin 3 milliGRAM(s) Oral at bedtime  pantoprazole    Tablet 40 milliGRAM(s) Oral before breakfast  polyethylene glycol 3350 17 Gram(s) Oral two times a day  tamsulosin 0.4 milliGRAM(s) Oral at bedtime  tenofovir disoproxil fumarate (VIREAD) 300 milliGRAM(s) Oral daily    VITALS:  T(C): , Max: 36.9 (09-27-19 @ 21:56)  T(F): , Max: 98.5 (09-27-19 @ 21:56)  HR: 89 (09-28-19 @ 05:17)  BP: 153/74 (09-28-19 @ 05:17)  RR: 16 (09-28-19 @ 05:17)  SpO2: 98% (09-28-19 @ 05:17)    I and O's:    09-27 @ 07:01  -  09-28 @ 07:00  --------------------------------------------------------  IN: 1200 mL / OUT: 1150 mL / NET: 50 mL    09-28 @ 07:01  -  09-28 @ 10:10  --------------------------------------------------------  IN: 240 mL / OUT: 0 mL / NET: 240 mL    PHYSICAL EXAM:  General: NAD; Alert  HEENT:  NCAT; PERRLA  Neck: No JVD; supple  Respiratory: CTA-b/l  Cardiac: RRR s1s2  Gastrointestinal: BS minimal with distention.   Urologic: No veloz  Extremities: + B/L LE edema     LABS:                        8.8    81.34 )-----------( 136      ( 28 Sep 2019 07:34 )             28.3     09-28    133<L>  |  101  |  10  ----------------------------<  96  3.5   |  23  |  0.76    Ca    7.6<L>      28 Sep 2019 07:34  Phos  2.3     09-28  Mg     1.9     09-28    TPro  5.2<L>  /  Alb  2.2<L>  /  TBili  0.7  /  DBili  x   /  AST  50<H>  /  ALT  39  /  AlkPhos  105  09-28

## 2019-09-28 NOTE — PROGRESS NOTE ADULT - PROBLEM SELECTOR PLAN 9
heparin sc for dvt ppx.
heparin sc for dvt ppx.
heparin sc for dvt ppx
heparin sc for dvt ppx.
heparin sc for dvt ppx.

## 2019-09-28 NOTE — PROGRESS NOTE ADULT - PROBLEM SELECTOR PROBLEM 2
Leukocytosis

## 2019-09-29 ENCOUNTER — TRANSCRIPTION ENCOUNTER (OUTPATIENT)
Age: 79
End: 2019-09-29

## 2019-09-29 VITALS
RESPIRATION RATE: 18 BRPM | DIASTOLIC BLOOD PRESSURE: 81 MMHG | TEMPERATURE: 98 F | OXYGEN SATURATION: 96 % | SYSTOLIC BLOOD PRESSURE: 149 MMHG | HEART RATE: 95 BPM

## 2019-09-29 LAB
ALBUMIN SERPL ELPH-MCNC: 2.3 G/DL — LOW (ref 3.3–5)
ALP SERPL-CCNC: 110 U/L — SIGNIFICANT CHANGE UP (ref 40–120)
ALT FLD-CCNC: 46 U/L — HIGH (ref 4–41)
ANION GAP SERPL CALC-SCNC: 9 MMO/L — SIGNIFICANT CHANGE UP (ref 7–14)
APTT BLD: 47 SEC — HIGH (ref 27.5–36.3)
AST SERPL-CCNC: 53 U/L — HIGH (ref 4–40)
BASOPHILS # BLD AUTO: 0.08 K/UL — SIGNIFICANT CHANGE UP (ref 0–0.2)
BASOPHILS NFR BLD AUTO: 0.1 % — SIGNIFICANT CHANGE UP (ref 0–2)
BILIRUB SERPL-MCNC: 0.8 MG/DL — SIGNIFICANT CHANGE UP (ref 0.2–1.2)
BUN SERPL-MCNC: 12 MG/DL — SIGNIFICANT CHANGE UP (ref 7–23)
CALCIUM SERPL-MCNC: 7.5 MG/DL — LOW (ref 8.4–10.5)
CHLORIDE SERPL-SCNC: 100 MMOL/L — SIGNIFICANT CHANGE UP (ref 98–107)
CO2 SERPL-SCNC: 23 MMOL/L — SIGNIFICANT CHANGE UP (ref 22–31)
CREAT SERPL-MCNC: 0.75 MG/DL — SIGNIFICANT CHANGE UP (ref 0.5–1.3)
EOSINOPHIL # BLD AUTO: 0.06 K/UL — SIGNIFICANT CHANGE UP (ref 0–0.5)
EOSINOPHIL NFR BLD AUTO: 0.1 % — SIGNIFICANT CHANGE UP (ref 0–6)
GLUCOSE SERPL-MCNC: 88 MG/DL — SIGNIFICANT CHANGE UP (ref 70–99)
HCT VFR BLD CALC: 27.3 % — LOW (ref 39–50)
HGB BLD-MCNC: 8.5 G/DL — LOW (ref 13–17)
IMM GRANULOCYTES NFR BLD AUTO: 0.2 % — SIGNIFICANT CHANGE UP (ref 0–1.5)
INR BLD: 1.14 — SIGNIFICANT CHANGE UP (ref 0.88–1.17)
LYMPHOCYTES # BLD AUTO: 79.43 K/UL — HIGH (ref 1–3.3)
LYMPHOCYTES # BLD AUTO: 93.9 % — HIGH (ref 13–44)
MAGNESIUM SERPL-MCNC: 1.7 MG/DL — SIGNIFICANT CHANGE UP (ref 1.6–2.6)
MANUAL SMEAR VERIFICATION: SIGNIFICANT CHANGE UP
MCHC RBC-ENTMCNC: 23.4 PG — LOW (ref 27–34)
MCHC RBC-ENTMCNC: 31.1 % — LOW (ref 32–36)
MCV RBC AUTO: 75 FL — LOW (ref 80–100)
MONOCYTES # BLD AUTO: 0.65 K/UL — SIGNIFICANT CHANGE UP (ref 0–0.9)
MONOCYTES NFR BLD AUTO: 0.8 % — LOW (ref 2–14)
NEUTROPHILS # BLD AUTO: 4.24 K/UL — SIGNIFICANT CHANGE UP (ref 1.8–7.4)
NEUTROPHILS NFR BLD AUTO: 4.9 % — LOW (ref 43–77)
NRBC # FLD: 0 K/UL — SIGNIFICANT CHANGE UP (ref 0–0)
PHOSPHATE SERPL-MCNC: 2.5 MG/DL — SIGNIFICANT CHANGE UP (ref 2.5–4.5)
PLATELET # BLD AUTO: 141 K/UL — LOW (ref 150–400)
PMV BLD: SIGNIFICANT CHANGE UP FL (ref 7–13)
POTASSIUM SERPL-MCNC: 3.7 MMOL/L — SIGNIFICANT CHANGE UP (ref 3.5–5.3)
POTASSIUM SERPL-SCNC: 3.7 MMOL/L — SIGNIFICANT CHANGE UP (ref 3.5–5.3)
PROT SERPL-MCNC: 5.3 G/DL — LOW (ref 6–8.3)
PROTHROM AB SERPL-ACNC: 13 SEC — SIGNIFICANT CHANGE UP (ref 9.8–13.1)
RBC # BLD: 3.64 M/UL — LOW (ref 4.2–5.8)
RBC # FLD: 21.2 % — HIGH (ref 10.3–14.5)
REVIEW TO FOLLOW: YES — SIGNIFICANT CHANGE UP
SODIUM SERPL-SCNC: 132 MMOL/L — LOW (ref 135–145)
WBC # BLD: 84.59 K/UL — CRITICAL HIGH (ref 3.8–10.5)
WBC # FLD AUTO: 84.59 K/UL — CRITICAL HIGH (ref 3.8–10.5)

## 2019-09-29 RX ORDER — POLYETHYLENE GLYCOL 3350 17 G/17G
17 POWDER, FOR SOLUTION ORAL
Qty: 2000 | Refills: 0
Start: 2019-09-29 | End: 2019-10-28

## 2019-09-29 RX ORDER — FERROUS SULFATE 325(65) MG
1 TABLET ORAL
Qty: 30 | Refills: 0
Start: 2019-09-29 | End: 2019-10-28

## 2019-09-29 RX ORDER — FUROSEMIDE 40 MG
1 TABLET ORAL
Qty: 30 | Refills: 0
Start: 2019-09-29 | End: 2019-10-28

## 2019-09-29 RX ORDER — OMEPRAZOLE 10 MG/1
0 CAPSULE, DELAYED RELEASE ORAL
Qty: 0 | Refills: 0 | DISCHARGE

## 2019-09-29 RX ORDER — FERROUS SULFATE 325(65) MG
0 TABLET ORAL
Qty: 0 | Refills: 0 | DISCHARGE

## 2019-09-29 RX ORDER — OMEPRAZOLE 10 MG/1
1 CAPSULE, DELAYED RELEASE ORAL
Qty: 30 | Refills: 0
Start: 2019-09-29 | End: 2019-10-28

## 2019-09-29 RX ORDER — FUROSEMIDE 40 MG
1 TABLET ORAL
Qty: 0 | Refills: 0 | DISCHARGE

## 2019-09-29 RX ORDER — LOSARTAN POTASSIUM 100 MG/1
1 TABLET, FILM COATED ORAL
Qty: 30 | Refills: 0
Start: 2019-09-29 | End: 2019-10-28

## 2019-09-29 RX ORDER — LOSARTAN POTASSIUM 100 MG/1
1 TABLET, FILM COATED ORAL
Qty: 0 | Refills: 0 | DISCHARGE

## 2019-09-29 RX ORDER — TENOFOVIR DISOPROXIL FUMARATE 300 MG/1
1 TABLET, FILM COATED ORAL
Qty: 30 | Refills: 0
Start: 2019-09-29 | End: 2019-10-28

## 2019-09-29 RX ORDER — TAMSULOSIN HYDROCHLORIDE 0.4 MG/1
1 CAPSULE ORAL
Qty: 30 | Refills: 0
Start: 2019-09-29 | End: 2019-10-28

## 2019-09-29 RX ADMIN — HEPARIN SODIUM 5000 UNIT(S): 5000 INJECTION INTRAVENOUS; SUBCUTANEOUS at 05:25

## 2019-09-29 RX ADMIN — Medication 20 MILLIGRAM(S): at 05:25

## 2019-09-29 RX ADMIN — TENOFOVIR DISOPROXIL FUMARATE 300 MILLIGRAM(S): 300 TABLET, FILM COATED ORAL at 11:23

## 2019-09-29 RX ADMIN — Medication 1 TABLET(S): at 05:25

## 2019-09-29 RX ADMIN — LOSARTAN POTASSIUM 50 MILLIGRAM(S): 100 TABLET, FILM COATED ORAL at 05:25

## 2019-09-29 RX ADMIN — PANTOPRAZOLE SODIUM 40 MILLIGRAM(S): 20 TABLET, DELAYED RELEASE ORAL at 05:26

## 2019-09-29 NOTE — PROVIDER CONTACT NOTE (CRITICAL VALUE NOTIFICATION) - BACKGROUND
Pt AOX4. Hx CLL, liver Cirrhosis
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
CLL h/o htn, gastritis, liver cirrhosis, acute rise in WBC r/o sepsis vs. progression of CLL
CLL, Leukocytosis, Liver Cirrhosis, Ileus, HTN
Leukemia, Liver Cirrhosis, Gastritis, Abdominal Distention
Pt presenting with abdominal distention. PMH of chronic lymphocytic leukemia. Pt has had elevated WBC over past several days.
lymphoid leukemia
hx CLL

## 2019-09-29 NOTE — DISCHARGE NOTE NURSING/CASE MANAGEMENT/SOCIAL WORK - NSDCPNINST_GEN_ALL_CORE
Increasing swelling to scrotum and penis, fever 100.4 and higher, nausea, vomiting with increasing stomach distention, Increasing swelling to scrotum and penis, fever 100.4 and higher, nausea, vomiting with increasing stomach distention, increasing difficulty urinating and increasing loose stools call MD.

## 2019-09-29 NOTE — DISCHARGE NOTE NURSING/CASE MANAGEMENT/SOCIAL WORK - PATIENT PORTAL LINK FT
You can access the FollowMyHealth Patient Portal offered by [facility] by registering at the following website: [email or web address]. By joining [employer]’s FollowMyHealth portal, you will also be able to view your health information using other applications (apps) compatible with our system.

## 2019-09-29 NOTE — PROVIDER CONTACT NOTE (CRITICAL VALUE NOTIFICATION) - RECOMMENDATIONS
MD notified
md made aware
Monitor Labs WBC trending down.
Monitor VS?
continue to monitor
monitor vitals
continue to monitor

## 2019-09-29 NOTE — PROGRESS NOTE ADULT - REASON FOR ADMISSION
abdominal pain, nausea, abdominal distention, rising leukocytosis

## 2019-09-29 NOTE — PROGRESS NOTE ADULT - NEGATIVE CARDIOVASCULAR SYMPTOMS
no palpitations/no chest pain
no palpitations/no chest pain
no chest pain/no palpitations
no chest pain/no palpitations
no palpitations/no chest pain
no palpitations/no chest pain
no chest pain/no palpitations
no chest pain/no palpitations

## 2019-09-29 NOTE — PROGRESS NOTE ADULT - RS GEN PE MLT RESP DETAILS PC
no rales/breath sounds equal/no rhonchi
no rales/breath sounds equal/no rhonchi
no rales/rhonchi/breath sounds equal
rhonchi/no rales/breath sounds equal
no rales/no rhonchi/breath sounds equal
no rales/rhonchi/breath sounds equal
no rales/breath sounds equal/no rhonchi
no rales/breath sounds equal/no rhonchi

## 2019-09-29 NOTE — PROGRESS NOTE ADULT - ASSESSMENT
79M with CLL, on observation, here with acute increase in WBC, LD slightly high, abdominal symptoms as above, no significant adenopathy on exam or CT a/p, no ascites, WBC decreasing suggesting reactive nature from some inflammatory response, decreasing hgb. will recommend:  - iron discontinued  - f/u on GI and hepatology recommendations  - on diuresis for the congestion, monitor and replace potasium  - will need cardiology f/u - can be done as out pt  - no evidence of Serrato's transformation - WBC has decreased significantly since admission, suggesting reactive increase on admission  - melatonin for sleeping.  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

## 2019-09-29 NOTE — PROGRESS NOTE ADULT - NEGATIVE HEMATOLOGY SYMPTOMS
no gum bleeding/no nose bleeding
no nose bleeding/no gum bleeding
no gum bleeding/no nose bleeding

## 2019-09-29 NOTE — PROGRESS NOTE ADULT - MOTOR
no focal deficits

## 2019-09-29 NOTE — PROGRESS NOTE ADULT - GASTROINTESTINAL DETAILS
soft/nontender
soft/nontender/no distention
soft/nontender
soft
soft/nontender
nontender/soft
nontender/soft
no distention/nontender/soft

## 2019-09-29 NOTE — PROGRESS NOTE ADULT - SUBJECTIVE AND OBJECTIVE BOX
Pt has been doing better and feels stronger. Passing urine and leg swelling is less, no SOB, CP and eating more, having BM. Ambulated a lot more yesterday. Wants to go home.      Meds:  benzocaine 15 mG/menthol 3.6 mG (Sugar-Free) Lozenge 1 Lozenge Oral every 8 hours PRN  furosemide    Tablet 20 milliGRAM(s) Oral daily  guaiFENesin    Syrup 200 milliGRAM(s) Oral every 6 hours PRN  heparin  Injectable 5000 Unit(s) SubCutaneous every 8 hours  influenza   Vaccine 0.5 milliLiter(s) IntraMuscular once  lactobacillus acidophilus 1 Tablet(s) Oral two times a day  losartan 50 milliGRAM(s) Oral daily  melatonin 3 milliGRAM(s) Oral at bedtime  ondansetron Injectable 4 milliGRAM(s) IV Push every 6 hours PRN  pantoprazole    Tablet 40 milliGRAM(s) Oral before breakfast  polyethylene glycol 3350 17 Gram(s) Oral two times a day  tamsulosin 0.4 milliGRAM(s) Oral at bedtime  tenofovir disoproxil fumarate (VIREAD) 300 milliGRAM(s) Oral daily      Vital Signs Last 24 Hrs  T(C): 36.7 (29 Sep 2019 05:23), Max: 36.9 (28 Sep 2019 17:51)  T(F): 98.1 (29 Sep 2019 05:23), Max: 98.4 (28 Sep 2019 17:51)  HR: 89 (29 Sep 2019 05:23) (89 - 98)  BP: 146/75 (29 Sep 2019 05:23) (112/86 - 166/84)  BP(mean): --  RR: 17 (29 Sep 2019 05:23) (17 - 18)  SpO2: 98% (29 Sep 2019 05:23) (96% - 99%)                          8.5    84.59 )-----------( 141      ( 29 Sep 2019 06:30 )             27.3       09-29    132<L>  |  100  |  12  ----------------------------<  88  3.7   |  23  |  0.75    Ca    7.5<L>      29 Sep 2019 06:50  Phos  2.5     09-29  Mg     1.7     09-29    TPro  5.3<L>  /  Alb  2.3<L>  /  TBili  0.8  /  DBili  x   /  AST  53<H>  /  ALT  46<H>  /  AlkPhos  110  09-29              PT/INR - ( 29 Sep 2019 06:35 )   PT: 13.0 SEC;   INR: 1.14          PTT - ( 29 Sep 2019 06:35 )  PTT:47.0 SEC

## 2019-09-29 NOTE — PROVIDER CONTACT NOTE (CRITICAL VALUE NOTIFICATION) - ACTION/TREATMENT ORDERED:
continue to monitor
MD forde
None
no new order
Continue to monitor patient
No new orders
None
continue to monitor

## 2019-09-29 NOTE — PROGRESS NOTE ADULT - CONSTITUTIONAL DETAILS
no distress
in mild distress from cough/SOB
no distress

## 2019-09-29 NOTE — PROGRESS NOTE ADULT - NEGATIVE RESPIRATORY AND THORAX SYMPTOMS
no dyspnea/no cough
no hemoptysis
no cough/no dyspnea
no dyspnea/no cough
no dyspnea/no hemoptysis
no wheezing/no cough/no dyspnea
no dyspnea
no wheezing/no dyspnea/no cough

## 2019-09-29 NOTE — PROVIDER CONTACT NOTE (CRITICAL VALUE NOTIFICATION) - ASSESSMENT
AOX4. Pt V/s Stable.  Pt stable
A&Ox4, OOB with assistance, fatigued upon any exertion. VSS; afebrile.
Pt A&OX4, VSS. Denies chills. Abdomen soft non tender. Reports some flatus. No n/v. OOB to chair.
Pt AOX4. VSS. NGT unclamped to int. suction. NPO with IVF. No GI s/s. Resting between care.
Pt denies feeling fever/chills/malaise. Abdomen remains distended but soft/nontender.
hx of CLL
no complaints presented
hx CLL asymptomatic

## 2019-09-29 NOTE — PROGRESS NOTE ADULT - NEGATIVE GASTROINTESTINAL SYMPTOMS
no diarrhea/no nausea/no vomiting/no constipation/no abdominal pain
no vomiting/no abdominal pain/no nausea
no vomiting/no nausea
no nausea/no vomiting/no diarrhea
no diarrhea/no abdominal pain/no vomiting/no nausea
no diarrhea/no nausea/no vomiting/no constipation
no vomiting/no diarrhea/no abdominal pain/no nausea
no nausea/no vomiting/no abdominal pain/no diarrhea

## 2019-09-29 NOTE — PROGRESS NOTE ADULT - NEGATIVE GENERAL SYMPTOMS
no fever/no chills
no chills/no anorexia/no fever
no fever/no chills
no fever/no chills
no chills/no fever
no fever/no chills
no fever/no chills

## 2019-09-29 NOTE — PROVIDER CONTACT NOTE (CRITICAL VALUE NOTIFICATION) - SITUATION
Critical Value .5
.92
WBC 65.37
WBC 73.8
WBC 78.55
WBC critical value 76.47
pt moved to ESSU1 when critical value received. NIA Murdock notified, and MD Dean at pt bedside notified. ESSU1 RN to monitor for further orders from MD and covering team.
critical value WBC 81.34
Critical result: WBC 84.59

## 2019-09-30 PROBLEM — I10 ESSENTIAL (PRIMARY) HYPERTENSION: Chronic | Status: ACTIVE | Noted: 2019-09-20

## 2019-09-30 PROBLEM — Z87.19 PERSONAL HISTORY OF OTHER DISEASES OF THE DIGESTIVE SYSTEM: Chronic | Status: ACTIVE | Noted: 2019-09-20

## 2019-09-30 PROBLEM — C91.90 LYMPHOID LEUKEMIA, UNSPECIFIED NOT HAVING ACHIEVED REMISSION: Chronic | Status: ACTIVE | Noted: 2019-09-20

## 2019-09-30 LAB — HBV E AG SER-ACNC: POSITIVE — SIGNIFICANT CHANGE UP

## 2019-10-04 ENCOUNTER — LABORATORY RESULT (OUTPATIENT)
Age: 79
End: 2019-10-04

## 2019-10-04 ENCOUNTER — APPOINTMENT (OUTPATIENT)
Dept: HEPATOLOGY | Facility: CLINIC | Age: 79
End: 2019-10-04
Payer: MEDICARE

## 2019-10-04 PROCEDURE — 99204 OFFICE O/P NEW MOD 45 MIN: CPT

## 2019-10-04 RX ORDER — LOSARTAN POTASSIUM 50 MG/1
50 TABLET, FILM COATED ORAL
Refills: 0 | Status: ACTIVE | COMMUNITY

## 2019-10-04 RX ORDER — LOSARTAN POTASSIUM 50 MG/1
50 TABLET, FILM COATED ORAL
Refills: 0 | Status: DISCONTINUED | COMMUNITY
End: 2019-10-04

## 2019-10-05 LAB
AFP-TM SERPL-MCNC: 2.5 NG/ML
ALBUMIN SERPL ELPH-MCNC: 3.2 G/DL
ALP BLD-CCNC: 143 U/L
ALT SERPL-CCNC: 64 U/L
ANION GAP SERPL CALC-SCNC: 10 MMOL/L
AST SERPL-CCNC: 54 U/L
BASOPHILS # BLD AUTO: 0.04 K/UL
BASOPHILS NFR BLD AUTO: 0 %
BILIRUB SERPL-MCNC: 1 MG/DL
BUN SERPL-MCNC: 12 MG/DL
CALCIUM SERPL-MCNC: 8.6 MG/DL
CHLORIDE SERPL-SCNC: 97 MMOL/L
CO2 SERPL-SCNC: 25 MMOL/L
CREAT SERPL-MCNC: 0.82 MG/DL
EOSINOPHIL # BLD AUTO: 0.05 K/UL
EOSINOPHIL NFR BLD AUTO: 0 %
GLUCOSE SERPL-MCNC: 108 MG/DL
HBV CORE IGG+IGM SER QL: REACTIVE
HBV SURFACE AB SER QL: NONREACTIVE
HBV SURFACE AG SER QL: REACTIVE
HCT VFR BLD CALC: 33 %
HGB BLD-MCNC: 9.5 G/DL
IMM GRANULOCYTES NFR BLD AUTO: 0.2 %
LYMPHOCYTES # BLD AUTO: 142.5 K/UL
LYMPHOCYTES NFR BLD AUTO: 94.3 %
MAN DIFF?: NORMAL
MCHC RBC-ENTMCNC: 22.6 PG
MCHC RBC-ENTMCNC: 28.8 GM/DL
MCV RBC AUTO: 78.6 FL
MONOCYTES # BLD AUTO: 1.9 K/UL
MONOCYTES NFR BLD AUTO: 1.3 %
NEUTROPHILS # BLD AUTO: 6.27 K/UL
NEUTROPHILS NFR BLD AUTO: 4.2 %
PLATELET # BLD AUTO: 196 K/UL
POTASSIUM SERPL-SCNC: 5 MMOL/L
PROT SERPL-MCNC: 6.1 G/DL
RBC # BLD: 4.2 M/UL
RBC # FLD: 22.1 %
SODIUM SERPL-SCNC: 132 MMOL/L
WBC # FLD AUTO: 151.13 K/UL

## 2019-10-06 LAB
HBV DNA # SERPL NAA+PROBE: NORMAL IU/ML
HEPB DNA PCR LOG: 5.21

## 2019-10-08 LAB
HBV E AB SER QL: NEGATIVE
HBV E AG SER QL: POSITIVE

## 2019-10-10 LAB
ESTRADIOL SERPL HS-MCNC: NOT DETECTED
ESTRIOL SERPL-MCNC: NOT DETECTED
ESTRONE SERPL-MCNC: NOT DETECTED
HDV AB SER IA-ACNC: NEGATIVE
HEPATITIS B GENOTYPE & DRUG RESISTANCE: NORMAL

## 2019-10-21 ENCOUNTER — APPOINTMENT (OUTPATIENT)
Dept: HEMATOLOGY ONCOLOGY | Facility: CLINIC | Age: 79
End: 2019-10-21
Payer: MEDICARE

## 2019-10-21 VITALS — HEART RATE: 106 BPM

## 2019-10-21 VITALS
DIASTOLIC BLOOD PRESSURE: 62 MMHG | SYSTOLIC BLOOD PRESSURE: 110 MMHG | WEIGHT: 132 LBS | BODY MASS INDEX: 21.97 KG/M2 | OXYGEN SATURATION: 99 % | HEART RATE: 112 BPM

## 2019-10-21 PROCEDURE — 85025 COMPLETE CBC W/AUTO DIFF WBC: CPT

## 2019-10-21 PROCEDURE — 99214 OFFICE O/P EST MOD 30 MIN: CPT

## 2019-10-22 LAB
ALBUMIN SERPL ELPH-MCNC: 3.1 G/DL
ALP BLD-CCNC: 255 U/L
ALT SERPL-CCNC: 177 U/L
ANION GAP SERPL CALC-SCNC: 10 MMOL/L
AST SERPL-CCNC: 123 U/L
BILIRUB SERPL-MCNC: 0.6 MG/DL
BUN SERPL-MCNC: 18 MG/DL
CALCIUM SERPL-MCNC: 8.3 MG/DL
CHLORIDE SERPL-SCNC: 97 MMOL/L
CO2 SERPL-SCNC: 21 MMOL/L
CREAT SERPL-MCNC: 0.79 MG/DL
FERRITIN SERPL-MCNC: 581 NG/ML
FOLATE SERPL-MCNC: 14.5 NG/ML
GLUCOSE SERPL-MCNC: 133 MG/DL
IRON SATN MFR SERPL: 18 %
IRON SERPL-MCNC: 38 UG/DL
LDH SERPL-CCNC: 198 U/L
POTASSIUM SERPL-SCNC: 4.6 MMOL/L
PROT SERPL-MCNC: 6.4 G/DL
SODIUM SERPL-SCNC: 128 MMOL/L
TIBC SERPL-MCNC: 210 UG/DL
UIBC SERPL-MCNC: 172 UG/DL
URATE SERPL-MCNC: 3 MG/DL
VIT B12 SERPL-MCNC: 1527 PG/ML

## 2019-10-22 NOTE — RESULTS/DATA
[FreeTextEntry1] : CBC10/21/19:\par WBC 95.1\par ALC 80.25\par ANC 6.75\par Hgb 9.8\par Hct 30.7\par Plt 202.0

## 2019-10-22 NOTE — REVIEW OF SYSTEMS
[Negative] : Heme/Lymph [Fever] : no fever [Chills] : no chills [Night Sweats] : no night sweats [Fatigue] : no fatigue [Recent Change In Weight] : ~T no recent weight change [Vision Problems] : no vision problems [Chest Pain] : no chest pain [Palpitations] : no palpitations [Lower Ext Edema] : no lower extremity edema [Shortness Of Breath] : no shortness of breath [Cough] : no cough [SOB on Exertion] : no shortness of breath during exertion

## 2019-10-22 NOTE — PHYSICAL EXAM
[Fully active, able to carry on all pre-disease performance without restriction] : Status 0 - Fully active, able to carry on all pre-disease performance without restriction [Normal] : affect appropriate [Thin] : thin [de-identified] : +1 pitting edema lower extremity [de-identified] : KASHMIR CARBALLO [de-identified] : no palpable cervical, axillary, or inguinal lymphadenopathy

## 2019-10-22 NOTE — HISTORY OF PRESENT ILLNESS
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 Dean. He was diagnosed in 2013 on routine blood work when he was found to have WBC of 14k with lymphocytosis. He was seeing Dr. Porfirio Carballo at that time. Peripheral blood flow cytometry form 2/2013 confirmed CLL with 20% of cells examined which expressed pan B cell markers (CD19, CD20, CD22, CD5, CD23 all positive, CD38 negative, and dim kappa). CLL FISH panel was negative, IgV mutated, positive Zap 70. He also had a BM biopsy in 3/2014 which showed involvement by CLL accounting for 30% of cells, karyotype 46 XY[16]. \par \par \par  Had GI work up with EGD on 8/9/18 and Colonoscopy on 9/21/18. CTAP on 8/9/18 showed enlarged spleen measuring 14.5 cm, nodular liver suggestive of liver cirrhosis and thickening of gastric antrum. EGD showed gastric ulcer, biopsy consistent with foveolar hyperplasia and no involvement by CLL, negative for H. Pylori. However, abdominal US with no evidence of liver cirrhosis and enlarged spleen measuring 15cm. Colonoscopy with no significant findings.  \par \par He presents today after recent hosptial admission to [facility] from 9/20/19 to 9/29/19 for abd distention, abd pain, nausea, and elevated WBC of 141K. 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 He also had work up done for chronic hepatitis B and cirrhosis, which revealed he had high HBV viremia and was started on Viread for chronic HBV. He had elevated LFTs Due to worsening WBC, he was also started on abx empirically. \par Since discharged he has followed up with hepatology  and is to continue with Viread for chronic hep B. He has also followed up with his local hematology oncologist Dr. Dean on 10/16/19. Blood work from 10/16/19 by Dr. Dean reveals a WBC 85.5, ALC 75.9, Hgb 9.6, plts 135.\par He feels very weak and tired. He reports loosing about 3 lbs since hosptial discharge. His appreite is poor, however he is eating small meals throughout the day. the main issue he feels is that he is having poor taste. He states his belly aches/cramps on/off throughout the abby. he denies any sharp pain. He has normal bowel movements. No issues with urination. No fever/drenching night sweats. He had some lower extremity swelling and is currently on lasix 20 mg every other day. No chest pain, No SOB. No recent infections. \par  [de-identified] : CD38 negative\par positive Zap70\par IgV mutated\par FISH panel negative [FreeTextEntry1] : Treatment naive

## 2019-10-22 NOTE — ASSESSMENT
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 Dean.\par \par He presents today after recent hosptial admission to [facility] from 9/20/19 to 9/29/19 for abd distention, abd pain, nausea, and elevated WBC of 141K. 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 He also had work up done for chronic hepatitis B and cirrhosis, which revealed he had high HBV viremia and was started on Viread for chronic HBV.\par \par 1) CBC reviewed from today, WBC stable around 95.1k, Hgb also stable at 9.8k, platelets 202k.  Discussed that he has lymphocytosis, however, no enlarged lymph nodes, no splenomegaly, and no significant symptoms from CLL, and therefore would continue with observation and not start treatment.\par 2) Anemia: likely combination of iron deficiency and beta thal minor. Will follow up on iron studies done today. Hgb overall stable.\par 3) Chronic HBV: on Viread, continue follow up with Dr. Lei as indicated \par 4) Continue routine health maintenance and age appropriate screening as indicated\par 5) Patient is planning to seek second opinion at [facility] for ongoing issues. Patient will continue to follow up with Dr. Dean. \par

## 2019-12-06 ENCOUNTER — TRANSCRIPTION ENCOUNTER (OUTPATIENT)
Age: 79
End: 2019-12-06

## 2019-12-06 ENCOUNTER — APPOINTMENT (OUTPATIENT)
Dept: HEPATOLOGY | Facility: CLINIC | Age: 79
End: 2019-12-06
Payer: MEDICARE

## 2019-12-06 VITALS
DIASTOLIC BLOOD PRESSURE: 72 MMHG | SYSTOLIC BLOOD PRESSURE: 109 MMHG | HEIGHT: 65 IN | WEIGHT: 130 LBS | BODY MASS INDEX: 21.66 KG/M2 | HEART RATE: 87 BPM | TEMPERATURE: 97.4 F

## 2019-12-06 DIAGNOSIS — C91.10 CHRONIC LYMPHOCYTIC LEUKEMIA OF B-CELL TYPE NOT HAVING ACHIEVED REMISSION: ICD-10-CM

## 2019-12-06 DIAGNOSIS — K74.60 UNSPECIFIED CIRRHOSIS OF LIVER: ICD-10-CM

## 2019-12-06 DIAGNOSIS — B18.1 CHRONIC VIRAL HEPATITIS B W/OUT DELTA-AGENT: ICD-10-CM

## 2019-12-06 PROCEDURE — 99214 OFFICE O/P EST MOD 30 MIN: CPT

## 2019-12-06 RX ORDER — DRONABINOL 2.5 MG/1
2.5 CAPSULE ORAL
Refills: 0 | Status: ACTIVE | COMMUNITY

## 2019-12-06 RX ORDER — FUROSEMIDE 20 MG/1
20 TABLET ORAL
Refills: 0 | Status: DISCONTINUED | COMMUNITY
End: 2019-12-06

## 2019-12-06 RX ORDER — FUROSEMIDE 20 MG/1
20 TABLET ORAL
Refills: 0 | Status: ACTIVE | COMMUNITY

## 2019-12-06 RX ORDER — LACTULOSE 10 G/15ML
10 SOLUTION ORAL
Qty: 450 | Refills: 5 | Status: ACTIVE | COMMUNITY
Start: 2019-12-06 | End: 1900-01-01

## 2019-12-08 LAB
ALBUMIN SERPL ELPH-MCNC: 3.2 G/DL
ALP BLD-CCNC: 256 U/L
ALT SERPL-CCNC: 68 U/L
ANION GAP SERPL CALC-SCNC: 14 MMOL/L
AST SERPL-CCNC: 50 U/L
BILIRUB SERPL-MCNC: 0.7 MG/DL
BUN SERPL-MCNC: 19 MG/DL
CALCIUM SERPL-MCNC: 8.6 MG/DL
CHLORIDE SERPL-SCNC: 97 MMOL/L
CO2 SERPL-SCNC: 18 MMOL/L
CREAT SERPL-MCNC: 0.85 MG/DL
GLUCOSE SERPL-MCNC: 120 MG/DL
HBV SURFACE AB SER QL: NONREACTIVE
HBV SURFACE AG SER QL: REACTIVE
POTASSIUM SERPL-SCNC: 4.8 MMOL/L
PROT SERPL-MCNC: 6.5 G/DL
SODIUM SERPL-SCNC: 129 MMOL/L

## 2019-12-09 LAB
HBV DNA # SERPL NAA+PROBE: 311 IU/ML
HEPB DNA PCR LOG: 2.49

## 2019-12-11 ENCOUNTER — TRANSCRIPTION ENCOUNTER (OUTPATIENT)
Age: 79
End: 2019-12-11

## 2019-12-11 DIAGNOSIS — R79.89 OTHER SPECIFIED ABNORMAL FINDINGS OF BLOOD CHEMISTRY: ICD-10-CM

## 2019-12-11 DIAGNOSIS — E87.1 HYPO-OSMOLALITY AND HYPONATREMIA: ICD-10-CM

## 2019-12-11 LAB
BILE AC SER-MCNC: 12.5 UMOL/L
HBV E AB SER QL: NEGATIVE
HBV E AG SER QL: POSITIVE

## 2019-12-19 LAB
ANION GAP SERPL CALC-SCNC: 11 MMOL/L
BUN SERPL-MCNC: 18 MG/DL
CALCIUM SERPL-MCNC: 9 MG/DL
CHLORIDE SERPL-SCNC: 98 MMOL/L
CO2 SERPL-SCNC: 23 MMOL/L
CREAT SERPL-MCNC: 0.84 MG/DL
GLUCOSE SERPL-MCNC: 140 MG/DL
POTASSIUM SERPL-SCNC: 4.3 MMOL/L
SODIUM SERPL-SCNC: 132 MMOL/L

## 2019-12-31 RX ORDER — TENOFOVIR DISOPROXIL FUMARATE 300 MG/1
300 TABLET ORAL DAILY
Qty: 90 | Refills: 2 | Status: ACTIVE | COMMUNITY
Start: 2019-10-29 | End: 1900-01-01

## 2020-01-06 ENCOUNTER — RESULT REVIEW (OUTPATIENT)
Age: 80
End: 2020-01-06

## 2020-01-06 ENCOUNTER — INPATIENT (INPATIENT)
Facility: HOSPITAL | Age: 80
LOS: 24 days | Discharge: OTHER INSTITUTION | End: 2020-01-31
Attending: STUDENT IN AN ORGANIZED HEALTH CARE EDUCATION/TRAINING PROGRAM | Admitting: STUDENT IN AN ORGANIZED HEALTH CARE EDUCATION/TRAINING PROGRAM
Payer: MEDICARE

## 2020-01-06 ENCOUNTER — TRANSCRIPTION ENCOUNTER (OUTPATIENT)
Age: 80
End: 2020-01-06

## 2020-01-06 VITALS
OXYGEN SATURATION: 96 % | DIASTOLIC BLOOD PRESSURE: 61 MMHG | SYSTOLIC BLOOD PRESSURE: 115 MMHG | HEART RATE: 40 BPM | TEMPERATURE: 98 F | RESPIRATION RATE: 22 BRPM

## 2020-01-06 LAB
ALBUMIN SERPL ELPH-MCNC: 3.2 G/DL — LOW (ref 3.3–5)
ALP SERPL-CCNC: 255 U/L — HIGH (ref 40–120)
ALT FLD-CCNC: 46 U/L — HIGH (ref 4–41)
ANION GAP SERPL CALC-SCNC: 15 MMO/L — HIGH (ref 7–14)
ANISOCYTOSIS BLD QL: SIGNIFICANT CHANGE UP
APTT BLD: 33 SEC — SIGNIFICANT CHANGE UP (ref 27.5–36.3)
AST SERPL-CCNC: 61 U/L — HIGH (ref 4–40)
BASE EXCESS BLDV CALC-SCNC: -3.3 MMOL/L — SIGNIFICANT CHANGE UP
BASOPHILS # BLD AUTO: 0.03 K/UL — SIGNIFICANT CHANGE UP (ref 0–0.2)
BASOPHILS NFR BLD AUTO: 0 % — SIGNIFICANT CHANGE UP (ref 0–2)
BASOPHILS NFR SPEC: 0 % — SIGNIFICANT CHANGE UP (ref 0–2)
BILIRUB SERPL-MCNC: 1.6 MG/DL — HIGH (ref 0.2–1.2)
BLASTS # FLD: 0 % — SIGNIFICANT CHANGE UP (ref 0–0)
BLOOD GAS VENOUS - CREATININE: 0.97 MG/DL — SIGNIFICANT CHANGE UP (ref 0.5–1.3)
BUN SERPL-MCNC: 20 MG/DL — SIGNIFICANT CHANGE UP (ref 7–23)
CALCIUM SERPL-MCNC: 9 MG/DL — SIGNIFICANT CHANGE UP (ref 8.4–10.5)
CHLORIDE BLDV-SCNC: 101 MMOL/L — SIGNIFICANT CHANGE UP (ref 96–108)
CHLORIDE SERPL-SCNC: 95 MMOL/L — LOW (ref 98–107)
CO2 SERPL-SCNC: 17 MMOL/L — LOW (ref 22–31)
CREAT SERPL-MCNC: 1.01 MG/DL — SIGNIFICANT CHANGE UP (ref 0.5–1.3)
DACRYOCYTES BLD QL SMEAR: SLIGHT — SIGNIFICANT CHANGE UP
EOSINOPHIL # BLD AUTO: 0.02 K/UL — SIGNIFICANT CHANGE UP (ref 0–0.5)
EOSINOPHIL NFR BLD AUTO: 0 % — SIGNIFICANT CHANGE UP (ref 0–6)
EOSINOPHIL NFR FLD: 0 % — SIGNIFICANT CHANGE UP (ref 0–6)
GAS PNL BLDV: 127 MMOL/L — LOW (ref 136–146)
GLUCOSE BLDV-MCNC: 192 MG/DL — HIGH (ref 70–99)
GLUCOSE SERPL-MCNC: 198 MG/DL — HIGH (ref 70–99)
HCO3 BLDV-SCNC: 21 MMOL/L — SIGNIFICANT CHANGE UP (ref 20–27)
HCT VFR BLD CALC: 33.4 % — LOW (ref 39–50)
HCT VFR BLDV CALC: 30.8 % — LOW (ref 39–51)
HGB BLD-MCNC: 9.7 G/DL — LOW (ref 13–17)
HGB BLDV-MCNC: 10 G/DL — LOW (ref 13–17)
HYPOCHROMIA BLD QL: SIGNIFICANT CHANGE UP
IMM GRANULOCYTES NFR BLD AUTO: 0.1 % — SIGNIFICANT CHANGE UP (ref 0–1.5)
INR BLD: 1.08 — SIGNIFICANT CHANGE UP (ref 0.88–1.17)
LACTATE BLDV-MCNC: 4.6 MMOL/L — CRITICAL HIGH (ref 0.5–2)
LIDOCAIN IGE QN: 115.9 U/L — HIGH (ref 7–60)
LYMPHOCYTES # BLD AUTO: 145.77 K/UL — HIGH (ref 1–3.3)
LYMPHOCYTES # BLD AUTO: 97.6 % — HIGH (ref 13–44)
LYMPHOCYTES NFR SPEC AUTO: 91.4 % — HIGH (ref 13–44)
MANUAL SMEAR VERIFICATION: SIGNIFICANT CHANGE UP
MCHC RBC-ENTMCNC: 20.8 PG — LOW (ref 27–34)
MCHC RBC-ENTMCNC: 29 % — LOW (ref 32–36)
MCV RBC AUTO: 71.7 FL — LOW (ref 80–100)
METAMYELOCYTES # FLD: 0 % — SIGNIFICANT CHANGE UP (ref 0–1)
MICROCYTES BLD QL: SLIGHT — SIGNIFICANT CHANGE UP
MONOCYTES # BLD AUTO: 0.87 K/UL — SIGNIFICANT CHANGE UP (ref 0–0.9)
MONOCYTES NFR BLD AUTO: 0.6 % — LOW (ref 2–14)
MONOCYTES NFR BLD: 0 % — LOW (ref 2–9)
MYELOCYTES NFR BLD: 0 % — SIGNIFICANT CHANGE UP (ref 0–0)
NEUTROPHIL AB SER-ACNC: 0 % — LOW (ref 43–77)
NEUTROPHILS # BLD AUTO: 2.53 K/UL — SIGNIFICANT CHANGE UP (ref 1.8–7.4)
NEUTROPHILS NFR BLD AUTO: 1.7 % — LOW (ref 43–77)
NEUTS BAND # BLD: 1.9 % — SIGNIFICANT CHANGE UP (ref 0–6)
NRBC # FLD: 0.06 K/UL — SIGNIFICANT CHANGE UP (ref 0–0)
OTHER - HEMATOLOGY %: 0 — SIGNIFICANT CHANGE UP
OVALOCYTES BLD QL SMEAR: SLIGHT — SIGNIFICANT CHANGE UP
PCO2 BLDV: 39 MMHG — LOW (ref 41–51)
PH BLDV: 7.36 PH — SIGNIFICANT CHANGE UP (ref 7.32–7.43)
PLATELET # BLD AUTO: 242 K/UL — SIGNIFICANT CHANGE UP (ref 150–400)
PLATELET COUNT - ESTIMATE: NORMAL — SIGNIFICANT CHANGE UP
PMV BLD: SIGNIFICANT CHANGE UP FL (ref 7–13)
PO2 BLDV: 30 MMHG — LOW (ref 35–40)
POIKILOCYTOSIS BLD QL AUTO: SIGNIFICANT CHANGE UP
POLYCHROMASIA BLD QL SMEAR: SLIGHT — SIGNIFICANT CHANGE UP
POTASSIUM BLDV-SCNC: 4.4 MMOL/L — SIGNIFICANT CHANGE UP (ref 3.4–4.5)
POTASSIUM SERPL-MCNC: 6.1 MMOL/L — HIGH (ref 3.5–5.3)
POTASSIUM SERPL-SCNC: 6.1 MMOL/L — HIGH (ref 3.5–5.3)
PROMYELOCYTES # FLD: 0 % — SIGNIFICANT CHANGE UP (ref 0–0)
PROT SERPL-MCNC: 7.4 G/DL — SIGNIFICANT CHANGE UP (ref 6–8.3)
PROTHROM AB SERPL-ACNC: 12.3 SEC — SIGNIFICANT CHANGE UP (ref 9.8–13.1)
RBC # BLD: 4.66 M/UL — SIGNIFICANT CHANGE UP (ref 4.2–5.8)
RBC # FLD: 18.9 % — HIGH (ref 10.3–14.5)
SAO2 % BLDV: 40.4 % — LOW (ref 60–85)
SMUDGE CELLS # BLD: PRESENT — SIGNIFICANT CHANGE UP
SODIUM SERPL-SCNC: 127 MMOL/L — LOW (ref 135–145)
VARIANT LYMPHS # BLD: 6.7 % — SIGNIFICANT CHANGE UP
WBC # BLD: 149.32 K/UL — CRITICAL HIGH (ref 3.8–10.5)
WBC # FLD AUTO: 149.32 K/UL — CRITICAL HIGH (ref 3.8–10.5)

## 2020-01-06 PROCEDURE — 74177 CT ABD & PELVIS W/CONTRAST: CPT | Mod: 26

## 2020-01-06 PROCEDURE — 71046 X-RAY EXAM CHEST 2 VIEWS: CPT | Mod: 26

## 2020-01-06 RX ORDER — SODIUM CHLORIDE 9 MG/ML
1000 INJECTION INTRAMUSCULAR; INTRAVENOUS; SUBCUTANEOUS ONCE
Refills: 0 | Status: COMPLETED | OUTPATIENT
Start: 2020-01-06 | End: 2020-01-06

## 2020-01-06 RX ORDER — SODIUM CHLORIDE 9 MG/ML
500 INJECTION INTRAMUSCULAR; INTRAVENOUS; SUBCUTANEOUS ONCE
Refills: 0 | Status: COMPLETED | OUTPATIENT
Start: 2020-01-06 | End: 2020-01-07

## 2020-01-06 RX ORDER — VANCOMYCIN HCL 1 G
1000 VIAL (EA) INTRAVENOUS ONCE
Refills: 0 | Status: COMPLETED | OUTPATIENT
Start: 2020-01-06 | End: 2020-01-06

## 2020-01-06 RX ORDER — SODIUM CHLORIDE 9 MG/ML
5001 INJECTION, SOLUTION INTRAVENOUS ONCE
Refills: 0 | Status: DISCONTINUED | OUTPATIENT
Start: 2020-01-06 | End: 2020-01-06

## 2020-01-06 RX ORDER — MORPHINE SULFATE 50 MG/1
4 CAPSULE, EXTENDED RELEASE ORAL ONCE
Refills: 0 | Status: DISCONTINUED | OUTPATIENT
Start: 2020-01-06 | End: 2020-01-06

## 2020-01-06 RX ORDER — ACETAMINOPHEN 500 MG
650 TABLET ORAL ONCE
Refills: 0 | Status: COMPLETED | OUTPATIENT
Start: 2020-01-06 | End: 2020-01-06

## 2020-01-06 RX ORDER — HYDROMORPHONE HYDROCHLORIDE 2 MG/ML
1 INJECTION INTRAMUSCULAR; INTRAVENOUS; SUBCUTANEOUS ONCE
Refills: 0 | Status: DISCONTINUED | OUTPATIENT
Start: 2020-01-06 | End: 2020-01-06

## 2020-01-06 RX ORDER — PIPERACILLIN AND TAZOBACTAM 4; .5 G/20ML; G/20ML
3.38 INJECTION, POWDER, LYOPHILIZED, FOR SOLUTION INTRAVENOUS ONCE
Refills: 0 | Status: COMPLETED | OUTPATIENT
Start: 2020-01-06 | End: 2020-01-06

## 2020-01-06 RX ADMIN — Medication 250 MILLIGRAM(S): at 23:55

## 2020-01-06 RX ADMIN — MORPHINE SULFATE 4 MILLIGRAM(S): 50 CAPSULE, EXTENDED RELEASE ORAL at 20:14

## 2020-01-06 RX ADMIN — PIPERACILLIN AND TAZOBACTAM 200 GRAM(S): 4; .5 INJECTION, POWDER, LYOPHILIZED, FOR SOLUTION INTRAVENOUS at 22:44

## 2020-01-06 RX ADMIN — SODIUM CHLORIDE 1000 MILLILITER(S): 9 INJECTION INTRAMUSCULAR; INTRAVENOUS; SUBCUTANEOUS at 22:44

## 2020-01-06 RX ADMIN — HYDROMORPHONE HYDROCHLORIDE 1 MILLIGRAM(S): 2 INJECTION INTRAMUSCULAR; INTRAVENOUS; SUBCUTANEOUS at 22:08

## 2020-01-06 RX ADMIN — Medication 650 MILLIGRAM(S): at 22:44

## 2020-01-06 NOTE — ED PROVIDER NOTE - OBJECTIVE STATEMENT
80 y/o m w/ pmh htn, cirrhosis, hep b, cll presenting to the ED c/o abdominal pain. Patient reports hx of 78 y/o m w/ pmh htn, cirrhosis, hep b, cll presenting to the ED c/o abdominal pain onset last night. Reports onset of severe pain last night that has worsened today. Was unable to eat or get out of bed secondary to pain. Reports nausea and vomiting. No diarrhea, constipation, melena, bloody stools, dysuria, hematuria, fever, chills.

## 2020-01-06 NOTE — ED PROVIDER NOTE - CLINICAL SUMMARY MEDICAL DECISION MAKING FREE TEXT BOX
68 y/o m w/ pmh htn, cirrhosis, hep b, cll presenting to the ED c/o abdominal pain onset last night. Abdomen distended and tender on exam. No free fluid per ultrasound. Will need CT abdomen/pelvis, cbc, cmp, coags, vbg, vanc/zosyn, fluids, and reassessment following CT.

## 2020-01-06 NOTE — ED ADULT TRIAGE NOTE - CHIEF COMPLAINT QUOTE
Pt c/o abd pain since last night. + abd distention. Hx: Cirrhosis, CLL. Also c/o N/V. Bradycardic in triage

## 2020-01-06 NOTE — ED ADULT NURSE NOTE - OBJECTIVE STATEMENT
Received pt in spot 18. [id number]. C/o abd pain since yesterday with distension. Also endorses one episode of vomiting. Denies fevers/chills. Last BM this AM, soft per pt. H/o hep B, cirrhosis, HTN, CLL (not on tx). Abd firm and distended. 20G placed to left AC, labs sent. Sinus tach on cardiac monitor. Will continue to monitor.

## 2020-01-06 NOTE — ED PROVIDER NOTE - NS ED ROS FT
CONST: no fevers, no chills  EYES: no pain, no vision changes  ENT: no sore throat, no ear pain, no change in hearing  CV: no chest pain, no leg swelling  RESP: no shortness of breath, no cough  ABD: + abdominal pain, + nausea, + vomiting, no diarrhea  : no dysuria, no flank pain, no hematuria  MSK: no back pain, no extremity pain  NEURO: no headache or additional neurologic complaints  HEME: no easy bleeding  SKIN:  no rash

## 2020-01-06 NOTE — ED PROVIDER NOTE - PHYSICAL EXAMINATION
GENERAL: Awake, alert, obvious discomfort from pain  HEENT: NC/AT, dry mucous membranes, PERRL  LUNGS: CTAB, no wheezes or crackles   CARDIAC: tachycardic, normal rhythm, no m/r/g  ABDOMEN: Soft, normal BS, severe abdominal tenderness, +rebound and guarding, +mild distention, + caput medusa   BACK: No midline spinal tenderness, no CVA tenderness  EXT: No edema, no calf tenderness, 2+ DP pulses bilaterally, no deformities.  NEURO: A&Ox3. Moving all extremities.  SKIN: Warm and dry. No rash.  PSYCH: Normal affect.

## 2020-01-06 NOTE — ED PROVIDER NOTE - ATTENDING CONTRIBUTION TO CARE
alana: PMH as noted which includes hep B and imaging dxed cirrhosis.   One day prior pt developed increasing abd girth, abd pain and poor appetite. This is similar (but not as severe) as the sx he was hospitalized for several months ago and for which the w/u did not reveal a dx, although after discharge pt slowly improved.   exam: moderate discomfort due to abd pain. The abd is moderately distended and tympanic with diffuse mild to moderate tenderness.   impression: possible SBO  recc: CT abd, labs, IV fluids, pain control alana: PMH as noted which includes hep B and imaging dxed cirrhosis.   One day prior pt developed increasing abd girth, abd pain and poor appetite. This is similar (but not as severe) as the sx he was hospitalized for several months ago and for which the w/u did not reveal a dx, although after discharge pt slowly improved.   exam: moderate discomfort due to abd pain. The abd is moderately distended and tympanic with diffuse mild to moderate tenderness.   impression: possible SBO  recc: CT abd, labs, IV fluids, pain control.

## 2020-01-06 NOTE — ED ADULT NURSE NOTE - NSIMPLEMENTINTERV_GEN_ALL_ED
Implemented All Fall Risk Interventions:  Leon to call system. Call bell, personal items and telephone within reach. Instruct patient to call for assistance. Room bathroom lighting operational. Non-slip footwear when patient is off stretcher. Physically safe environment: no spills, clutter or unnecessary equipment. Stretcher in lowest position, wheels locked, appropriate side rails in place. Provide visual cue, wrist band, yellow gown, etc. Monitor gait and stability. Monitor for mental status changes and reorient to person, place, and time. Review medications for side effects contributing to fall risk. Reinforce activity limits and safety measures with patient and family.

## 2020-01-07 DIAGNOSIS — K63.1 PERFORATION OF INTESTINE (NONTRAUMATIC): ICD-10-CM

## 2020-01-07 LAB
ANION GAP SERPL CALC-SCNC: 10 MMO/L — SIGNIFICANT CHANGE UP (ref 7–14)
ANION GAP SERPL CALC-SCNC: 10 MMO/L — SIGNIFICANT CHANGE UP (ref 7–14)
APPEARANCE UR: CLEAR — SIGNIFICANT CHANGE UP
BACTERIA # UR AUTO: NEGATIVE — SIGNIFICANT CHANGE UP
BASE EXCESS BLDV CALC-SCNC: -3.2 MMOL/L — SIGNIFICANT CHANGE UP
BILIRUB UR-MCNC: NEGATIVE — SIGNIFICANT CHANGE UP
BLD GP AB SCN SERPL QL: NEGATIVE — SIGNIFICANT CHANGE UP
BLOOD GAS VENOUS - CREATININE: 0.82 MG/DL — SIGNIFICANT CHANGE UP (ref 0.5–1.3)
BLOOD GAS VENOUS - FIO2: 21 — SIGNIFICANT CHANGE UP
BLOOD UR QL VISUAL: NEGATIVE — SIGNIFICANT CHANGE UP
BUN SERPL-MCNC: 19 MG/DL — SIGNIFICANT CHANGE UP (ref 7–23)
BUN SERPL-MCNC: 21 MG/DL — SIGNIFICANT CHANGE UP (ref 7–23)
CALCIUM SERPL-MCNC: 7.7 MG/DL — LOW (ref 8.4–10.5)
CALCIUM SERPL-MCNC: 8.1 MG/DL — LOW (ref 8.4–10.5)
CHLORIDE BLDV-SCNC: 100 MMOL/L — SIGNIFICANT CHANGE UP (ref 96–108)
CHLORIDE SERPL-SCNC: 96 MMOL/L — LOW (ref 98–107)
CHLORIDE SERPL-SCNC: 97 MMOL/L — LOW (ref 98–107)
CHLORIDE UR-SCNC: < 20 MMOL/L — SIGNIFICANT CHANGE UP
CO2 SERPL-SCNC: 18 MMOL/L — LOW (ref 22–31)
CO2 SERPL-SCNC: 18 MMOL/L — LOW (ref 22–31)
COLOR SPEC: YELLOW — SIGNIFICANT CHANGE UP
CREAT SERPL-MCNC: 0.81 MG/DL — SIGNIFICANT CHANGE UP (ref 0.5–1.3)
CREAT SERPL-MCNC: 0.84 MG/DL — SIGNIFICANT CHANGE UP (ref 0.5–1.3)
GAS PNL BLDV: 123 MMOL/L — LOW (ref 136–146)
GLUCOSE BLDV-MCNC: 188 MG/DL — HIGH (ref 70–99)
GLUCOSE SERPL-MCNC: 154 MG/DL — HIGH (ref 70–99)
GLUCOSE SERPL-MCNC: 180 MG/DL — HIGH (ref 70–99)
GLUCOSE UR-MCNC: NEGATIVE — SIGNIFICANT CHANGE UP
HCO3 BLDV-SCNC: 21 MMOL/L — SIGNIFICANT CHANGE UP (ref 20–27)
HCT VFR BLD CALC: 28.4 % — LOW (ref 39–50)
HCT VFR BLDV CALC: 27.5 % — LOW (ref 39–51)
HGB BLD-MCNC: 8.7 G/DL — LOW (ref 13–17)
HGB BLDV-MCNC: 8.8 G/DL — LOW (ref 13–17)
HYALINE CASTS # UR AUTO: NEGATIVE — SIGNIFICANT CHANGE UP
KETONES UR-MCNC: SIGNIFICANT CHANGE UP
LACTATE BLDV-MCNC: 2.7 MMOL/L — HIGH (ref 0.5–2)
LACTATE SERPL-SCNC: 1.9 MMOL/L — SIGNIFICANT CHANGE UP (ref 0.5–2)
LEUKOCYTE ESTERASE UR-ACNC: NEGATIVE — SIGNIFICANT CHANGE UP
MAGNESIUM SERPL-MCNC: 1.6 MG/DL — SIGNIFICANT CHANGE UP (ref 1.6–2.6)
MCHC RBC-ENTMCNC: 21.6 PG — LOW (ref 27–34)
MCHC RBC-ENTMCNC: 30.6 % — LOW (ref 32–36)
MCV RBC AUTO: 70.5 FL — LOW (ref 80–100)
NITRITE UR-MCNC: NEGATIVE — SIGNIFICANT CHANGE UP
NRBC # FLD: 0.06 K/UL — SIGNIFICANT CHANGE UP (ref 0–0)
PCO2 BLDV: 42 MMHG — SIGNIFICANT CHANGE UP (ref 41–51)
PH BLDV: 7.34 PH — SIGNIFICANT CHANGE UP (ref 7.32–7.43)
PH UR: 6.5 — SIGNIFICANT CHANGE UP (ref 5–8)
PHOSPHATE SERPL-MCNC: 3.9 MG/DL — SIGNIFICANT CHANGE UP (ref 2.5–4.5)
PLATELET # BLD AUTO: 149 K/UL — LOW (ref 150–400)
PMV BLD: SIGNIFICANT CHANGE UP FL (ref 7–13)
PO2 BLDV: 38 MMHG — SIGNIFICANT CHANGE UP (ref 35–40)
POTASSIUM BLDV-SCNC: 4.6 MMOL/L — HIGH (ref 3.4–4.5)
POTASSIUM SERPL-MCNC: 5.1 MMOL/L — SIGNIFICANT CHANGE UP (ref 3.5–5.3)
POTASSIUM SERPL-MCNC: 5.5 MMOL/L — HIGH (ref 3.5–5.3)
POTASSIUM SERPL-SCNC: 5.1 MMOL/L — SIGNIFICANT CHANGE UP (ref 3.5–5.3)
POTASSIUM SERPL-SCNC: 5.5 MMOL/L — HIGH (ref 3.5–5.3)
POTASSIUM UR-SCNC: 109.2 MMOL/L — SIGNIFICANT CHANGE UP
PROT UR-MCNC: 50 — SIGNIFICANT CHANGE UP
RBC # BLD: 4.03 M/UL — LOW (ref 4.2–5.8)
RBC # FLD: 18.5 % — HIGH (ref 10.3–14.5)
RBC CASTS # UR COMP ASSIST: SIGNIFICANT CHANGE UP (ref 0–?)
RH IG SCN BLD-IMP: POSITIVE — SIGNIFICANT CHANGE UP
SAO2 % BLDV: 57.5 % — LOW (ref 60–85)
SODIUM SERPL-SCNC: 124 MMOL/L — LOW (ref 135–145)
SODIUM SERPL-SCNC: 125 MMOL/L — LOW (ref 135–145)
SODIUM UR-SCNC: < 20 MMOL/L — SIGNIFICANT CHANGE UP
SP GR SPEC: > 1.04 — HIGH (ref 1–1.04)
SPECIMEN SOURCE: SIGNIFICANT CHANGE UP
SPECIMEN SOURCE: SIGNIFICANT CHANGE UP
SQUAMOUS # UR AUTO: SIGNIFICANT CHANGE UP
UROBILINOGEN FLD QL: NORMAL — SIGNIFICANT CHANGE UP
WBC # BLD: 102.65 K/UL — CRITICAL HIGH (ref 3.8–10.5)
WBC # FLD AUTO: 102.65 K/UL — CRITICAL HIGH (ref 3.8–10.5)
WBC UR QL: SIGNIFICANT CHANGE UP (ref 0–?)

## 2020-01-07 PROCEDURE — 88307 TISSUE EXAM BY PATHOLOGIST: CPT | Mod: 26

## 2020-01-07 PROCEDURE — 44120 REMOVAL OF SMALL INTESTINE: CPT | Mod: GC

## 2020-01-07 PROCEDURE — 99223 1ST HOSP IP/OBS HIGH 75: CPT | Mod: 25,57,GC

## 2020-01-07 RX ORDER — ONDANSETRON 8 MG/1
4 TABLET, FILM COATED ORAL ONCE
Refills: 0 | Status: COMPLETED | OUTPATIENT
Start: 2020-01-07 | End: 2020-01-07

## 2020-01-07 RX ORDER — MAGNESIUM SULFATE 500 MG/ML
2 VIAL (ML) INJECTION ONCE
Refills: 0 | Status: COMPLETED | OUTPATIENT
Start: 2020-01-07 | End: 2020-01-07

## 2020-01-07 RX ORDER — URSODIOL 250 MG/1
1 TABLET, FILM COATED ORAL
Qty: 0 | Refills: 0 | DISCHARGE

## 2020-01-07 RX ORDER — LACTULOSE 10 G/15ML
1 SOLUTION ORAL
Qty: 0 | Refills: 0 | DISCHARGE

## 2020-01-07 RX ORDER — HYDROMORPHONE HYDROCHLORIDE 2 MG/ML
1 INJECTION INTRAMUSCULAR; INTRAVENOUS; SUBCUTANEOUS ONCE
Refills: 0 | Status: DISCONTINUED | OUTPATIENT
Start: 2020-01-07 | End: 2020-01-07

## 2020-01-07 RX ORDER — ONDANSETRON 8 MG/1
4 TABLET, FILM COATED ORAL ONCE
Refills: 0 | Status: DISCONTINUED | OUTPATIENT
Start: 2020-01-07 | End: 2020-01-07

## 2020-01-07 RX ORDER — ENOXAPARIN SODIUM 100 MG/ML
40 INJECTION SUBCUTANEOUS DAILY
Refills: 0 | Status: DISCONTINUED | OUTPATIENT
Start: 2020-01-07 | End: 2020-01-16

## 2020-01-07 RX ORDER — SODIUM CHLORIDE 9 MG/ML
500 INJECTION INTRAMUSCULAR; INTRAVENOUS; SUBCUTANEOUS ONCE
Refills: 0 | Status: COMPLETED | OUTPATIENT
Start: 2020-01-07 | End: 2020-01-07

## 2020-01-07 RX ORDER — FENTANYL CITRATE 50 UG/ML
25 INJECTION INTRAVENOUS
Refills: 0 | Status: DISCONTINUED | OUTPATIENT
Start: 2020-01-07 | End: 2020-01-07

## 2020-01-07 RX ORDER — PANTOPRAZOLE SODIUM 20 MG/1
40 TABLET, DELAYED RELEASE ORAL DAILY
Refills: 0 | Status: DISCONTINUED | OUTPATIENT
Start: 2020-01-08 | End: 2020-01-08

## 2020-01-07 RX ORDER — SODIUM CHLORIDE 9 MG/ML
1000 INJECTION, SOLUTION INTRAVENOUS
Refills: 0 | Status: DISCONTINUED | OUTPATIENT
Start: 2020-01-07 | End: 2020-01-07

## 2020-01-07 RX ORDER — PIPERACILLIN AND TAZOBACTAM 4; .5 G/20ML; G/20ML
3.38 INJECTION, POWDER, LYOPHILIZED, FOR SOLUTION INTRAVENOUS EVERY 8 HOURS
Refills: 0 | Status: COMPLETED | OUTPATIENT
Start: 2020-01-07 | End: 2020-01-11

## 2020-01-07 RX ORDER — SODIUM CHLORIDE 9 MG/ML
1000 INJECTION INTRAMUSCULAR; INTRAVENOUS; SUBCUTANEOUS
Refills: 0 | Status: DISCONTINUED | OUTPATIENT
Start: 2020-01-07 | End: 2020-01-08

## 2020-01-07 RX ORDER — URSODIOL 250 MG/1
250 TABLET ORAL
Qty: 90 | Refills: 3 | Status: ACTIVE | COMMUNITY
Start: 2019-12-11 | End: 1900-01-01

## 2020-01-07 RX ORDER — PIPERACILLIN AND TAZOBACTAM 4; .5 G/20ML; G/20ML
3.38 INJECTION, POWDER, LYOPHILIZED, FOR SOLUTION INTRAVENOUS EVERY 8 HOURS
Refills: 0 | Status: DISCONTINUED | OUTPATIENT
Start: 2020-01-07 | End: 2020-01-07

## 2020-01-07 RX ORDER — BENZOCAINE AND MENTHOL 5; 1 G/100ML; G/100ML
1 LIQUID ORAL EVERY 4 HOURS
Refills: 0 | Status: DISCONTINUED | OUTPATIENT
Start: 2020-01-07 | End: 2020-01-08

## 2020-01-07 RX ORDER — MORPHINE SULFATE 50 MG/1
2 CAPSULE, EXTENDED RELEASE ORAL EVERY 4 HOURS
Refills: 0 | Status: DISCONTINUED | OUTPATIENT
Start: 2020-01-07 | End: 2020-01-08

## 2020-01-07 RX ORDER — MORPHINE SULFATE 50 MG/1
4 CAPSULE, EXTENDED RELEASE ORAL EVERY 4 HOURS
Refills: 0 | Status: DISCONTINUED | OUTPATIENT
Start: 2020-01-07 | End: 2020-01-08

## 2020-01-07 RX ORDER — HYDROMORPHONE HYDROCHLORIDE 2 MG/ML
0.5 INJECTION INTRAMUSCULAR; INTRAVENOUS; SUBCUTANEOUS
Refills: 0 | Status: DISCONTINUED | OUTPATIENT
Start: 2020-01-07 | End: 2020-01-07

## 2020-01-07 RX ORDER — VANCOMYCIN HCL 1 G
1000 VIAL (EA) INTRAVENOUS EVERY 12 HOURS
Refills: 0 | Status: DISCONTINUED | OUTPATIENT
Start: 2020-01-07 | End: 2020-01-07

## 2020-01-07 RX ORDER — SODIUM CHLORIDE 9 MG/ML
1000 INJECTION, SOLUTION INTRAVENOUS ONCE
Refills: 0 | Status: DISCONTINUED | OUTPATIENT
Start: 2020-01-07 | End: 2020-01-07

## 2020-01-07 RX ORDER — PANTOPRAZOLE SODIUM 20 MG/1
40 TABLET, DELAYED RELEASE ORAL ONCE
Refills: 0 | Status: COMPLETED | OUTPATIENT
Start: 2020-01-07 | End: 2020-01-07

## 2020-01-07 RX ADMIN — MORPHINE SULFATE 4 MILLIGRAM(S): 50 CAPSULE, EXTENDED RELEASE ORAL at 18:09

## 2020-01-07 RX ADMIN — HYDROMORPHONE HYDROCHLORIDE 0.5 MILLIGRAM(S): 2 INJECTION INTRAMUSCULAR; INTRAVENOUS; SUBCUTANEOUS at 07:21

## 2020-01-07 RX ADMIN — MORPHINE SULFATE 4 MILLIGRAM(S): 50 CAPSULE, EXTENDED RELEASE ORAL at 18:25

## 2020-01-07 RX ADMIN — SODIUM CHLORIDE 1000 MILLILITER(S): 9 INJECTION INTRAMUSCULAR; INTRAVENOUS; SUBCUTANEOUS at 14:55

## 2020-01-07 RX ADMIN — MORPHINE SULFATE 4 MILLIGRAM(S): 50 CAPSULE, EXTENDED RELEASE ORAL at 11:55

## 2020-01-07 RX ADMIN — PIPERACILLIN AND TAZOBACTAM 25 GRAM(S): 4; .5 INJECTION, POWDER, LYOPHILIZED, FOR SOLUTION INTRAVENOUS at 14:24

## 2020-01-07 RX ADMIN — MORPHINE SULFATE 4 MILLIGRAM(S): 50 CAPSULE, EXTENDED RELEASE ORAL at 11:39

## 2020-01-07 RX ADMIN — ONDANSETRON 4 MILLIGRAM(S): 8 TABLET, FILM COATED ORAL at 01:04

## 2020-01-07 RX ADMIN — SODIUM CHLORIDE 500 MILLILITER(S): 9 INJECTION INTRAMUSCULAR; INTRAVENOUS; SUBCUTANEOUS at 00:50

## 2020-01-07 RX ADMIN — ENOXAPARIN SODIUM 40 MILLIGRAM(S): 100 INJECTION SUBCUTANEOUS at 11:39

## 2020-01-07 RX ADMIN — PIPERACILLIN AND TAZOBACTAM 25 GRAM(S): 4; .5 INJECTION, POWDER, LYOPHILIZED, FOR SOLUTION INTRAVENOUS at 21:36

## 2020-01-07 RX ADMIN — Medication 50 GRAM(S): at 08:05

## 2020-01-07 RX ADMIN — PANTOPRAZOLE SODIUM 40 MILLIGRAM(S): 20 TABLET, DELAYED RELEASE ORAL at 19:50

## 2020-01-07 RX ADMIN — BENZOCAINE AND MENTHOL 1 LOZENGE: 5; 1 LIQUID ORAL at 19:50

## 2020-01-07 RX ADMIN — SODIUM CHLORIDE 100 MILLILITER(S): 9 INJECTION INTRAMUSCULAR; INTRAVENOUS; SUBCUTANEOUS at 10:15

## 2020-01-07 RX ADMIN — PIPERACILLIN AND TAZOBACTAM 25 GRAM(S): 4; .5 INJECTION, POWDER, LYOPHILIZED, FOR SOLUTION INTRAVENOUS at 06:44

## 2020-01-07 RX ADMIN — HYDROMORPHONE HYDROCHLORIDE 0.5 MILLIGRAM(S): 2 INJECTION INTRAMUSCULAR; INTRAVENOUS; SUBCUTANEOUS at 07:06

## 2020-01-07 NOTE — BRIEF OPERATIVE NOTE - OPERATION/FINDINGS
exploratory laparotomy  lysis of adhesive bands  area of perforation identified along with areas that had likely been obstructed 2/2 adhesions  en bloc resection of ~15cm of small bowel including strictured area as well as area of perforation  primary anastomosis performed

## 2020-01-07 NOTE — H&P ADULT - ATTENDING COMMENTS
I have reviewed the history, examined the patient, reviewed pertinent labs and imaging (CT scan), and discussed the care with the consult resident.    The active issues are:  1. peritonitis due to likely small bowel perforation and attendant ileus, possibly foreign body versus progression of CLL    This is a surgical emergency that may not extend the patient's life with operative management.  Given high baseline functional status, we recommend ex-lap in addition to maximal medical therapy.  Patient and family understand and agree to proceed.  IVF, IV antibx, NGT decompression, OR.    The Acute Care Surgery (B Team) Attending Group Practice:  Dr. Ivy Soria, Dr. Bart Hdez, Dr. Oren Daniels, Dr. Jian Lei, Dr. Lon Ross    urgent issues - spectra [phone] or [phone]  nonurgent issues - [phone]  patient appointments or afterhours - [phone]

## 2020-01-07 NOTE — CONSULT NOTE ADULT - ASSESSMENT
79M with multiple and significant medical history as above and CLL, never required Rx for it, here with SBO and perforation, s/p surgery, did not need any transfusion, has higher WBC than baseline (~ 80K), Hgb is stable, will recommend:  - continue Rx as per surgery - NGT suction, pain control, cautious IVF administration, abx.  - DVT prophylaxis  - monitor CBC and diff, the increase in WBC is likely reactive, if continues to increase, will evaluate it further  - Keep Hgb > 7, in part, hgb is low from beta thal minor  - all other supportive Rx.  - discussed in detail with the pt, wife and RN in PACU  - call for any questions - [phone]

## 2020-01-07 NOTE — H&P ADULT - HISTORY OF PRESENT ILLNESS
Patient is an 79 year old male with a PMH of CLL (untreated), cirrhosis, chronic hepatitis B, and gastritis who is presenting with abdominal pain since yesterday.  The pain has been getting worse, and he has had increasing abdominal distention during this time.  He had similar pain in September, where he came to [facility] for an ileus.  He denies having any fevers or chills but did have nausea as well as vomiting this morning.  His last BM was this morning and it was normal.  His last EGD was 2 years ago which showed gastritis and his last colonoscopy was 10 years ago and unremarkable.

## 2020-01-07 NOTE — ED ADULT NURSE REASSESSMENT NOTE - NS ED NURSE REASSESS COMMENT FT1
Pt resting comfortably, improved pain after being medicated. Sinus tach on cardiac monitor. Will repeat bmp/vbg after fluids. Will monitor.

## 2020-01-07 NOTE — H&P ADULT - NSICDXPASTMEDICALHX_GEN_ALL_CORE_FT
PAST MEDICAL HISTORY:  Cirrhosis     CLL (chronic lymphocytic leukemia)     H/O gastritis     HTN (hypertension)

## 2020-01-07 NOTE — PROGRESS NOTE ADULT - SUBJECTIVE AND OBJECTIVE BOX
B TEAM SURGERY POST OP CHECK      SUBJECTIVE: Patient seen and examined in PACU s/p ex lap with REESE and  Small bowel resection with anastomosis. NGT to suction. No complaints     Vital Signs Last 24 Hrs  T(C): 37 (07 Jan 2020 05:55), Max: 37.8 (06 Jan 2020 20:14)  T(F): 98.6 (07 Jan 2020 05:55), Max: 100 (06 Jan 2020 20:14)  HR: 98 (07 Jan 2020 10:00) (40 - 124)  BP: 111/57 (07 Jan 2020 10:00) (98/60 - 136/70)  BP(mean): 70 (07 Jan 2020 10:00) (69 - 82)  RR: 20 (07 Jan 2020 10:00) (11 - 22)  SpO2: 98% (07 Jan 2020 10:00) (95% - 98%)  I&O's Detail    06 Jan 2020 07:01  -  07 Jan 2020 07:00  --------------------------------------------------------  IN:    lactated ringers.: 100 mL  Total IN: 100 mL    OUT:    Nasoenteral Tube: 700 mL  Total OUT: 700 mL    Total NET: -600 mL      07 Jan 2020 07:01  -  07 Jan 2020 10:35  --------------------------------------------------------  IN:    IV PiggyBack: 25 mL    lactated ringers.: 200 mL  Total IN: 225 mL    OUT:  Total OUT: 0 mL    Total NET: 225 mL        MEDICATIONS  (STANDING):  enoxaparin Injectable 40 milliGRAM(s) SubCutaneous daily  piperacillin/tazobactam IVPB.. 3.375 Gram(s) IV Intermittent every 8 hours  sodium chloride 0.9%. 1000 milliLiter(s) (100 mL/Hr) IV Continuous <Continuous>    MEDICATIONS  (PRN):  fentaNYL    Injectable 25 MICROGram(s) IV Push every 15 minutes PRN Mild Pain (1 - 3)  HYDROmorphone  Injectable 0.5 milliGRAM(s) IV Push every 15 minutes PRN Moderate Pain (4 - 6)  HYDROmorphone  Injectable 1 milliGRAM(s) IV Push once PRN Severe Pain (7 - 10)  morphine  - Injectable 2 milliGRAM(s) IV Push every 4 hours PRN Moderate Pain (4 - 6)  morphine  - Injectable 4 milliGRAM(s) IV Push every 4 hours PRN Severe Pain (7 - 10)  ondansetron Injectable 4 milliGRAM(s) IV Push once PRN Nausea and/or Vomiting      Physical Exam  General: awake and alert, NAD  Neuro:  no focal deficits  Respiratory:  unlabored breathing.   CVS: Regular rate and rhythm on tele   Abdomen: Softly distended, midline incision dressing c/d/i  Extremities: Warm bilaterally        LABS:                        8.7    102.65 )-----------( 149      ( 07 Jan 2020 06:10 )             28.4     01-07    125<L>  |  97<L>  |  19  ----------------------------<  154<H>  5.1   |  18<L>  |  0.81    Ca    8.1<L>      07 Jan 2020 06:10  Phos  3.9     01-07  Mg     1.6     01-07    TPro  7.4  /  Alb  3.2<L>  /  TBili  1.6<H>  /  DBili  x   /  AST  61<H>  /  ALT  46<H>  /  AlkPhos  255<H>  01-06    PT/INR - ( 06 Jan 2020 19:55 )   PT: 12.3 SEC;   INR: 1.08          PTT - ( 06 Jan 2020 19:55 )  PTT:33.0 SEC  LIVER FUNCTIONS - ( 06 Jan 2020 19:55 )  Alb: 3.2 g/dL / Pro: 7.4 g/dL / ALK PHOS: 255 u/L / ALT: 46 u/L / AST: 61 u/L / GGT: x           Urinalysis Basic - ( 06 Jan 2020 02:40 )    Color: YELLOW / Appearance: CLEAR / SG: > 1.040 / pH: 6.5  Gluc: NEGATIVE / Ketone: TRACE  / Bili: NEGATIVE / Urobili: NORMAL   Blood: NEGATIVE / Protein: 50 / Nitrite: NEGATIVE   Leuk Esterase: NEGATIVE / RBC: 0-2 / WBC 3-5   Sq Epi: OCC / Non Sq Epi: x / Bacteria: NEGATIVE      ABO Interpretation: B (01-07-20 @ 00:57)

## 2020-01-07 NOTE — PROGRESS NOTE ADULT - ASSESSMENT
ASSESSMENT:    79 year old male with a PMH of CLL (untreated), cirrhosis (Child B at time of admission), chronic hepatitis B, and gastritis found to have SBO with pneumoperitoneum s/p ex lap, REESE and small bowel resection     PLAN:  -NPO  -NGT to LCWS  -veloz removed--> f/u TOV  -Na low on admission; currently 125 on repeat labs-> started on NS @ 100, will send Urine lytes, Cr and osmolality   - pain control PRN   -continue Lovenox for DVT PPX      [id number]  B Team Surgery

## 2020-01-07 NOTE — BRIEF OPERATIVE NOTE - NSICDXBRIEFPROCEDURE_GEN_ALL_CORE_FT
PROCEDURES:  Small bowel resection with anastomosis 07-Jan-2020 05:57:59  Geeta Venegas  Open lysis of abdominal adhesions 07-Jan-2020 05:57:52  Geeta Venegas  Exploratory laparotomy 07-Jan-2020 05:57:33  Geeta Venegas

## 2020-01-07 NOTE — BRIEF OPERATIVE NOTE - NSICDXBRIEFPOSTOP_GEN_ALL_CORE_FT
POST-OP DIAGNOSIS:  Small bowel obstruction 07-Jan-2020 05:58:34  Geeta Venegas  Perforated viscus 07-Jan-2020 05:58:25  Geeta Venegas

## 2020-01-07 NOTE — ED ADULT NURSE REASSESSMENT NOTE - NS ED NURSE REASSESS COMMENT FT1
Surgery at bedside for eval. Pt medicated for nausea as ordered and rpt labs drawn and sent. Will monitor.

## 2020-01-07 NOTE — H&P ADULT - NSHPPHYSICALEXAM_GEN_ALL_CORE
General: A&Ox3, NAD.  Neuro: CN II-XII intact, motor and sensory - grossly intact w/ no focal deficits.  HEENT: Normocephalic, atraumatic  Respiratory: unlabored breathing, no accessory muscle use  CVS: tachycardic  Abdomen: Distended, tender to palpation in the RLQ, involuntary guarding, no rebound tenderness.  No scars seen on abdomen.  Extremities: Warm bilaterally  MSK: Intact ROM. General: A&Ox3, NAD.  Neuro: CN II-XII intact, motor and sensory - grossly intact w/ no focal deficits.  HEENT: Normocephalic, atraumatic  Respiratory: unlabored breathing, no accessory muscle use  CVS: tachycardic  Abdomen: Distended, tender to palpation in the RLQ, involuntary guarding, no rebound tenderness.  No scars seen on abdomen. Blanching reticular erythema of abdominal wall present.  Extremities: Warm bilaterally  MSK: Intact ROM, 1+ edema bilateral lower extremities

## 2020-01-07 NOTE — H&P ADULT - NSHPLABSRESULTS_GEN_ALL_CORE
CBC (01-06 @ 19:55)                          9.7<L>                   149.32<HH>  )--------------(  242        1.7<L>% Neuts, 97.6<H>% Lymphs, ANC: 2.53                            33.4<L>    BMP (01-06 @ 19:55)       127<L>  |  95<L>   |  20    			Ca++ --      Ca 9.0          ---------------------------------( 198<H>		Mg --           6.1<H>  |  17<L>   |  1.01  			Ph --        LFTs (01-06 @ 19:55)      TPro 7.4 / Alb 3.2<L> / TBili 1.6<H> / DBili -- / AST 61<H> / ALT 46<H> / AlkPhos 255<H>    Coags (01-06 @ 19:55)  aPTT 33.0 / INR 1.08 / PT 12.3        VBG (01-07 @ 01:00)     7.34 / 42 / 38 / 21 / -3.2 / 57.5<L>%      Lactate: 2.7<H>  VBG (01-06 @ 19:55)     7.36 / 39<L> / 30<L> / 21 / -3.3 / 40.4<L>%      Lactate: 4.6<HH>        CT A/P:  Impression:  IMPRESSION:   Findings compatible with perforated small bowel obstruction.  Moderate pneumoperitoneum.   Small volume ascites.

## 2020-01-07 NOTE — H&P ADULT - ASSESSMENT
Patient is an 79 year old male with a PMH of CLL (untreated), cirrhosis (Child B at time of admission), chronic hepatitis B, and gastritis who has a SBO with pneumoperitoneum.    -NPO  -another LR bolus of 1liter before OR  -Continue Vanc and zosyn for coverage of an intraabdominal perforation  -NGT placed with 600 bilious output  -OR for exploratory laparotomy with possible bowel resection  -Consent in chart  -Discussed with Dr. Yang He PGY3  B team surgery #[id number]

## 2020-01-07 NOTE — PATIENT PROFILE ADULT - NSTRANSFERBELONGINGSRESP_GEN_A_NUR
[FreeTextEntry1] : 1. Abnormal chest CT: Now resolved on CT scan with patient asymptomatic. Per Fleischner guidelines 4mm nodule does not require further f/u. yes

## 2020-01-07 NOTE — CONSULT NOTE ADULT - SUBJECTIVE AND OBJECTIVE BOX
Patient is a 79y old  Male who presents with a chief complaint of abdominal pain (07 Jan 2020 02:06), w/u revealed a perforation and SBO, s/p surgery, doing better. Has NGT with no blood in that. Some abdominal pain. No fevers or chills. Did have SOB on presentation and was found to have fluid in the lungs. Overall, calm and relatively comfortable. Wife is with him.    PAST MEDICAL & SURGICAL HISTORY:  Cirrhosis, hepatitis B on Rx  HTN (hypertension)  H/O gastritis  CLL (chronic lymphocytic leukemia) - never required treatment, has been seen by Dr Glasgow for 2nd opinion  No significant past surgical history      enoxaparin Injectable 40 milliGRAM(s) SubCutaneous daily  fentaNYL    Injectable 25 MICROGram(s) IV Push every 15 minutes PRN  HYDROmorphone  Injectable 0.5 milliGRAM(s) IV Push every 15 minutes PRN  HYDROmorphone  Injectable 1 milliGRAM(s) IV Push once PRN  lactated ringers. 1000 milliLiter(s) IV Continuous <Continuous>  morphine  - Injectable 2 milliGRAM(s) IV Push every 4 hours PRN  morphine  - Injectable 4 milliGRAM(s) IV Push every 4 hours PRN  ondansetron Injectable 4 milliGRAM(s) IV Push once PRN  piperacillin/tazobactam IVPB.. 3.375 Gram(s) IV Intermittent every 8 hours      Allergies:  Beef (Unknown)  No Known Drug Allergies    SHx: Never smoked, used to drink wine socially      FAMILY HISTORY:  FH: CHF (congestive heart failure): MOTHER HAD CHF      Vital Signs Last 24 Hrs  T(C): 37 (07 Jan 2020 05:55), Max: 37.8 (06 Jan 2020 20:14)  T(F): 98.6 (07 Jan 2020 05:55), Max: 100 (06 Jan 2020 20:14)  HR: 99 (07 Jan 2020 08:00) (40 - 124)  BP: 98/60 (07 Jan 2020 08:00) (98/60 - 136/70)  BP(mean): 69 (07 Jan 2020 08:00) (69 - 82)  RR: 12 (07 Jan 2020 08:00) (12 - 22)  SpO2: 96% (07 Jan 2020 08:00) (95% - 98%)                          8.7    102.65 )-----------( 149      ( 07 Jan 2020 06:10 )             28.4       01-07    125<L>  |  97<L>  |  19  ----------------------------<  154<H>  5.1   |  18<L>  |  0.81    Ca    8.1<L>      07 Jan 2020 06:10  Phos  3.9     01-07  Mg     1.6     01-07    TPro  7.4  /  Alb  3.2<L>  /  TBili  1.6<H>  /  DBili  x   /  AST  61<H>  /  ALT  46<H>  /  AlkPhos  255<H>  01-06      PT/INR - ( 06 Jan 2020 19:55 )   PT: 12.3 SEC;   INR: 1.08          PTT - ( 06 Jan 2020 19:55 )  PTT:33.0 SEC      CT a/p: IMPRESSION:   Findings compatible with perforated small bowel obstruction.  Moderate pneumoperitoneum.   Small volume ascites.    These findings were discussed with Dr. Amado at 1/7/2020 12:00 AM by Dr. Peng with read back confirmation.               BASIL PENG M.D., RADIOLOGY RESIDENT  This document has been electronically signed.  DESTINY ALSTON M.D., ATTENDING RADIOLOGIST  This document has been electronically signed. Jan 7 2020 12:29AM      CxR: ******PRELIMINARY REPORT******    ******PRELIMINARY REPORT******            EXAM:  XR CHEST PA LAT 2V        PROCEDURE DATE:  Jan 6 2020     ******PRELIMINARY REPORT******    ******PRELIMINARY REPORT******            INTERPRETATION:  Clear lungs            ******PRELIMINARY REPORT******    ******PRELIMINARY REPORT******          BASIL PENG M.D., RADIOLOGY RESIDENT

## 2020-01-07 NOTE — BRIEF OPERATIVE NOTE - NSICDXBRIEFPREOP_GEN_ALL_CORE_FT
PRE-OP DIAGNOSIS:  Small bowel obstruction 07-Jan-2020 05:58:16  Geeta Venegas  Perforated viscus 07-Jan-2020 05:58:08  Geeta Venegas

## 2020-01-08 LAB
ANION GAP SERPL CALC-SCNC: 6 MMO/L — LOW (ref 7–14)
BACTERIA UR CULT: SIGNIFICANT CHANGE UP
BUN SERPL-MCNC: 16 MG/DL — SIGNIFICANT CHANGE UP (ref 7–23)
CALCIUM SERPL-MCNC: 8.3 MG/DL — LOW (ref 8.4–10.5)
CHLORIDE SERPL-SCNC: 103 MMOL/L — SIGNIFICANT CHANGE UP (ref 98–107)
CO2 SERPL-SCNC: 21 MMOL/L — LOW (ref 22–31)
CREAT SERPL-MCNC: 0.85 MG/DL — SIGNIFICANT CHANGE UP (ref 0.5–1.3)
GLUCOSE SERPL-MCNC: 129 MG/DL — HIGH (ref 70–99)
HCT VFR BLD CALC: 27.4 % — LOW (ref 39–50)
HGB BLD-MCNC: 8.1 G/DL — LOW (ref 13–17)
MAGNESIUM SERPL-MCNC: 2.1 MG/DL — SIGNIFICANT CHANGE UP (ref 1.6–2.6)
MCHC RBC-ENTMCNC: 21 PG — LOW (ref 27–34)
MCHC RBC-ENTMCNC: 29.6 % — LOW (ref 32–36)
MCV RBC AUTO: 71.2 FL — LOW (ref 80–100)
NRBC # FLD: 0 K/UL — SIGNIFICANT CHANGE UP (ref 0–0)
OSMOLALITY UR: 744 MOSMO/KG — SIGNIFICANT CHANGE UP (ref 50–1200)
PHOSPHATE SERPL-MCNC: 2.4 MG/DL — LOW (ref 2.5–4.5)
PLATELET # BLD AUTO: 153 K/UL — SIGNIFICANT CHANGE UP (ref 150–400)
PMV BLD: SIGNIFICANT CHANGE UP FL (ref 7–13)
POTASSIUM SERPL-MCNC: 4.5 MMOL/L — SIGNIFICANT CHANGE UP (ref 3.5–5.3)
POTASSIUM SERPL-SCNC: 4.5 MMOL/L — SIGNIFICANT CHANGE UP (ref 3.5–5.3)
RBC # BLD: 3.85 M/UL — LOW (ref 4.2–5.8)
RBC # FLD: 18.6 % — HIGH (ref 10.3–14.5)
SODIUM SERPL-SCNC: 130 MMOL/L — LOW (ref 135–145)
SPECIMEN SOURCE: SIGNIFICANT CHANGE UP
WBC # BLD: 108.04 K/UL — CRITICAL HIGH (ref 3.8–10.5)
WBC # FLD AUTO: 108.04 K/UL — CRITICAL HIGH (ref 3.8–10.5)

## 2020-01-08 RX ORDER — OXYCODONE HYDROCHLORIDE 5 MG/1
10 TABLET ORAL EVERY 4 HOURS
Refills: 0 | Status: DISCONTINUED | OUTPATIENT
Start: 2020-01-08 | End: 2020-01-09

## 2020-01-08 RX ORDER — OXYCODONE HYDROCHLORIDE 5 MG/1
5 TABLET ORAL EVERY 4 HOURS
Refills: 0 | Status: DISCONTINUED | OUTPATIENT
Start: 2020-01-08 | End: 2020-01-09

## 2020-01-08 RX ORDER — ACETAMINOPHEN 500 MG
1000 TABLET ORAL EVERY 6 HOURS
Refills: 0 | Status: DISCONTINUED | OUTPATIENT
Start: 2020-01-08 | End: 2020-01-09

## 2020-01-08 RX ORDER — LOSARTAN POTASSIUM 100 MG/1
50 TABLET, FILM COATED ORAL DAILY
Refills: 0 | Status: DISCONTINUED | OUTPATIENT
Start: 2020-01-08 | End: 2020-01-08

## 2020-01-08 RX ORDER — TENOFOVIR DISOPROXIL FUMARATE 300 MG/1
300 TABLET, FILM COATED ORAL DAILY
Refills: 0 | Status: DISCONTINUED | OUTPATIENT
Start: 2020-01-08 | End: 2020-01-31

## 2020-01-08 RX ORDER — URSODIOL 250 MG/1
250 TABLET, FILM COATED ORAL THREE TIMES A DAY
Refills: 0 | Status: DISCONTINUED | OUTPATIENT
Start: 2020-01-08 | End: 2020-01-09

## 2020-01-08 RX ORDER — PANTOPRAZOLE SODIUM 20 MG/1
40 TABLET, DELAYED RELEASE ORAL
Refills: 0 | Status: DISCONTINUED | OUTPATIENT
Start: 2020-01-08 | End: 2020-01-09

## 2020-01-08 RX ORDER — SODIUM CHLORIDE 9 MG/ML
1000 INJECTION, SOLUTION INTRAVENOUS
Refills: 0 | Status: DISCONTINUED | OUTPATIENT
Start: 2020-01-08 | End: 2020-01-10

## 2020-01-08 RX ORDER — LOSARTAN POTASSIUM 100 MG/1
50 TABLET, FILM COATED ORAL DAILY
Refills: 0 | Status: DISCONTINUED | OUTPATIENT
Start: 2020-01-08 | End: 2020-01-09

## 2020-01-08 RX ORDER — TAMSULOSIN HYDROCHLORIDE 0.4 MG/1
0.4 CAPSULE ORAL AT BEDTIME
Refills: 0 | Status: DISCONTINUED | OUTPATIENT
Start: 2020-01-08 | End: 2020-01-09

## 2020-01-08 RX ADMIN — MORPHINE SULFATE 4 MILLIGRAM(S): 50 CAPSULE, EXTENDED RELEASE ORAL at 02:20

## 2020-01-08 RX ADMIN — PIPERACILLIN AND TAZOBACTAM 25 GRAM(S): 4; .5 INJECTION, POWDER, LYOPHILIZED, FOR SOLUTION INTRAVENOUS at 06:00

## 2020-01-08 RX ADMIN — Medication 1000 MILLIGRAM(S): at 18:35

## 2020-01-08 RX ADMIN — MORPHINE SULFATE 4 MILLIGRAM(S): 50 CAPSULE, EXTENDED RELEASE ORAL at 12:33

## 2020-01-08 RX ADMIN — OXYCODONE HYDROCHLORIDE 5 MILLIGRAM(S): 5 TABLET ORAL at 22:23

## 2020-01-08 RX ADMIN — SODIUM CHLORIDE 100 MILLILITER(S): 9 INJECTION, SOLUTION INTRAVENOUS at 07:56

## 2020-01-08 RX ADMIN — PIPERACILLIN AND TAZOBACTAM 25 GRAM(S): 4; .5 INJECTION, POWDER, LYOPHILIZED, FOR SOLUTION INTRAVENOUS at 21:23

## 2020-01-08 RX ADMIN — OXYCODONE HYDROCHLORIDE 5 MILLIGRAM(S): 5 TABLET ORAL at 21:23

## 2020-01-08 RX ADMIN — LOSARTAN POTASSIUM 50 MILLIGRAM(S): 100 TABLET, FILM COATED ORAL at 17:37

## 2020-01-08 RX ADMIN — ENOXAPARIN SODIUM 40 MILLIGRAM(S): 100 INJECTION SUBCUTANEOUS at 12:34

## 2020-01-08 RX ADMIN — MORPHINE SULFATE 4 MILLIGRAM(S): 50 CAPSULE, EXTENDED RELEASE ORAL at 07:11

## 2020-01-08 RX ADMIN — PANTOPRAZOLE SODIUM 40 MILLIGRAM(S): 20 TABLET, DELAYED RELEASE ORAL at 12:34

## 2020-01-08 RX ADMIN — MORPHINE SULFATE 4 MILLIGRAM(S): 50 CAPSULE, EXTENDED RELEASE ORAL at 06:56

## 2020-01-08 RX ADMIN — MORPHINE SULFATE 4 MILLIGRAM(S): 50 CAPSULE, EXTENDED RELEASE ORAL at 02:05

## 2020-01-08 RX ADMIN — TAMSULOSIN HYDROCHLORIDE 0.4 MILLIGRAM(S): 0.4 CAPSULE ORAL at 21:23

## 2020-01-08 RX ADMIN — BENZOCAINE AND MENTHOL 1 LOZENGE: 5; 1 LIQUID ORAL at 08:01

## 2020-01-08 RX ADMIN — Medication 63.75 MILLIMOLE(S): at 16:42

## 2020-01-08 RX ADMIN — PIPERACILLIN AND TAZOBACTAM 25 GRAM(S): 4; .5 INJECTION, POWDER, LYOPHILIZED, FOR SOLUTION INTRAVENOUS at 13:46

## 2020-01-08 RX ADMIN — MORPHINE SULFATE 4 MILLIGRAM(S): 50 CAPSULE, EXTENDED RELEASE ORAL at 12:45

## 2020-01-08 RX ADMIN — BENZOCAINE AND MENTHOL 1 LOZENGE: 5; 1 LIQUID ORAL at 12:34

## 2020-01-08 RX ADMIN — Medication 1000 MILLIGRAM(S): at 17:37

## 2020-01-08 RX ADMIN — BENZOCAINE AND MENTHOL 1 LOZENGE: 5; 1 LIQUID ORAL at 02:07

## 2020-01-08 RX ADMIN — TENOFOVIR DISOPROXIL FUMARATE 300 MILLIGRAM(S): 300 TABLET, FILM COATED ORAL at 17:37

## 2020-01-08 RX ADMIN — URSODIOL 250 MILLIGRAM(S): 250 TABLET, FILM COATED ORAL at 21:23

## 2020-01-08 NOTE — PROGRESS NOTE ADULT - ASSESSMENT
79 year old male with a PMH of CLL (untreated), cirrhosis (Child B at time of admission), chronic hepatitis B, and gastritis found to have SBO with pneumoperitoneum s/p ex lap, REESE and small bowel resection     PLAN:  -NPO  -NGT to gravity   -pain control PRN   -continue Lovenox for DVT PPX

## 2020-01-08 NOTE — PHYSICAL THERAPY INITIAL EVALUATION ADULT - PERTINENT HX OF CURRENT PROBLEM, REHAB EVAL
patient is a 79 year old male admitted to [facility] for SBO, surgery listed above.

## 2020-01-08 NOTE — PROGRESS NOTE ADULT - SUBJECTIVE AND OBJECTIVE BOX
SURGERY DAILY PROGRESS NOTE:       SUBJECTIVE/ROS: Patient examined at bedside. Denies any abdominal pain. -/-, NGT in place  Denies nausea, vomiting, chest pain, shortness of breath         MEDICATIONS  (STANDING):  dextrose 5% + sodium chloride 0.9%. 1000 milliLiter(s) (100 mL/Hr) IV Continuous <Continuous>  enoxaparin Injectable 40 milliGRAM(s) SubCutaneous daily  pantoprazole  Injectable 40 milliGRAM(s) IV Push daily  piperacillin/tazobactam IVPB.. 3.375 Gram(s) IV Intermittent every 8 hours    MEDICATIONS  (PRN):  benzocaine 15 mG/menthol 3.6 mG (Sugar-Free) Lozenge 1 Lozenge Oral every 4 hours PRN Sore Throat  morphine  - Injectable 2 milliGRAM(s) IV Push every 4 hours PRN Moderate Pain (4 - 6)  morphine  - Injectable 4 milliGRAM(s) IV Push every 4 hours PRN Severe Pain (7 - 10)      OBJECTIVE:    Vital Signs Last 24 Hrs  T(C): 37.1 (08 Jan 2020 07:11), Max: 37.2 (08 Jan 2020 01:51)  T(F): 98.8 (08 Jan 2020 07:11), Max: 99 (08 Jan 2020 01:51)  HR: 102 (08 Jan 2020 07:11) (98 - 106)  BP: 135/68 (08 Jan 2020 07:11) (99/62 - 135/68)  BP(mean): 70 (07 Jan 2020 10:00) (70 - 70)  RR: 17 (08 Jan 2020 07:11) (14 - 20)  SpO2: 97% (08 Jan 2020 07:11) (95% - 100%)        I&O's Detail    07 Jan 2020 07:01  -  08 Jan 2020 07:00  --------------------------------------------------------  IN:    IV PiggyBack: 325 mL    lactated ringers.: 200 mL    sodium chloride 0.9%: 2000 mL  Total IN: 2525 mL    OUT:    Nasoenteral Tube: 150 mL    Voided: 620 mL  Total OUT: 770 mL    Total NET: 1755 mL          Daily     Daily     LABS:                        8.7    102.65 )-----------( 149      ( 07 Jan 2020 06:10 )             28.4     01-07    125<L>  |  97<L>  |  19  ----------------------------<  154<H>  5.1   |  18<L>  |  0.81    Ca    8.1<L>      07 Jan 2020 06:10  Phos  3.9     01-07  Mg     1.6     01-07    TPro  7.4  /  Alb  3.2<L>  /  TBili  1.6<H>  /  DBili  x   /  AST  61<H>  /  ALT  46<H>  /  AlkPhos  255<H>  01-06    PT/INR - ( 06 Jan 2020 19:55 )   PT: 12.3 SEC;   INR: 1.08          PTT - ( 06 Jan 2020 19:55 )  PTT:33.0 SEC                  PHYSICAL EXAM:  Constitutional: well developed, well nourished, NAD  Eyes: anicteric  ENMT: normal facies, symmetric  Respiratory: Breathing comfortably    Gastrointestinal: abdomen soft, nontender, nondistended. NGT in place, incisions c/d/i, dressing c/d/i  Extremities: FROM, warm  Neurological: intact, non-focal  Psychiatric: oriented x 3; appropriate

## 2020-01-08 NOTE — PROGRESS NOTE ADULT - SUBJECTIVE AND OBJECTIVE BOX
ANESTHESIA POSTOP CHECK    79y Male POSTOP DAY 1 S/P exp lap for perforated bowel    Vital Signs Last 24 Hrs  T(C): 36.8 (08 Jan 2020 12:30), Max: 37.2 (08 Jan 2020 01:51)  T(F): 98.3 (08 Jan 2020 12:30), Max: 99 (08 Jan 2020 01:51)  HR: 96 (08 Jan 2020 12:40) (96 - 106)  BP: 107/61 (08 Jan 2020 12:40) (99/62 - 135/68)  BP(mean): --  RR: 18 (08 Jan 2020 12:40) (14 - 18)  SpO2: 97% (08 Jan 2020 12:40) (95% - 100%)  I&O's Summary    07 Jan 2020 07:01  -  08 Jan 2020 07:00  --------------------------------------------------------  IN: 2525 mL / OUT: 770 mL / NET: 1755 mL    08 Jan 2020 07:01  -  08 Jan 2020 13:07  --------------------------------------------------------  IN: 200 mL / OUT: 320 mL / NET: -120 mL        [x] NO APPARENT ANESTHESIA COMPLICATIONS      Comments:

## 2020-01-08 NOTE — PROGRESS NOTE ADULT - SUBJECTIVE AND OBJECTIVE BOX
Pt is feeling a little better, required morphine periodically for pain control no n/v, no flatus, none other active or bothersome symptoms on ROS.       Meds:  benzocaine 15 mG/menthol 3.6 mG (Sugar-Free) Lozenge 1 Lozenge Oral every 4 hours PRN  dextrose 5% + sodium chloride 0.9%. 1000 milliLiter(s) IV Continuous <Continuous>  enoxaparin Injectable 40 milliGRAM(s) SubCutaneous daily  morphine  - Injectable 2 milliGRAM(s) IV Push every 4 hours PRN  morphine  - Injectable 4 milliGRAM(s) IV Push every 4 hours PRN  pantoprazole  Injectable 40 milliGRAM(s) IV Push daily  piperacillin/tazobactam IVPB.. 3.375 Gram(s) IV Intermittent every 8 hours      Vital Signs Last 24 Hrs  T(C): 37.1 (08 Jan 2020 07:11), Max: 37.2 (08 Jan 2020 01:51)  T(F): 98.8 (08 Jan 2020 07:11), Max: 99 (08 Jan 2020 01:51)  HR: 102 (08 Jan 2020 07:11) (95 - 106)  BP: 135/68 (08 Jan 2020 07:11) (99/62 - 135/68)  BP(mean): 70 (07 Jan 2020 10:00) (70 - 74)  RR: 17 (08 Jan 2020 07:11) (14 - 20)  SpO2: 97% (08 Jan 2020 07:11) (95% - 100%)                          8.7    102.65 )-----------( 149      ( 07 Jan 2020 06:10 )             28.4       01-07    125<L>  |  97<L>  |  19  ----------------------------<  154<H>  5.1   |  18<L>  |  0.81    Ca    8.1<L>      07 Jan 2020 06:10  Phos  3.9     01-07  Mg     1.6     01-07    TPro  7.4  /  Alb  3.2<L>  /  TBili  1.6<H>  /  DBili  x   /  AST  61<H>  /  ALT  46<H>  /  AlkPhos  255<H>  01-06              PT/INR - ( 06 Jan 2020 19:55 )   PT: 12.3 SEC;   INR: 1.08          PTT - ( 06 Jan 2020 19:55 )  PTT:33.0 SEC    PBS: RBCs show aniso and poikilocytosis, mainly microcytic. hypochromic cells, moderate polychromasia, several target cells and a few tear drop cells,, no nucleated red cells.

## 2020-01-08 NOTE — PHYSICAL THERAPY INITIAL EVALUATION ADULT - CRITERIA FOR SKILLED THERAPEUTIC INTERVENTIONS
therapy frequency/anticipated equipment needs at discharge/anticipated discharge recommendation/predicted duration of therapy intervention/impairments found/functional limitations in following categories/risk reduction/prevention/rehab potential

## 2020-01-08 NOTE — PROGRESS NOTE ADULT - ASSESSMENT
79M with multiple and significant medical history as above and CLL, never required Rx for it, here with SBO and perforation, s/p surgery, did not need any transfusion, has higher WBC than baseline (~ 80K), Hgb is stable, will recommend:  - continue Rx as per surgery - NGT suction, pain control, cautious IVF administration, abx.  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  - have been discussing with the pt, wife  - informed Cathy Carballo and Pee as pt has followed with them also  - call for any questions - [phone]

## 2020-01-09 LAB
ANION GAP SERPL CALC-SCNC: 7 MMO/L — SIGNIFICANT CHANGE UP (ref 7–14)
ANISOCYTOSIS BLD QL: SLIGHT — SIGNIFICANT CHANGE UP
BASOPHILS # BLD AUTO: 0.02 K/UL — SIGNIFICANT CHANGE UP (ref 0–0.2)
BASOPHILS NFR BLD AUTO: 0 % — SIGNIFICANT CHANGE UP (ref 0–2)
BUN SERPL-MCNC: 12 MG/DL — SIGNIFICANT CHANGE UP (ref 7–23)
CALCIUM SERPL-MCNC: 7.8 MG/DL — LOW (ref 8.4–10.5)
CHLORIDE SERPL-SCNC: 105 MMOL/L — SIGNIFICANT CHANGE UP (ref 98–107)
CO2 SERPL-SCNC: 20 MMOL/L — LOW (ref 22–31)
CREAT SERPL-MCNC: 0.85 MG/DL — SIGNIFICANT CHANGE UP (ref 0.5–1.3)
DACRYOCYTES BLD QL SMEAR: SLIGHT — SIGNIFICANT CHANGE UP
EOSINOPHIL # BLD AUTO: 0.08 K/UL — SIGNIFICANT CHANGE UP (ref 0–0.5)
EOSINOPHIL NFR BLD AUTO: 0.1 % — SIGNIFICANT CHANGE UP (ref 0–6)
GLUCOSE SERPL-MCNC: 138 MG/DL — HIGH (ref 70–99)
HCT VFR BLD CALC: 26.8 % — LOW (ref 39–50)
HGB BLD-MCNC: 8 G/DL — LOW (ref 13–17)
HYPOCHROMIA BLD QL: SLIGHT — SIGNIFICANT CHANGE UP
IMM GRANULOCYTES NFR BLD AUTO: 0.1 % — SIGNIFICANT CHANGE UP (ref 0–1.5)
LYMPHOCYTES # BLD AUTO: 71.94 K/UL — HIGH (ref 1–3.3)
LYMPHOCYTES # BLD AUTO: 94.2 % — HIGH (ref 13–44)
LYMPHOCYTES NFR SPEC AUTO: SIGNIFICANT CHANGE UP % (ref 13–44)
MAGNESIUM SERPL-MCNC: 1.9 MG/DL — SIGNIFICANT CHANGE UP (ref 1.6–2.6)
MANUAL SMEAR VERIFICATION: SIGNIFICANT CHANGE UP
MCHC RBC-ENTMCNC: 21.1 PG — LOW (ref 27–34)
MCHC RBC-ENTMCNC: 29.9 % — LOW (ref 32–36)
MCV RBC AUTO: 70.7 FL — LOW (ref 80–100)
MICROCYTES BLD QL: SLIGHT — SIGNIFICANT CHANGE UP
MONOCYTES # BLD AUTO: 0.15 K/UL — SIGNIFICANT CHANGE UP (ref 0–0.9)
MONOCYTES NFR BLD AUTO: 0.2 % — LOW (ref 2–14)
MONOCYTES NFR BLD: 2 % — SIGNIFICANT CHANGE UP (ref 2–9)
NEUTROPHIL AB SER-ACNC: 6 % — LOW (ref 43–77)
NEUTROPHILS # BLD AUTO: 4.11 K/UL — SIGNIFICANT CHANGE UP (ref 1.8–7.4)
NEUTROPHILS NFR BLD AUTO: 5.4 % — LOW (ref 43–77)
NRBC # FLD: 0 K/UL — SIGNIFICANT CHANGE UP (ref 0–0)
PHOSPHATE SERPL-MCNC: 2.2 MG/DL — LOW (ref 2.5–4.5)
PLATELET # BLD AUTO: 143 K/UL — LOW (ref 150–400)
PMV BLD: SIGNIFICANT CHANGE UP FL (ref 7–13)
POIKILOCYTOSIS BLD QL AUTO: SIGNIFICANT CHANGE UP
POTASSIUM SERPL-MCNC: 3.5 MMOL/L — SIGNIFICANT CHANGE UP (ref 3.5–5.3)
POTASSIUM SERPL-SCNC: 3.5 MMOL/L — SIGNIFICANT CHANGE UP (ref 3.5–5.3)
RBC # BLD: 3.79 M/UL — LOW (ref 4.2–5.8)
RBC # FLD: 18.5 % — HIGH (ref 10.3–14.5)
SCHISTOCYTES BLD QL AUTO: SLIGHT — SIGNIFICANT CHANGE UP
SMUDGE CELLS # BLD: PRESENT — SIGNIFICANT CHANGE UP
SODIUM SERPL-SCNC: 132 MMOL/L — LOW (ref 135–145)
TARGETS BLD QL SMEAR: SLIGHT — SIGNIFICANT CHANGE UP
WBC # BLD: 76.4 K/UL — CRITICAL HIGH (ref 3.8–10.5)
WBC # FLD AUTO: 76.4 K/UL — CRITICAL HIGH (ref 3.8–10.5)

## 2020-01-09 PROCEDURE — 93010 ELECTROCARDIOGRAM REPORT: CPT

## 2020-01-09 PROCEDURE — 71045 X-RAY EXAM CHEST 1 VIEW: CPT | Mod: 26,76

## 2020-01-09 RX ORDER — PANTOPRAZOLE SODIUM 20 MG/1
40 TABLET, DELAYED RELEASE ORAL DAILY
Refills: 0 | Status: DISCONTINUED | OUTPATIENT
Start: 2020-01-09 | End: 2020-01-31

## 2020-01-09 RX ORDER — HYDROMORPHONE HYDROCHLORIDE 2 MG/ML
1 INJECTION INTRAMUSCULAR; INTRAVENOUS; SUBCUTANEOUS EVERY 4 HOURS
Refills: 0 | Status: DISCONTINUED | OUTPATIENT
Start: 2020-01-09 | End: 2020-01-15

## 2020-01-09 RX ORDER — DOXAZOSIN MESYLATE 4 MG
2 TABLET ORAL AT BEDTIME
Refills: 0 | Status: DISCONTINUED | OUTPATIENT
Start: 2020-01-09 | End: 2020-01-15

## 2020-01-09 RX ORDER — HYDROMORPHONE HYDROCHLORIDE 2 MG/ML
0.5 INJECTION INTRAMUSCULAR; INTRAVENOUS; SUBCUTANEOUS EVERY 4 HOURS
Refills: 0 | Status: DISCONTINUED | OUTPATIENT
Start: 2020-01-09 | End: 2020-01-15

## 2020-01-09 RX ADMIN — PIPERACILLIN AND TAZOBACTAM 25 GRAM(S): 4; .5 INJECTION, POWDER, LYOPHILIZED, FOR SOLUTION INTRAVENOUS at 06:37

## 2020-01-09 RX ADMIN — HYDROMORPHONE HYDROCHLORIDE 1 MILLIGRAM(S): 2 INJECTION INTRAMUSCULAR; INTRAVENOUS; SUBCUTANEOUS at 18:17

## 2020-01-09 RX ADMIN — PIPERACILLIN AND TAZOBACTAM 25 GRAM(S): 4; .5 INJECTION, POWDER, LYOPHILIZED, FOR SOLUTION INTRAVENOUS at 13:37

## 2020-01-09 RX ADMIN — SODIUM CHLORIDE 100 MILLILITER(S): 9 INJECTION, SOLUTION INTRAVENOUS at 12:12

## 2020-01-09 RX ADMIN — Medication 63.75 MILLIMOLE(S): at 12:12

## 2020-01-09 RX ADMIN — HYDROMORPHONE HYDROCHLORIDE 1 MILLIGRAM(S): 2 INJECTION INTRAMUSCULAR; INTRAVENOUS; SUBCUTANEOUS at 14:00

## 2020-01-09 RX ADMIN — Medication 2 MILLIGRAM(S): at 21:41

## 2020-01-09 RX ADMIN — HYDROMORPHONE HYDROCHLORIDE 1 MILLIGRAM(S): 2 INJECTION INTRAMUSCULAR; INTRAVENOUS; SUBCUTANEOUS at 14:15

## 2020-01-09 RX ADMIN — PIPERACILLIN AND TAZOBACTAM 25 GRAM(S): 4; .5 INJECTION, POWDER, LYOPHILIZED, FOR SOLUTION INTRAVENOUS at 21:41

## 2020-01-09 RX ADMIN — ENOXAPARIN SODIUM 40 MILLIGRAM(S): 100 INJECTION SUBCUTANEOUS at 13:37

## 2020-01-09 RX ADMIN — HYDROMORPHONE HYDROCHLORIDE 1 MILLIGRAM(S): 2 INJECTION INTRAMUSCULAR; INTRAVENOUS; SUBCUTANEOUS at 18:32

## 2020-01-09 RX ADMIN — TENOFOVIR DISOPROXIL FUMARATE 300 MILLIGRAM(S): 300 TABLET, FILM COATED ORAL at 13:38

## 2020-01-09 RX ADMIN — PANTOPRAZOLE SODIUM 40 MILLIGRAM(S): 20 TABLET, DELAYED RELEASE ORAL at 13:38

## 2020-01-09 NOTE — PROGRESS NOTE ADULT - ASSESSMENT
79 year old male with a PMH of CLL (untreated), cirrhosis (Child B at time of admission), chronic hepatitis B, and gastritis found to have SBO with pneumoperitoneum s/p ex lap, REESE and small bowel resection     PLAN:  -NPO  -NGT to LWS  -pain control PRN   -continue Lovenox for DVT PPX

## 2020-01-09 NOTE — PROGRESS NOTE ADULT - ASSESSMENT
79M with multiple and significant medical history as above and CLL, never required Rx for it, here with SBO and perforation, s/p surgery, did not need any transfusion, has higher WBC than baseline (~ 80K), Hgb is stable, will recommend:  - continue Rx as per surgery - NGT suction, pain control, cautious IVF administration, abx.  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

## 2020-01-09 NOTE — PROGRESS NOTE ADULT - SUBJECTIVE AND OBJECTIVE BOX
Pt feels OK, had pain last night after removal of NGT and vomited, no pain at this time, NGT is back in. Son was concerned about the pain management issues.       Meds:  dextrose 5% + sodium chloride 0.9%. 1000 milliLiter(s) IV Continuous <Continuous>  enoxaparin Injectable 40 milliGRAM(s) SubCutaneous daily  HYDROmorphone  Injectable 0.5 milliGRAM(s) IV Push every 4 hours PRN  HYDROmorphone  Injectable 1 milliGRAM(s) IV Push every 4 hours PRN  pantoprazole  Injectable 40 milliGRAM(s) IV Push daily  piperacillin/tazobactam IVPB.. 3.375 Gram(s) IV Intermittent every 8 hours  tamsulosin 0.4 milliGRAM(s) Oral at bedtime  tenofovir disoproxil fumarate (VIREAD) 300 milliGRAM(s) Oral daily      Vital Signs Last 24 Hrs  T(C): 36.8 (09 Jan 2020 17:15), Max: 36.9 (09 Jan 2020 06:35)  T(F): 98.2 (09 Jan 2020 17:15), Max: 98.5 (09 Jan 2020 06:35)  HR: 94 (09 Jan 2020 18:23) (88 - 98)  BP: 148/78 (09 Jan 2020 18:23) (130/72 - 155/84)  BP(mean): --  RR: 18 (09 Jan 2020 18:23) (17 - 19)  SpO2: 97% (09 Jan 2020 18:23) (97% - 100%)                          8.0    76.40 )-----------( 143      ( 09 Jan 2020 06:30 )             26.8       01-09    132<L>  |  105  |  12  ----------------------------<  138<H>  3.5   |  20<L>  |  0.85    Ca    7.8<L>      09 Jan 2020 06:30  Phos  2.2     01-09  Mg     1.9     01-09

## 2020-01-09 NOTE — PROGRESS NOTE ADULT - SUBJECTIVE AND OBJECTIVE BOX
SURGERY DAILY PROGRESS NOTE:       SUBJECTIVE/ROS: Patient examined at bedside. Overnight patient experienced nausea and spitting up, abdomen became distended. On AM rounds, NGT was placed and immediately 300cc excreted.  Denies chest pain, shortness of breath         MEDICATIONS  (STANDING):  dextrose 5% + sodium chloride 0.9%. 1000 milliLiter(s) (100 mL/Hr) IV Continuous <Continuous>  enoxaparin Injectable 40 milliGRAM(s) SubCutaneous daily  pantoprazole  Injectable 40 milliGRAM(s) IV Push daily  piperacillin/tazobactam IVPB.. 3.375 Gram(s) IV Intermittent every 8 hours  sodium phosphate IVPB 15 milliMole(s) IV Intermittent once  tamsulosin 0.4 milliGRAM(s) Oral at bedtime  tenofovir disoproxil fumarate (VIREAD) 300 milliGRAM(s) Oral daily    MEDICATIONS  (PRN):      OBJECTIVE:    Vital Signs Last 24 Hrs  T(C): 36.8 (09 Jan 2020 10:19), Max: 37.2 (08 Jan 2020 11:10)  T(F): 98.3 (09 Jan 2020 10:19), Max: 98.9 (08 Jan 2020 11:10)  HR: 93 (09 Jan 2020 10:19) (88 - 100)  BP: 147/83 (09 Jan 2020 10:19) (107/61 - 147/83)  BP(mean): --  RR: 19 (09 Jan 2020 10:19) (16 - 19)  SpO2: 100% (09 Jan 2020 10:19) (97% - 100%)        I&O's Detail    08 Jan 2020 07:01  -  09 Jan 2020 07:00  --------------------------------------------------------  IN:    dextrose 5% + sodium chloride 0.9%.: 1800 mL    IV PiggyBack: 550 mL  Total IN: 2350 mL    OUT:    Nasoenteral Tube: 20 mL    Voided: 1070 mL  Total OUT: 1090 mL    Total NET: 1260 mL          Daily     Daily     LABS:                        8.0    76.40 )-----------( 143      ( 09 Jan 2020 06:30 )             26.8     01-09    132<L>  |  105  |  12  ----------------------------<  138<H>  3.5   |  20<L>  |  0.85    Ca    7.8<L>      09 Jan 2020 06:30  Phos  2.2     01-09  Mg     1.9     01-09                        PHYSICAL EXAM:  Constitutional: well developed, well nourished, NAD  Eyes: anicteric  ENMT: normal facies, symmetric  Respiratory: Breathing comfortably    Gastrointestinal: abdomen non tender, distended, NGT in place with bilious output  Extremities: FROM, warm  Neurological: intact, non-focal  Psychiatric: oriented x 3; appropriate

## 2020-01-09 NOTE — CHART NOTE - NSCHARTNOTEFT_GEN_A_CORE
Patient reported feeling very nauseated. He did not vomit, but he has been spitting up. He reports no passing gas or bowel movements.     On exam, he is uncomfortable and sitting up straight. His abdomen is very distended and mildly tender to palpation.     I explained to him that an NGT would help his symptoms and decrease his risk of aspiration if he were to vomit. He refused NGT and verbalized understanding of the risks. He agrees to rediscuss NGT when the morning team comes around for rounds in 30 minutes.    B Team [id number]

## 2020-01-10 LAB
ANION GAP SERPL CALC-SCNC: 10 MMO/L — SIGNIFICANT CHANGE UP (ref 7–14)
BUN SERPL-MCNC: 10 MG/DL — SIGNIFICANT CHANGE UP (ref 7–23)
CALCIUM SERPL-MCNC: 8 MG/DL — LOW (ref 8.4–10.5)
CHLORIDE SERPL-SCNC: 106 MMOL/L — SIGNIFICANT CHANGE UP (ref 98–107)
CO2 SERPL-SCNC: 19 MMOL/L — LOW (ref 22–31)
CREAT SERPL-MCNC: 0.76 MG/DL — SIGNIFICANT CHANGE UP (ref 0.5–1.3)
GLUCOSE SERPL-MCNC: 106 MG/DL — HIGH (ref 70–99)
HCT VFR BLD CALC: 23.8 % — LOW (ref 39–50)
HGB BLD-MCNC: 7.3 G/DL — LOW (ref 13–17)
MAGNESIUM SERPL-MCNC: 1.8 MG/DL — SIGNIFICANT CHANGE UP (ref 1.6–2.6)
MCHC RBC-ENTMCNC: 21.3 PG — LOW (ref 27–34)
MCHC RBC-ENTMCNC: 30.7 % — LOW (ref 32–36)
MCV RBC AUTO: 69.6 FL — LOW (ref 80–100)
NRBC # FLD: 0 K/UL — SIGNIFICANT CHANGE UP (ref 0–0)
PHOSPHATE SERPL-MCNC: 1.8 MG/DL — LOW (ref 2.5–4.5)
PLATELET # BLD AUTO: 111 K/UL — LOW (ref 150–400)
PMV BLD: SIGNIFICANT CHANGE UP FL (ref 7–13)
POTASSIUM SERPL-MCNC: 3 MMOL/L — LOW (ref 3.5–5.3)
POTASSIUM SERPL-SCNC: 3 MMOL/L — LOW (ref 3.5–5.3)
RBC # BLD: 3.42 M/UL — LOW (ref 4.2–5.8)
RBC # FLD: 17.9 % — HIGH (ref 10.3–14.5)
SODIUM SERPL-SCNC: 135 MMOL/L — SIGNIFICANT CHANGE UP (ref 135–145)
WBC # BLD: 45.44 K/UL — CRITICAL HIGH (ref 3.8–10.5)
WBC # FLD AUTO: 45.44 K/UL — CRITICAL HIGH (ref 3.8–10.5)

## 2020-01-10 RX ORDER — DEXTROSE MONOHYDRATE, SODIUM CHLORIDE, AND POTASSIUM CHLORIDE 50; .745; 4.5 G/1000ML; G/1000ML; G/1000ML
1000 INJECTION, SOLUTION INTRAVENOUS
Refills: 0 | Status: DISCONTINUED | OUTPATIENT
Start: 2020-01-10 | End: 2020-01-12

## 2020-01-10 RX ORDER — BENZOCAINE AND MENTHOL 5; 1 G/100ML; G/100ML
1 LIQUID ORAL EVERY 4 HOURS
Refills: 0 | Status: DISCONTINUED | OUTPATIENT
Start: 2020-01-10 | End: 2020-01-29

## 2020-01-10 RX ORDER — POTASSIUM CHLORIDE 20 MEQ
10 PACKET (EA) ORAL ONCE
Refills: 0 | Status: COMPLETED | OUTPATIENT
Start: 2020-01-10 | End: 2020-01-10

## 2020-01-10 RX ORDER — MAGNESIUM SULFATE 500 MG/ML
1 VIAL (ML) INJECTION ONCE
Refills: 0 | Status: COMPLETED | OUTPATIENT
Start: 2020-01-10 | End: 2020-01-10

## 2020-01-10 RX ADMIN — PIPERACILLIN AND TAZOBACTAM 25 GRAM(S): 4; .5 INJECTION, POWDER, LYOPHILIZED, FOR SOLUTION INTRAVENOUS at 05:24

## 2020-01-10 RX ADMIN — Medication 85 MILLIMOLE(S): at 19:03

## 2020-01-10 RX ADMIN — PIPERACILLIN AND TAZOBACTAM 25 GRAM(S): 4; .5 INJECTION, POWDER, LYOPHILIZED, FOR SOLUTION INTRAVENOUS at 14:09

## 2020-01-10 RX ADMIN — HYDROMORPHONE HYDROCHLORIDE 1 MILLIGRAM(S): 2 INJECTION INTRAMUSCULAR; INTRAVENOUS; SUBCUTANEOUS at 23:00

## 2020-01-10 RX ADMIN — HYDROMORPHONE HYDROCHLORIDE 1 MILLIGRAM(S): 2 INJECTION INTRAMUSCULAR; INTRAVENOUS; SUBCUTANEOUS at 09:42

## 2020-01-10 RX ADMIN — Medication 2 MILLIGRAM(S): at 21:26

## 2020-01-10 RX ADMIN — Medication 100 GRAM(S): at 11:16

## 2020-01-10 RX ADMIN — HYDROMORPHONE HYDROCHLORIDE 1 MILLIGRAM(S): 2 INJECTION INTRAMUSCULAR; INTRAVENOUS; SUBCUTANEOUS at 18:13

## 2020-01-10 RX ADMIN — HYDROMORPHONE HYDROCHLORIDE 1 MILLIGRAM(S): 2 INJECTION INTRAMUSCULAR; INTRAVENOUS; SUBCUTANEOUS at 18:28

## 2020-01-10 RX ADMIN — SODIUM CHLORIDE 100 MILLILITER(S): 9 INJECTION, SOLUTION INTRAVENOUS at 09:43

## 2020-01-10 RX ADMIN — DEXTROSE MONOHYDRATE, SODIUM CHLORIDE, AND POTASSIUM CHLORIDE 125 MILLILITER(S): 50; .745; 4.5 INJECTION, SOLUTION INTRAVENOUS at 11:15

## 2020-01-10 RX ADMIN — BENZOCAINE AND MENTHOL 1 LOZENGE: 5; 1 LIQUID ORAL at 21:52

## 2020-01-10 RX ADMIN — HYDROMORPHONE HYDROCHLORIDE 1 MILLIGRAM(S): 2 INJECTION INTRAMUSCULAR; INTRAVENOUS; SUBCUTANEOUS at 22:45

## 2020-01-10 RX ADMIN — Medication 100 MILLIEQUIVALENT(S): at 11:16

## 2020-01-10 RX ADMIN — TENOFOVIR DISOPROXIL FUMARATE 300 MILLIGRAM(S): 300 TABLET, FILM COATED ORAL at 11:15

## 2020-01-10 RX ADMIN — PIPERACILLIN AND TAZOBACTAM 25 GRAM(S): 4; .5 INJECTION, POWDER, LYOPHILIZED, FOR SOLUTION INTRAVENOUS at 21:26

## 2020-01-10 RX ADMIN — PANTOPRAZOLE SODIUM 40 MILLIGRAM(S): 20 TABLET, DELAYED RELEASE ORAL at 11:15

## 2020-01-10 RX ADMIN — HYDROMORPHONE HYDROCHLORIDE 1 MILLIGRAM(S): 2 INJECTION INTRAMUSCULAR; INTRAVENOUS; SUBCUTANEOUS at 06:00

## 2020-01-10 RX ADMIN — HYDROMORPHONE HYDROCHLORIDE 1 MILLIGRAM(S): 2 INJECTION INTRAMUSCULAR; INTRAVENOUS; SUBCUTANEOUS at 09:58

## 2020-01-10 RX ADMIN — ENOXAPARIN SODIUM 40 MILLIGRAM(S): 100 INJECTION SUBCUTANEOUS at 11:16

## 2020-01-10 RX ADMIN — HYDROMORPHONE HYDROCHLORIDE 1 MILLIGRAM(S): 2 INJECTION INTRAMUSCULAR; INTRAVENOUS; SUBCUTANEOUS at 05:24

## 2020-01-10 NOTE — PROGRESS NOTE ADULT - SUBJECTIVE AND OBJECTIVE BOX
Morning Surgical Progress Note  Patient is a 79y old  Male who presents with a chief complaint of abdominal pain (09 Jan 2020 19:58)      SUBJECTIVE: Patient seen and examined at bedside with surgical team, patient complains of abdominal pain. Patient denies nausea, passing flatus or bowel movements.     Vital Signs Last 24 Hrs  T(C): 36.8 (10 Klaus 2020 09:41), Max: 37.2 (09 Jan 2020 22:00)  T(F): 98.3 (10 Klaus 2020 09:41), Max: 99 (09 Jan 2020 22:00)  HR: 85 (10 Klaus 2020 09:46) (81 - 98)  BP: 146/70 (10 Klaus 2020 09:46) (139/69 - 157/80)  BP(mean): --  RR: 17 (10 Klaus 2020 09:46) (17 - 19)  SpO2: 100% (10 Klaus 2020 09:46) (97% - 100%)I&O's Detail    09 Jan 2020 07:01  -  10 Klaus 2020 07:00  --------------------------------------------------------  IN:    dextrose 5% + sodium chloride 0.9%.: 2400 mL    IV PiggyBack: 200 mL  Total IN: 2600 mL    OUT:    Nasoenteral Tube: 1000 mL    Voided: 800 mL  Total OUT: 1800 mL    Total NET: 800 mL      MEDICATIONS  (STANDING):  dextrose 5% + sodium chloride 0.9%. 1000 milliLiter(s) (100 mL/Hr) IV Continuous <Continuous>  doxazosin 2 milliGRAM(s) Oral at bedtime  enoxaparin Injectable 40 milliGRAM(s) SubCutaneous daily  pantoprazole  Injectable 40 milliGRAM(s) IV Push daily  piperacillin/tazobactam IVPB.. 3.375 Gram(s) IV Intermittent every 8 hours  tenofovir disoproxil fumarate (VIREAD) 300 milliGRAM(s) Oral daily    MEDICATIONS  (PRN):  HYDROmorphone  Injectable 0.5 milliGRAM(s) IV Push every 4 hours PRN Moderate Pain (4 - 6)  HYDROmorphone  Injectable 1 milliGRAM(s) IV Push every 4 hours PRN Severe Pain (7 - 10)      Physical Exam  Constitutional: A&Ox3, NAD  Respiratory: CTA b/l  Cardiac: RRR, S1 and S2  Gastrointestinal: abdomen soft, distended, tender to palpation, incisions c/d/i    LABS:                        8.0    76.40 )-----------( 143      ( 09 Jan 2020 06:30 )             26.8     01-09    132<L>  |  105  |  12  ----------------------------<  138<H>  3.5   |  20<L>  |  0.85    Ca    7.8<L>      09 Jan 2020 06:30  Phos  2.2     01-09  Mg     1.9     01-09

## 2020-01-10 NOTE — PROGRESS NOTE ADULT - ASSESSMENT
79 year old male with a PMH of CLL (untreated), cirrhosis (Child B at time of admission), chronic hepatitis B, and gastritis found to have SBO with pneumoperitoneum s/p ex lap, REESE and small bowel resection     PLAN:  NPO/ IVF  NGT to LWS  pain control PRN   continue Lovenox for DVT PPX    B Team   [id number] home

## 2020-01-11 LAB
ANION GAP SERPL CALC-SCNC: 5 MMO/L — LOW (ref 7–14)
ANION GAP SERPL CALC-SCNC: 8 MMO/L — SIGNIFICANT CHANGE UP (ref 7–14)
BACTERIA BLD CULT: SIGNIFICANT CHANGE UP
BACTERIA BLD CULT: SIGNIFICANT CHANGE UP
BLD GP AB SCN SERPL QL: NEGATIVE — SIGNIFICANT CHANGE UP
BUN SERPL-MCNC: 6 MG/DL — LOW (ref 7–23)
BUN SERPL-MCNC: 8 MG/DL — SIGNIFICANT CHANGE UP (ref 7–23)
CALCIUM SERPL-MCNC: 7.5 MG/DL — LOW (ref 8.4–10.5)
CALCIUM SERPL-MCNC: 7.5 MG/DL — LOW (ref 8.4–10.5)
CHLORIDE SERPL-SCNC: 108 MMOL/L — HIGH (ref 98–107)
CHLORIDE SERPL-SCNC: 112 MMOL/L — HIGH (ref 98–107)
CO2 SERPL-SCNC: 19 MMOL/L — LOW (ref 22–31)
CO2 SERPL-SCNC: 20 MMOL/L — LOW (ref 22–31)
CREAT SERPL-MCNC: 0.65 MG/DL — SIGNIFICANT CHANGE UP (ref 0.5–1.3)
CREAT SERPL-MCNC: 0.71 MG/DL — SIGNIFICANT CHANGE UP (ref 0.5–1.3)
GLUCOSE SERPL-MCNC: 120 MG/DL — HIGH (ref 70–99)
GLUCOSE SERPL-MCNC: 128 MG/DL — HIGH (ref 70–99)
HCT VFR BLD CALC: 21.8 % — LOW (ref 39–50)
HCT VFR BLD CALC: 23.2 % — LOW (ref 39–50)
HCT VFR BLD CALC: 26.6 % — LOW (ref 39–50)
HGB BLD-MCNC: 6.8 G/DL — CRITICAL LOW (ref 13–17)
HGB BLD-MCNC: 7.1 G/DL — LOW (ref 13–17)
HGB BLD-MCNC: 8.4 G/DL — LOW (ref 13–17)
MAGNESIUM SERPL-MCNC: 1.9 MG/DL — SIGNIFICANT CHANGE UP (ref 1.6–2.6)
MAGNESIUM SERPL-MCNC: 2 MG/DL — SIGNIFICANT CHANGE UP (ref 1.6–2.6)
MCHC RBC-ENTMCNC: 21.6 PG — LOW (ref 27–34)
MCHC RBC-ENTMCNC: 21.8 PG — LOW (ref 27–34)
MCHC RBC-ENTMCNC: 22.8 PG — LOW (ref 27–34)
MCHC RBC-ENTMCNC: 30.6 % — LOW (ref 32–36)
MCHC RBC-ENTMCNC: 31.2 % — LOW (ref 32–36)
MCHC RBC-ENTMCNC: 31.6 % — LOW (ref 32–36)
MCV RBC AUTO: 69.2 FL — LOW (ref 80–100)
MCV RBC AUTO: 71.4 FL — LOW (ref 80–100)
MCV RBC AUTO: 72.3 FL — LOW (ref 80–100)
NRBC # FLD: 0 K/UL — SIGNIFICANT CHANGE UP (ref 0–0)
NRBC # FLD: 0 K/UL — SIGNIFICANT CHANGE UP (ref 0–0)
NRBC # FLD: 0.02 K/UL — SIGNIFICANT CHANGE UP (ref 0–0)
PHOSPHATE SERPL-MCNC: 2 MG/DL — LOW (ref 2.5–4.5)
PHOSPHATE SERPL-MCNC: 2.4 MG/DL — LOW (ref 2.5–4.5)
PLATELET # BLD AUTO: 103 K/UL — LOW (ref 150–400)
PLATELET # BLD AUTO: 111 K/UL — LOW (ref 150–400)
PLATELET # BLD AUTO: 120 K/UL — LOW (ref 150–400)
PMV BLD: SIGNIFICANT CHANGE UP FL (ref 7–13)
POTASSIUM SERPL-MCNC: 3 MMOL/L — LOW (ref 3.5–5.3)
POTASSIUM SERPL-MCNC: 4.2 MMOL/L — SIGNIFICANT CHANGE UP (ref 3.5–5.3)
POTASSIUM SERPL-SCNC: 3 MMOL/L — LOW (ref 3.5–5.3)
POTASSIUM SERPL-SCNC: 4.2 MMOL/L — SIGNIFICANT CHANGE UP (ref 3.5–5.3)
RBC # BLD: 3.15 M/UL — LOW (ref 4.2–5.8)
RBC # BLD: 3.25 M/UL — LOW (ref 4.2–5.8)
RBC # BLD: 3.68 M/UL — LOW (ref 4.2–5.8)
RBC # FLD: 17.5 % — HIGH (ref 10.3–14.5)
RBC # FLD: 17.8 % — HIGH (ref 10.3–14.5)
RBC # FLD: 19.8 % — HIGH (ref 10.3–14.5)
RH IG SCN BLD-IMP: POSITIVE — SIGNIFICANT CHANGE UP
SODIUM SERPL-SCNC: 135 MMOL/L — SIGNIFICANT CHANGE UP (ref 135–145)
SODIUM SERPL-SCNC: 137 MMOL/L — SIGNIFICANT CHANGE UP (ref 135–145)
WBC # BLD: 38.21 K/UL — HIGH (ref 3.8–10.5)
WBC # BLD: 39 K/UL — HIGH (ref 3.8–10.5)
WBC # BLD: 50.04 K/UL — CRITICAL HIGH (ref 3.8–10.5)
WBC # FLD AUTO: 38.21 K/UL — HIGH (ref 3.8–10.5)
WBC # FLD AUTO: 39 K/UL — HIGH (ref 3.8–10.5)
WBC # FLD AUTO: 50.04 K/UL — CRITICAL HIGH (ref 3.8–10.5)

## 2020-01-11 PROCEDURE — 71045 X-RAY EXAM CHEST 1 VIEW: CPT | Mod: 26

## 2020-01-11 RX ORDER — MAGNESIUM SULFATE 500 MG/ML
1 VIAL (ML) INJECTION ONCE
Refills: 0 | Status: COMPLETED | OUTPATIENT
Start: 2020-01-11 | End: 2020-01-11

## 2020-01-11 RX ORDER — POTASSIUM PHOSPHATE, MONOBASIC POTASSIUM PHOSPHATE, DIBASIC 236; 224 MG/ML; MG/ML
15 INJECTION, SOLUTION INTRAVENOUS ONCE
Refills: 0 | Status: COMPLETED | OUTPATIENT
Start: 2020-01-11 | End: 2020-01-11

## 2020-01-11 RX ORDER — POTASSIUM CHLORIDE 20 MEQ
10 PACKET (EA) ORAL
Refills: 0 | Status: COMPLETED | OUTPATIENT
Start: 2020-01-11 | End: 2020-01-11

## 2020-01-11 RX ORDER — POTASSIUM PHOSPHATE, MONOBASIC POTASSIUM PHOSPHATE, DIBASIC 236; 224 MG/ML; MG/ML
30 INJECTION, SOLUTION INTRAVENOUS ONCE
Refills: 0 | Status: DISCONTINUED | OUTPATIENT
Start: 2020-01-11 | End: 2020-01-11

## 2020-01-11 RX ORDER — SODIUM CHLORIDE 9 MG/ML
1000 INJECTION INTRAMUSCULAR; INTRAVENOUS; SUBCUTANEOUS ONCE
Refills: 0 | Status: COMPLETED | OUTPATIENT
Start: 2020-01-11 | End: 2020-01-11

## 2020-01-11 RX ADMIN — HYDROMORPHONE HYDROCHLORIDE 1 MILLIGRAM(S): 2 INJECTION INTRAMUSCULAR; INTRAVENOUS; SUBCUTANEOUS at 23:04

## 2020-01-11 RX ADMIN — ENOXAPARIN SODIUM 40 MILLIGRAM(S): 100 INJECTION SUBCUTANEOUS at 10:27

## 2020-01-11 RX ADMIN — HYDROMORPHONE HYDROCHLORIDE 0.5 MILLIGRAM(S): 2 INJECTION INTRAMUSCULAR; INTRAVENOUS; SUBCUTANEOUS at 13:52

## 2020-01-11 RX ADMIN — Medication 100 MILLIEQUIVALENT(S): at 12:08

## 2020-01-11 RX ADMIN — POTASSIUM PHOSPHATE, MONOBASIC POTASSIUM PHOSPHATE, DIBASIC 62.5 MILLIMOLE(S): 236; 224 INJECTION, SOLUTION INTRAVENOUS at 15:00

## 2020-01-11 RX ADMIN — BENZOCAINE AND MENTHOL 1 LOZENGE: 5; 1 LIQUID ORAL at 23:03

## 2020-01-11 RX ADMIN — HYDROMORPHONE HYDROCHLORIDE 1 MILLIGRAM(S): 2 INJECTION INTRAMUSCULAR; INTRAVENOUS; SUBCUTANEOUS at 03:20

## 2020-01-11 RX ADMIN — Medication 100 GRAM(S): at 10:28

## 2020-01-11 RX ADMIN — HYDROMORPHONE HYDROCHLORIDE 1 MILLIGRAM(S): 2 INJECTION INTRAMUSCULAR; INTRAVENOUS; SUBCUTANEOUS at 17:50

## 2020-01-11 RX ADMIN — HYDROMORPHONE HYDROCHLORIDE 1 MILLIGRAM(S): 2 INJECTION INTRAMUSCULAR; INTRAVENOUS; SUBCUTANEOUS at 03:05

## 2020-01-11 RX ADMIN — PIPERACILLIN AND TAZOBACTAM 25 GRAM(S): 4; .5 INJECTION, POWDER, LYOPHILIZED, FOR SOLUTION INTRAVENOUS at 05:41

## 2020-01-11 RX ADMIN — HYDROMORPHONE HYDROCHLORIDE 0.5 MILLIGRAM(S): 2 INJECTION INTRAMUSCULAR; INTRAVENOUS; SUBCUTANEOUS at 13:38

## 2020-01-11 RX ADMIN — HYDROMORPHONE HYDROCHLORIDE 1 MILLIGRAM(S): 2 INJECTION INTRAMUSCULAR; INTRAVENOUS; SUBCUTANEOUS at 08:45

## 2020-01-11 RX ADMIN — HYDROMORPHONE HYDROCHLORIDE 1 MILLIGRAM(S): 2 INJECTION INTRAMUSCULAR; INTRAVENOUS; SUBCUTANEOUS at 18:05

## 2020-01-11 RX ADMIN — SODIUM CHLORIDE 1000 MILLILITER(S): 9 INJECTION INTRAMUSCULAR; INTRAVENOUS; SUBCUTANEOUS at 16:16

## 2020-01-11 RX ADMIN — PANTOPRAZOLE SODIUM 40 MILLIGRAM(S): 20 TABLET, DELAYED RELEASE ORAL at 10:27

## 2020-01-11 RX ADMIN — HYDROMORPHONE HYDROCHLORIDE 1 MILLIGRAM(S): 2 INJECTION INTRAMUSCULAR; INTRAVENOUS; SUBCUTANEOUS at 23:19

## 2020-01-11 RX ADMIN — Medication 100 MILLIEQUIVALENT(S): at 13:58

## 2020-01-11 RX ADMIN — TENOFOVIR DISOPROXIL FUMARATE 300 MILLIGRAM(S): 300 TABLET, FILM COATED ORAL at 10:28

## 2020-01-11 RX ADMIN — HYDROMORPHONE HYDROCHLORIDE 1 MILLIGRAM(S): 2 INJECTION INTRAMUSCULAR; INTRAVENOUS; SUBCUTANEOUS at 09:00

## 2020-01-11 RX ADMIN — BENZOCAINE AND MENTHOL 1 LOZENGE: 5; 1 LIQUID ORAL at 11:23

## 2020-01-11 RX ADMIN — Medication 100 MILLIEQUIVALENT(S): at 10:28

## 2020-01-11 RX ADMIN — Medication 2 MILLIGRAM(S): at 21:16

## 2020-01-11 RX ADMIN — DEXTROSE MONOHYDRATE, SODIUM CHLORIDE, AND POTASSIUM CHLORIDE 125 MILLILITER(S): 50; .745; 4.5 INJECTION, SOLUTION INTRAVENOUS at 10:27

## 2020-01-11 NOTE — PROGRESS NOTE ADULT - SUBJECTIVE AND OBJECTIVE BOX
Events since thursday noted, pt still has NGT and pain abdomen at times, did pass some flatus today. No fevers or chills, has throat irritation and some cough at times. Rest of the ROS unchanged to unremarkable.       Meds:  benzocaine 15 mG/menthol 3.6 mG (Sugar-Free) Lozenge 1 Lozenge Oral every 4 hours PRN  dextrose 5% + sodium chloride 0.9% with potassium chloride 20 mEq/L 1000 milliLiter(s) IV Continuous <Continuous>  doxazosin 2 milliGRAM(s) Oral at bedtime  enoxaparin Injectable 40 milliGRAM(s) SubCutaneous daily  HYDROmorphone  Injectable 0.5 milliGRAM(s) IV Push every 4 hours PRN  HYDROmorphone  Injectable 1 milliGRAM(s) IV Push every 4 hours PRN  pantoprazole  Injectable 40 milliGRAM(s) IV Push daily  potassium chloride  10 mEq/100 mL IVPB 10 milliEquivalent(s) IV Intermittent every 1 hour  potassium phosphate IVPB 15 milliMole(s) IV Intermittent once  tenofovir disoproxil fumarate (VIREAD) 300 milliGRAM(s) Oral daily      Vital Signs Last 24 Hrs  T(C): 36.8 (11 Jan 2020 09:15), Max: 37 (10 Klaus 2020 21:35)  T(F): 98.3 (11 Jan 2020 09:15), Max: 98.6 (10 Klaus 2020 21:35)  HR: 82 (11 Jan 2020 09:15) (82 - 96)  BP: 136/77 (11 Jan 2020 09:15) (130/70 - 155/76)  BP(mean): --  RR: 16 (11 Jan 2020 09:15) (16 - 20)  SpO2: 100% (11 Jan 2020 09:15) (100% - 100%)                          7.1    38.21 )-----------( 111      ( 11 Jan 2020 05:35 )             23.2       01-11    135  |  108<H>  |  8   ----------------------------<  120<H>  3.0<L>   |  19<L>  |  0.71    Ca    7.5<L>      11 Jan 2020 05:35  Phos  2.4     01-11  Mg     1.9     01-11          Path on the removed portion of the intestine: pending

## 2020-01-11 NOTE — PROGRESS NOTE ADULT - ASSESSMENT
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

## 2020-01-11 NOTE — PROVIDER CONTACT NOTE (OTHER) - SITUATION
order to repeat bpm post repletions, sodium phos will finish at 1 am, still check repeat bmp at 1 am?

## 2020-01-11 NOTE — PROGRESS NOTE ADULT - ASSESSMENT
79 year old male with a PMH of CLL (untreated), cirrhosis (Child B at time of admission), chronic hepatitis B, and gastritis found to have SBO with pneumoperitoneum s/p ex lap, REESE and small bowel resection     PLAN:  NPO/ IVF  NGT to LWS  Monitor bowel function  pain control PRN   continue Lovenox for DVT PPX    B Team   [id number]

## 2020-01-11 NOTE — PROGRESS NOTE ADULT - SUBJECTIVE AND OBJECTIVE BOX
Morning Surgical Progress Note  Patient is a 79y old  Male who presents with a chief complaint of abdominal pain (10 Klaus 2020 09:47)      SUBJECTIVE: Patient seen and examined at bedside with surgical team, patient without complaints. Patient states that he has not passed flatus or had a bowel movement. Patient asks when his NG tube can be removed.    Vital Signs Last 24 Hrs  T(C): 36.8 (11 Jan 2020 09:15), Max: 37 (10 Klaus 2020 21:35)  T(F): 98.3 (11 Jan 2020 09:15), Max: 98.6 (10 Klaus 2020 21:35)  HR: 82 (11 Jan 2020 09:15) (82 - 96)  BP: 136/77 (11 Jan 2020 09:15) (130/70 - 155/76)  BP(mean): --  RR: 16 (11 Jan 2020 09:15) (16 - 20)  SpO2: 100% (11 Jan 2020 09:15) (100% - 100%)I&O's Detail    10 Klaus 2020 07:01  -  11 Jan 2020 07:00  --------------------------------------------------------  IN:    dextrose 5% + sodium chloride 0.9% with potassium chloride 20 mEq/L: 3000 mL    IV PiggyBack: 400 mL  Total IN: 3400 mL    OUT:    Nasoenteral Tube: 650 mL    Voided: 950 mL  Total OUT: 1600 mL    Total NET: 1800 mL      11 Jan 2020 07:01  -  11 Jan 2020 11:43  --------------------------------------------------------  IN:    dextrose 5% + sodium chloride 0.9% with potassium chloride 20 mEq/L: 500 mL    IV PiggyBack: 200 mL  Total IN: 700 mL    OUT:    Nasoenteral Tube: 200 mL    Voided: 100 mL  Total OUT: 300 mL    Total NET: 400 mL      MEDICATIONS  (STANDING):  dextrose 5% + sodium chloride 0.9% with potassium chloride 20 mEq/L 1000 milliLiter(s) (125 mL/Hr) IV Continuous <Continuous>  doxazosin 2 milliGRAM(s) Oral at bedtime  enoxaparin Injectable 40 milliGRAM(s) SubCutaneous daily  pantoprazole  Injectable 40 milliGRAM(s) IV Push daily  potassium chloride  10 mEq/100 mL IVPB 10 milliEquivalent(s) IV Intermittent every 1 hour  potassium phosphate IVPB 15 milliMole(s) IV Intermittent once  tenofovir disoproxil fumarate (VIREAD) 300 milliGRAM(s) Oral daily    MEDICATIONS  (PRN):  benzocaine 15 mG/menthol 3.6 mG (Sugar-Free) Lozenge 1 Lozenge Oral every 4 hours PRN Sore Throat  HYDROmorphone  Injectable 0.5 milliGRAM(s) IV Push every 4 hours PRN Moderate Pain (4 - 6)  HYDROmorphone  Injectable 1 milliGRAM(s) IV Push every 4 hours PRN Severe Pain (7 - 10)      Physical Exam  Constitutional: A&Ox3, NAD  Respiratory: CTA b/l  Cardiac: RRR, S1 and S2, no m/r/g  Gastrointestinal: abdomen soft, distended, mildly tender to palpation at incision site. incisions c/d/i    LABS:                        7.1    38.21 )-----------( 111      ( 11 Jan 2020 05:35 )             23.2     01-11    135  |  108<H>  |  8   ----------------------------<  120<H>  3.0<L>   |  19<L>  |  0.71    Ca    7.5<L>      11 Jan 2020 05:35  Phos  2.4     01-11  Mg     1.9     01-11

## 2020-01-12 LAB
ANION GAP SERPL CALC-SCNC: 7 MMO/L — SIGNIFICANT CHANGE UP (ref 7–14)
ANION GAP SERPL CALC-SCNC: 9 MMO/L — SIGNIFICANT CHANGE UP (ref 7–14)
APPEARANCE UR: CLEAR — SIGNIFICANT CHANGE UP
BACTERIA # UR AUTO: NEGATIVE — SIGNIFICANT CHANGE UP
BILIRUB UR-MCNC: NEGATIVE — SIGNIFICANT CHANGE UP
BLOOD UR QL VISUAL: NEGATIVE — SIGNIFICANT CHANGE UP
BUN SERPL-MCNC: 5 MG/DL — LOW (ref 7–23)
BUN SERPL-MCNC: 5 MG/DL — LOW (ref 7–23)
CALCIUM SERPL-MCNC: 7.5 MG/DL — LOW (ref 8.4–10.5)
CALCIUM SERPL-MCNC: 8 MG/DL — LOW (ref 8.4–10.5)
CHLORIDE SERPL-SCNC: 111 MMOL/L — HIGH (ref 98–107)
CHLORIDE SERPL-SCNC: 119 MMOL/L — HIGH (ref 98–107)
CO2 SERPL-SCNC: 16 MMOL/L — LOW (ref 22–31)
CO2 SERPL-SCNC: 20 MMOL/L — LOW (ref 22–31)
COLOR SPEC: YELLOW — SIGNIFICANT CHANGE UP
CREAT SERPL-MCNC: 0.65 MG/DL — SIGNIFICANT CHANGE UP (ref 0.5–1.3)
CREAT SERPL-MCNC: 0.72 MG/DL — SIGNIFICANT CHANGE UP (ref 0.5–1.3)
GLUCOSE SERPL-MCNC: 101 MG/DL — HIGH (ref 70–99)
GLUCOSE SERPL-MCNC: 119 MG/DL — HIGH (ref 70–99)
GLUCOSE UR-MCNC: NEGATIVE — SIGNIFICANT CHANGE UP
HCT VFR BLD CALC: 26.4 % — LOW (ref 39–50)
HGB BLD-MCNC: 8.2 G/DL — LOW (ref 13–17)
HYALINE CASTS # UR AUTO: NEGATIVE — SIGNIFICANT CHANGE UP
KETONES UR-MCNC: NEGATIVE — SIGNIFICANT CHANGE UP
LEUKOCYTE ESTERASE UR-ACNC: NEGATIVE — SIGNIFICANT CHANGE UP
MAGNESIUM SERPL-MCNC: 1.9 MG/DL — SIGNIFICANT CHANGE UP (ref 1.6–2.6)
MAGNESIUM SERPL-MCNC: 2 MG/DL — SIGNIFICANT CHANGE UP (ref 1.6–2.6)
MCHC RBC-ENTMCNC: 22.7 PG — LOW (ref 27–34)
MCHC RBC-ENTMCNC: 31.1 % — LOW (ref 32–36)
MCV RBC AUTO: 73.1 FL — LOW (ref 80–100)
NITRITE UR-MCNC: NEGATIVE — SIGNIFICANT CHANGE UP
NRBC # FLD: 0.05 K/UL — SIGNIFICANT CHANGE UP (ref 0–0)
PH UR: 6 — SIGNIFICANT CHANGE UP (ref 5–8)
PHOSPHATE SERPL-MCNC: 1.7 MG/DL — LOW (ref 2.5–4.5)
PHOSPHATE SERPL-MCNC: 2.3 MG/DL — LOW (ref 2.5–4.5)
PLATELET # BLD AUTO: 130 K/UL — LOW (ref 150–400)
PMV BLD: SIGNIFICANT CHANGE UP FL (ref 7–13)
POTASSIUM SERPL-MCNC: 3.9 MMOL/L — SIGNIFICANT CHANGE UP (ref 3.5–5.3)
POTASSIUM SERPL-MCNC: 4.8 MMOL/L — SIGNIFICANT CHANGE UP (ref 3.5–5.3)
POTASSIUM SERPL-SCNC: 3.9 MMOL/L — SIGNIFICANT CHANGE UP (ref 3.5–5.3)
POTASSIUM SERPL-SCNC: 4.8 MMOL/L — SIGNIFICANT CHANGE UP (ref 3.5–5.3)
PROT UR-MCNC: 20 — SIGNIFICANT CHANGE UP
RBC # BLD: 3.61 M/UL — LOW (ref 4.2–5.8)
RBC # FLD: 18.9 % — HIGH (ref 10.3–14.5)
RBC CASTS # UR COMP ASSIST: SIGNIFICANT CHANGE UP (ref 0–?)
SODIUM SERPL-SCNC: 138 MMOL/L — SIGNIFICANT CHANGE UP (ref 135–145)
SODIUM SERPL-SCNC: 144 MMOL/L — SIGNIFICANT CHANGE UP (ref 135–145)
SP GR SPEC: 1.02 — SIGNIFICANT CHANGE UP (ref 1–1.04)
SQUAMOUS # UR AUTO: SIGNIFICANT CHANGE UP
UROBILINOGEN FLD QL: NORMAL — SIGNIFICANT CHANGE UP
WBC # BLD: 44.35 K/UL — CRITICAL HIGH (ref 3.8–10.5)
WBC # FLD AUTO: 44.35 K/UL — CRITICAL HIGH (ref 3.8–10.5)
WBC UR QL: SIGNIFICANT CHANGE UP (ref 0–?)

## 2020-01-12 PROCEDURE — 71045 X-RAY EXAM CHEST 1 VIEW: CPT | Mod: 26

## 2020-01-12 RX ORDER — POTASSIUM CHLORIDE 20 MEQ
10 PACKET (EA) ORAL
Refills: 0 | Status: COMPLETED | OUTPATIENT
Start: 2020-01-12 | End: 2020-01-12

## 2020-01-12 RX ORDER — DEXTROSE MONOHYDRATE, SODIUM CHLORIDE, AND POTASSIUM CHLORIDE 50; .745; 4.5 G/1000ML; G/1000ML; G/1000ML
1000 INJECTION, SOLUTION INTRAVENOUS
Refills: 0 | Status: DISCONTINUED | OUTPATIENT
Start: 2020-01-12 | End: 2020-01-12

## 2020-01-12 RX ORDER — FUROSEMIDE 40 MG
10 TABLET ORAL ONCE
Refills: 0 | Status: COMPLETED | OUTPATIENT
Start: 2020-01-12 | End: 2020-01-12

## 2020-01-12 RX ORDER — LANOLIN ALCOHOL/MO/W.PET/CERES
3 CREAM (GRAM) TOPICAL ONCE
Refills: 0 | Status: COMPLETED | OUTPATIENT
Start: 2020-01-12 | End: 2020-01-14

## 2020-01-12 RX ORDER — POTASSIUM PHOSPHATE, MONOBASIC POTASSIUM PHOSPHATE, DIBASIC 236; 224 MG/ML; MG/ML
30 INJECTION, SOLUTION INTRAVENOUS ONCE
Refills: 0 | Status: COMPLETED | OUTPATIENT
Start: 2020-01-12 | End: 2020-01-12

## 2020-01-12 RX ADMIN — HYDROMORPHONE HYDROCHLORIDE 0.5 MILLIGRAM(S): 2 INJECTION INTRAMUSCULAR; INTRAVENOUS; SUBCUTANEOUS at 18:25

## 2020-01-12 RX ADMIN — HYDROMORPHONE HYDROCHLORIDE 0.5 MILLIGRAM(S): 2 INJECTION INTRAMUSCULAR; INTRAVENOUS; SUBCUTANEOUS at 04:07

## 2020-01-12 RX ADMIN — HYDROMORPHONE HYDROCHLORIDE 0.5 MILLIGRAM(S): 2 INJECTION INTRAMUSCULAR; INTRAVENOUS; SUBCUTANEOUS at 03:52

## 2020-01-12 RX ADMIN — HYDROMORPHONE HYDROCHLORIDE 0.5 MILLIGRAM(S): 2 INJECTION INTRAMUSCULAR; INTRAVENOUS; SUBCUTANEOUS at 18:12

## 2020-01-12 RX ADMIN — BENZOCAINE AND MENTHOL 1 LOZENGE: 5; 1 LIQUID ORAL at 21:10

## 2020-01-12 RX ADMIN — POTASSIUM PHOSPHATE, MONOBASIC POTASSIUM PHOSPHATE, DIBASIC 83.33 MILLIMOLE(S): 236; 224 INJECTION, SOLUTION INTRAVENOUS at 11:45

## 2020-01-12 RX ADMIN — Medication 100 MILLIEQUIVALENT(S): at 11:46

## 2020-01-12 RX ADMIN — HYDROMORPHONE HYDROCHLORIDE 0.5 MILLIGRAM(S): 2 INJECTION INTRAMUSCULAR; INTRAVENOUS; SUBCUTANEOUS at 11:50

## 2020-01-12 RX ADMIN — ENOXAPARIN SODIUM 40 MILLIGRAM(S): 100 INJECTION SUBCUTANEOUS at 11:45

## 2020-01-12 RX ADMIN — Medication 100 MILLIEQUIVALENT(S): at 18:12

## 2020-01-12 RX ADMIN — DEXTROSE MONOHYDRATE, SODIUM CHLORIDE, AND POTASSIUM CHLORIDE 50 MILLILITER(S): 50; .745; 4.5 INJECTION, SOLUTION INTRAVENOUS at 21:08

## 2020-01-12 RX ADMIN — Medication 10 MILLIGRAM(S): at 19:28

## 2020-01-12 RX ADMIN — HYDROMORPHONE HYDROCHLORIDE 0.5 MILLIGRAM(S): 2 INJECTION INTRAMUSCULAR; INTRAVENOUS; SUBCUTANEOUS at 22:30

## 2020-01-12 RX ADMIN — TENOFOVIR DISOPROXIL FUMARATE 300 MILLIGRAM(S): 300 TABLET, FILM COATED ORAL at 11:46

## 2020-01-12 RX ADMIN — Medication 2 MILLIGRAM(S): at 21:08

## 2020-01-12 RX ADMIN — Medication 100 MILLIEQUIVALENT(S): at 13:48

## 2020-01-12 RX ADMIN — HYDROMORPHONE HYDROCHLORIDE 0.5 MILLIGRAM(S): 2 INJECTION INTRAMUSCULAR; INTRAVENOUS; SUBCUTANEOUS at 12:05

## 2020-01-12 RX ADMIN — PANTOPRAZOLE SODIUM 40 MILLIGRAM(S): 20 TABLET, DELAYED RELEASE ORAL at 11:45

## 2020-01-12 RX ADMIN — HYDROMORPHONE HYDROCHLORIDE 0.5 MILLIGRAM(S): 2 INJECTION INTRAMUSCULAR; INTRAVENOUS; SUBCUTANEOUS at 22:45

## 2020-01-12 RX ADMIN — DEXTROSE MONOHYDRATE, SODIUM CHLORIDE, AND POTASSIUM CHLORIDE 50 MILLILITER(S): 50; .745; 4.5 INJECTION, SOLUTION INTRAVENOUS at 19:28

## 2020-01-12 NOTE — PROGRESS NOTE ADULT - ASSESSMENT
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

## 2020-01-12 NOTE — PROVIDER CONTACT NOTE (OTHER) - ACTION/TREATMENT ORDERED:
Will order chest Xray, UA  IV fluid rate was decreased to 75cc/hr overnight   Will continue to monitor

## 2020-01-12 NOTE — PROGRESS NOTE ADULT - SUBJECTIVE AND OBJECTIVE BOX
Pt is a little better, passed flatus 3 times today, less abdominal pain, no n/v, no fevers or chills and ROS is otherwise unremarkable to unchanged. Wife is with him.         Meds:  benzocaine 15 mG/menthol 3.6 mG (Sugar-Free) Lozenge 1 Lozenge Oral every 4 hours PRN  dextrose 5% + sodium chloride 0.9% with potassium chloride 20 mEq/L 1000 milliLiter(s) IV Continuous <Continuous>  doxazosin 2 milliGRAM(s) Oral at bedtime  enoxaparin Injectable 40 milliGRAM(s) SubCutaneous daily  HYDROmorphone  Injectable 0.5 milliGRAM(s) IV Push every 4 hours PRN  HYDROmorphone  Injectable 1 milliGRAM(s) IV Push every 4 hours PRN  melatonin 3 milliGRAM(s) Oral once  pantoprazole  Injectable 40 milliGRAM(s) IV Push daily  potassium chloride  10 mEq/100 mL IVPB 10 milliEquivalent(s) IV Intermittent every 1 hour  tenofovir disoproxil fumarate (VIREAD) 300 milliGRAM(s) Oral daily      Vital Signs Last 24 Hrs  T(C): 36.7 (12 Jan 2020 13:32), Max: 36.9 (11 Jan 2020 21:11)  T(F): 98.1 (12 Jan 2020 13:32), Max: 98.5 (11 Jan 2020 21:11)  HR: 92 (12 Jan 2020 13:32) (90 - 98)  BP: 151/90 (12 Jan 2020 13:32) (134/73 - 152/76)  BP(mean): --  RR: 16 (12 Jan 2020 13:32) (16 - 20)  SpO2: 100% (12 Jan 2020 13:32) (97% - 100%)                          8.2    44.35 )-----------( 130      ( 12 Jan 2020 06:05 )             26.4       01-12    144  |  119<H>  |  5<L>  ----------------------------<  119<H>  3.9   |  16<L>  |  0.65    Ca    7.5<L>      12 Jan 2020 06:05  Phos  1.7     01-12  Mg     2.0     01-12

## 2020-01-12 NOTE — CHART NOTE - NSCHARTNOTEFT_GEN_A_CORE
Called to bedside as patient had audible wheeze per nurse. Patient receiving 1 unit of PRBCs for low H/H today. He denies nausea or vomiting, chest pain, shortness of breath. No bowel movements or flatus in 3 days     On exam, patient was resting comfortably in bed in no acute distress. He reports that he is breathing well.  NGT in place with serous output   Respiratory: end expiratory wheezing heard bilaterally in posterior bases, L>R  Abdomen: distended, soft, nontender   Extremities: 1+ pitting edema bilaterally     CXR showed fluid bilaterally compared to previous.    Plan:  - Follow up CXR read  - Decrease fluids from 125 to 75  - Re-evaluate in the morning as patient's wheezing is improving

## 2020-01-12 NOTE — PROGRESS NOTE ADULT - SUBJECTIVE AND OBJECTIVE BOX
Morning Surgical Progress Note  Patient is a 79y old  Male who presents with a chief complaint of abdominal pain (2020 15:36)      SUBJECTIVE: Patient seen and examined at bedside with surgical team, patient complains of pain with urination. Patient had abdominal pain near incision site, which improved with medication.    Vital Signs Last 24 Hrs  T(C): 36.7 (2020 13:32), Max: 36.9 (2020 21:11)  T(F): 98.1 (2020 13:32), Max: 98.5 (2020 21:11)  HR: 92 (:) (90 - 98)  BP: 151/90 (:) (134/73 - 152/76)  BP(mean): --  RR: 16 (:32) (16 - 20)  SpO2: 100% (:32) (97% - 100%)I&O's Detail    2020 07:01  -  2020 07:00  --------------------------------------------------------  IN:    dextrose 5% + sodium chloride 0.9% with potassium chloride 20 mEq/L: 1900 mL    IV PiggyBack: 650 mL    Packed Red Blood Cells: 300 mL    Sodium Chloride 0.9% IV Bolus: 1000 mL  Total IN: 3850 mL    OUT:    Nasoenteral Tube: 500 mL    Voided: 1400 mL  Total OUT: 1900 mL    Total NET: 1950 mL      2020 07:01  -  2020 15:56  --------------------------------------------------------  IN:    dextrose 5% + sodium chloride 0.9% with potassium chloride 20 mEq/L: 450 mL    IV PiggyBack: 700 mL  Total IN: 1150 mL    OUT:    Nasoenteral Tube: 50 mL    Voided: 100 mL  Total OUT: 150 mL    Total NET: 1000 mL      MEDICATIONS  (STANDING):  dextrose 5% + sodium chloride 0.9% with potassium chloride 20 mEq/L 1000 milliLiter(s) (75 mL/Hr) IV Continuous <Continuous>  doxazosin 2 milliGRAM(s) Oral at bedtime  enoxaparin Injectable 40 milliGRAM(s) SubCutaneous daily  melatonin 3 milliGRAM(s) Oral once  pantoprazole  Injectable 40 milliGRAM(s) IV Push daily  potassium chloride  10 mEq/100 mL IVPB 10 milliEquivalent(s) IV Intermittent every 1 hour  tenofovir disoproxil fumarate (VIREAD) 300 milliGRAM(s) Oral daily    MEDICATIONS  (PRN):  benzocaine 15 mG/menthol 3.6 mG (Sugar-Free) Lozenge 1 Lozenge Oral every 4 hours PRN Sore Throat  HYDROmorphone  Injectable 0.5 milliGRAM(s) IV Push every 4 hours PRN Moderate Pain (4 - 6)  HYDROmorphone  Injectable 1 milliGRAM(s) IV Push every 4 hours PRN Severe Pain (7 - 10)      Physical Exam  Constitutional: A&Ox3, NAD  Respiratory: expiratory wheeze b/l  Cardiac: RRR, S1 and S2  Gastrointestinal: abdomen soft, distended, voluntary guarding; tender to palpation; incisions c/d/i    LABS:                        8.2    44.35 )-----------( 130      ( 2020 06:05 )             26.4         144  |  119<H>  |  5<L>  ----------------------------<  119<H>  3.9   |  16<L>  |  0.65    Ca    7.5<L>      2020 06:05  Phos  1.7       Mg     2.0               Urinalysis Basic - ( 2020 10:35 )    Color: YELLOW / Appearance: CLEAR / S.020 / pH: 6.0  Gluc: NEGATIVE / Ketone: NEGATIVE  / Bili: NEGATIVE / Urobili: NORMAL   Blood: NEGATIVE / Protein: 20 / Nitrite: NEGATIVE   Leuk Esterase: NEGATIVE / RBC: 3-5 / WBC 0-2   Sq Epi: OCC / Non Sq Epi: x / Bacteria: NEGATIVE      ABO Interpretation: B (20 @ 15:59)

## 2020-01-12 NOTE — PROGRESS NOTE ADULT - ASSESSMENT
79 year old male with a PMH of CLL (untreated), cirrhosis (Child B at time of admission), chronic hepatitis B, and gastritis found to have SBO with pneumoperitoneum s/p ex lap, REESE and small bowel resection .    PLAN:  NPO/ IVF  NGT to LWS  Monitor bowel function  f/u upright abdominal x-ray  [facility] Team   [id number]

## 2020-01-13 LAB
ALBUMIN SERPL ELPH-MCNC: 2.4 G/DL — LOW (ref 3.3–5)
ALP SERPL-CCNC: 150 U/L — HIGH (ref 40–120)
ALT FLD-CCNC: 29 U/L — SIGNIFICANT CHANGE UP (ref 4–41)
ANION GAP SERPL CALC-SCNC: 9 MMO/L — SIGNIFICANT CHANGE UP (ref 7–14)
AST SERPL-CCNC: 32 U/L — SIGNIFICANT CHANGE UP (ref 4–40)
BILIRUB DIRECT SERPL-MCNC: 0.5 MG/DL — HIGH (ref 0.1–0.2)
BILIRUB SERPL-MCNC: 0.9 MG/DL — SIGNIFICANT CHANGE UP (ref 0.2–1.2)
BUN SERPL-MCNC: 6 MG/DL — LOW (ref 7–23)
CALCIUM SERPL-MCNC: 7.9 MG/DL — LOW (ref 8.4–10.5)
CHLORIDE SERPL-SCNC: 110 MMOL/L — HIGH (ref 98–107)
CO2 SERPL-SCNC: 19 MMOL/L — LOW (ref 22–31)
CREAT SERPL-MCNC: 0.72 MG/DL — SIGNIFICANT CHANGE UP (ref 0.5–1.3)
GLUCOSE SERPL-MCNC: 89 MG/DL — SIGNIFICANT CHANGE UP (ref 70–99)
HCT VFR BLD CALC: 25.9 % — LOW (ref 39–50)
HGB BLD-MCNC: 8 G/DL — LOW (ref 13–17)
MAGNESIUM SERPL-MCNC: 1.9 MG/DL — SIGNIFICANT CHANGE UP (ref 1.6–2.6)
MCHC RBC-ENTMCNC: 22.3 PG — LOW (ref 27–34)
MCHC RBC-ENTMCNC: 30.9 % — LOW (ref 32–36)
MCV RBC AUTO: 72.1 FL — LOW (ref 80–100)
NRBC # FLD: 0.02 K/UL — SIGNIFICANT CHANGE UP (ref 0–0)
PHOSPHATE SERPL-MCNC: 2.4 MG/DL — LOW (ref 2.5–4.5)
PLATELET # BLD AUTO: 130 K/UL — LOW (ref 150–400)
PMV BLD: SIGNIFICANT CHANGE UP FL (ref 7–13)
POTASSIUM SERPL-MCNC: 4.1 MMOL/L — SIGNIFICANT CHANGE UP (ref 3.5–5.3)
POTASSIUM SERPL-SCNC: 4.1 MMOL/L — SIGNIFICANT CHANGE UP (ref 3.5–5.3)
PROT SERPL-MCNC: 5.6 G/DL — LOW (ref 6–8.3)
RBC # BLD: 3.59 M/UL — LOW (ref 4.2–5.8)
RBC # FLD: 19.5 % — HIGH (ref 10.3–14.5)
SODIUM SERPL-SCNC: 138 MMOL/L — SIGNIFICANT CHANGE UP (ref 135–145)
WBC # BLD: 45.95 K/UL — CRITICAL HIGH (ref 3.8–10.5)
WBC # FLD AUTO: 45.95 K/UL — CRITICAL HIGH (ref 3.8–10.5)

## 2020-01-13 RX ORDER — FUROSEMIDE 40 MG
10 TABLET ORAL ONCE
Refills: 0 | Status: COMPLETED | OUTPATIENT
Start: 2020-01-13 | End: 2020-01-13

## 2020-01-13 RX ORDER — SODIUM CHLORIDE 9 MG/ML
1000 INJECTION, SOLUTION INTRAVENOUS
Refills: 0 | Status: DISCONTINUED | OUTPATIENT
Start: 2020-01-13 | End: 2020-01-14

## 2020-01-13 RX ADMIN — Medication 10 MILLIGRAM(S): at 09:08

## 2020-01-13 RX ADMIN — ENOXAPARIN SODIUM 40 MILLIGRAM(S): 100 INJECTION SUBCUTANEOUS at 13:50

## 2020-01-13 RX ADMIN — Medication 2 MILLIGRAM(S): at 22:31

## 2020-01-13 RX ADMIN — HYDROMORPHONE HYDROCHLORIDE 0.5 MILLIGRAM(S): 2 INJECTION INTRAMUSCULAR; INTRAVENOUS; SUBCUTANEOUS at 14:15

## 2020-01-13 RX ADMIN — HYDROMORPHONE HYDROCHLORIDE 0.5 MILLIGRAM(S): 2 INJECTION INTRAMUSCULAR; INTRAVENOUS; SUBCUTANEOUS at 14:03

## 2020-01-13 RX ADMIN — Medication 63.75 MILLIMOLE(S): at 13:51

## 2020-01-13 RX ADMIN — HYDROMORPHONE HYDROCHLORIDE 0.5 MILLIGRAM(S): 2 INJECTION INTRAMUSCULAR; INTRAVENOUS; SUBCUTANEOUS at 05:10

## 2020-01-13 RX ADMIN — PANTOPRAZOLE SODIUM 40 MILLIGRAM(S): 20 TABLET, DELAYED RELEASE ORAL at 13:51

## 2020-01-13 RX ADMIN — HYDROMORPHONE HYDROCHLORIDE 0.5 MILLIGRAM(S): 2 INJECTION INTRAMUSCULAR; INTRAVENOUS; SUBCUTANEOUS at 18:03

## 2020-01-13 RX ADMIN — TENOFOVIR DISOPROXIL FUMARATE 300 MILLIGRAM(S): 300 TABLET, FILM COATED ORAL at 13:51

## 2020-01-13 RX ADMIN — HYDROMORPHONE HYDROCHLORIDE 0.5 MILLIGRAM(S): 2 INJECTION INTRAMUSCULAR; INTRAVENOUS; SUBCUTANEOUS at 18:15

## 2020-01-13 RX ADMIN — HYDROMORPHONE HYDROCHLORIDE 0.5 MILLIGRAM(S): 2 INJECTION INTRAMUSCULAR; INTRAVENOUS; SUBCUTANEOUS at 05:25

## 2020-01-13 RX ADMIN — SODIUM CHLORIDE 50 MILLILITER(S): 9 INJECTION, SOLUTION INTRAVENOUS at 18:03

## 2020-01-13 NOTE — PROGRESS NOTE ADULT - ASSESSMENT
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

## 2020-01-13 NOTE — PROGRESS NOTE ADULT - ATTENDING COMMENTS
I have personally interviewed and examined this patient, reviewed pertinent labs and imaging, and discussed the case with colleagues, residents, and physician assistants on B Team rounds.    passing flatus but feeling very bloated  abd distended and tympanitic  periumbilical blanching erythema and tenderness->staples removed and liquified hematoma evacuated  WBC 45K    The active care issues are:  1. ileus    If abd remains this distended, then will obtain CT scan to eval. for intra-abd abscess in setting of immunocompromise (CLL and cirrhosis).    The Acute Care Surgery (B Team) Attending Group Practice:  Dr. Ivy Soria, Dr. Bart Hdez, Dr. Oren Daniels, Dr. Jian Lei, Dr. Lon Ross    urgent issues - spectra [id number] or [id number]  nonurgent issues - [phone]  patient appointments or afterhours - [phone]

## 2020-01-13 NOTE — PROGRESS NOTE ADULT - SUBJECTIVE AND OBJECTIVE BOX
Pt is feeling better, NGT is out, has not passed much gas today, has swelling of the scrotum and legs, on fluid restriction. Pain abdomen is controlled. None other active or bothersome symptoms on ROS. Wife is with him.       Meds:  benzocaine 15 mG/menthol 3.6 mG (Sugar-Free) Lozenge 1 Lozenge Oral every 4 hours PRN  dextrose 5% + sodium chloride 0.45%. 1000 milliLiter(s) IV Continuous <Continuous>  doxazosin 2 milliGRAM(s) Oral at bedtime  enoxaparin Injectable 40 milliGRAM(s) SubCutaneous daily  HYDROmorphone  Injectable 0.5 milliGRAM(s) IV Push every 4 hours PRN  HYDROmorphone  Injectable 1 milliGRAM(s) IV Push every 4 hours PRN  melatonin 3 milliGRAM(s) Oral once  pantoprazole  Injectable 40 milliGRAM(s) IV Push daily  tenofovir disoproxil fumarate (VIREAD) 300 milliGRAM(s) Oral daily      Vital Signs Last 24 Hrs  T(C): 36.7 (13 Jan 2020 17:05), Max: 36.9 (13 Jan 2020 05:10)  T(F): 98 (13 Jan 2020 17:05), Max: 98.5 (13 Jan 2020 05:10)  HR: 89 (13 Jan 2020 17:05) (70 - 99)  BP: 135/79 (13 Jan 2020 17:05) (125/73 - 149/72)  BP(mean): --  RR: 16 (13 Jan 2020 17:05) (14 - 18)  SpO2: 100% (13 Jan 2020 17:05) (97% - 100%)                          8.0    45.95 )-----------( 130      ( 13 Jan 2020 06:10 )             25.9       01-13    138  |  110<H>  |  6<L>  ----------------------------<  89  4.1   |  19<L>  |  0.72    Ca    7.9<L>      13 Jan 2020 06:10  Phos  2.4     01-13  Mg     1.9     01-13    TPro  5.6<L>  /  Alb  2.4<L>  /  TBili  0.9  /  DBili  0.5<H>  /  AST  32  /  ALT  29  /  AlkPhos  150<H>  01-12            Path: pending

## 2020-01-14 LAB
ANION GAP SERPL CALC-SCNC: 10 MMO/L — SIGNIFICANT CHANGE UP (ref 7–14)
BUN SERPL-MCNC: 8 MG/DL — SIGNIFICANT CHANGE UP (ref 7–23)
CALCIUM SERPL-MCNC: 7.8 MG/DL — LOW (ref 8.4–10.5)
CHLORIDE SERPL-SCNC: 105 MMOL/L — SIGNIFICANT CHANGE UP (ref 98–107)
CO2 SERPL-SCNC: 20 MMOL/L — LOW (ref 22–31)
CREAT SERPL-MCNC: 0.72 MG/DL — SIGNIFICANT CHANGE UP (ref 0.5–1.3)
GLUCOSE SERPL-MCNC: 109 MG/DL — HIGH (ref 70–99)
HCT VFR BLD CALC: 25.3 % — LOW (ref 39–50)
HGB BLD-MCNC: 8 G/DL — LOW (ref 13–17)
MAGNESIUM SERPL-MCNC: 1.7 MG/DL — SIGNIFICANT CHANGE UP (ref 1.6–2.6)
MCHC RBC-ENTMCNC: 22.8 PG — LOW (ref 27–34)
MCHC RBC-ENTMCNC: 31.6 % — LOW (ref 32–36)
MCV RBC AUTO: 72.1 FL — LOW (ref 80–100)
NRBC # FLD: 0.05 K/UL — SIGNIFICANT CHANGE UP (ref 0–0)
PHOSPHATE SERPL-MCNC: 2.7 MG/DL — SIGNIFICANT CHANGE UP (ref 2.5–4.5)
PLATELET # BLD AUTO: 131 K/UL — LOW (ref 150–400)
PMV BLD: SIGNIFICANT CHANGE UP FL (ref 7–13)
POTASSIUM SERPL-MCNC: 3.7 MMOL/L — SIGNIFICANT CHANGE UP (ref 3.5–5.3)
POTASSIUM SERPL-SCNC: 3.7 MMOL/L — SIGNIFICANT CHANGE UP (ref 3.5–5.3)
RBC # BLD: 3.51 M/UL — LOW (ref 4.2–5.8)
RBC # FLD: 20 % — HIGH (ref 10.3–14.5)
SODIUM SERPL-SCNC: 135 MMOL/L — SIGNIFICANT CHANGE UP (ref 135–145)
WBC # BLD: 41.44 K/UL — CRITICAL HIGH (ref 3.8–10.5)
WBC # FLD AUTO: 41.44 K/UL — CRITICAL HIGH (ref 3.8–10.5)

## 2020-01-14 PROCEDURE — 74177 CT ABD & PELVIS W/CONTRAST: CPT | Mod: 26

## 2020-01-14 PROCEDURE — 74018 RADEX ABDOMEN 1 VIEW: CPT | Mod: 26

## 2020-01-14 RX ORDER — MAGNESIUM SULFATE 500 MG/ML
1 VIAL (ML) INJECTION ONCE
Refills: 0 | Status: COMPLETED | OUTPATIENT
Start: 2020-01-14 | End: 2020-01-14

## 2020-01-14 RX ORDER — SODIUM CHLORIDE 9 MG/ML
1000 INJECTION, SOLUTION INTRAVENOUS
Refills: 0 | Status: DISCONTINUED | OUTPATIENT
Start: 2020-01-14 | End: 2020-01-14

## 2020-01-14 RX ORDER — FUROSEMIDE 40 MG
20 TABLET ORAL ONCE
Refills: 0 | Status: COMPLETED | OUTPATIENT
Start: 2020-01-14 | End: 2020-01-14

## 2020-01-14 RX ADMIN — Medication 2 MILLIGRAM(S): at 22:35

## 2020-01-14 RX ADMIN — HYDROMORPHONE HYDROCHLORIDE 1 MILLIGRAM(S): 2 INJECTION INTRAMUSCULAR; INTRAVENOUS; SUBCUTANEOUS at 05:41

## 2020-01-14 RX ADMIN — Medication 1 ENEMA: at 18:08

## 2020-01-14 RX ADMIN — Medication 100 GRAM(S): at 10:05

## 2020-01-14 RX ADMIN — HYDROMORPHONE HYDROCHLORIDE 1 MILLIGRAM(S): 2 INJECTION INTRAMUSCULAR; INTRAVENOUS; SUBCUTANEOUS at 10:37

## 2020-01-14 RX ADMIN — ENOXAPARIN SODIUM 40 MILLIGRAM(S): 100 INJECTION SUBCUTANEOUS at 10:08

## 2020-01-14 RX ADMIN — HYDROMORPHONE HYDROCHLORIDE 0.5 MILLIGRAM(S): 2 INJECTION INTRAMUSCULAR; INTRAVENOUS; SUBCUTANEOUS at 01:03

## 2020-01-14 RX ADMIN — HYDROMORPHONE HYDROCHLORIDE 1 MILLIGRAM(S): 2 INJECTION INTRAMUSCULAR; INTRAVENOUS; SUBCUTANEOUS at 14:15

## 2020-01-14 RX ADMIN — Medication 3 MILLIGRAM(S): at 04:06

## 2020-01-14 RX ADMIN — HYDROMORPHONE HYDROCHLORIDE 1 MILLIGRAM(S): 2 INJECTION INTRAMUSCULAR; INTRAVENOUS; SUBCUTANEOUS at 05:56

## 2020-01-14 RX ADMIN — PANTOPRAZOLE SODIUM 40 MILLIGRAM(S): 20 TABLET, DELAYED RELEASE ORAL at 10:07

## 2020-01-14 RX ADMIN — Medication 20 MILLIGRAM(S): at 18:08

## 2020-01-14 RX ADMIN — HYDROMORPHONE HYDROCHLORIDE 0.5 MILLIGRAM(S): 2 INJECTION INTRAMUSCULAR; INTRAVENOUS; SUBCUTANEOUS at 01:18

## 2020-01-14 RX ADMIN — HYDROMORPHONE HYDROCHLORIDE 1 MILLIGRAM(S): 2 INJECTION INTRAMUSCULAR; INTRAVENOUS; SUBCUTANEOUS at 14:45

## 2020-01-14 RX ADMIN — TENOFOVIR DISOPROXIL FUMARATE 300 MILLIGRAM(S): 300 TABLET, FILM COATED ORAL at 10:07

## 2020-01-14 RX ADMIN — HYDROMORPHONE HYDROCHLORIDE 1 MILLIGRAM(S): 2 INJECTION INTRAMUSCULAR; INTRAVENOUS; SUBCUTANEOUS at 10:07

## 2020-01-14 NOTE — DIETITIAN INITIAL EVALUATION ADULT. - CONTINUE CURRENT NUTRITION CARE PLAN
1. Suggest advance diet as tolerated from clear liquids--Full Liquids--> Low fiber diet 2. Please add PO supplement Ensure Clear x 3 with clear liquid diet and switch to Ensure Enlive when advanced to full liquids. 3. Monitor weights, labs, BM's, skin integrity, p.o. intake/tolerance to PO intake 4. Please Encourage po intake, assist with meals and menu selections, provide alternatives PRN.

## 2020-01-14 NOTE — DIETITIAN INITIAL EVALUATION ADULT. - PERTINENT LABORATORY DATA
01-14 Na135 mmol/L Glu 109 mg/dL<H> K+ 3.7 mmol/L Cr  0.72 mg/dL BUN 8 mg/dL 01-14 Phos 2.7 mg/dL 01-12 Alb 2.4 g/dL<L>

## 2020-01-14 NOTE — DIETITIAN INITIAL EVALUATION ADULT. - PHYSICAL APPEARANCE
other (specify) Nutrition focused physical exam conducted - found signs of malnutrition [ ]absent [X ]present   Subcutaneous fat loss: [Moderate ] Orbital fat pads region, [severe ]Buccal fat region, [ severe]Triceps region,  Muscle wasting: [moderate]Temples region, [moderate ]Clavicle region

## 2020-01-14 NOTE — CHART NOTE - NSCHARTNOTEFT_GEN_A_CORE
NUTRITION SERVICES     Upon Nutritional Assessment by the Registered Dietitian your patient was determined to meet criteria/ has evidence of the following diagnosis/diagnoses:  [ ] Mild Protein Calorie Malnutrition   [ ] Moderate Protein Calorie Malnutrition   [X ] Severe Protein Calorie Malnutrition   [ ] Unspecified Protein Calorie Malnutrition   [ ] Underweight / BMI <19  [ ] Morbid Obesity / BMI >40    Findings as based on:  •  Comprehensive nutritional assessment and consultation    Please refer to Initial Dietitian Evaluation or Nutrition Follow-up via documents section of [employer] EMR for further recommendations.

## 2020-01-14 NOTE — DIETITIAN INITIAL EVALUATION ADULT. - PERTINENT MEDS FT
MEDICATIONS  (STANDING):  dextrose 5% + sodium chloride 0.45% 1000 milliLiter(s) (50 mL/Hr) IV Continuous <Continuous>  doxazosin 2 milliGRAM(s) Oral at bedtime  enoxaparin Injectable 40 milliGRAM(s) SubCutaneous daily  pantoprazole  Injectable 40 milliGRAM(s) IV Push daily  tenofovir disoproxil fumarate (VIREAD) 300 milliGRAM(s) Oral daily

## 2020-01-14 NOTE — DIETITIAN INITIAL EVALUATION ADULT. - OTHER INFO
79 year old male with medical history inclusive of CLL (untreated), cirrhosis, chronic hepatitis B, and gastritis, admitted with abdominal pain, +abdominal distention, endorsed similar pain in September when admitted to [facility] for an ileus per chart review. Met with patient and wife at bedside. Patient remains NPO/clear liquids x 7days, started on clear liquids yesterday tolerating small sips of liquids. Per wife at bedside, patient had mostly jello, tea and broth and did not consume juice. Patient denies any nausea/vomiting/diarrhea/constipation or difficulty chewing and swallowing. Avoids beef no food allergies reported-same implemented. Offered PO supplement Ensure Clear which he was amenable to. Prior to admission, patient was gradually eating better s/p discharge from hospital until readmitted 2/2 pain, consuming 50-60% on regular diet with Ensure PO supplement x 1per day. Per wife, noticed patient was losing weight since last admission. Usual weight of 150-153lbs was 148.1lbs/67.2kg on 9/22/19 (last admission) and this admission weighs 126lbs/57.2kg indicative of further weight loss of 27lbs/17% over 4months.

## 2020-01-14 NOTE — DIETITIAN INITIAL EVALUATION ADULT. - ENERGY NEEDS
Height (cm): 165.1 (07 Jan 2020 02:15) Weight (kg): 57.2 (07 Jan 2020 02:15)  BMI (kg/m2): 21 (07 Jan 2020 02:15)  IBW:136lbs (+/-10%) %IBW:92%  No edema or pressure injury noted.   Mid abdomen surgical incision noted.

## 2020-01-14 NOTE — PROGRESS NOTE ADULT - SUBJECTIVE AND OBJECTIVE BOX
Surgery Daily Progress Note  =====================================================  Interval/Overnight Events:     No acute events overnight.    HPI:  Patient is an 79 year old male with a PMH of CLL (untreated), cirrhosis, chronic hepatitis B, and gastritis who is presenting with abdominal pain since yesterday.  The pain has been getting worse, and he has had increasing abdominal distention during this time.  He had similar pain in September, where he came to [facility] for an ileus.  He denies having any fevers or chills but did have nausea as well as vomiting this morning.  His last BM was this morning and it was normal.  His last EGD was 2 years ago which showed gastritis and his last colonoscopy was 10 years ago and unremarkable. (07 Jan 2020 02:06)    PAST MEDICAL & SURGICAL HISTORY:  Cirrhosis  HTN (hypertension)  H/O gastritis  CLL (chronic lymphocytic leukemia)  No significant past surgical history      ALLERGIES:  Beef (Unknown)  No Known Drug Allergies    --------------------------------------------------------------------------------------    MEDICATIONS:    Neurologic Medications  HYDROmorphone  Injectable 0.5 milliGRAM(s) IV Push every 4 hours PRN Moderate Pain (4 - 6)  HYDROmorphone  Injectable 1 milliGRAM(s) IV Push every 4 hours PRN Severe Pain (7 - 10)    Cardiovascular Medications  doxazosin 2 milliGRAM(s) Oral at bedtime    Gastrointestinal Medications  dextrose 5% + sodium chloride 0.45% 1000 milliLiter(s) IV Continuous <Continuous>  pantoprazole  Injectable 40 milliGRAM(s) IV Push daily    Hematologic/Oncologic Medications  enoxaparin Injectable 40 milliGRAM(s) SubCutaneous daily    Antimicrobial/Immunologic Medications  tenofovir disoproxil fumarate (VIREAD) 300 milliGRAM(s) Oral daily    Topical/Other Medications  benzocaine 15 mG/menthol 3.6 mG (Sugar-Free) Lozenge 1 Lozenge Oral every 4 hours PRN Sore Throat    --------------------------------------------------------------------------------------    VITAL SIGNS:    --------------------------------------------------------------------------------------    INS AND OUTS:  I&O's Detail    13 Jan 2020 07:01  -  14 Jan 2020 07:00  --------------------------------------------------------  IN:    dextrose 5% + sodium chloride 0.45%.: 650 mL    IV PiggyBack: 250 mL    Oral Fluid: 250 mL  Total IN: 1150 mL    OUT:    Voided: 1450 mL  Total OUT: 1450 mL    Total NET: -300 mL      14 Jan 2020 07:01  -  14 Jan 2020 10:45  --------------------------------------------------------  IN:  Total IN: 0 mL    OUT:    Voided: 100 mL  Total OUT: 100 mL    Total NET: -100 mL      --------------------------------------------------------------------------------------    EXAM  NEUROLOGY    Exam: Normal, in no acute distress.  A/Ox4.    HEENT  Exam: Normocephalic, atraumatic.    RESPIRATORY  Exam: Nonlabored respirations, normal chest wall expansion.    GI/NUTRITION  Exam: Abdomen is distended, tympanic and nontender.  Wound:  Midline incision with improved erythema and without purulent drainage.  Current Diet: Clear liquid.    MUSCULOSKELETAL  Exam: All extremities moving spontaneously without limitations.     METABOLIC / FLUIDS / ELECTROLYTES  dextrose 5% + sodium chloride 0.45% 1000 milliLiter(s) IV Continuous <Continuous>    HEMATOLOGIC  [x] VTE Prophylaxis: enoxaparin Injectable 40 milliGRAM(s) SubCutaneous daily    INFECTIOUS DISEASE  Antimicrobials/Immunologic Medications:  tenofovir disoproxil fumarate (VIREAD) 300 milliGRAM(s) Oral daily    --------------------------------------------------------------------------------------    LABS                          8.0    41.44 )-----------( 131      ( 14 Jan 2020 05:45 )             25.3     01-14    135  |  105  |  8   ----------------------------<  109<H>  3.7   |  20<L>  |  0.72    Ca    7.8<L>      14 Jan 2020 05:45  Phos  2.7     01-14  Mg     1.7     01-14    TPro  5.6<L>  /  Alb  2.4<L>  /  TBili  0.9  /  DBili  0.5<H>  /  AST  32  /  ALT  29  /  AlkPhos  150<H>  01-12    --------------------------------------------------------------------------------------

## 2020-01-14 NOTE — DIETITIAN INITIAL EVALUATION ADULT. - ETIOLOGY
patient meets criteria for severe malnutrition in the context of acute/chronic illness related to physiological causes

## 2020-01-14 NOTE — PROGRESS NOTE ADULT - ASSESSMENT
79 year old male with a PMH of CLL (untreated), cirrhosis (Child B at time of admission), chronic hepatitis B, and gastritis found to have SBO with pneumoperitoneum s/p ex lap, REESE and small bowel resection 1/7. On 1/13 midline incision erythematous and tender on exam, staples removed and liquid evacuated yesterday during PM rounds. Today, midline incision erythema is improved without purulent drainage. Patient with persistent distended and tympanic abdomen. Complains of feeling bloated, +flatus, -BM. Also complains of abdominal pain located around the umbilicus, nonradiating, rated at a 5/10, relieved with current pain regimen. Tolerating small amount of clear liquid diet. Denies nausea, vomiting.    Plan:  - CT Abdomen with PO and IV contrast.  - Continue with clear liquid diet.  - Continue pain regimen.  - Monitor bowel function.  - Monitor I & O's.   - DVT ppx with Lovenox.

## 2020-01-14 NOTE — DIETITIAN INITIAL EVALUATION ADULT. - SIGNS/SYMPTOMS
49yo male with CKD4 s/p left brachiocephalic AVF creation on 1/44/42 presents for post-op check  Denies any significant pain, numbness, tingling of the incision or hand  Can sense thrill through fistula  Excellent thrill and +bruit, Palpable radial pulse, hand warm well perfused  Will obtain ultrasound in 3 weeks to assess for maturation  Clinic f/u to discuss results  <50% over 7days, weight loss of 17% over 4 months suboptimal po intake PTA and NPO/clear liquids status x 7 days course of admission

## 2020-01-14 NOTE — PROGRESS NOTE ADULT - ATTENDING COMMENTS
tolerating sips of clears and passing flatus  abd softer today but still distended ?ascites versus air filled loops of small bowel  WBC down to 41K    I think his ileus is resolving but intraabdominal fluid/anasarca is increasing.  Will f/u CT scan today to assure ourselves no remaining intraabdominal nidus of infection.

## 2020-01-15 DIAGNOSIS — K72.90 HEPATIC FAILURE, UNSPECIFIED WITHOUT COMA: ICD-10-CM

## 2020-01-15 LAB
ANION GAP SERPL CALC-SCNC: 7 MMO/L — SIGNIFICANT CHANGE UP (ref 7–14)
BUN SERPL-MCNC: 8 MG/DL — SIGNIFICANT CHANGE UP (ref 7–23)
CALCIUM SERPL-MCNC: 7.6 MG/DL — LOW (ref 8.4–10.5)
CHLORIDE SERPL-SCNC: 101 MMOL/L — SIGNIFICANT CHANGE UP (ref 98–107)
CO2 SERPL-SCNC: 22 MMOL/L — SIGNIFICANT CHANGE UP (ref 22–31)
CREAT SERPL-MCNC: 0.74 MG/DL — SIGNIFICANT CHANGE UP (ref 0.5–1.3)
GLUCOSE SERPL-MCNC: 113 MG/DL — HIGH (ref 70–99)
HCT VFR BLD CALC: 26.3 % — LOW (ref 39–50)
HGB BLD-MCNC: 8.2 G/DL — LOW (ref 13–17)
MAGNESIUM SERPL-MCNC: 1.8 MG/DL — SIGNIFICANT CHANGE UP (ref 1.6–2.6)
MCHC RBC-ENTMCNC: 22.5 PG — LOW (ref 27–34)
MCHC RBC-ENTMCNC: 31.2 % — LOW (ref 32–36)
MCV RBC AUTO: 72.1 FL — LOW (ref 80–100)
NRBC # FLD: 0.04 K/UL — SIGNIFICANT CHANGE UP (ref 0–0)
PHOSPHATE SERPL-MCNC: 2.7 MG/DL — SIGNIFICANT CHANGE UP (ref 2.5–4.5)
PLATELET # BLD AUTO: 151 K/UL — SIGNIFICANT CHANGE UP (ref 150–400)
PMV BLD: 11.1 FL — SIGNIFICANT CHANGE UP (ref 7–13)
POTASSIUM SERPL-MCNC: 3.8 MMOL/L — SIGNIFICANT CHANGE UP (ref 3.5–5.3)
POTASSIUM SERPL-SCNC: 3.8 MMOL/L — SIGNIFICANT CHANGE UP (ref 3.5–5.3)
RBC # BLD: 3.65 M/UL — LOW (ref 4.2–5.8)
RBC # FLD: 20.1 % — HIGH (ref 10.3–14.5)
SODIUM SERPL-SCNC: 130 MMOL/L — LOW (ref 135–145)
WBC # BLD: 47.37 K/UL — CRITICAL HIGH (ref 3.8–10.5)
WBC # FLD AUTO: 47.37 K/UL — CRITICAL HIGH (ref 3.8–10.5)

## 2020-01-15 PROCEDURE — 99223 1ST HOSP IP/OBS HIGH 75: CPT | Mod: GC

## 2020-01-15 RX ORDER — LANOLIN ALCOHOL/MO/W.PET/CERES
3 CREAM (GRAM) TOPICAL AT BEDTIME
Refills: 0 | Status: DISCONTINUED | OUTPATIENT
Start: 2020-01-15 | End: 2020-01-29

## 2020-01-15 RX ORDER — FUROSEMIDE 40 MG
40 TABLET ORAL ONCE
Refills: 0 | Status: COMPLETED | OUTPATIENT
Start: 2020-01-15 | End: 2020-01-15

## 2020-01-15 RX ORDER — ACETAMINOPHEN 500 MG
650 TABLET ORAL EVERY 6 HOURS
Refills: 0 | Status: DISCONTINUED | OUTPATIENT
Start: 2020-01-15 | End: 2020-01-30

## 2020-01-15 RX ORDER — TAMSULOSIN HYDROCHLORIDE 0.4 MG/1
0.4 CAPSULE ORAL AT BEDTIME
Refills: 0 | Status: DISCONTINUED | OUTPATIENT
Start: 2020-01-15 | End: 2020-01-29

## 2020-01-15 RX ORDER — OXYCODONE HYDROCHLORIDE 5 MG/1
10 TABLET ORAL EVERY 6 HOURS
Refills: 0 | Status: DISCONTINUED | OUTPATIENT
Start: 2020-01-15 | End: 2020-01-22

## 2020-01-15 RX ORDER — LACTULOSE 10 G/15ML
10 SOLUTION ORAL DAILY
Refills: 0 | Status: DISCONTINUED | OUTPATIENT
Start: 2020-01-15 | End: 2020-01-17

## 2020-01-15 RX ORDER — OXYCODONE HYDROCHLORIDE 5 MG/1
5 TABLET ORAL EVERY 6 HOURS
Refills: 0 | Status: DISCONTINUED | OUTPATIENT
Start: 2020-01-15 | End: 2020-01-22

## 2020-01-15 RX ADMIN — LACTULOSE 10 GRAM(S): 10 SOLUTION ORAL at 15:56

## 2020-01-15 RX ADMIN — Medication 40 MILLIGRAM(S): at 15:56

## 2020-01-15 RX ADMIN — BENZOCAINE AND MENTHOL 1 LOZENGE: 5; 1 LIQUID ORAL at 23:12

## 2020-01-15 RX ADMIN — Medication 1 ENEMA: at 13:30

## 2020-01-15 RX ADMIN — BENZOCAINE AND MENTHOL 1 LOZENGE: 5; 1 LIQUID ORAL at 16:05

## 2020-01-15 RX ADMIN — TENOFOVIR DISOPROXIL FUMARATE 300 MILLIGRAM(S): 300 TABLET, FILM COATED ORAL at 15:57

## 2020-01-15 RX ADMIN — PANTOPRAZOLE SODIUM 40 MILLIGRAM(S): 20 TABLET, DELAYED RELEASE ORAL at 13:31

## 2020-01-15 RX ADMIN — ENOXAPARIN SODIUM 40 MILLIGRAM(S): 100 INJECTION SUBCUTANEOUS at 13:30

## 2020-01-15 RX ADMIN — TAMSULOSIN HYDROCHLORIDE 0.4 MILLIGRAM(S): 0.4 CAPSULE ORAL at 22:17

## 2020-01-15 NOTE — CONSULT NOTE ADULT - SUBJECTIVE AND OBJECTIVE BOX
ZAN ROBLES  79y  Male  78 yo M w/ pmhx on CLL, chronic HBV w/ cirrhosis previously compensated, HTN presenting 1/6 for abdominal pain.  found to have SBO with pneumoperitoneum s/p ex lap, REESE and small bowel resection 1/7. On 1/13 midline incision erythematous and tender on exam, now with improved erythema after removing staples. Patient with persistent distended and tympanic abdomen. CT obtained after persistent abdominal distention and ileus which showed ascites but no obstruction and follow up xray significant for contrast in the colon.    For cirrhosis hx patient follows with Dr. Lei, on Viread for HBV, was previously on furosemide which was dcd 12/2019. Has been compensated since.       Patient is a 79y old  Male who presents with a chief complaint of abdominal pain (15 Klaus 2020 03:33)        PAST MEDICAL/SURGICAL HISTORY  PAST MEDICAL & SURGICAL HISTORY:  Cirrhosis  HTN (hypertension)  H/O gastritis  CLL (chronic lymphocytic leukemia)  No significant past surgical history      REVIEW OF SYSTEMS:  CONSTITUTIONAL: No fever, weight loss, or fatigue  EYES: No eye pain, visual disturbances, or discharge  ENMT:  No difficulty hearing, tinnitus, vertigo; No sinus or throat pain  NECK: No pain or stiffness  BREASTS: No pain, masses, or nipple discharge  RESPIRATORY: No cough, wheezing, chills or hemoptysis; No shortness of breath  CARDIOVASCULAR: No chest pain, palpitations, dizziness, or leg swelling  GASTROINTESTINAL: No abdominal or epigastric pain. No nausea, vomiting, or hematemesis; No diarrhea or constipation. No melena or hematochezia.  GENITOURINARY: No dysuria, frequency, hematuria, or incontinence  NEUROLOGICAL: No headaches, memory loss, loss of strength, numbness, or tremors  SKIN: No itching, burning, rashes, or lesions   LYMPH NODES: No enlarged glands  ENDOCRINE: No heat or cold intolerance; No hair loss  MUSCULOSKELETAL: No joint pain or swelling; No muscle, back, or extremity pain  PSYCHIATRIC: No depression, anxiety, mood swings, or difficulty sleeping  HEME/LYMPH: No easy bruising, or bleeding gums  ALLERY AND IMMUNOLOGIC: No hives or eczema    T(C): 36.4 (01-15-20 @ 09:57), Max: 37.2 (01-14-20 @ 21:25)  HR: 97 (01-15-20 @ 09:57) (82 - 97)  BP: 118/65 (01-15-20 @ 09:57) (118/65 - 147/83)  RR: 17 (01-15-20 @ 09:57) (16 - 17)  SpO2: 99% (01-15-20 @ 09:57) (97% - 100%)  Wt(kg): --Vital Signs Last 24 Hrs  T(C): 36.4 (15 Klaus 2020 09:57), Max: 37.2 (14 Jan 2020 21:25)  T(F): 97.5 (15 Klaus 2020 09:57), Max: 98.9 (14 Jan 2020 21:25)  HR: 97 (15 Klaus 2020 09:57) (82 - 97)  BP: 118/65 (15 Klaus 2020 09:57) (118/65 - 147/83)  BP(mean): --  RR: 17 (15 Klaus 2020 09:57) (16 - 17)  SpO2: 99% (15 Klaus 2020 09:57) (97% - 100%)    PHYSICAL EXAM:  GENERAL: NAD, well-groomed, well-developed  HEAD:  Atraumatic, Normocephalic  EYES: EOMI, PERRLA, conjunctiva and sclera clear  ENMT: No tonsillar erythema, exudates, or enlargement; Moist mucous membranes, Good dentition, No lesions  NECK: Supple, No JVD, Normal thyroid  NERVOUS SYSTEM:  Alert & Oriented X3, Good concentration; Motor Strength 5/5 B/L upper and lower extremities; DTRs 2+ intact and symmetric  CHEST/LUNG: Clear to percussion bilaterally; No rales, rhonchi, wheezing, or rubs  HEART: Regular rate and rhythm; No murmurs, rubs, or gallops  ABDOMEN: Soft, Nontender, Nondistended; Bowel sounds present  EXTREMITIES:  2+ Peripheral Pulses, No clubbing, cyanosis, or edema  LYMPH: No lymphadenopathy noted  SKIN: No rashes or lesions    Consultant(s) Notes Reviewed:  [x ] YES  [ ] NO  Care Discussed with Consultants/Other Providers [ x] YES  [ ] NO    LABS:  CBC   01-15-20 @ 07:30  Hematcorit 26.3  Hemoglobin 8.2  Mean Cell Hemoglobin 22.5  Platelet Count-Automated 151  RBC Count 3.65  Red Cell Distrib Width 20.1  Wbc Count 47.37      BMP  01-15-20 @ 07:30  Anion Gap. Serum 7  Blood Urea Nitrogen,Serm 8  Calcium, Total Serum 7.6  Carbon Dioxide, Serum 22  Chloride, Serum 101  Creatinine, Serum 0.74  eGFR in  101  eGFR in Non Afican American 87  Gloucose, serum 113  Potassium, Serum 3.8  Sodium, Serum 130              01-14-20 @ 05:45  Anion Gap. Serum 10  Blood Urea Nitrogen,Serm 8  Calcium, Total Serum 7.8  Carbon Dioxide, Serum 20  Chloride, Serum 105  Creatinine, Serum 0.72  eGFR in  102  eGFR in Non Afican American 88  Gloucose, serum 109  Potassium, Serum 3.7  Sodium, Serum 135              01-13-20 @ 06:10  Anion Gap. Serum 9  Blood Urea Nitrogen,Serm 6  Calcium, Total Serum 7.9  Carbon Dioxide, Serum 19  Chloride, Serum 110  Creatinine, Serum 0.72  eGFR in  102  eGFR in Non Afican American 88  Gloucose, serum 89  Potassium, Serum 4.1  Sodium, Serum 138              01-12-20 @ 21:53  Anion Gap. Serum 7  Blood Urea Nitrogen,Serm 5  Calcium, Total Serum 8.0  Carbon Dioxide, Serum 20  Chloride, Serum 111  Creatinine, Serum 0.72  eGFR in  102  eGFR in Non Afican American 88  Gloucose, serum 101  Potassium, Serum 4.8  Sodium, Serum 138                  CMP  01-15-20 @ 07:30  Jeanie Aminotransferase(ALT/SGPT)--  Albumin, Serum --  Alkaline Phosphatase, Serum --  Anion Gap, Serum 7  Aspartate Aminotransferase (AST/SGOT)--  Bilirubin Total, Serum --  Blood Urea Nitrogen, Serum 8  Calcium,Total Serum 7.6  Carbon Dioxide, Serum 22  Chloride, Serum 101  Creatinine, Serum 0.74  eGFR if  101  eGFR if Non African American 87  Glucose, Serum 113  Potassium, Serum 3.8  Protein Total, Serum --  Sodium, Serum 130                      01-14-20 @ 05:45  Jeanie Aminotransferase(ALT/SGPT)--  Albumin, Serum --  Alkaline Phosphatase, Serum --  Anion Gap, Serum 10  Aspartate Aminotransferase (AST/SGOT)--  Bilirubin Total, Serum --  Blood Urea Nitrogen, Serum 8  Calcium,Total Serum 7.8  Carbon Dioxide, Serum 20  Chloride, Serum 105  Creatinine, Serum 0.72  eGFR if  102  eGFR if Non African American 88  Glucose, Serum 109  Potassium, Serum 3.7  Protein Total, Serum --  Sodium, Serum 135                      01-13-20 @ 06:10  Jeanie Aminotransferase(ALT/SGPT)--  Albumin, Serum --  Alkaline Phosphatase, Serum --  Anion Gap, Serum 9  Aspartate Aminotransferase (AST/SGOT)--  Bilirubin Total, Serum --  Blood Urea Nitrogen, Serum 6  Calcium,Total Serum 7.9  Carbon Dioxide, Serum 19  Chloride, Serum 110  Creatinine, Serum 0.72  eGFR if  102  eGFR if Non African American 88  Glucose, Serum 89  Potassium, Serum 4.1  Protein Total, Serum --  Sodium, Serum 138                      01-12-20 @ 21:53  Jeanie Aminotransferase(ALT/SGPT)29  Albumin, Serum 2.4  Alkaline Phosphatase, Serum 150  Anion Gap, Serum 7  Aspartate Aminotransferase (AST/SGOT)32  Bilirubin Total, Serum 0.9  Blood Urea Nitrogen, Serum 5  Calcium,Total Serum 8.0  Carbon Dioxide, Serum 20  Chloride, Serum 111  Creatinine, Serum 0.72  eGFR if  102  eGFR if Non African American 88  Glucose, Serum 101  Potassium, Serum 4.8  Protein Total, Serum 5.6  Sodium, Serum 138                          PT/INR      Amylase/Lipase            RADIOLOGY & ADDITIONAL TESTS:    Imaging Personally Reviewed:  [ ] YES  [ ] NO ZAN ROBLES  79y  Male  80 yo M w/ pmhx on CLL, chronic HBV w/ cirrhosis previously compensated, HTN presenting 1/6 for abdominal pain.  Patient was found to have SBO with pneumoperitoneum s/p ex lap, REESE and small bowel resection 1/7. Patient has been tolerating PO over the past two days however has had worsening LE edema and abdominal distention. He had a CT abdomen 1/14 showing worsening ascites without obstruction.  During hospitalization patient has been + I/Os however it is unclear how much as has not been documented well. Found to be retaining urine yesterday requiring catheterization. Received 5 days pip-tazo. Has received furosemide 10, 10, 20 the past 3 days.  For cirrhosis hx patient follows with Dr. Lei, on Viread for HBV, was previously on furosemide which was dcd 12/2019. Has been compensated since.   Patient is a 79y old  Male who presents with a chief complaint of abdominal pain (15 Klaus 2020 03:33)        PAST MEDICAL/SURGICAL HISTORY  PAST MEDICAL & SURGICAL HISTORY:  Cirrhosis  HTN (hypertension)  H/O gastritis  CLL (chronic lymphocytic leukemia)  No significant past surgical history      REVIEW OF SYSTEMS:  Constitutional: X[ ] negative [ ] fevers [ ] chills [ ] weight loss [ ] weight gain  HEENT: [X ] negative [ ] dry eyes [ ] eye irritation [ ] postnasal drip [ ] nasal congestion  CV: [X ] negative  [ ] chest pain [ ] orthopnea [ ] palpitations [ ] murmur  Resp: [X ] negative [ ] cough [ ] shortness of breath [ ] dyspnea [ ] wheezing [ ] sputum [ ] hemoptysis  GI: [ ] negative [ ] nausea [ ] vomiting [ ] diarrhea [ ] constipation [X ] abd pain [ ] dysphagia   : [X ] negative [ ] dysuria [ ] nocturia [ ] hematuria [ ] increased urinary frequency  Musculoskeletal: [ X] negative [ ] back pain [ ] myalgias [ ] arthralgias [ ] fracture  Skin: [X ] negative [ ] rash [ ] itch  Neurological: [ X] negative [ ] headache [ ] dizziness [ ] syncope [ ] weakness [ ] numbness  Psychiatric: [X ] negative [ ] anxiety [ ] depression  Endocrine: [X ] negative [ ] diabetes [ ] thyroid problem  Hematologic/Lymphatic: [X ] negative [ ] anemia [ ] bleeding problem  Allergic/Immunologic: [X ] negative [ ] itchy eyes [ ] nasal discharge [ ] hives [ ] angioedema  [ ] All other systems negative  [ ] Unable to assess ROS because ________      T(C): 36.4 (01-15-20 @ 09:57), Max: 37.2 (01-14-20 @ 21:25)  HR: 97 (01-15-20 @ 09:57) (82 - 97)  BP: 118/65 (01-15-20 @ 09:57) (118/65 - 147/83)  RR: 17 (01-15-20 @ 09:57) (16 - 17)  SpO2: 99% (01-15-20 @ 09:57) (97% - 100%)  Wt(kg): --Vital Signs Last 24 Hrs  T(C): 36.4 (15 Klaus 2020 09:57), Max: 37.2 (14 Jan 2020 21:25)  T(F): 97.5 (15 Klaus 2020 09:57), Max: 98.9 (14 Jan 2020 21:25)  HR: 97 (15 Klaus 2020 09:57) (82 - 97)  BP: 118/65 (15 Klaus 2020 09:57) (118/65 - 147/83)  BP(mean): --  RR: 17 (15 Klaus 2020 09:57) (16 - 17)  SpO2: 99% (15 Klaus 2020 09:57) (97% - 100%)    PHYSICAL EXAM:  GENERAL: NAD, well-groomed, well-developed  HEAD:  Atraumatic, Normocephalic  EYES: EOMI, PERRLA, conjunctiva and sclera clear  ENMT: No tonsillar erythema, exudates, or enlargement; Moist mucous membranes, Good dentition, No lesions  NECK: Supple, No JVD, Normal thyroid  NERVOUS SYSTEM:  Alert & Oriented X3, Good concentration; Motor Strength 5/5 B/L upper and lower extremities; DTRs 2+ intact and symmetric  CHEST/LUNG: Clear to percussion bilaterally; No rales, rhonchi, wheezing, or rubs  HEART: Regular rate and rhythm; No murmurs, rubs, or gallops  ABDOMEN: Well healing surgical scar with surgical staples and serosanginous drainage. Distended with mild TTP mid epigastric with fluid wave.  Scrotal and edema of the penis with ? peyronies?  EXTREMITIES:  2+ pitting edema to hips bilaterally  LYMPH: No lymphadenopathy noted  SKIN: No rashes or lesions    Consultant(s) Notes Reviewed:  [x ] YES  [ ] NO  Care Discussed with Consultants/Other Providers [ x] YES  [ ] NO    LABS:  CBC   01-15-20 @ 07:30  Hematcorit 26.3  Hemoglobin 8.2  Mean Cell Hemoglobin 22.5  Platelet Count-Automated 151  RBC Count 3.65  Red Cell Distrib Width 20.1  Wbc Count 47.37      CMP  01-15-20 @ 07:30  Jeanie Aminotransferase(ALT/SGPT)--  Albumin, Serum --  Alkaline Phosphatase, Serum --  Anion Gap, Serum 7  Aspartate Aminotransferase (AST/SGOT)--  Bilirubin Total, Serum --  Blood Urea Nitrogen, Serum 8  Calcium,Total Serum 7.6  Carbon Dioxide, Serum 22  Chloride, Serum 101  Creatinine, Serum 0.74  eGFR if  101  eGFR if Non African American 87  Glucose, Serum 113  Potassium, Serum 3.8  Protein Total, Serum --  Sodium, Serum 130    01-14-20 @ 05:45  Jeanie Aminotransferase(ALT/SGPT)--  Albumin, Serum --  Alkaline Phosphatase, Serum --  Anion Gap, Serum 10  Aspartate Aminotransferase (AST/SGOT)--  Bilirubin Total, Serum --  Blood Urea Nitrogen, Serum 8  Calcium,Total Serum 7.8  Carbon Dioxide, Serum 20  Chloride, Serum 105  Creatinine, Serum 0.72  eGFR if  102  eGFR if Non African American 88  Glucose, Serum 109  Potassium, Serum 3.7  Protein Total, Serum --  Sodium, Serum 135        PT/INR      Amylase/Lipase            RADIOLOGY & ADDITIONAL TESTS:    Imaging Personally Reviewed:  [ ] YES  [ ] NO

## 2020-01-15 NOTE — CONSULT NOTE ADULT - ASSESSMENT
80 yo M w/ pmhx on CLL, chronic HBV w/ cirrhosis previously compensated, HTN presenting 1/6 for abdominal pain secondary to SBO s/p ex lap now with decompensated cirrhosis with ascites and LE edema.

## 2020-01-15 NOTE — PROGRESS NOTE ADULT - ASSESSMENT
79 year old male with a PMH of CLL (untreated), cirrhosis (Child B at time of admission), chronic hepatitis B, and gastritis found to have SBO with pneumoperitoneum s/p ex lap, REESE and small bowel resection 1/7. On 1/13 midline incision erythematous and tender on exam, staples removed and liquid evacuated yesterday during PM rounds. Today, midline incision erythema is improved without purulent drainage. Patient with persistent distended and tympanic abdomen. Complains of feeling bloated, +flatus, -BM. Also complains of abdominal pain located around the umbilicus, nonradiating, rated at a 5/10, relieved with current pain regimen. Tolerating small amount of clear liquid diet. Denies nausea, vomiting.    Plan:  - XR Abdomen with contrast in colon; follow-up final read  - Continue with clear liquid diet.  - Continue pain regimen.  - Monitor bowel function.  - Monitor I & O's.   - DVT ppx with Lovenox.    B Team Surgery [id number] 79 year old male with a PMH of CLL (untreated), cirrhosis (Child B at time of admission), chronic hepatitis B, and gastritis found to have SBO with pneumoperitoneum s/p ex lap, REESE and small bowel resection 1/7. On 1/13 midline incision erythematous and tender on exam, now with improved erythema after removing staples. Patient with persistent distended and tympanic abdomen. CT obtained after persistent abdominal distention and ileus which showed ascites but no obstruction and follow up xray significant for contrast in the colon.    Plan:  - XR Abdomen with contrast in colon, no obstruction  - Advance to regular diet  - Transition to PO pain medication  - Give another enema for constipation, continue home lactulose  - Medicine consult for management of ascites i/s/o hep B  - hold off on lasix until medicine recommendations  - DVT ppx with Lovenox.    B Team Surgery [id number]

## 2020-01-15 NOTE — PROGRESS NOTE ADULT - ATTENDING COMMENTS
ascites and anasarca, may benefit from diuresis  consult medicine to help with management of cirrhosis  advance oral diet, continue wound care/PT  anticipate rehab for discharge in next 48hrs

## 2020-01-15 NOTE — CHART NOTE - NSCHARTNOTEFT_GEN_A_CORE
Pt noted to have paraphimosis.  Manually reduced at bedside.  Pt tolerated well.   Primary team to monitor

## 2020-01-15 NOTE — PROGRESS NOTE ADULT - SUBJECTIVE AND OBJECTIVE BOX
A Team Surgery Progress Note     SUBJECTIVE / 24H EVENTS  Patient seen and examined on morning rounds. No acute events overnight. PVR with 306cc, straight cath'd for 300cc. He has had 3 incontinent voids throughout the evening. He reports stool and flatus after fleet enema yesterday. Denies fevers, chills, nausea, or vomiting.     OBJECTIVE:    VITAL SIGNS:  T(C): 36.3 (01-15-20 @ 02:06), Max: 37.2 (20 @ 21:25)  HR: 97 (01-15-20 @ 02:06) (82 - 98)  BP: 147/83 (01-15-20 @ 02:06) (112/59 - 147/83)  RR: 16 (01-15-20 @ 02:06) (16 - 17)  SpO2: 99% (01-15-20 @ 02:06) (96% - 99%)    Daily     Daily Weight in k.1 (2020 15:36)        PHYSICAL EXAM:  Gen: NAD  LS: Respirations unlabored. CTA b/l  Card: RRR. No m/r/g.   GI: Mildly tender, distended. midline incision erythematous with guaze (minimal strikethrough) at distal aspect  Ext: Warm, well perfused      20 @ 07:01  -  20 @ 07:00  --------------------------------------------------------  IN:    dextrose 5% + sodium chloride 0.45%.: 650 mL    IV PiggyBack: 250 mL    Oral Fluid: 250 mL  Total IN: 1150 mL    OUT:    Voided: 1450 mL  Total OUT: 1450 mL    Total NET: -300 mL      20 @ 07:01  -  01-15-20 @ 03:34  --------------------------------------------------------  IN:    dextrose 5% + sodium chloride 0.45%: 400 mL    Oral Fluid: 510 mL  Total IN: 910 mL    OUT:    Intermittent Catheterization - Urethral: 300 mL    Voided: 100 mL  Total OUT: 400 mL    Total NET: 510 mL          LAB VALUES:      135  |  105  |  8   ----------------------------<  109<H>  3.7   |  20<L>  |  0.72    Ca    7.8<L>      2020 05:45  Phos  2.7       Mg     1.7                                      8.0    41.44 )-----------( 131      ( 2020 05:45 )             25.3         MICROBIOLOGY:    No new microbiology data for review.     RADIOLOGY:  PACS Image: Image(s) Available (20 @ 14:39)      MEDICATIONS  (STANDING):  doxazosin 2 milliGRAM(s) Oral at bedtime  enoxaparin Injectable 40 milliGRAM(s) SubCutaneous daily  pantoprazole  Injectable 40 milliGRAM(s) IV Push daily  tenofovir disoproxil fumarate (VIREAD) 300 milliGRAM(s) Oral daily    MEDICATIONS  (PRN):  benzocaine 15 mG/menthol 3.6 mG (Sugar-Free) Lozenge 1 Lozenge Oral every 4 hours PRN Sore Throat  HYDROmorphone  Injectable 0.5 milliGRAM(s) IV Push every 4 hours PRN Moderate Pain (4 - 6)  HYDROmorphone  Injectable 1 milliGRAM(s) IV Push every 4 hours PRN Severe Pain (7 - 10) A Team Surgery Progress Note     SUBJECTIVE / 24H EVENTS  Patient seen and examined on morning rounds. No acute events overnight. PVR with 306cc, straight cath'd for 300cc. He has had 3 incontinent voids throughout the evening. He reports stool and flatus after fleet enema yesterday. Denies fevers, chills, nausea, or vomiting. He is having some mild abdominal pain but nothing serious.    OBJECTIVE:    VITAL SIGNS:  T(C): 36.3 (01-15-20 @ 02:06), Max: 37.2 (20 @ 21:25)  HR: 97 (01-15-20 @ 02:06) (82 - 98)  BP: 147/83 (01-15-20 @ 02:06) (112/59 - 147/83)  RR: 16 (01-15-20 @ 02:06) (16 - 17)  SpO2: 99% (01-15-20 @ 02:06) (96% - 99%)    Daily     Daily Weight in k.1 (2020 15:36)        PHYSICAL EXAM:  Gen: NAD  LS: Respirations unlabored. CTA b/l  Card: RRR. No m/r/g.   GI: Mildly tender, distended. midline incision CDI with staples in place, some staples removed in the middle of the wound now open with improved erythema  Ext: Warm, well perfused      20 @ 07:01  -  20 @ 07:00  --------------------------------------------------------  IN:    dextrose 5% + sodium chloride 0.45%.: 650 mL    IV PiggyBack: 250 mL    Oral Fluid: 250 mL  Total IN: 1150 mL    OUT:    Voided: 1450 mL  Total OUT: 1450 mL    Total NET: -300 mL      20 @ 07:01  -  01-15-20 @ 03:34  --------------------------------------------------------  IN:    dextrose 5% + sodium chloride 0.45%: 400 mL    Oral Fluid: 510 mL  Total IN: 910 mL    OUT:    Intermittent Catheterization - Urethral: 300 mL    Voided: 100 mL  Total OUT: 400 mL    Total NET: 510 mL          LAB VALUES:      135  |  105  |  8   ----------------------------<  109<H>  3.7   |  20<L>  |  0.72    Ca    7.8<L>      2020 05:45  Phos  2.7       Mg     1.7                                      8.0    41.44 )-----------( 131      ( 2020 05:45 )             25.3         MICROBIOLOGY:    No new microbiology data for review.     RADIOLOGY:  PACS Image: Image(s) Available (20 @ 14:39)      MEDICATIONS  (STANDING):  doxazosin 2 milliGRAM(s) Oral at bedtime  enoxaparin Injectable 40 milliGRAM(s) SubCutaneous daily  pantoprazole  Injectable 40 milliGRAM(s) IV Push daily  tenofovir disoproxil fumarate (VIREAD) 300 milliGRAM(s) Oral daily    MEDICATIONS  (PRN):  benzocaine 15 mG/menthol 3.6 mG (Sugar-Free) Lozenge 1 Lozenge Oral every 4 hours PRN Sore Throat  HYDROmorphone  Injectable 0.5 milliGRAM(s) IV Push every 4 hours PRN Moderate Pain (4 - 6)  HYDROmorphone  Injectable 1 milliGRAM(s) IV Push every 4 hours PRN Severe Pain (7 - 10)

## 2020-01-15 NOTE — CONSULT NOTE ADULT - PROBLEM SELECTOR RECOMMENDATION 9
Patient with worsening ascites and LE edema in setting of IVF as patient has SBO, unclear about what true I+Os are but patient clinically hypervolemic.  -Would recommend hepatology consult for patient to restart furosemide, potentially spironolactone as it patient of Dr. Lei  -would continue strick I+Os  -furosemide 40mg IV today and will reassess daily. MELD Na 15. Patient with worsening ascites and LE edema in setting of IVF as patient has SBO, unclear about what true I+Os are but patient clinically hypervolemic.  -Would recommend hepatology consult for patient to restart furosemide, potentially spironolactone as it patient of Dr. Lei  -would continue strick I+Os, daily weights if I+Os inconsistent  -furosemide 40mg IV today and will reassess daily.  -if abdominal pain worsening/fevers consider paracentesis Dx/Tx for peritoninits, no signs of peritonitis currently  -would check CMP and PT/INR in AM

## 2020-01-16 LAB
ANION GAP SERPL CALC-SCNC: 8 MMO/L — SIGNIFICANT CHANGE UP (ref 7–14)
ANION GAP SERPL CALC-SCNC: 9 MMO/L — SIGNIFICANT CHANGE UP (ref 7–14)
BUN SERPL-MCNC: 10 MG/DL — SIGNIFICANT CHANGE UP (ref 7–23)
BUN SERPL-MCNC: 11 MG/DL — SIGNIFICANT CHANGE UP (ref 7–23)
CALCIUM SERPL-MCNC: 7.7 MG/DL — LOW (ref 8.4–10.5)
CALCIUM SERPL-MCNC: 7.8 MG/DL — LOW (ref 8.4–10.5)
CHLORIDE SERPL-SCNC: 100 MMOL/L — SIGNIFICANT CHANGE UP (ref 98–107)
CHLORIDE SERPL-SCNC: 99 MMOL/L — SIGNIFICANT CHANGE UP (ref 98–107)
CO2 SERPL-SCNC: 24 MMOL/L — SIGNIFICANT CHANGE UP (ref 22–31)
CO2 SERPL-SCNC: 24 MMOL/L — SIGNIFICANT CHANGE UP (ref 22–31)
CREAT SERPL-MCNC: 0.68 MG/DL — SIGNIFICANT CHANGE UP (ref 0.5–1.3)
CREAT SERPL-MCNC: 0.69 MG/DL — SIGNIFICANT CHANGE UP (ref 0.5–1.3)
GLUCOSE SERPL-MCNC: 116 MG/DL — HIGH (ref 70–99)
GLUCOSE SERPL-MCNC: 125 MG/DL — HIGH (ref 70–99)
HCT VFR BLD CALC: 25.5 % — LOW (ref 39–50)
HGB BLD-MCNC: 7.8 G/DL — LOW (ref 13–17)
MAGNESIUM SERPL-MCNC: 1.7 MG/DL — SIGNIFICANT CHANGE UP (ref 1.6–2.6)
MAGNESIUM SERPL-MCNC: 2 MG/DL — SIGNIFICANT CHANGE UP (ref 1.6–2.6)
MCHC RBC-ENTMCNC: 21.9 PG — LOW (ref 27–34)
MCHC RBC-ENTMCNC: 30.6 % — LOW (ref 32–36)
MCV RBC AUTO: 71.6 FL — LOW (ref 80–100)
NRBC # FLD: 0.05 K/UL — SIGNIFICANT CHANGE UP (ref 0–0)
PHOSPHATE SERPL-MCNC: 1.8 MG/DL — LOW (ref 2.5–4.5)
PHOSPHATE SERPL-MCNC: 2 MG/DL — LOW (ref 2.5–4.5)
PLATELET # BLD AUTO: 163 K/UL — SIGNIFICANT CHANGE UP (ref 150–400)
PMV BLD: SIGNIFICANT CHANGE UP FL (ref 7–13)
POTASSIUM SERPL-MCNC: 2.9 MMOL/L — CRITICAL LOW (ref 3.5–5.3)
POTASSIUM SERPL-MCNC: 4.5 MMOL/L — SIGNIFICANT CHANGE UP (ref 3.5–5.3)
POTASSIUM SERPL-SCNC: 2.9 MMOL/L — CRITICAL LOW (ref 3.5–5.3)
POTASSIUM SERPL-SCNC: 4.5 MMOL/L — SIGNIFICANT CHANGE UP (ref 3.5–5.3)
RBC # BLD: 3.56 M/UL — LOW (ref 4.2–5.8)
RBC # FLD: 20.5 % — HIGH (ref 10.3–14.5)
SODIUM SERPL-SCNC: 132 MMOL/L — LOW (ref 135–145)
SODIUM SERPL-SCNC: 132 MMOL/L — LOW (ref 135–145)
WBC # BLD: 51.98 K/UL — CRITICAL HIGH (ref 3.8–10.5)
WBC # FLD AUTO: 51.98 K/UL — CRITICAL HIGH (ref 3.8–10.5)

## 2020-01-16 PROCEDURE — 99223 1ST HOSP IP/OBS HIGH 75: CPT | Mod: GC

## 2020-01-16 RX ORDER — ALBUMIN HUMAN 25 %
50 VIAL (ML) INTRAVENOUS EVERY 6 HOURS
Refills: 0 | Status: DISCONTINUED | OUTPATIENT
Start: 2020-01-16 | End: 2020-01-16

## 2020-01-16 RX ORDER — MAGNESIUM SULFATE 500 MG/ML
2 VIAL (ML) INJECTION ONCE
Refills: 0 | Status: COMPLETED | OUTPATIENT
Start: 2020-01-16 | End: 2020-01-16

## 2020-01-16 RX ORDER — POTASSIUM CHLORIDE 20 MEQ
10 PACKET (EA) ORAL
Refills: 0 | Status: COMPLETED | OUTPATIENT
Start: 2020-01-16 | End: 2020-01-16

## 2020-01-16 RX ORDER — DEXTROSE MONOHYDRATE, SODIUM CHLORIDE, AND POTASSIUM CHLORIDE 50; .745; 4.5 G/1000ML; G/1000ML; G/1000ML
1000 INJECTION, SOLUTION INTRAVENOUS
Refills: 0 | Status: DISCONTINUED | OUTPATIENT
Start: 2020-01-16 | End: 2020-01-18

## 2020-01-16 RX ORDER — POTASSIUM CHLORIDE 20 MEQ
40 PACKET (EA) ORAL ONCE
Refills: 0 | Status: COMPLETED | OUTPATIENT
Start: 2020-01-16 | End: 2020-01-16

## 2020-01-16 RX ORDER — ALBUMIN HUMAN 25 %
50 VIAL (ML) INTRAVENOUS EVERY 6 HOURS
Refills: 0 | Status: COMPLETED | OUTPATIENT
Start: 2020-01-16 | End: 2020-01-17

## 2020-01-16 RX ORDER — SODIUM,POTASSIUM PHOSPHATES 278-250MG
1 POWDER IN PACKET (EA) ORAL
Refills: 0 | Status: COMPLETED | OUTPATIENT
Start: 2020-01-16 | End: 2020-01-17

## 2020-01-16 RX ADMIN — Medication 100 MILLIEQUIVALENT(S): at 18:30

## 2020-01-16 RX ADMIN — Medication 50 GRAM(S): at 11:00

## 2020-01-16 RX ADMIN — Medication 3 MILLIGRAM(S): at 23:11

## 2020-01-16 RX ADMIN — Medication 40 MILLIEQUIVALENT(S): at 11:01

## 2020-01-16 RX ADMIN — TENOFOVIR DISOPROXIL FUMARATE 300 MILLIGRAM(S): 300 TABLET, FILM COATED ORAL at 11:11

## 2020-01-16 RX ADMIN — PANTOPRAZOLE SODIUM 40 MILLIGRAM(S): 20 TABLET, DELAYED RELEASE ORAL at 11:01

## 2020-01-16 RX ADMIN — TAMSULOSIN HYDROCHLORIDE 0.4 MILLIGRAM(S): 0.4 CAPSULE ORAL at 22:18

## 2020-01-16 RX ADMIN — Medication 1 PACKET(S): at 18:30

## 2020-01-16 RX ADMIN — Medication 50 MILLILITER(S): at 19:47

## 2020-01-16 RX ADMIN — LACTULOSE 10 GRAM(S): 10 SOLUTION ORAL at 11:00

## 2020-01-16 RX ADMIN — ENOXAPARIN SODIUM 40 MILLIGRAM(S): 100 INJECTION SUBCUTANEOUS at 11:00

## 2020-01-16 RX ADMIN — BENZOCAINE AND MENTHOL 1 LOZENGE: 5; 1 LIQUID ORAL at 06:04

## 2020-01-16 RX ADMIN — Medication 3 MILLIGRAM(S): at 03:34

## 2020-01-16 RX ADMIN — Medication 1 PACKET(S): at 11:00

## 2020-01-16 RX ADMIN — Medication 85 MILLIMOLE(S): at 23:07

## 2020-01-16 RX ADMIN — Medication 100 MILLIEQUIVALENT(S): at 11:01

## 2020-01-16 NOTE — PROGRESS NOTE ADULT - ASSESSMENT
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

## 2020-01-16 NOTE — PROGRESS NOTE ADULT - SUBJECTIVE AND OBJECTIVE BOX
Pt has scrotal swelling and penile swelling but no n/v, has been passing stool. no pain abdomen, no fevers or chills and ROS unchanged to unremarkable. Surgical team with the pt.      Meds:  acetaminophen   Tablet .. 650 milliGRAM(s) Oral every 6 hours PRN  albumin human 25% IVPB 50 milliLiter(s) IV Intermittent every 6 hours  benzocaine 15 mG/menthol 3.6 mG (Sugar-Free) Lozenge 1 Lozenge Oral every 4 hours PRN  enoxaparin Injectable 40 milliGRAM(s) SubCutaneous daily  lactulose Syrup 10 Gram(s) Oral daily  melatonin 3 milliGRAM(s) Oral at bedtime PRN  oxyCODONE    IR 5 milliGRAM(s) Oral every 6 hours PRN  oxyCODONE    IR 10 milliGRAM(s) Oral every 6 hours PRN  pantoprazole  Injectable 40 milliGRAM(s) IV Push daily  potassium chloride  10 mEq/100 mL IVPB 10 milliEquivalent(s) IV Intermittent every 1 hour  potassium phosphate / sodium phosphate powder 1 Packet(s) Oral three times a day before meals  tamsulosin 0.4 milliGRAM(s) Oral at bedtime  tenofovir disoproxil fumarate (VIREAD) 300 milliGRAM(s) Oral daily      Vital Signs Last 24 Hrs  T(C): 36.6 (16 Jan 2020 13:36), Max: 36.8 (15 Klaus 2020 21:59)  T(F): 97.8 (16 Jan 2020 13:36), Max: 98.2 (15 Klaus 2020 21:59)  HR: 98 (16 Jan 2020 13:36) (89 - 98)  BP: 122/84 (16 Jan 2020 13:36) (122/84 - 143/82)  BP(mean): --  RR: 18 (16 Jan 2020 13:36) (16 - 18)  SpO2: 98% (16 Jan 2020 13:36) (96% - 99%)                          7.8    51.98 )-----------( 163      ( 16 Jan 2020 05:30 )             25.5       01-16    132<L>  |  99  |  10  ----------------------------<  125<H>  2.9<LL>   |  24  |  0.68    Ca    7.7<L>      16 Jan 2020 05:30  Phos  2.0     01-16  Mg     1.7     01-16          Path: Pending      CT a/p: IMPRESSION: No bowel obstruction or free air.    Moderate ascites.    Cirrhotic liver and splenomegaly.    Small bilateral pleural effusions.                      JYOTSNA JOSEPH M.D., ATTENDING RADIOLOGIST  This document has been electronically signed. Jan 14 2020  2:50PM

## 2020-01-16 NOTE — PROGRESS NOTE ADULT - SUBJECTIVE AND OBJECTIVE BOX
A Team Surgery Progress Note     SUBJECTIVE / 24H EVENTS  Patient seen and examined on morning rounds. Pain resting comfortably, had paraphimosis fixed overnight by urology, passed flatus and having BMs, states he is eating ok    OBJECTIVE:  VITAL SIGNS:  T(C): 36.7 (01-16-20 @ 05:57), Max: 36.9 (01-15-20 @ 14:00)  HR: 89 (01-16-20 @ 05:57) (89 - 97)  BP: 139/70 (01-16-20 @ 05:57) (118/65 - 143/82)  BP(mean): --  ABP: --  ABP(mean): --  RR: 17 (01-16-20 @ 05:57) (16 - 17)  SpO2: 99% (01-16-20 @ 05:57) (94% - 99%)  Wt(kg): --  CVP(mm Hg): --  CI: --  CAPILLARY BLOOD GLUCOSE       N/A      01-15 @ 07:01  -  01-16 @ 07:00  --------------------------------------------------------  IN:    Oral Fluid: 240 mL  Total IN: 240 mL    OUT:    Voided: 1120 mL  Total OUT: 1120 mL    Total NET: -880 mL      PHYSICAL EXAM:  Gen: NAD  LS: Respirations unlabored. CTA b/l  Card: RRR. No m/r/g.   GI: Mildly tender, distended. midline incision CDI with staples in place, some staples removed in the middle of the wound now open with improved erythema, packed with gauze  Ext: Warm, well perfused        LAB VALUES:  CBC (01-16 @ 05:30)                              7.8<L>                         51.98<HH>  )----------------(  163        --    % Neutrophils, --    % Lymphocytes, ANC: --                                  25.5<L>  CBC (01-15 @ 07:30)                              8.2<L>                         47.37<HH>  )----------------(  151        --    % Neutrophils, --    % Lymphocytes, ANC: --                                  26.3<L>    BMP (01-15 @ 07:30)             130<L>  |  101     |  8     		Ca++ --      Ca 7.6<L>             ---------------------------------( 113<H>		Mg 1.8                3.8     |  22      |  0.74  			Ph 2.7         -> URINE MIDSTREAM Culture (01-07 @ 04:02)     NG    NG  NG    -> BLOOD VENOUS Culture (01-06 @ 23:59)     NG    NG  NG    Assessment and Plan:   · Assessment		  79 year old male with a PMH of CLL (untreated), cirrhosis (Child B at time of admission), chronic hepatitis B, and gastritis found to have SBO with pneumoperitoneum s/p ex lap, REESE and small bowel resection 1/7. On 1/13 midline incision erythematous and tender on exam, now with improved erythema after removing staples. Patient with persistent distended and tympanic abdomen. CT obtained after persistent abdominal distention and ileus which showed ascites but no obstruction and follow up xray significant for contrast in the colon.    Plan:  - regular diet  - Medicine consult for management of ascites i/s/o hep B  - f/u medicine and hepatology recs  - DVT ppx with Lovenox.    B Team Surgery [id number]

## 2020-01-16 NOTE — CONSULT NOTE ADULT - SUBJECTIVE AND OBJECTIVE BOX
GI HPI:  An 78 y/o man with h/o HTN, gastritis, CLL (diagnosed in 2013, treatment naive)  with hx of Hep B cirrhosis with eAg positive, with high HBV viremia,  genotype D with no mutation was on Viread presented with abdominal pain. Pt found to have SBO complicated with perforation S/P Ex Lap on jan 7 with 15 cm SB  removed  with  primary anastomosis. Hepatology called for management of ascites. As per home meds review patient was on lasix 20 mg and no aldactone.   Today he denies any complaints, No abd pain, no vomiting. Patient reports passing flatus and having BM    His last EGD was 2 years ago which showed gastritis and his last colonoscopy was 10 years ago and unremarkable  PAST MEDICAL & SURGICAL HISTORY  Cirrhosis  HTN (hypertension)  H/O gastritis  CLL (chronic lymphocytic leukemia)  No significant past surgical history        SOCIAL HISTORY:  Alcohol: Non alcoholic  Drug: Denies use of drugs   FAMILY HISTORY:  FAMILY HISTORY:  FH: CHF (congestive heart failure): MOTHER HAD CHF      ALLERGIES:  Beef (Unknown)  No Known Drug Allergies      MEDICATIONS:  MEDICATIONS  (STANDING):  enoxaparin Injectable 40 milliGRAM(s) SubCutaneous daily  lactulose Syrup 10 Gram(s) Oral daily  magnesium sulfate  IVPB 2 Gram(s) IV Intermittent once  pantoprazole  Injectable 40 milliGRAM(s) IV Push daily  potassium chloride    Tablet ER 40 milliEquivalent(s) Oral once  potassium phosphate / sodium phosphate powder 1 Packet(s) Oral three times a day before meals  tamsulosin 0.4 milliGRAM(s) Oral at bedtime  tenofovir disoproxil fumarate (VIREAD) 300 milliGRAM(s) Oral daily    MEDICATIONS  (PRN):  acetaminophen   Tablet .. 650 milliGRAM(s) Oral every 6 hours PRN Mild Pain (1 - 3)  benzocaine 15 mG/menthol 3.6 mG (Sugar-Free) Lozenge 1 Lozenge Oral every 4 hours PRN Sore Throat  melatonin 3 milliGRAM(s) Oral at bedtime PRN Insomnia  oxyCODONE    IR 5 milliGRAM(s) Oral every 6 hours PRN Moderate Pain (4 - 6)  oxyCODONE    IR 10 milliGRAM(s) Oral every 6 hours PRN Severe Pain (7 - 10)      HOME MEDICATIONS:  Home Medications:  lactulose 10 g/15 mL oral solution: 1 tab(s) orally once a day, As Needed (07 Jan 2020 02:13)  ursodiol 250 mg oral tablet: 1 tab(s) orally 3 times a day (07 Jan 2020 02:13)      ROS:     REVIEW OF SYSTEMS  General:  No fevers  Eyes:  No reported pain   ENT:  No sore throat   NECK: No stiffness   CV:  No chest pain   Resp:  No shortness of breath  GI:  See HPI  :  No dysuria  Muscle:  ++  weakness  Neuro:  No tingling  Endocrine:  No polyuria  Heme:  No ecchymosis          VITALS:   T(F): 98.1 (01-16 @ 05:57), Max: 98.9 (01-14 @ 21:25)  HR: 89 (01-16 @ 05:57) (72 - 99)  BP: 139/70 (01-16 @ 05:57) (112/59 - 149/72)  BP(mean): --  RR: 17 (01-16 @ 05:57) (16 - 18)  SpO2: 99% (01-16 @ 05:57) (94% - 100%)    I&O's Summary    15 Klaus 2020 07:01  -  16 Jan 2020 07:00  --------------------------------------------------------  IN: 240 mL / OUT: 1120 mL / NET: -880 mL    16 Jan 2020 07:01  -  16 Jan 2020 09:09  --------------------------------------------------------  IN: 0 mL / OUT: 75 mL / NET: -75 mL        PHYSICAL EXAM:  GENERAL:  Appears in no distress  HEENT:  Conjunctivae  anicteric  CHEST:  Full & symmetric excursion  HEART:  N S1, S2  ABDOMEN:  Soft, non-tender, +++ distended, Surgical wound   EXTEREMITIES:  ++ edema  SKIN:  No rash, abdominal surgical wound   NEURO:  Alert, ANOX 2,  No asterixis   Uro: Scrotal swelling       LABS:                        7.8    51.98 )-----------( 163      ( 16 Jan 2020 05:30 )             25.5       LIVER FUNCTIONS - ( 12 Jan 2020 21:53 )  Alb: 2.4 g/dL / Pro: 5.6 g/dL / ALK PHOS: 150 u/L / ALT: 29 u/L / AST: 32 u/L / GGT: x           01-16    132<L>  |  99  |  10  ----------------------------<  125<H>  2.9<LL>   |  24  |  0.68    Ca    7.7<L>      16 Jan 2020 05:30  Phos  2.0     01-16  Mg     1.7     01-16                    RADIOLOGY:  < from: CT Abdomen and Pelvis w/ Oral Cont and w/ IV Cont (01.14.20 @ 14:39) >    EXAM:  CT ABDOMEN AND PELVIS OC IC        PROCEDURE DATE:  Jan 14 2020         INTERPRETATION:  CLINICAL INFORMATION: rule out sbo / collection. 79M w/free air s/p ex lap susy, bowel resection not progressing, distended tender ADMDIAG1: K63.1 K63.1/    COMPARISON: 1.6.20.    PROCEDURE:   CT of the Abdomen and Pelvis was performed with intravenous contrast.   Intravenous contrast: 90 ml Omnipaque 350. 10 ml discarded.  Oral contrast: positive contrast was administered.  Sagittal and coronal reformats were performed.    FINDINGS:    LOWER CHEST: Small pleural effusions.     LIVER: Cirrhotic liver.   BILE DUCTS: Normal caliber.  GALLBLADDER: Within normal limits.  SPLEEN: Enlarged.   PANCREAS: Within normal limits.  ADRENALS: Within normal limits.   KIDNEYS/URETERS: Within normal limits.     BLADDER: Within normal limits.   REPRODUCTIVE ORGANS: Within normal limits.    BOWEL: No bowel obstruction. Small bowel anastamosis.  Duodenal diverticula.  PERITONEUM: Moderate ascites.  No free air.  VESSELS:  Within normal limits.  RETROPERITONEUM/LYMPH NODES: No lymphadenopathy.     ABDOMINAL WALL: Postsurgical changes. Anasarca.  BONES: Within normal limits.     IMPRESSION: No bowel obstruction or free air.    Moderate ascites.    Cirrhotic liver and splenomegaly.    Small bilateral pleural effusions.                      JYOTSNA JOSEPH M.D., ATTENDING RADIOLOGIST  This document has been electronically signed. Jan 14 2020  2:50PM        < end of copied text >

## 2020-01-16 NOTE — CONSULT NOTE ADULT - ASSESSMENT
An 80 y/o man with h/o HTN, gastritis, CLL (diagnosed in 2013, treatment naive)  with hx of Hep B cirrhosis with eAg positive, with high HBV viremia,  genotype D with no mutation was on Viread presented with abdominal pain. Pt found to have SBO complicated with perforation S/P Ex Lap on jan 7 with 15 cm SB  removed  with  primary anastomosis. Hepatology called for management of ascites. As per home meds review patient was on lasix 20 mg and no aldactone.     # Decompensated Hep B liver cirrhosis. MELD score of 9 on january 6   Ascites on CT done after surgery   No Asterixis on exam. Pt on lactulose   EV: Last EGD 2 y ago but no records   HCC: No Hcc on imaging done this admission     # New onset ascites and anasarca on exam post surgery     # SBO complicated with perforation s/p Ex lap with sb resection and anastomosis on jan 7. No signs of obstruction after surgery and pt stable     recommendations to follow An 78 y/o man with h/o HTN, gastritis, CLL (diagnosed in 2013, treatment naive)  with hx of Hep B cirrhosis with eAg positive, with high HBV viremia,  genotype D with no mutation was on Viread presented with abdominal pain. Pt found to have SBO complicated with perforation S/P Ex Lap on jan 7 with 15 cm SB  removed  with  primary anastomosis. Hepatology called for management of ascites. As per home meds review patient was on lasix 20 mg and no aldactone.     # Decompensated Hep B liver cirrhosis. MELD score of 9 on january 6   Ascites on CT done after surgery   No Asterixis on exam. Pt on lactulose   EV: Last EGD 2 y ago but no records   HCC: No Hcc on imaging done this admission     # New onset ascites sould be related to surgery/ Anasarca vs decompensated liver cirrhosis after surgery. Awaiting Tap.     # SBO complicated with perforation s/p Ex lap with sb resection and anastomosis on jan 7. No signs of obstruction after surgery and pt stable     recommendations:   Please get a diagnostic paracentesis and send fluid for TP, Albumin. LDH, Glucose, Amylase and cell count with gram stain and culture   (will help us identified the cause of ascites PHTN related or not)   Please correct hypokalemia and hyponatremia   Start Albumin 25 g (25%) q6h   Dietician follow for appropriate calorie and protein intake   After Hypokalemia corrected can start lasix 40 po once daily with aldactone 100 daily   Follow electrolytes   Will follow     Cheikh Callie Richard  GI Fellow  Pager# [phone] An 80 y/o man with h/o HTN, gastritis, CLL (diagnosed in 2013, treatment naive)  with hx of Hep B cirrhosis with eAg positive, with high HBV viremia,  genotype D with no mutation was on Viread presented with abdominal pain. Pt found to have SBO complicated by perforation S/P Ex Lap on jan 7 with 15 cm SB  removed  with  primary anastomosis. Hepatology called for management of ascites. As per home meds review patient was on lasix 20 mg and no aldactone.     # Decompensated Hep B liver cirrhosis. MELD score of 9 on january 6   Ascites on CT done after surgery   No Asterixis on exam. Pt on lactulose   EV: Last EGD 2 y ago but no records   HCC: No Hcc on imaging done this admission     # New onset ascites may be resulted from post surgery/ Anasarca vs decompensated liver cirrhosis after surgery. Awaiting Tap.     # SBO complicated with perforation s/p Ex lap with sb resection and anastomosis on jan 7. No signs of obstruction after surgery and pt stable     recommendations:   Please get a diagnostic paracentesis and send fluid for TP, Albumin. LDH, Glucose, Amylase and cell count with gram stain and culture   (will help us identified the cause of ascites PHTN related or not)   Please correct hypokalemia and hyponatremia   Start Albumin 25 g (25%) q6h   Dietician follow for appropriate calorie and protein intake   After Hypokalemia corrected can start lasix 40 po once daily with aldactone 100 daily   Follow electrolytes   Will follow     Cheikh Callie Richard  GI Fellow  Pager# [phone]

## 2020-01-17 LAB
AMMONIA BLD-MCNC: 22 UMOL/L — SIGNIFICANT CHANGE UP (ref 11–55)
ANION GAP SERPL CALC-SCNC: 9 MMO/L — SIGNIFICANT CHANGE UP (ref 7–14)
APTT BLD: 27 SEC — LOW (ref 27.5–36.3)
BUN SERPL-MCNC: 12 MG/DL — SIGNIFICANT CHANGE UP (ref 7–23)
CALCIUM SERPL-MCNC: 7.6 MG/DL — LOW (ref 8.4–10.5)
CHLORIDE SERPL-SCNC: 99 MMOL/L — SIGNIFICANT CHANGE UP (ref 98–107)
CO2 SERPL-SCNC: 22 MMOL/L — SIGNIFICANT CHANGE UP (ref 22–31)
CREAT SERPL-MCNC: 0.66 MG/DL — SIGNIFICANT CHANGE UP (ref 0.5–1.3)
GLUCOSE SERPL-MCNC: 112 MG/DL — HIGH (ref 70–99)
HCT VFR BLD CALC: 26.2 % — LOW (ref 39–50)
HGB BLD-MCNC: 7.9 G/DL — LOW (ref 13–17)
INR BLD: 1.19 — HIGH (ref 0.88–1.17)
MAGNESIUM SERPL-MCNC: 2 MG/DL — SIGNIFICANT CHANGE UP (ref 1.6–2.6)
MCHC RBC-ENTMCNC: 22.4 PG — LOW (ref 27–34)
MCHC RBC-ENTMCNC: 30.2 % — LOW (ref 32–36)
MCV RBC AUTO: 74.2 FL — LOW (ref 80–100)
NRBC # FLD: 0 K/UL — SIGNIFICANT CHANGE UP (ref 0–0)
PHOSPHATE SERPL-MCNC: 2.8 MG/DL — SIGNIFICANT CHANGE UP (ref 2.5–4.5)
PLATELET # BLD AUTO: 165 K/UL — SIGNIFICANT CHANGE UP (ref 150–400)
PMV BLD: SIGNIFICANT CHANGE UP FL (ref 7–13)
POTASSIUM SERPL-MCNC: 3.9 MMOL/L — SIGNIFICANT CHANGE UP (ref 3.5–5.3)
POTASSIUM SERPL-SCNC: 3.9 MMOL/L — SIGNIFICANT CHANGE UP (ref 3.5–5.3)
PROTHROM AB SERPL-ACNC: 13.3 SEC — HIGH (ref 9.8–13.1)
RBC # BLD: 3.53 M/UL — LOW (ref 4.2–5.8)
RBC # FLD: 20.7 % — HIGH (ref 10.3–14.5)
SODIUM SERPL-SCNC: 130 MMOL/L — LOW (ref 135–145)
WBC # BLD: 63.38 K/UL — CRITICAL HIGH (ref 3.8–10.5)
WBC # FLD AUTO: 63.38 K/UL — CRITICAL HIGH (ref 3.8–10.5)

## 2020-01-17 PROCEDURE — 99232 SBSQ HOSP IP/OBS MODERATE 35: CPT | Mod: GC

## 2020-01-17 RX ORDER — FUROSEMIDE 40 MG
40 TABLET ORAL DAILY
Refills: 0 | Status: DISCONTINUED | OUTPATIENT
Start: 2020-01-17 | End: 2020-01-18

## 2020-01-17 RX ORDER — SODIUM CHLORIDE 9 MG/ML
1 INJECTION INTRAMUSCULAR; INTRAVENOUS; SUBCUTANEOUS THREE TIMES A DAY
Refills: 0 | Status: DISCONTINUED | OUTPATIENT
Start: 2020-01-17 | End: 2020-01-20

## 2020-01-17 RX ORDER — ENOXAPARIN SODIUM 100 MG/ML
40 INJECTION SUBCUTANEOUS DAILY
Refills: 0 | Status: DISCONTINUED | OUTPATIENT
Start: 2020-01-17 | End: 2020-01-23

## 2020-01-17 RX ORDER — ALBUMIN HUMAN 25 %
50 VIAL (ML) INTRAVENOUS EVERY 6 HOURS
Refills: 0 | Status: COMPLETED | OUTPATIENT
Start: 2020-01-18 | End: 2020-01-20

## 2020-01-17 RX ORDER — SODIUM,POTASSIUM PHOSPHATES 278-250MG
1 POWDER IN PACKET (EA) ORAL ONCE
Refills: 0 | Status: COMPLETED | OUTPATIENT
Start: 2020-01-17 | End: 2020-01-17

## 2020-01-17 RX ORDER — SPIRONOLACTONE 25 MG/1
100 TABLET, FILM COATED ORAL DAILY
Refills: 0 | Status: DISCONTINUED | OUTPATIENT
Start: 2020-01-17 | End: 2020-01-20

## 2020-01-17 RX ADMIN — Medication 50 MILLILITER(S): at 14:15

## 2020-01-17 RX ADMIN — Medication 1 PACKET(S): at 06:07

## 2020-01-17 RX ADMIN — TAMSULOSIN HYDROCHLORIDE 0.4 MILLIGRAM(S): 0.4 CAPSULE ORAL at 22:18

## 2020-01-17 RX ADMIN — TENOFOVIR DISOPROXIL FUMARATE 300 MILLIGRAM(S): 300 TABLET, FILM COATED ORAL at 11:32

## 2020-01-17 RX ADMIN — SODIUM CHLORIDE 1 GRAM(S): 9 INJECTION INTRAMUSCULAR; INTRAVENOUS; SUBCUTANEOUS at 22:18

## 2020-01-17 RX ADMIN — DEXTROSE MONOHYDRATE, SODIUM CHLORIDE, AND POTASSIUM CHLORIDE 100 MILLILITER(S): 50; .745; 4.5 INJECTION, SOLUTION INTRAVENOUS at 01:08

## 2020-01-17 RX ADMIN — Medication 40 MILLIGRAM(S): at 11:32

## 2020-01-17 RX ADMIN — Medication 50 MILLILITER(S): at 06:15

## 2020-01-17 RX ADMIN — SPIRONOLACTONE 100 MILLIGRAM(S): 25 TABLET, FILM COATED ORAL at 11:32

## 2020-01-17 RX ADMIN — Medication 50 MILLILITER(S): at 01:01

## 2020-01-17 RX ADMIN — ENOXAPARIN SODIUM 40 MILLIGRAM(S): 100 INJECTION SUBCUTANEOUS at 16:12

## 2020-01-17 RX ADMIN — DEXTROSE MONOHYDRATE, SODIUM CHLORIDE, AND POTASSIUM CHLORIDE 100 MILLILITER(S): 50; .745; 4.5 INJECTION, SOLUTION INTRAVENOUS at 11:33

## 2020-01-17 RX ADMIN — SODIUM CHLORIDE 1 GRAM(S): 9 INJECTION INTRAMUSCULAR; INTRAVENOUS; SUBCUTANEOUS at 11:32

## 2020-01-17 RX ADMIN — Medication 1 TABLET(S): at 11:33

## 2020-01-17 RX ADMIN — PANTOPRAZOLE SODIUM 40 MILLIGRAM(S): 20 TABLET, DELAYED RELEASE ORAL at 11:34

## 2020-01-17 RX ADMIN — BENZOCAINE AND MENTHOL 1 LOZENGE: 5; 1 LIQUID ORAL at 17:40

## 2020-01-17 RX ADMIN — BENZOCAINE AND MENTHOL 1 LOZENGE: 5; 1 LIQUID ORAL at 06:08

## 2020-01-17 RX ADMIN — DEXTROSE MONOHYDRATE, SODIUM CHLORIDE, AND POTASSIUM CHLORIDE 100 MILLILITER(S): 50; .745; 4.5 INJECTION, SOLUTION INTRAVENOUS at 16:12

## 2020-01-17 NOTE — PROGRESS NOTE ADULT - SUBJECTIVE AND OBJECTIVE BOX
Chief Complaint:  Patient is a 79y old  Male who presents with a chief complaint of abdominal pain (16 Jan 2020 18:09)    Interval Events: Having BMs. Still reporting total body swelling. Has not had paracentesis.     Hospital Medications:  acetaminophen   Tablet .. 650 milliGRAM(s) Oral every 6 hours PRN  albumin human 25% IVPB 50 milliLiter(s) IV Intermittent every 6 hours  benzocaine 15 mG/menthol 3.6 mG (Sugar-Free) Lozenge 1 Lozenge Oral every 4 hours PRN  dextrose 5% + sodium chloride 0.45% with potassium chloride 20 mEq/L 1000 milliLiter(s) IV Continuous <Continuous>  lactulose Syrup 10 Gram(s) Oral daily  melatonin 3 milliGRAM(s) Oral at bedtime PRN  oxyCODONE    IR 5 milliGRAM(s) Oral every 6 hours PRN  oxyCODONE    IR 10 milliGRAM(s) Oral every 6 hours PRN  pantoprazole  Injectable 40 milliGRAM(s) IV Push daily  tamsulosin 0.4 milliGRAM(s) Oral at bedtime  tenofovir disoproxil fumarate (VIREAD) 300 milliGRAM(s) Oral daily      ROS:   General:  No wt loss, fevers, chills, night sweats  Eyes:  Good vision, no reported pain  ENT:  No sore throat, pain, runny nose, dysphagia  CV:  No pain, palpitations, hypo/hypertension  Pulm:  No dyspnea, cough, tachypnea, wheezing  GI:  See HPI, otherwise negative  :  No pain, bleeding, incontinence, nocturia  Muscle:  No pain, weakness  Neuro:  No weakness, tingling, memory problems  Psych:  No fatigue, insomnia, mood problems, depression  Endocrine:  No polyuria, polydipsia, cold/heat intolerance  Heme:  No petechiae, ecchymosis, easy bruisability  Skin:  No rash, tattoos, scars    PHYSICAL EXAM:   Vital Signs:  Vital Signs Last 24 Hrs  T(C): 36.9 (17 Jan 2020 06:02), Max: 37.3 (16 Jan 2020 22:00)  T(F): 98.4 (17 Jan 2020 06:02), Max: 99.2 (16 Jan 2020 22:00)  HR: 84 (17 Jan 2020 06:02) (84 - 98)  BP: 140/68 (17 Jan 2020 06:02) (122/84 - 145/73)  BP(mean): --  RR: 18 (17 Jan 2020 06:02) (17 - 18)  SpO2: 99% (17 Jan 2020 06:02) (95% - 99%)  Daily     Daily     GENERAL: no acute distress, anasarca  NEURO: alert and oriented x 3, no asterixis  HEENT: anicteric sclera, no conjunctival pallor appreciated  CHEST: no respiratory distress, no accessory muscle use  CARDIAC: regular rate, rhythm  ABDOMEN: soft, non-tender, ++++ distended, no rebound or guarding  EXTREMITIES: warm, well perfused, +++ edema  SKIN: no lesions noted    LABS: reviewed                        7.9    63.38 )-----------( 165      ( 17 Jan 2020 06:00 )             26.2     01-17    130<L>  |  99  |  12  ----------------------------<  112<H>  3.9   |  22  |  0.66    Ca    7.6<L>      17 Jan 2020 06:00  Phos  2.8     01-17  Mg     2.0     01-17          Interval Diagnostic Studies: see sunrise for full report

## 2020-01-17 NOTE — PROGRESS NOTE ADULT - SUBJECTIVE AND OBJECTIVE BOX
B Team Surgery Progress Note     SUBJECTIVE / 24H EVENTS  Patient seen and examined on morning rounds. Pain resting comfortably, had paraphimosis fixed by urology, passed flatus and having BMs, states he is eating ok. Patient being followed by Hepatology - started on Lasix and Aldactone today, will continue albumin. Plan for paracentesis today.     OBJECTIVE:  Vital Signs Last 24 Hrs  T(C): 36.2 (17 Jan 2020 10:29), Max: 37.3 (16 Jan 2020 22:00)  T(F): 97.2 (17 Jan 2020 10:29), Max: 99.2 (16 Jan 2020 22:00)  HR: 86 (17 Jan 2020 10:29) (84 - 98)  BP: 118/73 (17 Jan 2020 10:29) (118/73 - 145/73)  RR: 18 (17 Jan 2020 10:29) (18 - 18)  SpO2: 85% (17 Jan 2020 10:29) (85% - 99%)    I&O's Detail    16 Jan 2020 07:01  -  17 Jan 2020 07:00  --------------------------------------------------------  IN:    dextrose 5% + sodium chloride 0.45% with potassium chloride 20 mEq/L: 900 mL    IV PiggyBack: 750 mL    Oral Fluid: 780 mL  Total IN: 2430 mL    OUT:    Voided: 475 mL  Total OUT: 475 mL    Total NET: 1955 mL      PHYSICAL EXAM:  Gen: NAD  Chest: non labored breathing   GI: NT distended, midline incision CDI with staples in place, some staples removed in the middle of the wound now open with improved erythema, packed with gauze  Ext: Warm, well perfused      LAB VALUES:                        7.9    63.38 )-----------( 165      ( 17 Jan 2020 06:00 )             26.2   01-17    130<L>  |  99  |  12  ----------------------------<  112<H>  3.9   |  22  |  0.66    Ca    7.6<L>      17 Jan 2020 06:00  Phos  2.8     01-17  Mg     2.0     01-17

## 2020-01-17 NOTE — CHART NOTE - NSCHARTNOTEFT_GEN_A_CORE
Source: Patient [X ]    Family [X ]  spouse   other [X ] electronic chart     Diet : Diet, Regular:   Supplement Feeding Modality:  Oral  Ensure Enlive Cans or Servings Per Day:  3       Frequency:  Daily (01-15-20 @ 09:24)    Patient seen for nutrition follow-up. Patient tolerating regular diet well, consuming fair to good po intake, family also brings food from home. Patient denies any nausea/vomiting/diarrhea/constipation or difficulty chewing and swallowing. Patient also consuming PO supplement Ensure Enlive with meals. Encouraged good po intakes of nutrient and protein dense foods as tolerated.       Weight (kg): 57.2 (07 Jan 2020 02:15) no new weight recorded.       Pertinent Medications: albumin human 25% IVPB  dextrose 5% + sodium chloride 0.45% with potassium chloride 20 mEq/L  furosemide    Tablet  pantoprazole  Injectable  sodium chloride  spironolactone  tamsulosin    Pertinent Labs:  01-17 Na130 mmol/L<L> Glu 112 mg/dL<H> K+ 3.9 mmol/L Cr  0.66 mg/dL BUN 12 mg/dL 01-17 Phos 2.8 mg/dL 01-12 Alb 2.4 g/dL<L>      Skin: no pressure injury noted.   +2 b/l leg  and +3 penis, scrotum edema noted.     Estimated Needs:   [X ] no change since previous assessment  [ ] recalculated:       Previous Nutrition Diagnosis:     [ ] Inadequate Energy Intake [ X]Inadequate Oral Intake [ ] Excessive Energy Intake     [ ] Underweight [ ] Increased Nutrient Needs [ ] Overweight/Obesity     [ ] Altered GI Function [ ] Unintended Weight Loss [ ] Food & Nutrition Related Knowledge Deficit [X ] Malnutrition, severe     Nutrition Diagnosis is [X ] ongoing  [ ] resolved [ ] not applicable          Additional Recommendations:     1. Continue current diet order, which remains appropriate at this time.   2. Monitor weights, labs, BM's, skin integrity, p.o. intake.   3. Please Encourage po intake, assist with meals and menu selections, provide alternatives PRN.   4. Provide food preferences within therapeutic diet when requested.

## 2020-01-17 NOTE — PROGRESS NOTE ADULT - ASSESSMENT
Assessment and Plan:   · Assessment		  79 year old male with a PMH of CLL (untreated), cirrhosis (Child B at time of admission), chronic hepatitis B, and gastritis found to have SBO with pneumoperitoneum s/p ex lap, REESE and small bowel resection 1/7. On 1/13 midline incision erythematous and tender on exam, now with improved erythema after removing staples. Patient with persistent distended and tympanic abdomen. CT obtained after persistent abdominal distention and ileus which showed ascites but no obstruction and follow up xray significant for contrast in the colon. Hepatology following, recommended Paracentesis     Plan:  - NPO - resume regular diet after paracentesis   - Medicine consult for management of ascites i/s/o hep B  - f/u medicine and hepatology recs - started on Lasix and Aldactone   - DVT ppx with Lovenox - will resume after paracentesis   - IS use, encourage OOB   - f/u Paracentesis panel     B Team Surgery [id number]

## 2020-01-17 NOTE — CHART NOTE - NSCHARTNOTEFT_GEN_A_CORE
B Team Surgery    Attempted to have diagnostic paracentesis performed by procedure team but unable to do so secondary to having no windows available for puncture. Subsequently called Interventional Radiology for evaluation who reported that the area was too small and deep in the pelvis. D/W Hepatology who reports Paracentesis is not emergent. Will consider re-imaging patient Monday to evaluate if fluid is better accessible. Per hepatology paracentesis would be helpful in diagnosis as it seems as though ascites is new on this admission. Will resume diet and DVT PPx.

## 2020-01-17 NOTE — PROGRESS NOTE ADULT - ASSESSMENT
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

## 2020-01-17 NOTE — PROVIDER CONTACT NOTE (OTHER) - DATE AND TIME:
17-Jan-2020 15:32 Surgeon/Pathologist Verbiage (Will Incorporate Name Of Surgeon From Intro If Not Blank): operated in two distinct and integrated capacities as the surgeon and pathologist.

## 2020-01-17 NOTE — PROGRESS NOTE ADULT - SUBJECTIVE AND OBJECTIVE BOX
Pt feels weak, not dizzy or lightheaded. No CP, SOB or cough. No fevers or chills. No diarrhea or n/v and abdominal pain is much better overall. Wife is with him.       Meds:  acetaminophen   Tablet .. 650 milliGRAM(s) Oral every 6 hours PRN  albumin human 25% IVPB 50 milliLiter(s) IV Intermittent every 6 hours  benzocaine 15 mG/menthol 3.6 mG (Sugar-Free) Lozenge 1 Lozenge Oral every 4 hours PRN  dextrose 5% + sodium chloride 0.45% with potassium chloride 20 mEq/L 1000 milliLiter(s) IV Continuous <Continuous>  furosemide    Tablet 40 milliGRAM(s) Oral daily  lactulose Syrup 10 Gram(s) Oral daily  melatonin 3 milliGRAM(s) Oral at bedtime PRN  oxyCODONE    IR 5 milliGRAM(s) Oral every 6 hours PRN  oxyCODONE    IR 10 milliGRAM(s) Oral every 6 hours PRN  pantoprazole  Injectable 40 milliGRAM(s) IV Push daily  potassium acid phosphate/sodium acid phosphate tablet (K-PHOS No. 2) 1 Tablet(s) Oral once  sodium chloride 1 Gram(s) Oral three times a day  spironolactone 100 milliGRAM(s) Oral daily  tamsulosin 0.4 milliGRAM(s) Oral at bedtime  tenofovir disoproxil fumarate (VIREAD) 300 milliGRAM(s) Oral daily      Vital Signs Last 24 Hrs  T(C): 36.9 (17 Jan 2020 06:02), Max: 37.3 (16 Jan 2020 22:00)  T(F): 98.4 (17 Jan 2020 06:02), Max: 99.2 (16 Jan 2020 22:00)  HR: 84 (17 Jan 2020 06:02) (84 - 98)  BP: 140/68 (17 Jan 2020 06:02) (122/84 - 145/73)  BP(mean): --  RR: 18 (17 Jan 2020 06:02) (17 - 18)  SpO2: 99% (17 Jan 2020 06:02) (95% - 99%)                          7.9    63.38 )-----------( 165      ( 17 Jan 2020 06:00 )             26.2       01-17    130<L>  |  99  |  12  ----------------------------<  112<H>  3.9   |  22  |  0.66    Ca    7.6<L>      17 Jan 2020 06:00  Phos  2.8     01-17  Mg     2.0     01-17                PT/INR - ( 17 Jan 2020 06:00 )   PT: 13.3 SEC;   INR: 1.19          PTT - ( 17 Jan 2020 06:00 )  PTT:27.0 SEC    PATH: spoke to DR Boyer - serosal adhesions, no CLL oe malignancy.

## 2020-01-17 NOTE — PROGRESS NOTE ADULT - ASSESSMENT
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 Hepatology called for management of ascites, previously on Lasix 20mg.    Impression:  #Cirrhosis from HBV  -varices: unknown, last EGD 2 years ago  -ascites: seen on CT this admission  -HE: none on exam, on lactulose  -HCC: none on imaging this admission  #SBO    Recommendations:  - dx para, send cell count, albumin, protein  - trend CMP, INR, CBC daily   - check HBV DNA level  - c/w albumin   - start lasix 40mg PO daily and spironolactone 100mg daily    Ricardo De La Cruz  Gastroenterology Fellow  Pager# [phone] ([facility]) or [phone] ([facility])  GI Phone# [phone] ([facility])  Page on-call GI fellow via  from 5pm-8am and on weekends 79M w/ HTN, gastritis, CLL (dx 2013), 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 Hepatology called for management of ascites, previously on Lasix 20mg.    Impression:  #Cirrhosis from HBV  -varices: unknown, last EGD 2 years ago  -ascites: seen on CT this admission  -HE: none on exam, on lactulose  -HCC: none on imaging this admission  #SBO    Recommendations:  - please get dx para, send cell count, albumin, protein  - trend CMP, INR, CBC daily   - c/w tx for HBV  - check HBV DNA level  - c/w albumin infusion  - start lasix 40mg PO daily and spironolactone 100mg daily  - f/u pathology from bowel resection    Ricardo De La Cruz  Gastroenterology Fellow  Pager# [phone] ([facility]) or [phone] ([facility])  GI Phone# [phone] ([facility])  Page on-call GI fellow via  from 5pm-8am and on weekends

## 2020-01-18 DIAGNOSIS — E87.8 OTHER DISORDERS OF ELECTROLYTE AND FLUID BALANCE, NOT ELSEWHERE CLASSIFIED: ICD-10-CM

## 2020-01-18 LAB
ALBUMIN SERPL ELPH-MCNC: 2.8 G/DL — LOW (ref 3.3–5)
ALP SERPL-CCNC: 142 U/L — HIGH (ref 40–120)
ALT FLD-CCNC: 33 U/L — SIGNIFICANT CHANGE UP (ref 4–41)
ANION GAP SERPL CALC-SCNC: 10 MMO/L — SIGNIFICANT CHANGE UP (ref 7–14)
ANION GAP SERPL CALC-SCNC: 8 MMO/L — SIGNIFICANT CHANGE UP (ref 7–14)
APTT BLD: 28.5 SEC — SIGNIFICANT CHANGE UP (ref 27.5–36.3)
AST SERPL-CCNC: 37 U/L — SIGNIFICANT CHANGE UP (ref 4–40)
BILIRUB DIRECT SERPL-MCNC: 0.3 MG/DL — HIGH (ref 0.1–0.2)
BILIRUB SERPL-MCNC: 0.9 MG/DL — SIGNIFICANT CHANGE UP (ref 0.2–1.2)
BUN SERPL-MCNC: 8 MG/DL — SIGNIFICANT CHANGE UP (ref 7–23)
BUN SERPL-MCNC: 9 MG/DL — SIGNIFICANT CHANGE UP (ref 7–23)
CALCIUM SERPL-MCNC: 7.5 MG/DL — LOW (ref 8.4–10.5)
CALCIUM SERPL-MCNC: 7.9 MG/DL — LOW (ref 8.4–10.5)
CHLORIDE SERPL-SCNC: 91 MMOL/L — LOW (ref 98–107)
CHLORIDE SERPL-SCNC: 95 MMOL/L — LOW (ref 98–107)
CO2 SERPL-SCNC: 23 MMOL/L — SIGNIFICANT CHANGE UP (ref 22–31)
CO2 SERPL-SCNC: 24 MMOL/L — SIGNIFICANT CHANGE UP (ref 22–31)
CREAT SERPL-MCNC: 0.6 MG/DL — SIGNIFICANT CHANGE UP (ref 0.5–1.3)
CREAT SERPL-MCNC: 0.72 MG/DL — SIGNIFICANT CHANGE UP (ref 0.5–1.3)
GLUCOSE SERPL-MCNC: 131 MG/DL — HIGH (ref 70–99)
GLUCOSE SERPL-MCNC: 134 MG/DL — HIGH (ref 70–99)
HCT VFR BLD CALC: 27.6 % — LOW (ref 39–50)
HGB BLD-MCNC: 8.4 G/DL — LOW (ref 13–17)
INR BLD: 1.19 — HIGH (ref 0.88–1.17)
MAGNESIUM SERPL-MCNC: 1.8 MG/DL — SIGNIFICANT CHANGE UP (ref 1.6–2.6)
MAGNESIUM SERPL-MCNC: 2 MG/DL — SIGNIFICANT CHANGE UP (ref 1.6–2.6)
MCHC RBC-ENTMCNC: 22.2 PG — LOW (ref 27–34)
MCHC RBC-ENTMCNC: 30.4 % — LOW (ref 32–36)
MCV RBC AUTO: 72.8 FL — LOW (ref 80–100)
NRBC # FLD: 0 K/UL — SIGNIFICANT CHANGE UP (ref 0–0)
PHOSPHATE SERPL-MCNC: 1.3 MG/DL — LOW (ref 2.5–4.5)
PLATELET # BLD AUTO: 168 K/UL — SIGNIFICANT CHANGE UP (ref 150–400)
PMV BLD: SIGNIFICANT CHANGE UP FL (ref 7–13)
POTASSIUM SERPL-MCNC: 3.9 MMOL/L — SIGNIFICANT CHANGE UP (ref 3.5–5.3)
POTASSIUM SERPL-MCNC: 4.8 MMOL/L — SIGNIFICANT CHANGE UP (ref 3.5–5.3)
POTASSIUM SERPL-SCNC: 3.9 MMOL/L — SIGNIFICANT CHANGE UP (ref 3.5–5.3)
POTASSIUM SERPL-SCNC: 4.8 MMOL/L — SIGNIFICANT CHANGE UP (ref 3.5–5.3)
PROT SERPL-MCNC: 5.7 G/DL — LOW (ref 6–8.3)
PROTHROM AB SERPL-ACNC: 13.6 SEC — HIGH (ref 9.8–13.1)
RBC # BLD: 3.79 M/UL — LOW (ref 4.2–5.8)
RBC # FLD: 20.7 % — HIGH (ref 10.3–14.5)
SODIUM SERPL-SCNC: 125 MMOL/L — LOW (ref 135–145)
SODIUM SERPL-SCNC: 126 MMOL/L — LOW (ref 135–145)
WBC # BLD: 88.58 K/UL — CRITICAL HIGH (ref 3.8–10.5)
WBC # FLD AUTO: 88.58 K/UL — CRITICAL HIGH (ref 3.8–10.5)

## 2020-01-18 PROCEDURE — 99233 SBSQ HOSP IP/OBS HIGH 50: CPT | Mod: GC

## 2020-01-18 RX ORDER — MAGNESIUM SULFATE 500 MG/ML
2 VIAL (ML) INJECTION ONCE
Refills: 0 | Status: COMPLETED | OUTPATIENT
Start: 2020-01-18 | End: 2020-01-18

## 2020-01-18 RX ADMIN — Medication 50 MILLILITER(S): at 13:11

## 2020-01-18 RX ADMIN — Medication 85 MILLIMOLE(S): at 16:02

## 2020-01-18 RX ADMIN — TAMSULOSIN HYDROCHLORIDE 0.4 MILLIGRAM(S): 0.4 CAPSULE ORAL at 21:52

## 2020-01-18 RX ADMIN — ENOXAPARIN SODIUM 40 MILLIGRAM(S): 100 INJECTION SUBCUTANEOUS at 13:12

## 2020-01-18 RX ADMIN — Medication 50 MILLILITER(S): at 06:25

## 2020-01-18 RX ADMIN — SODIUM CHLORIDE 1 GRAM(S): 9 INJECTION INTRAMUSCULAR; INTRAVENOUS; SUBCUTANEOUS at 05:52

## 2020-01-18 RX ADMIN — Medication 50 MILLILITER(S): at 01:57

## 2020-01-18 RX ADMIN — PANTOPRAZOLE SODIUM 40 MILLIGRAM(S): 20 TABLET, DELAYED RELEASE ORAL at 13:12

## 2020-01-18 RX ADMIN — Medication 50 GRAM(S): at 14:16

## 2020-01-18 RX ADMIN — OXYCODONE HYDROCHLORIDE 5 MILLIGRAM(S): 5 TABLET ORAL at 06:55

## 2020-01-18 RX ADMIN — Medication 40 MILLIGRAM(S): at 05:52

## 2020-01-18 RX ADMIN — TENOFOVIR DISOPROXIL FUMARATE 300 MILLIGRAM(S): 300 TABLET, FILM COATED ORAL at 13:12

## 2020-01-18 RX ADMIN — Medication 50 MILLILITER(S): at 19:04

## 2020-01-18 RX ADMIN — SODIUM CHLORIDE 1 GRAM(S): 9 INJECTION INTRAMUSCULAR; INTRAVENOUS; SUBCUTANEOUS at 13:12

## 2020-01-18 RX ADMIN — SODIUM CHLORIDE 1 GRAM(S): 9 INJECTION INTRAMUSCULAR; INTRAVENOUS; SUBCUTANEOUS at 21:52

## 2020-01-18 RX ADMIN — SPIRONOLACTONE 100 MILLIGRAM(S): 25 TABLET, FILM COATED ORAL at 05:52

## 2020-01-18 RX ADMIN — OXYCODONE HYDROCHLORIDE 5 MILLIGRAM(S): 5 TABLET ORAL at 06:25

## 2020-01-18 NOTE — PROGRESS NOTE ADULT - PROBLEM SELECTOR PLAN 1
Hyponatremic likely multifactorial in setting of diuresis, IVF, and decompensated cirrhosis. Patient with hypervolemic hypernatremia in setting of furosemide and D5 + 1/2NS at 100/hr.   -would recommend DC D5 1/2 NS as furosemide is blocking Na reabsorption and is being replaced with hypotonic fluid through the IV. Is receiving Na tabs.  -would continue to replete electrolytes, K+ Phosphorous orally as patient tolerates.

## 2020-01-18 NOTE — PROGRESS NOTE ADULT - ASSESSMENT
79 year old male with a PMH of CLL (untreated), cirrhosis (Child B at time of admission), chronic hepatitis B, and gastritis found to have SBO with pneumoperitoneum s/p ex lap, REESE and small bowel resection 1/7. On 1/13 midline incision erythematous and tender on exam, now with improved erythema after removing staples. Patient with persistent distended and tympanic abdomen. CT obtained after persistent abdominal distention and ileus which showed ascites but no obstruction and follow up xray significant for contrast in the colon. Hepatology following, recommended Paracentesis but no window.    Plan:  - Regular diet  - pain control PRN tylenol/oxycodone  - cotninue lasix 40mg  - Medicine consult for management of ascites i/s/o hep B  - f/u medicine and hepatology recs - started on Lasix and Aldactone   - DVT ppx with Lovenox  - IS use, encourage OOB   - dispo planning    B Team Surgery [id number]

## 2020-01-18 NOTE — PROGRESS NOTE ADULT - SUBJECTIVE AND OBJECTIVE BOX
POD #: 11    No acute events overnight    SUBJECTIVE:  Reports very little pain but improving  Denies nausea, vomiting  Is passing gas and having bowel movements  Tolerating diet  Ambulating with PT    OBJECTIVE:  Vital Signs Last 24 Hrs  T(C): 37.4 (18 Jan 2020 10:33), Max: 37.4 (18 Jan 2020 05:49)  T(F): 99.4 (18 Jan 2020 10:33), Max: 99.4 (18 Jan 2020 10:33)  HR: 95 (18 Jan 2020 10:33) (90 - 96)  BP: 136/75 (18 Jan 2020 10:33) (127/63 - 158/77)  BP(mean): --  RR: 18 (18 Jan 2020 10:33) (16 - 18)  SpO2: 98% (18 Jan 2020 10:33) (98% - 100%)      Physical Examination:  GEN: NAD, resting quietly  NEURO: AAOx3, CN II-XII grossly intact, no focal deficits  PULM: symmetric chest rise bilaterally, no increased WOB  GI: NT distended, midline incision CDI with staples in place, some staples removed in the middle of the wound now open without erythema  EXTR: no cyanosis or edema, moving all extremities    LABS:                        8.4    88.58 )-----------( 168      ( 18 Jan 2020 06:10 )             27.6       01-18    126<L>  |  95<L>  |  9   ----------------------------<  134<H>  3.9   |  23  |  0.60    Ca    7.5<L>      18 Jan 2020 06:10  Phos  1.3     01-18  Mg     1.8     01-18    TPro  5.7<L>  /  Alb  2.8<L>  /  TBili  0.9  /  DBili  0.3<H>  /  AST  37  /  ALT  33  /  AlkPhos  142<H>  01-18        IMAGING:  []

## 2020-01-18 NOTE — PROGRESS NOTE ADULT - SUBJECTIVE AND OBJECTIVE BOX
Matthew Garcia  [birth year] [id number]    Patient is a 79y old  Male who presents with a chief complaint of abdominal pain (18 Jan 2020 10:42)      SUBJECTIVE / OVERNIGHT EVENTS:  Patient states his LE edema has improved.   Has mild abdominal pain which has improved. Has been having BMs and tolerating small amounts of food. Has nauisea with large amounts of food.   Otherwise denies fever, chills, chest pain, palpitations, SOB, diarrhea, dysuria.     MEDICATIONS  (STANDING):  albumin human 25% IVPB 50 milliLiter(s) IV Intermittent every 6 hours  dextrose 5% + sodium chloride 0.45% with potassium chloride 20 mEq/L 1000 milliLiter(s) (100 mL/Hr) IV Continuous <Continuous>  enoxaparin Injectable 40 milliGRAM(s) SubCutaneous daily  furosemide    Tablet 40 milliGRAM(s) Oral daily  magnesium sulfate  IVPB 2 Gram(s) IV Intermittent once  pantoprazole  Injectable 40 milliGRAM(s) IV Push daily  sodium chloride 1 Gram(s) Oral three times a day  sodium phosphate IVPB 30 milliMole(s) IV Intermittent once  spironolactone 100 milliGRAM(s) Oral daily  tamsulosin 0.4 milliGRAM(s) Oral at bedtime  tenofovir disoproxil fumarate (VIREAD) 300 milliGRAM(s) Oral daily    MEDICATIONS  (PRN):  acetaminophen   Tablet .. 650 milliGRAM(s) Oral every 6 hours PRN Mild Pain (1 - 3)  benzocaine 15 mG/menthol 3.6 mG (Sugar-Free) Lozenge 1 Lozenge Oral every 4 hours PRN Sore Throat  melatonin 3 milliGRAM(s) Oral at bedtime PRN Insomnia  oxyCODONE    IR 5 milliGRAM(s) Oral every 6 hours PRN Moderate Pain (4 - 6)  oxyCODONE    IR 10 milliGRAM(s) Oral every 6 hours PRN Severe Pain (7 - 10)      Vital Signs Last 24 Hrs  T(C): 37.4 (18 Jan 2020 10:33), Max: 37.4 (18 Jan 2020 05:49)  T(F): 99.4 (18 Jan 2020 10:33), Max: 99.4 (18 Jan 2020 10:33)  HR: 95 (18 Jan 2020 10:33) (90 - 96)  BP: 136/75 (18 Jan 2020 10:33) (127/63 - 158/77)  BP(mean): --  RR: 18 (18 Jan 2020 10:33) (16 - 18)  SpO2: 98% (18 Jan 2020 10:33) (98% - 100%)  CAPILLARY BLOOD GLUCOSE        I&O's Summary    17 Jan 2020 07:01  -  18 Jan 2020 07:00  --------------------------------------------------------  IN: 2550 mL / OUT: 950 mL / NET: 1600 mL    18 Jan 2020 07:01  -  18 Jan 2020 12:20  --------------------------------------------------------  IN: 0 mL / OUT: 0 mL / NET: 0 mL        PHYSICAL EXAM:  GENERAL: NAD  HEAD:  Atraumatic, Normocephalic  EYES: EOMI, PERRLA, conjunctiva and sclera clear  ENMT: No tonsillar erythema, exudates, or enlargement; Moist mucous membranes, Good dentition, No lesions  NECK: Supple, No JVD, Normal thyroid  NERVOUS SYSTEM:  Alert & Oriented X3, Good concentration; Motor Strength 5/5 B/L upper and lower extremities; DTRs 2+ intact and symmetric  CHEST/LUNG: Clear to percussion bilaterally; No rales, rhonchi, wheezing, or rubs  HEART: Regular rate and rhythm; No murmurs, rubs, or gallops  ABDOMEN: Well healing surgical scar with surgical staples. Distended with mild TTP mid epigastric with fluid wave.  Improving Scrotal and edema of the penis with ? peyronies?  EXTREMITIES:  1+ pitting edema to hips bilaterally  LYMPH: No lymphadenopathy noted  SKIN: No rashes or lesions    LABS:                        8.4    88.58 )-----------( 168      ( 18 Jan 2020 06:10 )             27.6     01-18    126<L>  |  95<L>  |  9   ----------------------------<  134<H>  3.9   |  23  |  0.60    Ca    7.5<L>      18 Jan 2020 06:10  Phos  1.3     01-18  Mg     1.8     01-18    TPro  5.7<L>  /  Alb  2.8<L>  /  TBili  0.9  /  DBili  0.3<H>  /  AST  37  /  ALT  33  /  AlkPhos  142<H>  01-18    PT/INR - ( 18 Jan 2020 06:10 )   PT: 13.6 SEC;   INR: 1.19          PTT - ( 18 Jan 2020 06:10 )  PTT:28.5 SEC

## 2020-01-18 NOTE — PROGRESS NOTE ADULT - PROBLEM SELECTOR PLAN 2
Patient w/ ascites likely in setting of IVF and decompensated cirrhosis.  -continue furosemide and spironolactone, if Na continues to drop would recommend holding diuretics  -f/u hepatology  -pending paracentesis for Dx/Tx

## 2020-01-18 NOTE — PROGRESS NOTE ADULT - ATTENDING COMMENTS
Hold IVF at this time, continue with Lasix. Unclear benefit of salt tabs at this time as there are no clear studies showing the benefit of increasing sodium concentration in patients with cirrhosis unless the level is <120 or if patient is symptomatic. If hyponatremia worsens may need to hold Lasix.

## 2020-01-18 NOTE — PROGRESS NOTE ADULT - ATTENDING COMMENTS
unable to undergo diagnostic paracentesis but hepatologist is alright with this  continue medical management of cirrhosis  PT and rehab for d/c planning  Can likely remove remaining staples on POD#14 if still in hospital

## 2020-01-18 NOTE — PROGRESS NOTE ADULT - ASSESSMENT
80 yo M w/ pmhx on CLL, chronic HBV w/ cirrhosis previously compensated, HTN presenting 1/6 for abdominal pain secondary to SBO s/p ex lap now with decompensated cirrhosis with ascites and LE edema. Worsening hyponatremia possibly in setting of diuresis +IVF

## 2020-01-19 LAB
ANION GAP SERPL CALC-SCNC: 9 MMO/L — SIGNIFICANT CHANGE UP (ref 7–14)
BUN SERPL-MCNC: 8 MG/DL — SIGNIFICANT CHANGE UP (ref 7–23)
C DIFF TOX GENS STL QL NAA+PROBE: DETECTED — HIGH
CALCIUM SERPL-MCNC: 7.8 MG/DL — LOW (ref 8.4–10.5)
CHLORIDE SERPL-SCNC: 94 MMOL/L — LOW (ref 98–107)
CO2 SERPL-SCNC: 22 MMOL/L — SIGNIFICANT CHANGE UP (ref 22–31)
CREAT SERPL-MCNC: 0.59 MG/DL — SIGNIFICANT CHANGE UP (ref 0.5–1.3)
GLUCOSE SERPL-MCNC: 96 MG/DL — SIGNIFICANT CHANGE UP (ref 70–99)
HCT VFR BLD CALC: 25.2 % — LOW (ref 39–50)
HGB BLD-MCNC: 7.8 G/DL — LOW (ref 13–17)
MAGNESIUM SERPL-MCNC: 2 MG/DL — SIGNIFICANT CHANGE UP (ref 1.6–2.6)
MCHC RBC-ENTMCNC: 22.3 PG — LOW (ref 27–34)
MCHC RBC-ENTMCNC: 31 % — LOW (ref 32–36)
MCV RBC AUTO: 72.2 FL — LOW (ref 80–100)
NRBC # FLD: 0 K/UL — SIGNIFICANT CHANGE UP (ref 0–0)
PHOSPHATE SERPL-MCNC: 2.6 MG/DL — SIGNIFICANT CHANGE UP (ref 2.5–4.5)
PLATELET # BLD AUTO: 162 K/UL — SIGNIFICANT CHANGE UP (ref 150–400)
PMV BLD: SIGNIFICANT CHANGE UP FL (ref 7–13)
POTASSIUM SERPL-MCNC: 4.1 MMOL/L — SIGNIFICANT CHANGE UP (ref 3.5–5.3)
POTASSIUM SERPL-SCNC: 4.1 MMOL/L — SIGNIFICANT CHANGE UP (ref 3.5–5.3)
RBC # BLD: 3.49 M/UL — LOW (ref 4.2–5.8)
RBC # FLD: 20.8 % — HIGH (ref 10.3–14.5)
SODIUM SERPL-SCNC: 125 MMOL/L — LOW (ref 135–145)
WBC # BLD: 83.85 K/UL — CRITICAL HIGH (ref 3.8–10.5)
WBC # FLD AUTO: 83.85 K/UL — CRITICAL HIGH (ref 3.8–10.5)

## 2020-01-19 PROCEDURE — 99233 SBSQ HOSP IP/OBS HIGH 50: CPT | Mod: GC

## 2020-01-19 RX ADMIN — OXYCODONE HYDROCHLORIDE 5 MILLIGRAM(S): 5 TABLET ORAL at 10:55

## 2020-01-19 RX ADMIN — OXYCODONE HYDROCHLORIDE 5 MILLIGRAM(S): 5 TABLET ORAL at 03:33

## 2020-01-19 RX ADMIN — PANTOPRAZOLE SODIUM 40 MILLIGRAM(S): 20 TABLET, DELAYED RELEASE ORAL at 10:25

## 2020-01-19 RX ADMIN — Medication 650 MILLIGRAM(S): at 07:16

## 2020-01-19 RX ADMIN — Medication 650 MILLIGRAM(S): at 06:46

## 2020-01-19 RX ADMIN — SODIUM CHLORIDE 1 GRAM(S): 9 INJECTION INTRAMUSCULAR; INTRAVENOUS; SUBCUTANEOUS at 06:37

## 2020-01-19 RX ADMIN — OXYCODONE HYDROCHLORIDE 5 MILLIGRAM(S): 5 TABLET ORAL at 22:47

## 2020-01-19 RX ADMIN — OXYCODONE HYDROCHLORIDE 5 MILLIGRAM(S): 5 TABLET ORAL at 17:15

## 2020-01-19 RX ADMIN — OXYCODONE HYDROCHLORIDE 5 MILLIGRAM(S): 5 TABLET ORAL at 16:45

## 2020-01-19 RX ADMIN — Medication 3 MILLIGRAM(S): at 21:13

## 2020-01-19 RX ADMIN — Medication 50 MILLILITER(S): at 01:58

## 2020-01-19 RX ADMIN — SPIRONOLACTONE 100 MILLIGRAM(S): 25 TABLET, FILM COATED ORAL at 06:37

## 2020-01-19 RX ADMIN — SODIUM CHLORIDE 1 GRAM(S): 9 INJECTION INTRAMUSCULAR; INTRAVENOUS; SUBCUTANEOUS at 13:47

## 2020-01-19 RX ADMIN — OXYCODONE HYDROCHLORIDE 5 MILLIGRAM(S): 5 TABLET ORAL at 23:17

## 2020-01-19 RX ADMIN — TENOFOVIR DISOPROXIL FUMARATE 300 MILLIGRAM(S): 300 TABLET, FILM COATED ORAL at 10:25

## 2020-01-19 RX ADMIN — Medication 650 MILLIGRAM(S): at 14:52

## 2020-01-19 RX ADMIN — Medication 650 MILLIGRAM(S): at 15:22

## 2020-01-19 RX ADMIN — SODIUM CHLORIDE 1 GRAM(S): 9 INJECTION INTRAMUSCULAR; INTRAVENOUS; SUBCUTANEOUS at 21:13

## 2020-01-19 RX ADMIN — ENOXAPARIN SODIUM 40 MILLIGRAM(S): 100 INJECTION SUBCUTANEOUS at 10:25

## 2020-01-19 RX ADMIN — OXYCODONE HYDROCHLORIDE 5 MILLIGRAM(S): 5 TABLET ORAL at 10:25

## 2020-01-19 RX ADMIN — TAMSULOSIN HYDROCHLORIDE 0.4 MILLIGRAM(S): 0.4 CAPSULE ORAL at 21:13

## 2020-01-19 RX ADMIN — Medication 50 MILLILITER(S): at 19:09

## 2020-01-19 RX ADMIN — Medication 50 MILLILITER(S): at 06:37

## 2020-01-19 RX ADMIN — OXYCODONE HYDROCHLORIDE 5 MILLIGRAM(S): 5 TABLET ORAL at 03:03

## 2020-01-19 RX ADMIN — Medication 50 MILLILITER(S): at 13:47

## 2020-01-19 NOTE — PROGRESS NOTE ADULT - ATTENDING COMMENTS
Hyponatremia slightly worsening to 125 today, Lasix on hold. Would also consider d/cing salt tabs as well as this can worsen patient's ascites. Patient appears to have chronic mild hyponatremia 2/2 cirrhosis.    Would also start on PO standing phosphorus supplementation instead of giving IV repletion as this can add to patient's volume overload.

## 2020-01-19 NOTE — PROGRESS NOTE ADULT - ASSESSMENT
79M with multiple and significant medical history as above and CLL, never required Rx for it, here with SBO and perforation, s/p surgery, Path showing just serosal adhesions without any malignancy, etiology unclear, did not need any transfusion, will recommend:  - continue Rx as per surgery - pain control  - slow advancing of diet as tolerated  - stool for c. 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

## 2020-01-19 NOTE — PROGRESS NOTE ADULT - PROBLEM SELECTOR PLAN 2
Patient w/ ascites likely in setting of IVF and decompensated cirrhosis.  -continue furosemide and spironolactone, if Na continues to drop would recommend holding diuretics  -f/u hepatology  -pending paracentesis for Dx/Tx Patient w/ ascites likely in setting of IVF and decompensated cirrhosis. No signs of encephalopathy  -f/u hepatology

## 2020-01-19 NOTE — PROGRESS NOTE ADULT - PROBLEM SELECTOR PLAN 1
Hyponatremic likely multifactorial in setting of diuresis, IVF, and decompensated cirrhosis. Patient with hypervolemic hypernatremia in setting of furosemide and D5 + 1/2NS at 100/hr. Worsening today to 125, may have worsened given IV D5 through electrolyte repletion as received about 550 mL of D5.  -replete electrolytes via PO route if possible  -would put on standing KPhos packets TID w/ meals x2 days given persistent hypophosp  -Given Na 125 would hold furosemide today (already received spironolactone)  -would recommend dc NaCl tabs as this may worsen ascites as cirrhosis is Hyponatremic likely multifactorial in setting of diuresis, IVF, and decompensated cirrhosis. Patient with hypervolemic hypernatremia in setting of furosemide and D5 + 1/2NS at 100/hr. Worsening today to 125, may have worsened given IV D5 through electrolyte repletion as received about 550 mL of D5. Also w/ diarrhea potentially exacerbating hyponatremia.  -replete electrolytes via PO route if possible  -would put on standing KPhos packets TID w/ meals x2 days given persistent hypophosp  -Given Na 125 would hold furosemide today (already received spironolactone), could hold spironolactone in AM as it has greater natriuresis properties.  -Can trial fluid restriction but would dc NaCl tabs as could worsen ascites.

## 2020-01-19 NOTE — PROGRESS NOTE ADULT - ASSESSMENT
79 year old male with a PMH of CLL (untreated), cirrhosis (Child B at time of admission), chronic hepatitis B, and gastritis found to have SBO with pneumoperitoneum s/p ex lap, REESE and small bowel resection 1/7. On 1/13 midline incision erythematous and tender on exam, now with improved erythema after removing staples. Patient with persistent distended and tympanic abdomen. CT obtained after persistent abdominal distention and ileus which showed ascites but no obstruction and follow up xray significant for contrast in the colon. Hepatology following, recommended Paracentesis but no window.    Plan:  - Regular diet  - pain control PRN tylenol/oxycodone  - fluid restrict <1L  - continue lasix 40mg when hyponatremia corrected  - Medicine consult for management of ascites i/s/o hep B  - f/u medicine and hepatology recs - started on Lasix and Aldactone but lasix held 2/2 hyponatremia  - DVT ppx with Lovenox  - IS use, encourage OOB   - dispo planning    B Team Surgery [id number] 79 year old male with a PMH of CLL (untreated), cirrhosis (Child B at time of admission), chronic hepatitis B, and gastritis found to have SBO with pneumoperitoneum s/p ex lap, REESE and small bowel resection 1/7. On 1/13 midline incision erythematous and tender on exam, now with improved erythema after removing staples. Patient with persistent distended and tympanic abdomen. CT obtained after persistent abdominal distention and ileus which showed ascites but no obstruction and follow up xray significant for contrast in the colon. Hepatology following, recommended Paracentesis but no window.    Plan:  - Regular diet  - pain control PRN tylenol/oxycodone  - fluid restrict <1L  - continue lasix 40mg when hyponatremia corrected  - Medicine consult for management of ascites i/s/o hep B  - f/u medicine and hepatology recs - started on Lasix and Aldactone but lasix held 2/2 hyponatremia  - DVT ppx with Lovenox  - IS use, encourage OOB   - dispo planning    B Team Surgery   [id number]

## 2020-01-19 NOTE — PROGRESS NOTE ADULT - SUBJECTIVE AND OBJECTIVE BOX
POD #: 12    No acute events overnight    SUBJECTIVE:  Reports pain  Denies nausea, vomiting, diarrhea  Is passing gas and having bowel movements  Tolerating diet  Ambulating independently    OBJECTIVE:  Vital Signs Last 24 Hrs  T(C): 36.7 (19 Jan 2020 06:35), Max: 37.4 (18 Jan 2020 10:33)  T(F): 98 (19 Jan 2020 06:35), Max: 99.4 (18 Jan 2020 10:33)  HR: 92 (19 Jan 2020 06:35) (92 - 98)  BP: 143/73 (19 Jan 2020 06:35) (136/75 - 155/81)  BP(mean): --  RR: 18 (19 Jan 2020 06:35) (18 - 20)  SpO2: 96% (19 Jan 2020 06:35) (96% - 100%)      Physical Examination:  GEN: NAD, resting quietly  NEURO: AAOx3, CN II-XII grossly intact, no focal deficits  PULM: symmetric chest rise bilaterally, no increased WOB  GI: NT distended, midline incision CDI with staples in place, some staples removed in the middle of the wound now open without erythema  EXTR: no cyanosis or edema, moving all extremities    LABS:                        8.4    88.58 )-----------( 168      ( 18 Jan 2020 06:10 )             27.6       01-18    125<L>  |  91<L>  |  8   ----------------------------<  131<H>  4.8   |  24  |  0.72    Ca    7.9<L>      18 Jan 2020 19:35  Phos  1.3     01-18  Mg     2.0     01-18    TPro  5.7<L>  /  Alb  2.8<L>  /  TBili  0.9  /  DBili  0.3<H>  /  AST  37  /  ALT  33  /  AlkPhos  142<H>  01-18        IMAGING:  [] POD #: 12    No acute events overnight    SUBJECTIVE:  Reports pain  Denies nausea, vomiting, diarrhea  Is passing gas and having bowel movements  Tolerating diet  Ambulating independently    OBJECTIVE:  Vital Signs Last 24 Hrs  T(C): 36.7 (19 Jan 2020 06:35), Max: 37.4 (18 Jan 2020 10:33)  T(F): 98 (19 Jan 2020 06:35), Max: 99.4 (18 Jan 2020 10:33)  HR: 92 (19 Jan 2020 06:35) (92 - 98)  BP: 143/73 (19 Jan 2020 06:35) (136/75 - 155/81)  BP(mean): --  RR: 18 (19 Jan 2020 06:35) (18 - 20)  SpO2: 96% (19 Jan 2020 06:35) (96% - 100%)      Physical Examination:  GEN: NAD, resting quietly  NEURO: AAOx3, CN II-XII grossly intact, no focal deficits  PULM: symmetric chest rise bilaterally, no increased WOB  GI: Soft NT distended, midline incision CDI with staples in place, some staples removed in the middle of the wound now open without erythema dressing changed  : Bernabe rectal tube  EXTR: no cyanosis or edema, moving all extremities LEs pitting edema    LABS:                        8.4    88.58 )-----------( 168      ( 18 Jan 2020 06:10 )             27.6       01-18    125<L>  |  91<L>  |  8   ----------------------------<  131<H>  4.8   |  24  |  0.72    Ca    7.9<L>      18 Jan 2020 19:35  Phos  1.3     01-18  Mg     2.0     01-18    TPro  5.7<L>  /  Alb  2.8<L>  /  TBili  0.9  /  DBili  0.3<H>  /  AST  37  /  ALT  33  /  AlkPhos  142<H>  01-18        IMAGING:  []

## 2020-01-19 NOTE — PROGRESS NOTE ADULT - SUBJECTIVE AND OBJECTIVE BOX
Pt has diarrhea, no foul smell, no abdominal cramps, no fevers or chills, no n/v, still has swelling of the scrotum and legs. None other active symptoms. Wife is with him.        Meds:  acetaminophen   Tablet .. 650 milliGRAM(s) Oral every 6 hours PRN  albumin human 25% IVPB 50 milliLiter(s) IV Intermittent every 6 hours  benzocaine 15 mG/menthol 3.6 mG (Sugar-Free) Lozenge 1 Lozenge Oral every 4 hours PRN  enoxaparin Injectable 40 milliGRAM(s) SubCutaneous daily  melatonin 3 milliGRAM(s) Oral at bedtime PRN  oxyCODONE    IR 5 milliGRAM(s) Oral every 6 hours PRN  oxyCODONE    IR 10 milliGRAM(s) Oral every 6 hours PRN  pantoprazole  Injectable 40 milliGRAM(s) IV Push daily  sodium chloride 1 Gram(s) Oral three times a day  spironolactone 100 milliGRAM(s) Oral daily  tamsulosin 0.4 milliGRAM(s) Oral at bedtime  tenofovir disoproxil fumarate (VIREAD) 300 milliGRAM(s) Oral daily      Vital Signs Last 24 Hrs  T(C): 36.5 (19 Jan 2020 09:52), Max: 37.3 (18 Jan 2020 18:00)  T(F): 97.7 (19 Jan 2020 09:52), Max: 99.1 (18 Jan 2020 18:00)  HR: 89 (19 Jan 2020 09:52) (89 - 98)  BP: 116/54 (19 Jan 2020 09:52) (116/54 - 155/81)  BP(mean): --  RR: 17 (19 Jan 2020 09:52) (17 - 20)  SpO2: 95% (19 Jan 2020 09:52) (95% - 100%)                          7.8    83.85 )-----------( 162      ( 19 Jan 2020 06:10 )             25.2       01-19    125<L>  |  94<L>  |  8   ----------------------------<  96  4.1   |  22  |  0.59    Ca    7.8<L>      19 Jan 2020 06:10  Phos  2.6     01-19  Mg     2.0     01-19    TPro  5.7<L>  /  Alb  2.8<L>  /  TBili  0.9  /  DBili  0.3<H>  /  AST  37  /  ALT  33  /  AlkPhos  142<H>  01-18              PT/INR - ( 18 Jan 2020 06:10 )   PT: 13.6 SEC;   INR: 1.19          PTT - ( 18 Jan 2020 06:10 )  PTT:28.5 SEC      Path: Surgical Pathology Report:   ACCESSION No:  [id number]    ROBLES, ZAN                          2        Surgical Final Report          Final Diagnosis  1. Small bowel, segment resection:  - Segment of small bowel with perforation, ulcerations,  acute serositis with fibrinous exudates and serosal adhesions,  see note.  - Mesenteric adipose tissue with fat necrosis.  - Resection margins are viable.    Note: There is no obvious CLL involvement of the perforation  site. Some of the congested blood vessels show small lymphocytes,  which are considered as component of the peripheral blood.  Negative for vasculitis, granulomata and  viral cytopathic effects.    Verified by: Minor Boyer M.D.  (Electronic Signature)  Reported on: 01/17/20 09:37 EST, [address] [facility] [place] [address]  Phone: [phone]   Fax: [phone]  _________________________________________________________________    Clinical History  79 year-old male history of CLL,peritonitis due to bowel  perforation    Specimen(s) Submitted  1. Small bowel (portion of small bowel)    Gross Description  The specimen is received in formalin and the specimen container  is labeled: Portion of small bowel.  It consists of an unoriented  segment of small bowel, measuring 15.5 cm in length and 6.2 cm in  average circumference. There is 3.6 cm portion of attached  mesenteric adipose tissue.  The margins have been stapled  together, and are differentially inked. The serosa is is tan-  brown, dull with focal purulent exudate and a 1.5 x 1.0 cm defect  located 2.9 cm from the closest margin, inked blue.  The area of  perforation is inked green.  Upon opening, the mucosa appears  tan-brown, unremarkable with unremarkable mucosal folds, there is  a site of stricture, which has a diameter measuring 2.0 cm at the  area of the perforation.  The wall thickness ranges from 0.3 cm  to 0.8 cm at the area of stricture.  Sectioning of the adherent  adipose tissue reveals two firm areas (possible lymph nodes).  Representative sections submitted as follows.  Five cassettes.  A. Margins, differentially inked  B-C. Stricture site with perforation  D. Unremarkable colonic mucosa  E. Firm areas in adipose tissue            ZAN ROBLES                          2        Surgical Final Report            In addition to other data that may appear on the specimen  container, the label has been inspected to confirm the presence  of the patient's name and date of birth.    JR Mosley [facility] 01/08/2020 11:41 (01.07.20 @ 04:30)

## 2020-01-20 DIAGNOSIS — A49.8 OTHER BACTERIAL INFECTIONS OF UNSPECIFIED SITE: ICD-10-CM

## 2020-01-20 LAB
ANION GAP SERPL CALC-SCNC: 10 MMO/L — SIGNIFICANT CHANGE UP (ref 7–14)
BUN SERPL-MCNC: 13 MG/DL — SIGNIFICANT CHANGE UP (ref 7–23)
CALCIUM SERPL-MCNC: 8.3 MG/DL — LOW (ref 8.4–10.5)
CHLORIDE SERPL-SCNC: 93 MMOL/L — LOW (ref 98–107)
CO2 SERPL-SCNC: 22 MMOL/L — SIGNIFICANT CHANGE UP (ref 22–31)
CREAT SERPL-MCNC: 0.74 MG/DL — SIGNIFICANT CHANGE UP (ref 0.5–1.3)
GLUCOSE SERPL-MCNC: 78 MG/DL — SIGNIFICANT CHANGE UP (ref 70–99)
HCT VFR BLD CALC: 25.2 % — LOW (ref 39–50)
HGB BLD-MCNC: 7.7 G/DL — LOW (ref 13–17)
MAGNESIUM SERPL-MCNC: 2 MG/DL — SIGNIFICANT CHANGE UP (ref 1.6–2.6)
MCHC RBC-ENTMCNC: 22.5 PG — LOW (ref 27–34)
MCHC RBC-ENTMCNC: 30.6 % — LOW (ref 32–36)
MCV RBC AUTO: 73.7 FL — LOW (ref 80–100)
NRBC # FLD: 0 K/UL — SIGNIFICANT CHANGE UP (ref 0–0)
PHOSPHATE SERPL-MCNC: 2.1 MG/DL — LOW (ref 2.5–4.5)
PLATELET # BLD AUTO: 171 K/UL — SIGNIFICANT CHANGE UP (ref 150–400)
PMV BLD: SIGNIFICANT CHANGE UP FL (ref 7–13)
POTASSIUM SERPL-MCNC: 4.6 MMOL/L — SIGNIFICANT CHANGE UP (ref 3.5–5.3)
POTASSIUM SERPL-SCNC: 4.6 MMOL/L — SIGNIFICANT CHANGE UP (ref 3.5–5.3)
RBC # BLD: 3.42 M/UL — LOW (ref 4.2–5.8)
RBC # FLD: 21.2 % — HIGH (ref 10.3–14.5)
SODIUM SERPL-SCNC: 125 MMOL/L — LOW (ref 135–145)
WBC # BLD: 71.69 K/UL — CRITICAL HIGH (ref 3.8–10.5)
WBC # FLD AUTO: 71.69 K/UL — CRITICAL HIGH (ref 3.8–10.5)

## 2020-01-20 PROCEDURE — 99233 SBSQ HOSP IP/OBS HIGH 50: CPT | Mod: GC

## 2020-01-20 RX ORDER — VANCOMYCIN HCL 1 G
125 VIAL (EA) INTRAVENOUS EVERY 6 HOURS
Refills: 0 | Status: DISCONTINUED | OUTPATIENT
Start: 2020-01-20 | End: 2020-01-29

## 2020-01-20 RX ORDER — SODIUM,POTASSIUM PHOSPHATES 278-250MG
1 POWDER IN PACKET (EA) ORAL
Refills: 0 | Status: DISCONTINUED | OUTPATIENT
Start: 2020-01-20 | End: 2020-01-31

## 2020-01-20 RX ORDER — VANCOMYCIN HCL 1 G
VIAL (EA) INTRAVENOUS
Refills: 0 | Status: DISCONTINUED | OUTPATIENT
Start: 2020-01-20 | End: 2020-01-20

## 2020-01-20 RX ORDER — ONDANSETRON 8 MG/1
4 TABLET, FILM COATED ORAL
Refills: 0 | Status: DISCONTINUED | OUTPATIENT
Start: 2020-01-20 | End: 2020-01-26

## 2020-01-20 RX ADMIN — OXYCODONE HYDROCHLORIDE 5 MILLIGRAM(S): 5 TABLET ORAL at 05:31

## 2020-01-20 RX ADMIN — SODIUM CHLORIDE 1 GRAM(S): 9 INJECTION INTRAMUSCULAR; INTRAVENOUS; SUBCUTANEOUS at 05:01

## 2020-01-20 RX ADMIN — TENOFOVIR DISOPROXIL FUMARATE 300 MILLIGRAM(S): 300 TABLET, FILM COATED ORAL at 11:42

## 2020-01-20 RX ADMIN — Medication 125 MILLIGRAM(S): at 17:46

## 2020-01-20 RX ADMIN — ONDANSETRON 4 MILLIGRAM(S): 8 TABLET, FILM COATED ORAL at 21:17

## 2020-01-20 RX ADMIN — PANTOPRAZOLE SODIUM 40 MILLIGRAM(S): 20 TABLET, DELAYED RELEASE ORAL at 11:40

## 2020-01-20 RX ADMIN — Medication 50 MILLILITER(S): at 00:41

## 2020-01-20 RX ADMIN — ONDANSETRON 4 MILLIGRAM(S): 8 TABLET, FILM COATED ORAL at 17:47

## 2020-01-20 RX ADMIN — Medication 50 MILLILITER(S): at 06:35

## 2020-01-20 RX ADMIN — Medication 50 MILLILITER(S): at 13:19

## 2020-01-20 RX ADMIN — Medication 125 MILLIGRAM(S): at 23:19

## 2020-01-20 RX ADMIN — OXYCODONE HYDROCHLORIDE 5 MILLIGRAM(S): 5 TABLET ORAL at 05:01

## 2020-01-20 RX ADMIN — SPIRONOLACTONE 100 MILLIGRAM(S): 25 TABLET, FILM COATED ORAL at 05:01

## 2020-01-20 RX ADMIN — Medication 3 MILLIGRAM(S): at 21:16

## 2020-01-20 RX ADMIN — TAMSULOSIN HYDROCHLORIDE 0.4 MILLIGRAM(S): 0.4 CAPSULE ORAL at 21:16

## 2020-01-20 RX ADMIN — Medication 125 MILLIGRAM(S): at 11:42

## 2020-01-20 RX ADMIN — Medication 1 TABLET(S): at 13:19

## 2020-01-20 RX ADMIN — Medication 63.75 MILLIMOLE(S): at 11:40

## 2020-01-20 RX ADMIN — Medication 50 MILLILITER(S): at 19:03

## 2020-01-20 RX ADMIN — ENOXAPARIN SODIUM 40 MILLIGRAM(S): 100 INJECTION SUBCUTANEOUS at 11:41

## 2020-01-20 RX ADMIN — Medication 1 TABLET(S): at 17:47

## 2020-01-20 NOTE — PROGRESS NOTE ADULT - SUBJECTIVE AND OBJECTIVE BOX
POD #: 13    No acute events overnight    SUBJECTIVE:  Reports pain  Denies nausea, vomiting, diarrhea  Is passing gas and having bowel movements  Tolerating diet  Ambulating independently    OBJECTIVE:  Vital Signs Last 24 Hrs  T(C): 37.1 (19 Jan 2020 21:55), Max: 37.2 (19 Jan 2020 17:36)  T(F): 98.7 (19 Jan 2020 21:55), Max: 98.9 (19 Jan 2020 17:36)  HR: 86 (19 Jan 2020 22:00) (58 - 95)  BP: 122/72 (19 Jan 2020 21:55) (116/54 - 143/73)  RR: 17 (19 Jan 2020 21:55) (17 - 18)  SpO2: 98% (19 Jan 2020 21:55) (95% - 99%)      Physical Examination:  GEN: NAD, resting quietly  NEURO: AAOx3, CN II-XII grossly intact, no focal deficits  PULM: symmetric chest rise bilaterally, no increased WOB  GI: Soft NT distended, midline incision CDI with staples in place, some staples removed in the middle of the wound now open without erythema dressing changed  : Bernabe rectal tube  EXTR: no cyanosis or edema, moving all extremities LEs pitting edema    LABS:             7.8    83.85 )-----------( 162      ( 19 Jan 2020 06:10 )             25.2     01-19    125<L>  |  94<L>  |  8   ----------------------------<  96  4.1   |  22  |  0.59    Ca    7.8<L>      19 Jan 2020 06:10  Phos  2.6     01-19  Mg     2.0     01-19    TPro  5.7<L>  /  Alb  2.8<L>  /  TBili  0.9  /  DBili  0.3<H>  /  AST  37  /  ALT  33  /  AlkPhos  142<H>  01-18 POD #: 13    No acute events overnight    SUBJECTIVE:  Reports pain  Denies nausea, vomiting, diarrhea  Is passing gas and having bowel movements  Tolerating diet  Ambulating independently    OBJECTIVE:  Vital Signs Last 24 Hrs  T(C): 37.1 (19 Jan 2020 21:55), Max: 37.2 (19 Jan 2020 17:36)  T(F): 98.7 (19 Jan 2020 21:55), Max: 98.9 (19 Jan 2020 17:36)  HR: 86 (19 Jan 2020 22:00) (58 - 95)  BP: 122/72 (19 Jan 2020 21:55) (116/54 - 143/73)  RR: 17 (19 Jan 2020 21:55) (17 - 18)  SpO2: 98% (19 Jan 2020 21:55) (95% - 99%)    Physical Examination:  GEN: NAD, resting quietly  NEURO: AAOx3, CN II-XII grossly intact, no focal deficits  PULM: symmetric chest rise bilaterally, no increased WOB  GI: Soft NT distended, midline incision CDI with staples in place, some staples removed in the middle of the wound now open without erythema dressing changed  : Bernabe rectal tube  EXTR: no cyanosis or edema, moving all extremities LEs pitting edema    LABS:             7.8    83.85 )-----------( 162      ( 19 Jan 2020 06:10 )             25.2     01-19    125<L>  |  94<L>  |  8   ----------------------------<  96  4.1   |  22  |  0.59    Ca    7.8<L>      19 Jan 2020 06:10  Phos  2.6     01-19  Mg     2.0     01-19    TPro  5.7<L>  /  Alb  2.8<L>  /  TBili  0.9  /  DBili  0.3<H>  /  AST  37  /  ALT  33  /  AlkPhos  142<H>  01-18

## 2020-01-20 NOTE — PROGRESS NOTE ADULT - PROBLEM SELECTOR PLAN 2
Patient w/ ascites likely in setting of IVF and decompensated cirrhosis. No signs of encephalopathy  -evaluate for paracentesis to confirm ascites is transudative as if it is exudative likely would not appropiately diuresis.   -f/u hepatology Patient w/ ascites likely in setting of IVF and decompensated cirrhosis. No signs of encephalopathy  -evaluate for paracentesis to confirm ascites is transudative as if it is exudative likely would not appropiately diuresis.   -f/u hepatology  -would suggest CMP Mg/Phosp and PT/INR in AM

## 2020-01-20 NOTE — PROGRESS NOTE ADULT - SUBJECTIVE AND OBJECTIVE BOX
Events since yesterday noted, pt has C diff, started on oral vanco. Has nausea, pain abdomen on and off, unable to eat because of nausea. No fevers or chills and ROS is otherwise unchanged. Wife and his son are with him.         Meds:  acetaminophen   Tablet .. 650 milliGRAM(s) Oral every 6 hours PRN  albumin human 25% IVPB 50 milliLiter(s) IV Intermittent every 6 hours  benzocaine 15 mG/menthol 3.6 mG (Sugar-Free) Lozenge 1 Lozenge Oral every 4 hours PRN  enoxaparin Injectable 40 milliGRAM(s) SubCutaneous daily  melatonin 3 milliGRAM(s) Oral at bedtime PRN  ondansetron   Disintegrating Tablet 4 milliGRAM(s) Oral four times a day  oxyCODONE    IR 5 milliGRAM(s) Oral every 6 hours PRN  oxyCODONE    IR 10 milliGRAM(s) Oral every 6 hours PRN  pantoprazole  Injectable 40 milliGRAM(s) IV Push daily  potassium acid phosphate/sodium acid phosphate tablet (K-PHOS No. 2) 1 Tablet(s) Oral three times a day with meals  tamsulosin 0.4 milliGRAM(s) Oral at bedtime  tenofovir disoproxil fumarate (VIREAD) 300 milliGRAM(s) Oral daily  vancomycin    Solution 125 milliGRAM(s) Oral every 6 hours      Vital Signs Last 24 Hrs  T(C): 36.9 (20 Jan 2020 14:24), Max: 37.2 (19 Jan 2020 17:36)  T(F): 98.4 (20 Jan 2020 14:24), Max: 98.9 (19 Jan 2020 17:36)  HR: 92 (20 Jan 2020 14:24) (58 - 99)  BP: 136/68 (20 Jan 2020 14:24) (122/72 - 145/93)  BP(mean): --  RR: 18 (20 Jan 2020 14:24) (17 - 18)  SpO2: 98% (20 Jan 2020 14:24) (97% - 99%)                          7.7    71.69 )-----------( 171      ( 20 Jan 2020 05:15 )             25.2       01-20    125<L>  |  93<L>  |  13  ----------------------------<  78  4.6   |  22  |  0.74    Ca    8.3<L>      20 Jan 2020 05:15  Phos  2.1     01-20  Mg     2.0     01-20              C. Diff POSITIVE

## 2020-01-20 NOTE — PROGRESS NOTE ADULT - ASSESSMENT
79M with multiple and significant medical history as above and CLL, never required Rx for it, here with SBO and perforation, s/p surgery, Path showing just serosal adhesions without any malignancy, etiology unclear, did not need any transfusion, will recommend:  - continue Rx as per surgery - pain control  - slow advancing of diet as tolerated, start ondansetron 4 mg half hour before each meal to minimize nausea  - stool for c. 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

## 2020-01-20 NOTE — PROGRESS NOTE ADULT - ASSESSMENT
79 year old male with a PMH of CLL (untreated), cirrhosis (Child B at time of admission), chronic hepatitis B, and gastritis found to have SBO with pneumoperitoneum s/p ex lap, REESE and small bowel resection 1/7. On 1/13 midline incision erythematous and tender on exam, now with improved erythema after removing staples. Patient with persistent distended and tympanic abdomen. CT obtained after persistent abdominal distention and ileus which showed ascites but no obstruction and follow up xray significant for contrast in the colon. Hepatology following, recommended Paracentesis but no window.    Plan:  - Regular diet  - pain control PRN tylenol/oxycodone  - fluid restrict <1L  - continue lasix 40mg when hyponatremia corrected  - Medicine consult for management of ascites i/s/o hep B  - f/u medicine and hepatology recs - started on Lasix and Aldactone but lasix held 2/2 hyponatremia  - DVT ppx with Lovenox  - IS use, encourage OOB   - dispo planning    B Team Surgery   [id number] 79 year old male with a PMH of CLL (untreated), cirrhosis (Child B at time of admission), chronic hepatitis B, and gastritis found to have SBO with pneumoperitoneum s/p ex lap, REESE and small bowel resection 1/7. On 1/13 midline incision erythematous and tender on exam, now with improved erythema after removing staples. Patient with persistent distended and tympanic abdomen. CT obtained after persistent abdominal distention and ileus which showed ascites but no obstruction and follow up xray significant for contrast in the colon. Hepatology following, recommended Paracentesis but no window.    Plan:  - Regular diet  - pain control PRN tylenol/oxycodone  - fluid restrict <1L  - continue lasix 40mg when hyponatremia corrected  - Medicine consult for management of ascites i/s/o hep B  - f/u medicine and hepatology recs - started on Lasix and Aldactone but lasix held 2/2 hyponatremia  - DVT ppx with Lovenox  - IS use, encourage OOB   - dispo planning  - No needs  - PO vanc  - Repleted Na Phos hyponatremia hypophosphatemia    B Team Surgery   [id number]

## 2020-01-20 NOTE — PROGRESS NOTE ADULT - SUBJECTIVE AND OBJECTIVE BOX
Matthew Garcia  [facility] [address]    Patient is a 79y old  Male who presents with a chief complaint of abdominal pain (20 Jan 2020 01:47)      SUBJECTIVE / OVERNIGHT EVENTS:  Found to be C. dif + started on oral vancomycin      MEDICATIONS  (STANDING):  albumin human 25% IVPB 50 milliLiter(s) IV Intermittent every 6 hours  enoxaparin Injectable 40 milliGRAM(s) SubCutaneous daily  pantoprazole  Injectable 40 milliGRAM(s) IV Push daily  potassium acid phosphate/sodium acid phosphate tablet (K-PHOS No. 2) 1 Tablet(s) Oral three times a day with meals  sodium phosphate IVPB 15 milliMole(s) IV Intermittent once  tamsulosin 0.4 milliGRAM(s) Oral at bedtime  tenofovir disoproxil fumarate (VIREAD) 300 milliGRAM(s) Oral daily  vancomycin    Solution 125 milliGRAM(s) Oral every 6 hours    MEDICATIONS  (PRN):  acetaminophen   Tablet .. 650 milliGRAM(s) Oral every 6 hours PRN Mild Pain (1 - 3)  benzocaine 15 mG/menthol 3.6 mG (Sugar-Free) Lozenge 1 Lozenge Oral every 4 hours PRN Sore Throat  melatonin 3 milliGRAM(s) Oral at bedtime PRN Insomnia  oxyCODONE    IR 5 milliGRAM(s) Oral every 6 hours PRN Moderate Pain (4 - 6)  oxyCODONE    IR 10 milliGRAM(s) Oral every 6 hours PRN Severe Pain (7 - 10)      Vital Signs Last 24 Hrs  T(C): 36.9 (20 Jan 2020 10:29), Max: 37.2 (19 Jan 2020 17:36)  T(F): 98.4 (20 Jan 2020 10:29), Max: 98.9 (19 Jan 2020 17:36)  HR: 96 (20 Jan 2020 10:29) (58 - 99)  BP: 135/70 (20 Jan 2020 10:29) (122/72 - 145/93)  BP(mean): --  RR: 18 (20 Jan 2020 10:29) (17 - 18)  SpO2: 97% (20 Jan 2020 10:29) (97% - 99%)  CAPILLARY BLOOD GLUCOSE        I&O's Summary    19 Jan 2020 07:01  -  20 Jan 2020 07:00  --------------------------------------------------------  IN: 510 mL / OUT: 975 mL / NET: -465 mL    20 Jan 2020 07:01  -  20 Jan 2020 10:39  --------------------------------------------------------  IN: 0 mL / OUT: 500 mL / NET: -500 mL        PHYSICAL EXAM:  GENERAL: NAD, well-developed  HEAD:  Atraumatic, Normocephalic  EYES: EOMI, PERRLA, conjunctiva and sclera clear  NECK: Supple, No JVD  CHEST/LUNG: Clear to auscultation bilaterally; No wheeze  HEART: Regular rate and rhythm; No murmurs, rubs, or gallops  ABDOMEN: Soft, Nontender, Nondistended; Bowel sounds present  EXTREMITIES:  2+ Peripheral Pulses, No clubbing, cyanosis, or edema  PSYCH: AAOx3  NEUROLOGY: non-focal  SKIN: No rashes or lesions    LABS:                        7.7    71.69 )-----------( 171      ( 20 Jan 2020 05:15 )             25.2     01-20    125<L>  |  93<L>  |  13  ----------------------------<  78  4.6   |  22  |  0.74    Ca    8.3<L>      20 Jan 2020 05:15  Phos  2.1     01-20  Mg     2.0     01-20 Matthew Garcia  [place] [id number]    Patient is a 79y old  Male who presents with a chief complaint of abdominal pain (20 Jan 2020 01:47)      SUBJECTIVE / OVERNIGHT EVENTS:  Found to be C. dif + started on oral vancomycin      MEDICATIONS  (STANDING):  albumin human 25% IVPB 50 milliLiter(s) IV Intermittent every 6 hours  enoxaparin Injectable 40 milliGRAM(s) SubCutaneous daily  pantoprazole  Injectable 40 milliGRAM(s) IV Push daily  potassium acid phosphate/sodium acid phosphate tablet (K-PHOS No. 2) 1 Tablet(s) Oral three times a day with meals  sodium phosphate IVPB 15 milliMole(s) IV Intermittent once  tamsulosin 0.4 milliGRAM(s) Oral at bedtime  tenofovir disoproxil fumarate (VIREAD) 300 milliGRAM(s) Oral daily  vancomycin    Solution 125 milliGRAM(s) Oral every 6 hours    MEDICATIONS  (PRN):  acetaminophen   Tablet .. 650 milliGRAM(s) Oral every 6 hours PRN Mild Pain (1 - 3)  benzocaine 15 mG/menthol 3.6 mG (Sugar-Free) Lozenge 1 Lozenge Oral every 4 hours PRN Sore Throat  melatonin 3 milliGRAM(s) Oral at bedtime PRN Insomnia  oxyCODONE    IR 5 milliGRAM(s) Oral every 6 hours PRN Moderate Pain (4 - 6)  oxyCODONE    IR 10 milliGRAM(s) Oral every 6 hours PRN Severe Pain (7 - 10)      Vital Signs Last 24 Hrs  T(C): 36.9 (20 Jan 2020 10:29), Max: 37.2 (19 Jan 2020 17:36)  T(F): 98.4 (20 Jan 2020 10:29), Max: 98.9 (19 Jan 2020 17:36)  HR: 96 (20 Jan 2020 10:29) (58 - 99)  BP: 135/70 (20 Jan 2020 10:29) (122/72 - 145/93)  BP(mean): --  RR: 18 (20 Jan 2020 10:29) (17 - 18)  SpO2: 97% (20 Jan 2020 10:29) (97% - 99%)  CAPILLARY BLOOD GLUCOSE        I&O's Summary    19 Jan 2020 07:01  -  20 Jan 2020 07:00  --------------------------------------------------------  IN: 510 mL / OUT: 975 mL / NET: -465 mL    20 Jan 2020 07:01  -  20 Jan 2020 10:39  --------------------------------------------------------  IN: 0 mL / OUT: 500 mL / NET: -500 mL        PHYSICAL EXAM:  GENERAL: NAD, well-developed  HEAD:  Atraumatic, Normocephalic  EYES: EOMI, PERRLA, conjunctiva and sclera clear  NECK: Supple, No JVD  CHEST/LUNG: Clear to auscultation bilaterally; No wheeze  HEART: Regular rate and rhythm; No murmurs, rubs, or gallops  ABDOMEN: Soft, Nontender, Nondistended; Bowel sounds present  EXTREMITIES:  2+ Peripheral Pulses, No clubbing, cyanosis, or edema  PSYCH: AAOx3  NEUROLOGY: non-focal  SKIN: No rashes or lesions    LABS:                        7.7    71.69 )-----------( 171      ( 20 Jan 2020 05:15 )             25.2     01-20    125<L>  |  93<L>  |  13  ----------------------------<  78  4.6   |  22  |  0.74    Ca    8.3<L>      20 Jan 2020 05:15  Phos  2.1     01-20  Mg     2.0     01-20

## 2020-01-20 NOTE — PROGRESS NOTE ADULT - ATTENDING COMMENTS
1. Hyponatremia- recommend continuing to hold Lasix for now. Replete electrolytes by PO instead of IV if able to as IV repletion will add to patient's volume overload. Salt tabs d/maco.  2. Decompensated cirrhosis- unable to perform paracentesis due to small windows, patient still with distended abdomen on exam.   3. Cdiff- positive for Cdiff, however with small amount of fecal material in rectal tube. Consider abdominal x-ray to evaluate for megacolon if abdominal pain and distension worsens. Continue with PO vancomycin 125mg q6h for 10 days. 1. Hyponatremia- recommend continuing to hold Lasix for now. Replete electrolytes by PO instead of IV if able to as IV repletion will add to patient's volume overload. Salt tabs d/maco.  2. Decompensated cirrhosis- unable to perform paracentesis due to small windows, patient still with distended abdomen on exam.   3. Cdiff- positive for Cdiff, however with small amount of fecal material in rectal tube. Consider abdominal x-ray to evaluate for megacolon if abdominal pain and distension worsens. Continue with PO vancomycin 125mg q6h for 10 days.    Volume status appears improved today. Patient's appetite also improving as well. Last BM this morning was semi-solid.    Will have patient transferred to medicine service in AM on 1/21.

## 2020-01-20 NOTE — PROGRESS NOTE ADULT - PROBLEM SELECTOR PLAN 1
Hyponatremic likely multifactorial in setting of diuresis w/ spironolactone and furosemide, hypotonic IVF, and decompensated cirrhosis. 125 Na  -replete electrolytes via PO route if possible  -would put on standing KPhos packets TID w/ meals x2 days given persistent hypophosp, when repleting w/ IV giving 250mL D5 a hypotonic solution which may make edema worse.  -Given Na 125 would hold furosemide today (already received spironolactone), could hold spironolactone in AM as it has greater natriuresis properties.  -Can trial fluid restriction but would dc NaCl tabs as could worsen ascites. Hyponatremic likely multifactorial in setting of diuresis w/ spironolactone and furosemide, hypotonic IVF, and decompensated cirrhosis. 125 Na  -replete electrolytes via PO route if possible  -would put on standing KPhos packets TID w/ meals x2 days given persistent hypophosp, when repleting w/ IV giving 250mL D5 a hypotonic solution which may make edema worse.  -given persistent hypophophatemia would check PTH and Vitamin D 25    -Given Na 125 would hold furosemide today (already received spironolactone), could hold spironolactone in AM as it has greater natriuresis properties.  -Can trial fluid restriction but would dc NaCl tabs as could worsen ascites.

## 2020-01-21 DIAGNOSIS — E87.1 HYPO-OSMOLALITY AND HYPONATREMIA: ICD-10-CM

## 2020-01-21 DIAGNOSIS — Z02.9 ENCOUNTER FOR ADMINISTRATIVE EXAMINATIONS, UNSPECIFIED: ICD-10-CM

## 2020-01-21 DIAGNOSIS — Z29.9 ENCOUNTER FOR PROPHYLACTIC MEASURES, UNSPECIFIED: ICD-10-CM

## 2020-01-21 DIAGNOSIS — B18.1 CHRONIC VIRAL HEPATITIS B WITHOUT DELTA-AGENT: ICD-10-CM

## 2020-01-21 DIAGNOSIS — C91.10 CHRONIC LYMPHOCYTIC LEUKEMIA OF B-CELL TYPE NOT HAVING ACHIEVED REMISSION: ICD-10-CM

## 2020-01-21 LAB
ANION GAP SERPL CALC-SCNC: 8 MMO/L — SIGNIFICANT CHANGE UP (ref 7–14)
BUN SERPL-MCNC: 13 MG/DL — SIGNIFICANT CHANGE UP (ref 7–23)
CALCIUM SERPL-MCNC: 8.2 MG/DL — LOW (ref 8.4–10.5)
CHLORIDE SERPL-SCNC: 93 MMOL/L — LOW (ref 98–107)
CHLORIDE UR-SCNC: 69 MMOL/L — SIGNIFICANT CHANGE UP
CO2 SERPL-SCNC: 23 MMOL/L — SIGNIFICANT CHANGE UP (ref 22–31)
CREAT SERPL-MCNC: 0.69 MG/DL — SIGNIFICANT CHANGE UP (ref 0.5–1.3)
GLUCOSE SERPL-MCNC: 92 MG/DL — SIGNIFICANT CHANGE UP (ref 70–99)
HBV DNA # SERPL NAA+PROBE: NOT DETECTED — SIGNIFICANT CHANGE UP
HBV DNA SERPL NAA+PROBE-LOG#: SIGNIFICANT CHANGE UP
HCT VFR BLD CALC: 22.9 % — LOW (ref 39–50)
HGB BLD-MCNC: 7.2 G/DL — LOW (ref 13–17)
MAGNESIUM SERPL-MCNC: 1.9 MG/DL — SIGNIFICANT CHANGE UP (ref 1.6–2.6)
MCHC RBC-ENTMCNC: 22.8 PG — LOW (ref 27–34)
MCHC RBC-ENTMCNC: 31.4 % — LOW (ref 32–36)
MCV RBC AUTO: 72.5 FL — LOW (ref 80–100)
NRBC # FLD: 0.02 K/UL — SIGNIFICANT CHANGE UP (ref 0–0)
PHOSPHATE SERPL-MCNC: 2.9 MG/DL — SIGNIFICANT CHANGE UP (ref 2.5–4.5)
PLATELET # BLD AUTO: 150 K/UL — SIGNIFICANT CHANGE UP (ref 150–400)
PMV BLD: SIGNIFICANT CHANGE UP FL (ref 7–13)
POTASSIUM SERPL-MCNC: 4.8 MMOL/L — SIGNIFICANT CHANGE UP (ref 3.5–5.3)
POTASSIUM SERPL-SCNC: 4.8 MMOL/L — SIGNIFICANT CHANGE UP (ref 3.5–5.3)
POTASSIUM UR-SCNC: 19.8 MMOL/L — SIGNIFICANT CHANGE UP
RBC # BLD: 3.16 M/UL — LOW (ref 4.2–5.8)
RBC # FLD: 20.7 % — HIGH (ref 10.3–14.5)
SODIUM SERPL-SCNC: 124 MMOL/L — LOW (ref 135–145)
SODIUM UR-SCNC: 105 MMOL/L — SIGNIFICANT CHANGE UP
WBC # BLD: 59.65 K/UL — CRITICAL HIGH (ref 3.8–10.5)
WBC # FLD AUTO: 59.65 K/UL — CRITICAL HIGH (ref 3.8–10.5)

## 2020-01-21 PROCEDURE — 99233 SBSQ HOSP IP/OBS HIGH 50: CPT

## 2020-01-21 PROCEDURE — 99232 SBSQ HOSP IP/OBS MODERATE 35: CPT

## 2020-01-21 PROCEDURE — 99232 SBSQ HOSP IP/OBS MODERATE 35: CPT | Mod: GC

## 2020-01-21 RX ORDER — ALBUMIN HUMAN 25 %
100 VIAL (ML) INTRAVENOUS EVERY 6 HOURS
Refills: 0 | Status: DISCONTINUED | OUTPATIENT
Start: 2020-01-21 | End: 2020-01-31

## 2020-01-21 RX ADMIN — Medication 50 MILLILITER(S): at 17:34

## 2020-01-21 RX ADMIN — ONDANSETRON 4 MILLIGRAM(S): 8 TABLET, FILM COATED ORAL at 13:07

## 2020-01-21 RX ADMIN — PANTOPRAZOLE SODIUM 40 MILLIGRAM(S): 20 TABLET, DELAYED RELEASE ORAL at 13:07

## 2020-01-21 RX ADMIN — Medication 1 TABLET(S): at 10:07

## 2020-01-21 RX ADMIN — Medication 125 MILLIGRAM(S): at 17:34

## 2020-01-21 RX ADMIN — Medication 1 TABLET(S): at 17:34

## 2020-01-21 RX ADMIN — TAMSULOSIN HYDROCHLORIDE 0.4 MILLIGRAM(S): 0.4 CAPSULE ORAL at 23:12

## 2020-01-21 RX ADMIN — Medication 3 MILLIGRAM(S): at 23:12

## 2020-01-21 RX ADMIN — ONDANSETRON 4 MILLIGRAM(S): 8 TABLET, FILM COATED ORAL at 17:34

## 2020-01-21 RX ADMIN — Medication 125 MILLIGRAM(S): at 05:03

## 2020-01-21 RX ADMIN — Medication 1 TABLET(S): at 13:07

## 2020-01-21 RX ADMIN — Medication 125 MILLIGRAM(S): at 13:07

## 2020-01-21 RX ADMIN — ONDANSETRON 4 MILLIGRAM(S): 8 TABLET, FILM COATED ORAL at 07:23

## 2020-01-21 RX ADMIN — Medication 50 MILLILITER(S): at 23:12

## 2020-01-21 RX ADMIN — TENOFOVIR DISOPROXIL FUMARATE 300 MILLIGRAM(S): 300 TABLET, FILM COATED ORAL at 13:07

## 2020-01-21 RX ADMIN — Medication 125 MILLIGRAM(S): at 23:12

## 2020-01-21 RX ADMIN — ENOXAPARIN SODIUM 40 MILLIGRAM(S): 100 INJECTION SUBCUTANEOUS at 13:06

## 2020-01-21 NOTE — PROGRESS NOTE ADULT - SUBJECTIVE AND OBJECTIVE BOX
STATUS POST:  Ex-lap, REESE, SBR ~15cm with anastomosis    POST OPERATIVE DAY #: 14    Vital Signs Last 24 Hrs  T(C): 36.8 (21 Jan 2020 05:15), Max: 37 (20 Jan 2020 17:42)  T(F): 98.3 (21 Jan 2020 05:15), Max: 98.6 (20 Jan 2020 17:42)  HR: 93 (21 Jan 2020 05:15) (88 - 96)  BP: 147/71 (21 Jan 2020 05:15) (135/70 - 152/87)  RR: 17 (21 Jan 2020 05:15) (17 - 18)  SpO2: 98% (21 Jan 2020 05:15) (97% - 99%)      SUBJECTIVE: Pt seen and examined this morning in NAD, lying comfortably in bed. No acute events overnight. Denies abdominal pain, nausea, vomiting, chest pain, fever, chills. Patient having BM and passing flatus.    PHYSICAL EXAM:  GEN: NAD, resting quietly  NEURO: A&Ox3, CN II-XII grossly intact, no focal deficits  PULM: symmetric chest rise bilaterally, no increased WOB  GI: Soft NT distended, midline incision CDI with staples in place, some staples removed in the middle of the wound now open without erythema dressing changed  : Bernabe rectal tube  EXTR: no cyanosis or edema, moving all extremities LEs pitting edema    I&O's Summary    20 Jan 2020 07:01  -  21 Jan 2020 07:00  --------------------------------------------------------  IN: 795 mL / OUT: 1752 mL / NET: -957 mL      I&O's Detail    20 Jan 2020 07:01  -  21 Jan 2020 07:00  --------------------------------------------------------  IN:    IV PiggyBack: 300 mL    Oral Fluid: 495 mL  Total IN: 795 mL    OUT:    Incontinent per Retracted Penis Pouch: 1750 mL    Stool: 2 mL  Total OUT: 1752 mL    Total NET: -957 mL          MEDICATIONS  (STANDING):  enoxaparin Injectable 40 milliGRAM(s) SubCutaneous daily  ondansetron   Disintegrating Tablet 4 milliGRAM(s) Oral four times a day  pantoprazole  Injectable 40 milliGRAM(s) IV Push daily  potassium acid phosphate/sodium acid phosphate tablet (K-PHOS No. 2) 1 Tablet(s) Oral three times a day with meals  tamsulosin 0.4 milliGRAM(s) Oral at bedtime  tenofovir disoproxil fumarate (VIREAD) 300 milliGRAM(s) Oral daily  vancomycin    Solution 125 milliGRAM(s) Oral every 6 hours    MEDICATIONS  (PRN):  acetaminophen   Tablet .. 650 milliGRAM(s) Oral every 6 hours PRN Mild Pain (1 - 3)  benzocaine 15 mG/menthol 3.6 mG (Sugar-Free) Lozenge 1 Lozenge Oral every 4 hours PRN Sore Throat  melatonin 3 milliGRAM(s) Oral at bedtime PRN Insomnia  oxyCODONE    IR 5 milliGRAM(s) Oral every 6 hours PRN Moderate Pain (4 - 6)  oxyCODONE    IR 10 milliGRAM(s) Oral every 6 hours PRN Severe Pain (7 - 10)      LABS:                        7.2    59.65 )-----------( 150      ( 21 Jan 2020 05:00 )             22.9     01-21    124<L>  |  93<L>  |  13  ----------------------------<  92  4.8   |  23  |  0.69    Ca    8.2<L>      21 Jan 2020 05:00  Phos  2.9     01-21  Mg     1.9     01-21            RADIOLOGY & ADDITIONAL STUDIES:

## 2020-01-21 NOTE — PROGRESS NOTE ADULT - ASSESSMENT
An 78 y/o man with h/o HTN, gastritis, CLL (diagnosed in 2013, treatment naive)  with hx of Hep B cirrhosis with eAg positive, with high HBV viremia,  genotype D with no mutation was on Viread presented with abdominal pain. Pt found to have SBO complicated by perforation S/P Ex Lap on jan 7 with 15 cm SB  removed  with  primary anastomosis. Hepatology called for management of ascites. As per home meds review patient was on lasix 20 mg and no aldactone.     # Decompensated Hep B liver cirrhosis. MELD score of 9 on january 6   Ascites on CT done after surgery   No Asterixis on exam. Pt on lactulose   EV: Last EGD 2 y ago but no records   HCC: No Hcc on imaging done this admission     # New onset ascites may be resulted from post surgery/ Anasarca vs decompensated liver cirrhosis after surgery. small window for tap so will ask for abd Tap by IR     # SBO complicated with perforation s/p Ex lap with sb resection and anastomosis on jan 7. No signs of obstruction after surgery and pt stable   # C Diff colitis on vancomycin po     recommendations:   Please try to get a diagnostic paracentesis by IR given small window and send fluid for TP, Albumin. LDH, Glucose, Amylase and cell count with gram stain and culture   (will help us identified the cause of ascites PHTN related or not)   Please correct  hyponatremia   Restart Albumin 25 g (25%) q6h daily    Continue Hep B therapy   Continue Vancomycin po for total of 10 days   Dietician follow for appropriate calorie and protein intake   Hold diuretics for now given hyponatremia and restart (lasix and aldactone) when hyponatremia resolves  Follow electrolytes   Will follow     Cheikh Callie Richard  GI Fellow  Pager# [phone] An 80 y/o man with h/o HTN, gastritis, CLL (diagnosed in 2013, treatment naive)  with hx of Hep B cirrhosis with eAg positive, with high HBV viremia,  genotype D with no mutation was on Viread presented with abdominal pain. Pt found to have SBO complicated by perforation S/P Ex Lap on jan 7 with 15 cm SB  removed  with  primary anastomosis. Hepatology called for management of ascites. As per home meds review patient was on lasix 20 mg and no aldactone.     # Decompensated Hep B liver cirrhosis. MELD score of 9 on january 6   Ascites on CT done after surgery   No Asterixis on exam. Pt on lactulose   EV: Last EGD 2 y ago but no records   HCC: No Hcc on imaging done this admission     # New onset ascites may be resulted from post surgery/ Anasarca vs decompensated liver cirrhosis after surgery. small window for tap so will ask for abd Tap by IR     # SBO complicated with perforation s/p Ex lap with sb resection and anastomosis on jan 7. No signs of obstruction after surgery and pt stable   # C Diff colitis on vancomycin po     recommendations:   Given active C Diff infection will hold on abdominal paracentesis  Restart Albumin 25 g (25%) q6h daily    Continue Hep B therapy   Continue Vancomycin po for total of 10 days   Dietician follow for appropriate calorie and protein intake   Hold diuretics for now given hyponatremia and restart (lasix and aldactone) when hyponatremia resolves  Follow electrolytes   Will follow     Cheikh Callie Richard  GI Fellow  Pager# [phone]

## 2020-01-21 NOTE — PROGRESS NOTE ADULT - ASSESSMENT
Patient is a 79y M with CLL, chronic HBV with cirrhosis previously compensated, HTN presenting 1/6 for abdominal pain secondary to SBO c/b perforation s/p ex lap now with decompensated cirrhosis with ascites and LE edema, and  hyponatremia

## 2020-01-21 NOTE — PROGRESS NOTE ADULT - PROBLEM SELECTOR PLAN 2
In part due to hypervolemia due to cirrhosis  Will start albumin and will consider resuming diuretics  Monitor volume status  Monitor BMP  Repeat urine lytes

## 2020-01-21 NOTE — PROGRESS NOTE ADULT - PROBLEM SELECTOR PLAN 1
d/w hepatology attending  Will hold off on paracentesis given active colitis  Restart albumin   No signs of encephalopathy

## 2020-01-21 NOTE — PROGRESS NOTE ADULT - SUBJECTIVE AND OBJECTIVE BOX
Patient is a 79y old  Male who presents with a chief complaint of abdominal pain (21 Jan 2020 10:39)      SUBJECTIVE / OVERNIGHT EVENTS:  Patient seen and examined.  No acute events overnight.  No current complaints.   Had 3 BM overnight, not this am.    MEDICATIONS  (STANDING):  enoxaparin Injectable 40 milliGRAM(s) SubCutaneous daily  ondansetron   Disintegrating Tablet 4 milliGRAM(s) Oral four times a day  pantoprazole  Injectable 40 milliGRAM(s) IV Push daily  potassium acid phosphate/sodium acid phosphate tablet (K-PHOS No. 2) 1 Tablet(s) Oral three times a day with meals  tamsulosin 0.4 milliGRAM(s) Oral at bedtime  tenofovir disoproxil fumarate (VIREAD) 300 milliGRAM(s) Oral daily  vancomycin    Solution 125 milliGRAM(s) Oral every 6 hours    MEDICATIONS  (PRN):  acetaminophen   Tablet .. 650 milliGRAM(s) Oral every 6 hours PRN Mild Pain (1 - 3)  benzocaine 15 mG/menthol 3.6 mG (Sugar-Free) Lozenge 1 Lozenge Oral every 4 hours PRN Sore Throat  melatonin 3 milliGRAM(s) Oral at bedtime PRN Insomnia  oxyCODONE    IR 5 milliGRAM(s) Oral every 6 hours PRN Moderate Pain (4 - 6)  oxyCODONE    IR 10 milliGRAM(s) Oral every 6 hours PRN Severe Pain (7 - 10)      Vital Signs Last 24 Hrs  T(C): 36.9 (21 Jan 2020 13:23), Max: 37 (20 Jan 2020 17:42)  T(F): 98.5 (21 Jan 2020 13:23), Max: 98.6 (20 Jan 2020 17:42)  HR: 88 (21 Jan 2020 13:23) (85 - 96)  BP: 121/66 (21 Jan 2020 13:23) (121/63 - 152/87)  BP(mean): --  RR: 16 (21 Jan 2020 13:23) (16 - 18)  SpO2: 98% (21 Jan 2020 13:23) (98% - 99%)  CAPILLARY BLOOD GLUCOSE        I&O's Summary    20 Jan 2020 07:01  -  21 Jan 2020 07:00  --------------------------------------------------------  IN: 795 mL / OUT: 1752 mL / NET: -957 mL    21 Jan 2020 07:01  -  21 Jan 2020 16:27  --------------------------------------------------------  IN: 0 mL / OUT: 550 mL / NET: -550 mL        PHYSICAL EXAM:  GENERAL: NAD, well-developed  HEENT: MMM, conjunctiva and sclera clear  NECK: Supple, No JVD  CHEST/LUNG: Clear to auscultation bilaterally; No wheeze  HEART: Regular rate and rhythm; No murmurs, rubs, or gallops  ABDOMEN: Soft, Nontender, Distended; Bowel sounds present, +fluid wave  : +scrotal edema  EXTREMITIES:  2+ LE edema, No clubbing or cyanosis  PSYCH: AAOx3  NEUROLOGY: non-focal  SKIN: No rashes or lesions    LABS:                        7.2    59.65 )-----------( 150      ( 21 Jan 2020 05:00 )             22.9     01-21    124<L>  |  93<L>  |  13  ----------------------------<  92  4.8   |  23  |  0.69    Ca    8.2<L>      21 Jan 2020 05:00  Phos  2.9     01-21  Mg     1.9     01-21

## 2020-01-21 NOTE — PROGRESS NOTE ADULT - SUBJECTIVE AND OBJECTIVE BOX
We are following the patient for Ascites and Cirrhosis      GI HPI Today:  Pt denies any complaints. Still having diarrhea   Still edematous with scrotal edema and ascites on exam   No overnight events   No fever     PAST MEDICAL & SURGICAL HISTORY  Cirrhosis  HTN (hypertension)  H/O gastritis  CLL (chronic lymphocytic leukemia)  No significant past surgical history      ALLERGIES:  Beef (Unknown)  No Known Drug Allergies      MEDICATIONS:  MEDICATIONS  (STANDING):  enoxaparin Injectable 40 milliGRAM(s) SubCutaneous daily  ondansetron   Disintegrating Tablet 4 milliGRAM(s) Oral four times a day  pantoprazole  Injectable 40 milliGRAM(s) IV Push daily  potassium acid phosphate/sodium acid phosphate tablet (K-PHOS No. 2) 1 Tablet(s) Oral three times a day with meals  tamsulosin 0.4 milliGRAM(s) Oral at bedtime  tenofovir disoproxil fumarate (VIREAD) 300 milliGRAM(s) Oral daily  vancomycin    Solution 125 milliGRAM(s) Oral every 6 hours    MEDICATIONS  (PRN):  acetaminophen   Tablet .. 650 milliGRAM(s) Oral every 6 hours PRN Mild Pain (1 - 3)  benzocaine 15 mG/menthol 3.6 mG (Sugar-Free) Lozenge 1 Lozenge Oral every 4 hours PRN Sore Throat  melatonin 3 milliGRAM(s) Oral at bedtime PRN Insomnia  oxyCODONE    IR 5 milliGRAM(s) Oral every 6 hours PRN Moderate Pain (4 - 6)  oxyCODONE    IR 10 milliGRAM(s) Oral every 6 hours PRN Severe Pain (7 - 10)      REVIEW OF SYSTEMS  General:  No fevers  Eyes:  No reported pain   ENT:  No sore throat   NECK: No stiffness   CV:  No chest pain   Resp:  No shortness of breath  GI:  See HPI  :  No dysuria  Muscle:  ++ weakness  Neuro:  No tingling  Endocrine:  No polyuria  Heme:  No ecchymosis        VITALS:   T(F): 98.3 (01-21 @ 05:15), Max: 99.4 (01-18 @ 10:33)  HR: 93 (01-21 @ 05:15) (58 - 99)  BP: 147/71 (01-21 @ 05:15) (116/54 - 155/81)  BP(mean): --  RR: 17 (01-21 @ 05:15) (17 - 20)  SpO2: 98% (01-21 @ 05:15) (95% - 100%)        PHYSICAL EXAM:  GENERAL:  Appears in no distress  HEENT:  Conjunctivae Anicteric   CHEST:  Full & symmetric excursion  HEART:  NS1, S2,   ABDOMEN:  Soft, non-tender, ++ distended with surgery wound   EXTREMITIES  +++ edema  SKIN:  No rash  NEURO:  Alert         Blood Work :                        7.2    59.65 )-----------( 150      ( 21 Jan 2020 05:00 )             22.9     PT/INR - ( 18 Jan 2020 06:10 )  INR: 1.19          PTT - ( 18 Jan 2020 06:10 )  PTT:28.5   01-21    124<L>  |  93<L>  |  13  ----------------------------<  92  4.8   |  23  |  0.69    Ca    8.2<L>      21 Jan 2020 05:00  Phos  2.9     01-21  Mg     1.9     01-21      CBC -  ( 21 Jan 2020 05:00 )  Hemoglobin : 7.2    CBC -  ( 20 Jan 2020 05:15 )  Hemoglobin : 7.7    CBC -  ( 19 Jan 2020 06:10 )  Hemoglobin : 7.8    CBC -  ( 18 Jan 2020 06:10 )  Hemoglobin : 8.4      LIVER FUNCTIONS - ( 18 Jan 2020 06:10 )  Alb: 2.8 [3.3 - 5.0] / Pro: 5.7 [6.0 - 8.3] / ALK PHOS: 142 [40 - 120] / ALT: 33 [4 - 41] / AST: 37 [4 - 40] / GGT: x

## 2020-01-21 NOTE — PROGRESS NOTE ADULT - ASSESSMENT
79 year old male with a PMH of CLL (untreated), cirrhosis (Child B at time of admission), chronic hepatitis B, and gastritis found to have SBO with pneumoperitoneum s/p ex lap, REESE and small bowel resection 1/7. On 1/13 midline incision erythematous and tender on exam, now with improved erythema after removing staples. Patient with persistent distended and tympanic abdomen. CT obtained after persistent abdominal distention and ileus which showed ascites but no obstruction and follow up xray significant for contrast in the colon. Hepatology following, recommended Paracentesis but no window.    Plan:  - Regular diet  - pain control PRN tylenol/oxycodone  - fluid restrict <1L  - continue lasix 40 mg when hyponatremia corrected  - DVT ppx with Lovenox  - IS use, encourage OOB   - PO vanc  - Transfer to medicine for management hep B, ascites    B Team Surgery   [id number]

## 2020-01-21 NOTE — PROGRESS NOTE ADULT - ATTENDING COMMENTS
He has a small amount of ascites. He was recently diagnosed with C. difficile infection and is being treated. I would not perform a paracentesis at that time as I'm concerned about the risks of possible perforation in a patient with active colitis. I explained this to he, his wife, and the medical team caring for him

## 2020-01-22 LAB
ANION GAP SERPL CALC-SCNC: 9 MMO/L — SIGNIFICANT CHANGE UP (ref 7–14)
ANISOCYTOSIS BLD QL: SIGNIFICANT CHANGE UP
BASOPHILS # BLD AUTO: 0.09 K/UL — SIGNIFICANT CHANGE UP (ref 0–0.2)
BASOPHILS NFR BLD AUTO: 0.2 % — SIGNIFICANT CHANGE UP (ref 0–2)
BASOPHILS NFR SPEC: 0 % — SIGNIFICANT CHANGE UP (ref 0–2)
BUN SERPL-MCNC: 14 MG/DL — SIGNIFICANT CHANGE UP (ref 7–23)
CALCIUM SERPL-MCNC: 8.8 MG/DL — SIGNIFICANT CHANGE UP (ref 8.4–10.5)
CHLORIDE SERPL-SCNC: 95 MMOL/L — LOW (ref 98–107)
CO2 SERPL-SCNC: 23 MMOL/L — SIGNIFICANT CHANGE UP (ref 22–31)
CREAT SERPL-MCNC: 0.76 MG/DL — SIGNIFICANT CHANGE UP (ref 0.5–1.3)
DACRYOCYTES BLD QL SMEAR: SIGNIFICANT CHANGE UP
ELLIPTOCYTES BLD QL SMEAR: SIGNIFICANT CHANGE UP
EOSINOPHIL # BLD AUTO: 0.05 K/UL — SIGNIFICANT CHANGE UP (ref 0–0.5)
EOSINOPHIL NFR BLD AUTO: 0.1 % — SIGNIFICANT CHANGE UP (ref 0–6)
EOSINOPHIL NFR FLD: 1 % — SIGNIFICANT CHANGE UP (ref 0–6)
GLUCOSE SERPL-MCNC: 93 MG/DL — SIGNIFICANT CHANGE UP (ref 70–99)
HCT VFR BLD CALC: 23.9 % — LOW (ref 39–50)
HGB BLD-MCNC: 7.4 G/DL — LOW (ref 13–17)
HYPOCHROMIA BLD QL: SIGNIFICANT CHANGE UP
IMM GRANULOCYTES NFR BLD AUTO: 0.1 % — SIGNIFICANT CHANGE UP (ref 0–1.5)
INR BLD: 1.2 — HIGH (ref 0.88–1.17)
LYMPHOCYTES # BLD AUTO: 44.87 K/UL — HIGH (ref 1–3.3)
LYMPHOCYTES # BLD AUTO: 94.2 % — HIGH (ref 13–44)
LYMPHOCYTES NFR SPEC AUTO: 95 % — HIGH (ref 13–44)
MAGNESIUM SERPL-MCNC: 2.1 MG/DL — SIGNIFICANT CHANGE UP (ref 1.6–2.6)
MANUAL SMEAR VERIFICATION: SIGNIFICANT CHANGE UP
MCHC RBC-ENTMCNC: 22.3 PG — LOW (ref 27–34)
MCHC RBC-ENTMCNC: 31 % — LOW (ref 32–36)
MCV RBC AUTO: 72 FL — LOW (ref 80–100)
MICROCYTES BLD QL: SIGNIFICANT CHANGE UP
MONOCYTES # BLD AUTO: 0.27 K/UL — SIGNIFICANT CHANGE UP (ref 0–0.9)
MONOCYTES NFR BLD AUTO: 0.6 % — LOW (ref 2–14)
MONOCYTES NFR BLD: 0 % — LOW (ref 2–9)
NEUTROPHIL AB SER-ACNC: 4 % — LOW (ref 43–77)
NEUTROPHILS # BLD AUTO: 2.27 K/UL — SIGNIFICANT CHANGE UP (ref 1.8–7.4)
NEUTROPHILS NFR BLD AUTO: 4.8 % — LOW (ref 43–77)
NRBC # BLD: 0 /100WBC — SIGNIFICANT CHANGE UP
NRBC # FLD: 0 K/UL — SIGNIFICANT CHANGE UP (ref 0–0)
OVALOCYTES BLD QL SMEAR: SLIGHT — SIGNIFICANT CHANGE UP
PHOSPHATE SERPL-MCNC: 3 MG/DL — SIGNIFICANT CHANGE UP (ref 2.5–4.5)
PLATELET # BLD AUTO: 168 K/UL — SIGNIFICANT CHANGE UP (ref 150–400)
PLATELET COUNT - ESTIMATE: NORMAL — SIGNIFICANT CHANGE UP
PMV BLD: SIGNIFICANT CHANGE UP FL (ref 7–13)
POTASSIUM SERPL-MCNC: 4.7 MMOL/L — SIGNIFICANT CHANGE UP (ref 3.5–5.3)
POTASSIUM SERPL-SCNC: 4.7 MMOL/L — SIGNIFICANT CHANGE UP (ref 3.5–5.3)
PROTHROM AB SERPL-ACNC: 13.8 SEC — HIGH (ref 9.8–13.1)
RBC # BLD: 3.32 M/UL — LOW (ref 4.2–5.8)
RBC # FLD: 20.7 % — HIGH (ref 10.3–14.5)
SCHISTOCYTES BLD QL AUTO: SLIGHT — SIGNIFICANT CHANGE UP
SODIUM SERPL-SCNC: 127 MMOL/L — LOW (ref 135–145)
WBC # BLD: 47.61 K/UL — CRITICAL HIGH (ref 3.8–10.5)
WBC # FLD AUTO: 47.61 K/UL — CRITICAL HIGH (ref 3.8–10.5)

## 2020-01-22 PROCEDURE — 99233 SBSQ HOSP IP/OBS HIGH 50: CPT

## 2020-01-22 PROCEDURE — 99232 SBSQ HOSP IP/OBS MODERATE 35: CPT | Mod: GC

## 2020-01-22 RX ORDER — OXYCODONE HYDROCHLORIDE 5 MG/1
10 TABLET ORAL EVERY 6 HOURS
Refills: 0 | Status: DISCONTINUED | OUTPATIENT
Start: 2020-01-22 | End: 2020-01-23

## 2020-01-22 RX ORDER — OXYCODONE HYDROCHLORIDE 5 MG/1
5 TABLET ORAL EVERY 6 HOURS
Refills: 0 | Status: DISCONTINUED | OUTPATIENT
Start: 2020-01-22 | End: 2020-01-23

## 2020-01-22 RX ADMIN — TENOFOVIR DISOPROXIL FUMARATE 300 MILLIGRAM(S): 300 TABLET, FILM COATED ORAL at 13:13

## 2020-01-22 RX ADMIN — PANTOPRAZOLE SODIUM 40 MILLIGRAM(S): 20 TABLET, DELAYED RELEASE ORAL at 13:13

## 2020-01-22 RX ADMIN — ENOXAPARIN SODIUM 40 MILLIGRAM(S): 100 INJECTION SUBCUTANEOUS at 13:13

## 2020-01-22 RX ADMIN — Medication 650 MILLIGRAM(S): at 14:30

## 2020-01-22 RX ADMIN — Medication 50 MILLILITER(S): at 05:26

## 2020-01-22 RX ADMIN — Medication 125 MILLIGRAM(S): at 05:26

## 2020-01-22 RX ADMIN — ONDANSETRON 4 MILLIGRAM(S): 8 TABLET, FILM COATED ORAL at 13:13

## 2020-01-22 RX ADMIN — Medication 650 MILLIGRAM(S): at 13:28

## 2020-01-22 RX ADMIN — Medication 1 TABLET(S): at 08:30

## 2020-01-22 RX ADMIN — ONDANSETRON 4 MILLIGRAM(S): 8 TABLET, FILM COATED ORAL at 08:30

## 2020-01-22 RX ADMIN — Medication 1 TABLET(S): at 17:47

## 2020-01-22 RX ADMIN — Medication 125 MILLIGRAM(S): at 13:13

## 2020-01-22 RX ADMIN — Medication 125 MILLIGRAM(S): at 17:47

## 2020-01-22 RX ADMIN — Medication 50 MILLILITER(S): at 18:57

## 2020-01-22 RX ADMIN — Medication 1 TABLET(S): at 13:13

## 2020-01-22 RX ADMIN — ONDANSETRON 4 MILLIGRAM(S): 8 TABLET, FILM COATED ORAL at 17:47

## 2020-01-22 RX ADMIN — Medication 50 MILLILITER(S): at 13:11

## 2020-01-22 RX ADMIN — TAMSULOSIN HYDROCHLORIDE 0.4 MILLIGRAM(S): 0.4 CAPSULE ORAL at 22:22

## 2020-01-22 NOTE — PROGRESS NOTE ADULT - SUBJECTIVE AND OBJECTIVE BOX
Patient is a 79y old  Male who presents with a chief complaint of abdominal pain (22 Jan 2020 08:55)      SUBJECTIVE / OVERNIGHT EVENTS:  Patient seen and examined.  No acute events overnight.  No current complaints.     MEDICATIONS  (STANDING):  albumin human 25% IVPB 100 milliLiter(s) IV Intermittent every 6 hours  enoxaparin Injectable 40 milliGRAM(s) SubCutaneous daily  ondansetron   Disintegrating Tablet 4 milliGRAM(s) Oral four times a day  pantoprazole  Injectable 40 milliGRAM(s) IV Push daily  potassium acid phosphate/sodium acid phosphate tablet (K-PHOS No. 2) 1 Tablet(s) Oral three times a day with meals  tamsulosin 0.4 milliGRAM(s) Oral at bedtime  tenofovir disoproxil fumarate (VIREAD) 300 milliGRAM(s) Oral daily  vancomycin    Solution 125 milliGRAM(s) Oral every 6 hours    MEDICATIONS  (PRN):  acetaminophen   Tablet .. 650 milliGRAM(s) Oral every 6 hours PRN Mild Pain (1 - 3)  benzocaine 15 mG/menthol 3.6 mG (Sugar-Free) Lozenge 1 Lozenge Oral every 4 hours PRN Sore Throat  melatonin 3 milliGRAM(s) Oral at bedtime PRN Insomnia  oxyCODONE    IR 5 milliGRAM(s) Oral every 6 hours PRN Moderate Pain (4 - 6)  oxyCODONE    IR 10 milliGRAM(s) Oral every 6 hours PRN Severe Pain (7 - 10)      Vital Signs Last 24 Hrs  T(C): 36.7 (22 Jan 2020 13:24), Max: 37.3 (21 Jan 2020 17:29)  T(F): 98.1 (22 Jan 2020 13:24), Max: 99.1 (21 Jan 2020 17:29)  HR: 102 (22 Jan 2020 13:24) (83 - 102)  BP: 146/81 (22 Jan 2020 13:24) (124/69 - 147/72)  BP(mean): --  RR: 18 (22 Jan 2020 13:24) (17 - 18)  SpO2: 98% (22 Jan 2020 13:24) (98% - 100%)  CAPILLARY BLOOD GLUCOSE        I&O's Summary    21 Jan 2020 07:01  -  22 Jan 2020 07:00  --------------------------------------------------------  IN: 100 mL / OUT: 1575 mL / NET: -1475 mL    22 Jan 2020 07:01  -  22 Jan 2020 16:42  --------------------------------------------------------  IN: 0 mL / OUT: 175 mL / NET: -175 mL        PHYSICAL EXAM:  GENERAL: NAD, well-developed  HEENT: MMM, conjunctiva and sclera clear  NECK: Supple, No JVD  CHEST/LUNG: Clear to auscultation bilaterally; No wheeze  HEART: Regular rate and rhythm; No murmurs, rubs, or gallops  ABDOMEN: Soft, Nontender, Distended; Bowel sounds present, +fluid wave  : +scrotal edema  EXTREMITIES:  2+ LE edema, No clubbing or cyanosis  PSYCH: AAOx3  NEUROLOGY: non-focal  SKIN: No rashes or lesions    LABS:                        7.4    47.61 )-----------( 168      ( 22 Jan 2020 05:45 )             23.9     01-22    127<L>  |  95<L>  |  14  ----------------------------<  93  4.7   |  23  |  0.76    Ca    8.8      22 Jan 2020 05:45  Phos  3.0     01-22  Mg     2.1     01-22      PT/INR - ( 22 Jan 2020 05:45 )   PT: 13.8 SEC;   INR: 1.20

## 2020-01-22 NOTE — PROGRESS NOTE ADULT - ASSESSMENT
79 year old male with a PMH of CLL (untreated), cirrhosis (Child B at time of admission), chronic hepatitis B, and gastritis found to have SBO with pneumoperitoneum s/p ex lap, REESE and small bowel resection 1/7. On 1/13 midline incision erythematous and tender on exam, now with improved erythema after removing staples. Patient with persistent distended and tympanic abdomen. CT obtained after persistent abdominal distention and ileus which showed ascites but no obstruction and follow up xray significant for contrast in the colon and now with RBF.  Hepatology following, recommended Paracentesis but no window.    Plan:  - Primary team now medicine, will follow for post surgical care  - Currently with appropriate BF, continue regular diet  - pain control PRN tylenol/oxycodone  - fluid/electrolyte balance per medicine  - DVT ppx with Lovenox  - IS use, encourage OOB   - PO vanc    B Team Surgery   [id number]

## 2020-01-22 NOTE — PROGRESS NOTE ADULT - PROBLEM SELECTOR PLAN 1
d/w hepatology attending  Will hold off on paracentesis given active colitis  Restarted albumin   No signs of encephalopathy

## 2020-01-22 NOTE — PROGRESS NOTE ADULT - SUBJECTIVE AND OBJECTIVE BOX
Pt was seen in am when his son was with him. Pt feeling better, less swelling of the legs, abdomen, no nausea, less diarrhea, less abdominal pain, no fevers or chills. ROS is otherwise unchanged to unremarkable.      Meds:  acetaminophen   Tablet .. 650 milliGRAM(s) Oral every 6 hours PRN  albumin human 25% IVPB 100 milliLiter(s) IV Intermittent every 6 hours  benzocaine 15 mG/menthol 3.6 mG (Sugar-Free) Lozenge 1 Lozenge Oral every 4 hours PRN  enoxaparin Injectable 40 milliGRAM(s) SubCutaneous daily  melatonin 3 milliGRAM(s) Oral at bedtime PRN  ondansetron   Disintegrating Tablet 4 milliGRAM(s) Oral four times a day  oxyCODONE    IR 5 milliGRAM(s) Oral every 6 hours PRN  oxyCODONE    IR 10 milliGRAM(s) Oral every 6 hours PRN  pantoprazole  Injectable 40 milliGRAM(s) IV Push daily  potassium acid phosphate/sodium acid phosphate tablet (K-PHOS No. 2) 1 Tablet(s) Oral three times a day with meals  tamsulosin 0.4 milliGRAM(s) Oral at bedtime  tenofovir disoproxil fumarate (VIREAD) 300 milliGRAM(s) Oral daily  vancomycin    Solution 125 milliGRAM(s) Oral every 6 hours      Vital Signs Last 24 Hrs  T(C): 36.9 (22 Jan 2020 18:05), Max: 36.9 (22 Jan 2020 18:05)  T(F): 98.4 (22 Jan 2020 18:05), Max: 98.4 (22 Jan 2020 18:05)  HR: 90 (22 Jan 2020 18:05) (83 - 102)  BP: 136/65 (22 Jan 2020 18:05) (132/66 - 147/72)  BP(mean): --  RR: 18 (22 Jan 2020 18:05) (17 - 18)  SpO2: 98% (22 Jan 2020 18:05) (98% - 100%)                          7.4    47.61 )-----------( 168      ( 22 Jan 2020 05:45 )             23.9       01-22    127<L>  |  95<L>  |  14  ----------------------------<  93  4.7   |  23  |  0.76    Ca    8.8      22 Jan 2020 05:45  Phos  3.0     01-22  Mg     2.1     01-22                PT/INR - ( 22 Jan 2020 05:45 )   PT: 13.8 SEC;   INR: 1.20

## 2020-01-22 NOTE — PROGRESS NOTE ADULT - ATTENDING COMMENTS
small amount of ascites with C. difficile infection being treated. I would not perform a paracentesis at that time until his C. difficile infection has been eradicated

## 2020-01-22 NOTE — PROGRESS NOTE ADULT - ASSESSMENT
An 80 y/o man with h/o HTN, gastritis, CLL (diagnosed in 2013, treatment naive)  with hx of Hep B cirrhosis with eAg positive, with high HBV viremia,  genotype D with no mutation was on Viread presented with abdominal pain. Pt found to have SBO complicated by perforation S/P Ex Lap on jan 7 with 15 cm SB  removed  with  primary anastomosis. Hepatology called for management of ascites. As per home meds review patient was on lasix 20 mg and no aldactone.     # Decompensated Hep B liver cirrhosis. MELD score of 9 on january 6   Ascites on CT done after surgery   No Asterixis on exam. Pt on lactulose   EV: Last EGD 2 y ago but no records   HCC: No Hcc on imaging done this admission     # New onset ascites may be resulted from post surgery/ Anasarca vs decompensated liver cirrhosis after surgery. small window for tap so will ask for abd Tap by IR     # SBO complicated with perforation s/p Ex lap with sb resection and anastomosis on jan 7. No signs of obstruction after surgery and pt stable   # C Diff colitis on vancomycin po     recommendations:   Continue  Albumin 25 g (25%) q6h daily    Continue Hep B therapy   Continue Vancomycin po for total of 10 days   Dietician follow for appropriate calorie and protein intake   Keep diuretics on hold  for now given hyponatremia and restart (lasix and aldactone) when hyponatremia resolves  Follow electrolytes   Will follow     Cheikh Callie Richard  GI Fellow  Pager# [phone]

## 2020-01-22 NOTE — PROGRESS NOTE ADULT - SUBJECTIVE AND OBJECTIVE BOX
POD #: 15    No acute events overnight. Staples removed and steri strips placed    SUBJECTIVE:  Reports minimal abdominal pain, mostly related to distention  Denies nausea, vomiting, diarrhea  Is passing gas and having bowel movements  Tolerating diet  Ambulating independently    OBJECTIVE:  Vital Signs Last 24 Hrs  T(C): 36.7 (22 Jan 2020 02:27), Max: 37.3 (21 Jan 2020 17:29)  T(F): 98.1 (22 Jan 2020 02:27), Max: 99.1 (21 Jan 2020 17:29)  HR: 83 (22 Jan 2020 02:27) (83 - 99)  BP: 133/79 (22 Jan 2020 02:27) (121/63 - 147/72)  BP(mean): --  RR: 18 (22 Jan 2020 02:27) (16 - 18)  SpO2: 100% (22 Jan 2020 02:27) (98% - 100%)      PHYSICAL EXAM:  GEN: NAD, resting quietly  NEURO: A&Ox3, CN II-XII grossly intact, no focal deficits  PULM: symmetric chest rise bilaterally, no increased WOB  GI: Soft NT distended, midline incision CDI with steri strips in place  : Bernabe rectal tube  EXTR: no cyanosis or edema, moving all extremities LEs pitting edema      LABS:                        7.2    59.65 )-----------( 150      ( 21 Jan 2020 05:00 )             22.9       01-21    124<L>  |  93<L>  |  13  ----------------------------<  92  4.8   |  23  |  0.69    Ca    8.2<L>      21 Jan 2020 05:00  Phos  2.9     01-21  Mg     1.9     01-21

## 2020-01-22 NOTE — PROGRESS NOTE ADULT - ASSESSMENT
79M with multiple and significant medical history as above and CLL, never required Rx for it, here with SBO and perforation, s/p surgery, Path showing just serosal adhesions without any malignancy, etiology unclear, did not need any transfusion, will recommend:  - continue Rx as per surgery - pain control  - slow advancing of diet as tolerated, continue ondansetron 4 mg half hour before each meal to minimize nausea  - stool for c. 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

## 2020-01-22 NOTE — PROGRESS NOTE ADULT - SUBJECTIVE AND OBJECTIVE BOX
We are following the patient for Ascites      GI HPI Today:  Pt still have diarrhea. No abd pain, no vomiting   No fevers and hemodynamically stable   No overnight events     PAST MEDICAL & SURGICAL HISTORY  Cirrhosis  HTN (hypertension)  H/O gastritis  CLL (chronic lymphocytic leukemia)  No significant past surgical history      ALLERGIES:  Beef (Unknown)  No Known Drug Allergies      MEDICATIONS:  MEDICATIONS  (STANDING):  albumin human 25% IVPB 100 milliLiter(s) IV Intermittent every 6 hours  enoxaparin Injectable 40 milliGRAM(s) SubCutaneous daily  ondansetron   Disintegrating Tablet 4 milliGRAM(s) Oral four times a day  pantoprazole  Injectable 40 milliGRAM(s) IV Push daily  potassium acid phosphate/sodium acid phosphate tablet (K-PHOS No. 2) 1 Tablet(s) Oral three times a day with meals  tamsulosin 0.4 milliGRAM(s) Oral at bedtime  tenofovir disoproxil fumarate (VIREAD) 300 milliGRAM(s) Oral daily  vancomycin    Solution 125 milliGRAM(s) Oral every 6 hours    MEDICATIONS  (PRN):  acetaminophen   Tablet .. 650 milliGRAM(s) Oral every 6 hours PRN Mild Pain (1 - 3)  benzocaine 15 mG/menthol 3.6 mG (Sugar-Free) Lozenge 1 Lozenge Oral every 4 hours PRN Sore Throat  melatonin 3 milliGRAM(s) Oral at bedtime PRN Insomnia  oxyCODONE    IR 5 milliGRAM(s) Oral every 6 hours PRN Moderate Pain (4 - 6)  oxyCODONE    IR 10 milliGRAM(s) Oral every 6 hours PRN Severe Pain (7 - 10)      REVIEW OF SYSTEMS  General:  No fevers  Eyes:  No reported pain   ENT:  No sore throat   NECK: No stiffness   CV:  No chest pain   Resp:  No shortness of breath  GI:  See HPI  :  No dysuria  Muscle:  ++  weakness  Neuro:  No tingling  Endocrine:  No polyuria  Heme:  No ecchymosis        VITALS:   T(F): 98 (01-22 @ 05:25), Max: 99.1 (01-21 @ 17:29)  HR: 90 (01-22 @ 05:25) (58 - 99)  BP: 140/73 (01-22 @ 05:25) (116/54 - 152/87)  BP(mean): --  RR: 17 (01-22 @ 05:25) (16 - 18)  SpO2: 99% (01-22 @ 05:25) (95% - 100%)        PHYSICAL EXAM:  GENERAL:  Appears in no distress  HEENT:  Conjunctivae Anicteric   CHEST:  Full & symmetric excursion  HEART:  NS1, S2,   ABDOMEN:  Soft, non-tender+++ distended  EXTEREMITIES:  no edema  SKIN:  No rash  NEURO:  Alert         Blood Work :                        7.4    47.61 )-----------( 168      ( 22 Jan 2020 05:45 )             23.9     PT/INR - ( 22 Jan 2020 05:45 )  INR: 1.20            01-22    127<L>  |  95<L>  |  14  ----------------------------<  93  4.7   |  23  |  0.76    Ca    8.8      22 Jan 2020 05:45  Phos  3.0     01-22  Mg     2.1     01-22      CBC -  ( 22 Jan 2020 05:45 )  Hemoglobin : 7.4    CBC -  ( 21 Jan 2020 05:00 )  Hemoglobin : 7.2    CBC -  ( 20 Jan 2020 05:15 )  Hemoglobin : 7.7    CBC -  ( 19 Jan 2020 06:10 )  Hemoglobin : 7.8      LIVER FUNCTIONS - ( 18 Jan 2020 06:10 )  Alb: 2.8 [3.3 - 5.0] / Pro: 5.7 [6.0 - 8.3] / ALK PHOS: 142 [40 - 120] / ALT: 33 [4 - 41] / AST: 37 [4 - 40] / GGT: x

## 2020-01-23 ENCOUNTER — TRANSCRIPTION ENCOUNTER (OUTPATIENT)
Age: 80
End: 2020-01-23

## 2020-01-23 LAB
ANION GAP SERPL CALC-SCNC: 12 MMO/L — SIGNIFICANT CHANGE UP (ref 7–14)
BASOPHILS # BLD AUTO: 0.08 K/UL — SIGNIFICANT CHANGE UP (ref 0–0.2)
BASOPHILS NFR BLD AUTO: 0.2 % — SIGNIFICANT CHANGE UP (ref 0–2)
BLD GP AB SCN SERPL QL: NEGATIVE — SIGNIFICANT CHANGE UP
BUN SERPL-MCNC: 13 MG/DL — SIGNIFICANT CHANGE UP (ref 7–23)
CALCIUM SERPL-MCNC: 9 MG/DL — SIGNIFICANT CHANGE UP (ref 8.4–10.5)
CHLORIDE SERPL-SCNC: 95 MMOL/L — LOW (ref 98–107)
CO2 SERPL-SCNC: 21 MMOL/L — LOW (ref 22–31)
CREAT SERPL-MCNC: 0.7 MG/DL — SIGNIFICANT CHANGE UP (ref 0.5–1.3)
EOSINOPHIL # BLD AUTO: 0.05 K/UL — SIGNIFICANT CHANGE UP (ref 0–0.5)
EOSINOPHIL NFR BLD AUTO: 0.1 % — SIGNIFICANT CHANGE UP (ref 0–6)
GLUCOSE SERPL-MCNC: 89 MG/DL — SIGNIFICANT CHANGE UP (ref 70–99)
HCT VFR BLD CALC: 21.4 % — LOW (ref 39–50)
HCT VFR BLD CALC: 23.9 % — LOW (ref 39–50)
HGB BLD-MCNC: 6.6 G/DL — CRITICAL LOW (ref 13–17)
HGB BLD-MCNC: 7.3 G/DL — LOW (ref 13–17)
IMM GRANULOCYTES NFR BLD AUTO: 0.1 % — SIGNIFICANT CHANGE UP (ref 0–1.5)
INR BLD: 1.3 — HIGH (ref 0.88–1.17)
LYMPHOCYTES # BLD AUTO: 39.81 K/UL — HIGH (ref 1–3.3)
LYMPHOCYTES # BLD AUTO: 94.1 % — HIGH (ref 13–44)
MAGNESIUM SERPL-MCNC: 2 MG/DL — SIGNIFICANT CHANGE UP (ref 1.6–2.6)
MANUAL SMEAR VERIFICATION: SIGNIFICANT CHANGE UP
MCHC RBC-ENTMCNC: 22.2 PG — LOW (ref 27–34)
MCHC RBC-ENTMCNC: 22.4 PG — LOW (ref 27–34)
MCHC RBC-ENTMCNC: 30.5 % — LOW (ref 32–36)
MCHC RBC-ENTMCNC: 30.8 % — LOW (ref 32–36)
MCV RBC AUTO: 72.6 FL — LOW (ref 80–100)
MCV RBC AUTO: 72.8 FL — LOW (ref 80–100)
MONOCYTES # BLD AUTO: 0.19 K/UL — SIGNIFICANT CHANGE UP (ref 0–0.9)
MONOCYTES NFR BLD AUTO: 0.4 % — LOW (ref 2–14)
NEUTROPHILS # BLD AUTO: 2.13 K/UL — SIGNIFICANT CHANGE UP (ref 1.8–7.4)
NEUTROPHILS NFR BLD AUTO: 5.1 % — LOW (ref 43–77)
NRBC # FLD: 0 K/UL — SIGNIFICANT CHANGE UP (ref 0–0)
NRBC # FLD: 0 K/UL — SIGNIFICANT CHANGE UP (ref 0–0)
PHOSPHATE SERPL-MCNC: 3 MG/DL — SIGNIFICANT CHANGE UP (ref 2.5–4.5)
PLATELET # BLD AUTO: 169 K/UL — SIGNIFICANT CHANGE UP (ref 150–400)
PLATELET # BLD AUTO: 181 K/UL — SIGNIFICANT CHANGE UP (ref 150–400)
PMV BLD: 11.6 FL — SIGNIFICANT CHANGE UP (ref 7–13)
PMV BLD: SIGNIFICANT CHANGE UP FL (ref 7–13)
POTASSIUM SERPL-MCNC: 4.4 MMOL/L — SIGNIFICANT CHANGE UP (ref 3.5–5.3)
POTASSIUM SERPL-SCNC: 4.4 MMOL/L — SIGNIFICANT CHANGE UP (ref 3.5–5.3)
PROTHROM AB SERPL-ACNC: 14.6 SEC — HIGH (ref 9.8–13.1)
RBC # BLD: 2.94 M/UL — LOW (ref 4.2–5.8)
RBC # BLD: 3.29 M/UL — LOW (ref 4.2–5.8)
RBC # FLD: 20.6 % — HIGH (ref 10.3–14.5)
RBC # FLD: 20.8 % — HIGH (ref 10.3–14.5)
RH IG SCN BLD-IMP: POSITIVE — SIGNIFICANT CHANGE UP
SODIUM SERPL-SCNC: 128 MMOL/L — LOW (ref 135–145)
WBC # BLD: 42.29 K/UL — CRITICAL HIGH (ref 3.8–10.5)
WBC # BLD: 46.94 K/UL — CRITICAL HIGH (ref 3.8–10.5)
WBC # FLD AUTO: 42.29 K/UL — CRITICAL HIGH (ref 3.8–10.5)
WBC # FLD AUTO: 46.94 K/UL — CRITICAL HIGH (ref 3.8–10.5)

## 2020-01-23 PROCEDURE — 99233 SBSQ HOSP IP/OBS HIGH 50: CPT

## 2020-01-23 RX ORDER — OXYCODONE HYDROCHLORIDE 5 MG/1
5 TABLET ORAL EVERY 6 HOURS
Refills: 0 | Status: DISCONTINUED | OUTPATIENT
Start: 2020-01-23 | End: 2020-01-29

## 2020-01-23 RX ORDER — TIOTROPIUM BROMIDE 18 UG/1
1 CAPSULE ORAL; RESPIRATORY (INHALATION) DAILY
Refills: 0 | Status: DISCONTINUED | OUTPATIENT
Start: 2020-01-23 | End: 2020-01-31

## 2020-01-23 RX ORDER — ALBUTEROL 90 UG/1
1 AEROSOL, METERED ORAL EVERY 4 HOURS
Refills: 0 | Status: DISCONTINUED | OUTPATIENT
Start: 2020-01-23 | End: 2020-01-31

## 2020-01-23 RX ORDER — OXYCODONE HYDROCHLORIDE 5 MG/1
10 TABLET ORAL EVERY 6 HOURS
Refills: 0 | Status: DISCONTINUED | OUTPATIENT
Start: 2020-01-23 | End: 2020-01-29

## 2020-01-23 RX ORDER — IPRATROPIUM/ALBUTEROL SULFATE 18-103MCG
3 AEROSOL WITH ADAPTER (GRAM) INHALATION EVERY 6 HOURS
Refills: 0 | Status: DISCONTINUED | OUTPATIENT
Start: 2020-01-23 | End: 2020-01-31

## 2020-01-23 RX ORDER — ENOXAPARIN SODIUM 100 MG/ML
40 INJECTION SUBCUTANEOUS DAILY
Refills: 0 | Status: DISCONTINUED | OUTPATIENT
Start: 2020-01-24 | End: 2020-01-31

## 2020-01-23 RX ADMIN — Medication 3 MILLILITER(S): at 22:58

## 2020-01-23 RX ADMIN — Medication 50 MILLILITER(S): at 00:35

## 2020-01-23 RX ADMIN — Medication 50 MILLILITER(S): at 05:28

## 2020-01-23 RX ADMIN — Medication 125 MILLIGRAM(S): at 18:22

## 2020-01-23 RX ADMIN — Medication 125 MILLIGRAM(S): at 00:35

## 2020-01-23 RX ADMIN — PANTOPRAZOLE SODIUM 40 MILLIGRAM(S): 20 TABLET, DELAYED RELEASE ORAL at 12:46

## 2020-01-23 RX ADMIN — TENOFOVIR DISOPROXIL FUMARATE 300 MILLIGRAM(S): 300 TABLET, FILM COATED ORAL at 12:40

## 2020-01-23 RX ADMIN — BENZOCAINE AND MENTHOL 1 LOZENGE: 5; 1 LIQUID ORAL at 12:39

## 2020-01-23 RX ADMIN — ENOXAPARIN SODIUM 40 MILLIGRAM(S): 100 INJECTION SUBCUTANEOUS at 12:41

## 2020-01-23 RX ADMIN — Medication 3 MILLILITER(S): at 14:24

## 2020-01-23 RX ADMIN — Medication 1 TABLET(S): at 12:41

## 2020-01-23 RX ADMIN — Medication 1 TABLET(S): at 18:22

## 2020-01-23 RX ADMIN — Medication 50 MILLILITER(S): at 12:42

## 2020-01-23 RX ADMIN — Medication 50 MILLILITER(S): at 18:21

## 2020-01-23 RX ADMIN — Medication 125 MILLIGRAM(S): at 05:27

## 2020-01-23 RX ADMIN — Medication 1 TABLET(S): at 10:05

## 2020-01-23 RX ADMIN — TAMSULOSIN HYDROCHLORIDE 0.4 MILLIGRAM(S): 0.4 CAPSULE ORAL at 20:53

## 2020-01-23 RX ADMIN — Medication 125 MILLIGRAM(S): at 12:40

## 2020-01-23 NOTE — DISCHARGE NOTE PROVIDER - NSDCCPCAREPLAN_GEN_ALL_CORE_FT
PRINCIPAL DISCHARGE DIAGNOSIS  Diagnosis: Perforated bowel  Assessment and Plan of Treatment: Small bowel resection 1/7/2020. You were being seen by surgery on this admission.      SECONDARY DISCHARGE DIAGNOSES  Diagnosis: Chronic viral hepatitis B without delta agent and without coma  Assessment and Plan of Treatment: Continue with tenofovir as ordered    Diagnosis: CLL (chronic lymphocytic leukemia)  Assessment and Plan of Treatment: CLL (chronic lymphocytic leukemia)    Diagnosis: Decompensated hepatic cirrhosis  Assessment and Plan of Treatment: Paracentesis was held ---- No signs of change in mental status.    Diagnosis: Clostridium difficile infection  Assessment and Plan of Treatment: You were being treated with oral vancomycin in the hospital. PRINCIPAL DISCHARGE DIAGNOSIS  Diagnosis: Perforated bowel  Assessment and Plan of Treatment: Small bowel resection 1/7/2020. You were being seen by surgery on this admission.      SECONDARY DISCHARGE DIAGNOSES  Diagnosis: Chronic viral hepatitis B without delta agent and without coma  Assessment and Plan of Treatment: Continue with tenofovir as ordered.    Diagnosis: Decompensated hepatic cirrhosis  Assessment and Plan of Treatment: Paracentesis was held due to risks associated with paracenthesis. Your family also does not want this procedure to be performed.    Diagnosis: CLL (chronic lymphocytic leukemia)  Assessment and Plan of Treatment: History of CLL. Continue medications as prescribed.    Diagnosis: Palliative care encounter  Assessment and Plan of Treatment: You are being transferred to inpatient hospice.    Diagnosis: Clostridium difficile infection  Assessment and Plan of Treatment: You were being treated with oral vancomycin in the hospital. Please continue to take this medicine as prescribed.

## 2020-01-23 NOTE — PROGRESS NOTE ADULT - PROBLEM SELECTOR PLAN 1
Holding off on paracentesis given active colitis - abdomen is increasing in size but is not tight and no respiratory symptoms. Will d/w hepatology timing of paracentesis.  No signs of encephalopathy

## 2020-01-23 NOTE — PROVIDER CONTACT NOTE (CRITICAL VALUE NOTIFICATION) - ASSESSMENT
Pt A&Ox4, VS stable and afebrile. No s/s of respiratory distress noted. Pt resting comfortably in bed. normal S1, S2 heard

## 2020-01-23 NOTE — PROGRESS NOTE ADULT - SUBJECTIVE AND OBJECTIVE BOX
Patient is a 79y old  Male who presents with a chief complaint of abdominal pain (22 Jan 2020 19:03)      SUBJECTIVE / OVERNIGHT EVENTS:  Patient seen and examined.  No acute events overnight.  Patient reports 1 BM today, per RN soft in nature.  Reports increased abdominal girth but denies pain or SOB.    MEDICATIONS  (STANDING):  albumin human 25% IVPB 100 milliLiter(s) IV Intermittent every 6 hours  albuterol/ipratropium for Nebulization 3 milliLiter(s) Nebulizer every 6 hours  ondansetron   Disintegrating Tablet 4 milliGRAM(s) Oral four times a day  pantoprazole  Injectable 40 milliGRAM(s) IV Push daily  potassium acid phosphate/sodium acid phosphate tablet (K-PHOS No. 2) 1 Tablet(s) Oral three times a day with meals  tamsulosin 0.4 milliGRAM(s) Oral at bedtime  tenofovir disoproxil fumarate (VIREAD) 300 milliGRAM(s) Oral daily  tiotropium 18 MICROgram(s) Capsule 1 Capsule(s) Inhalation daily  vancomycin    Solution 125 milliGRAM(s) Oral every 6 hours    MEDICATIONS  (PRN):  acetaminophen   Tablet .. 650 milliGRAM(s) Oral every 6 hours PRN Mild Pain (1 - 3)  ALBUTerol    90 MICROgram(s) HFA Inhaler 1 Puff(s) Inhalation every 4 hours PRN Shortness of Breath and/or Wheezing  benzocaine 15 mG/menthol 3.6 mG (Sugar-Free) Lozenge 1 Lozenge Oral every 4 hours PRN Sore Throat  melatonin 3 milliGRAM(s) Oral at bedtime PRN Insomnia  oxyCODONE    IR 5 milliGRAM(s) Oral every 6 hours PRN Moderate Pain (4 - 6)  oxyCODONE    IR 10 milliGRAM(s) Oral every 6 hours PRN Severe Pain (7 - 10)      Vital Signs Last 24 Hrs  T(C): 36.7 (23 Jan 2020 13:05), Max: 37.1 (23 Jan 2020 05:25)  T(F): 98 (23 Jan 2020 13:05), Max: 98.7 (23 Jan 2020 05:25)  HR: 107 (23 Jan 2020 14:26) (90 - 107)  BP: 132/72 (23 Jan 2020 13:05) (128/70 - 148/82)  BP(mean): --  RR: 16 (23 Jan 2020 13:05) (16 - 19)  SpO2: 100% (23 Jan 2020 14:26) (98% - 100%)  CAPILLARY BLOOD GLUCOSE        I&O's Summary    22 Jan 2020 07:01  -  23 Jan 2020 07:00  --------------------------------------------------------  IN: 1420 mL / OUT: 2125 mL / NET: -705 mL    23 Jan 2020 07:01  -  23 Jan 2020 15:21  --------------------------------------------------------  IN: 480 mL / OUT: 700 mL / NET: -220 mL        PHYSICAL EXAM:  GENERAL: NAD, well-developed  HEENT: MMM, conjunctiva and sclera clear  NECK: Supple, No JVD  CHEST/LUNG: Clear to auscultation bilaterally; No wheeze  HEART: Regular rate and rhythm; No murmurs, rubs, or gallops  ABDOMEN: Distended, increased from prior but soft and nontender; Bowel sounds present, +fluid wave. Incision site c/d/i  : +scrotal edema  EXTREMITIES:  2+ LE edema, No clubbing or cyanosis  PSYCH: AAOx3  NEUROLOGY: non-focal  SKIN: No rashes or lesions    LABS:                        7.3    46.94 )-----------( 181      ( 23 Jan 2020 10:00 )             23.9     01-23    128<L>  |  95<L>  |  13  ----------------------------<  89  4.4   |  21<L>  |  0.70    Ca    9.0      23 Jan 2020 07:00  Phos  3.0     01-23  Mg     2.0     01-23      PT/INR - ( 23 Jan 2020 07:01 )   PT: 14.6 SEC;   INR: 1.30

## 2020-01-23 NOTE — PROGRESS NOTE ADULT - PROBLEM SELECTOR PLAN 2
In part due to hypervolemia due to cirrhosis  Improving with IV albumin  Reassess diuretics daily - likely will need to resume in coming days  Monitor volume status  Monitor BMP normal rate and rhythm, no chest pain and no edema.

## 2020-01-23 NOTE — DISCHARGE NOTE PROVIDER - HOSPITAL COURSE
79M hx of untreated CLL, cirrhosis, chronic hep B, gastritis presenting with peritonitis 2/2 SBO and perforation s/p ex-lap REESE, SBR/A (1/7).        hospital course:         SBO with pneumoperitoneum    - s/p ex lap REESE, SBR/A on 1/7    - Surgery following        Hyponatremia    - likely multifactorial in setting of diuresis w/ spironolactone and furosemide, hypotonic IVF, and decompensated cirrhosis. 125 Na    -Given Na 125 would hold furosemide today (already received spironolactone), could hold spironolactone in AM as it has greater natriuresis properties.    -Can trial fluid restriction but would dc NaCl tabs as could worsen ascites.         Hypophosphatemia    -would put on standing KPhos packets TID w/ meals x2 days given persistent hypophosp, when repleting w/ IV giving 250mL D5 a hypotonic solution which may make edema worse.    -given persistent hypophophatemia would check PTH and Vitamin D 25        Decompensated hepatic cirrhosis. Patient w/ ascites likely in setting of IVF and decompensated cirrhosis. No signs of encephalopathy    -evaluate for paracentesis to confirm ascites is transudative as if it is exudative likely would not appropiately diuresis.     -f/u hepatology    - restartedon albumin 25g 25% q6h, c/w hep B therapy    - diuretics held due to hypoNa        Clostridium difficile infection.      - Non Severe (WBCs chronically elevated from CLL) C. Dif + insetting of diarrhea and recent antibx.    -Vancomycin 125mg PO QID x10    -monitor abdomen for worsening pain, if diarrhea/abdominal pain not improving consider abd xray/CT to eval for pseudomembranous colitis. · Assessment        Patient is a 78yo M with CLL, chronic HBV with cirrhosis previously compensated, HTN presenting 1/6 for abdominal pain secondary to SBO c/b perforation s/p ex lap now with decompensated cirrhosis with ascites and LE edema, and hyponatremia         Problem/Plan - 1:    ·  Problem: Decompensated hepatic cirrhosis.  Plan: Abdomen is increasing in size    Monitoring volume status and Na.    No signs of encephalopathy    Progressing abdominal pain, surgery reconsulted, recommended Ct scan of the abdomen/pelvis shows increased ascites, no signs of perforation.          Problem/Plan - 2:    ·  Problem: Other ascites.  Plan: abdomen markedly distended, compromising breathing at times    Previously was trying to hold off on a tap b/o recent history of SBO w/bowel perf and concurrent infection with C-diff.           Problem/Plan - 3:    ·  Problem: Advance directive discussed with patient.  Plan: 1/27 Advance directive was discussed at length with the patient. This conversation included current condition, prognosis. Discussed desires by patient for further care and wishes were expressed. Once again, Family present at the bedside. Patient decided that he would like to be DNR/DNI and is interested in learning more about hospice. Palliative fellow was also present during the discussion. Patient and family agreed to inpatient hospice at [facility].         Problem/Plan - 4:    ·  Problem: Hyponatremia.  Plan: Improved    S/P IV albumin.          Problem/Plan - 5:    ·  Problem: Clostridium difficile infection.  Plan: c/w Vancomycin 125mg PO QID x10-14 days total, started January 20th.     BMs are improving    Leukocytosis unreliable marker given CLL.          Problem/Plan - 6:    Problem: Chronic viral hepatitis B without delta agent and without coma. Plan: c/w tenofovir as ordered             Problem/Plan - 7:    ·  Problem: CLL (chronic lymphocytic leukemia).  Plan: Monitor WBC            Case discussed with Dr. Vernon on 1/31/20. Patient will be discharged to inpatient hospice.

## 2020-01-23 NOTE — PROVIDER CONTACT NOTE (CRITICAL VALUE NOTIFICATION) - ASSESSMENT
patient asymptomatic, vital signs stable. no complaints of pain or lightheadness, dressing clean dry and intact

## 2020-01-23 NOTE — DISCHARGE NOTE PROVIDER - NSDCFUSCHEDAPPT_GEN_ALL_CORE_FT
ZAN ROBLES ; 02/07/2020 ; [facility] [address] [place] [address] ZAN ROBLES ; 02/07/2020 ; [facility] [address] [place] [address]

## 2020-01-23 NOTE — DISCHARGE NOTE PROVIDER - NSDCMRMEDTOKEN_GEN_ALL_CORE_FT
lactulose 10 g/15 mL oral solution: 1 tab(s) orally once a day, As Needed  losartan 50 mg oral tablet: 1 tab(s) orally once a day  PriLOSEC 20 mg oral delayed release capsule: 1 tab(s) orally once a day   tamsulosin 0.4 mg oral capsule: 1 cap(s) orally once a day (at bedtime)  tenofovir disoproxil fumarate 300 mg oral tablet: 1 tab(s) orally once a day  ursodiol 250 mg oral tablet: 1 tab(s) orally 3 times a day FIRST-Vancomycin 25 oral liquid: 5 milliliter(s) orally 4 times a day  glycopyrrolate 1 mg oral tablet: 1 tab(s) orally every 4 hours  HYDROmorphone 100 mg/50 mL-D5% intravenous solution: 1 milliliter(s) intravenous every 4 hours, As needed, moderate and severe Pain  ipratropium-albuterol 0.5 mg-2.5 mg/3 mLinhalation solution: 3 milliliter(s) inhaled every 6 hours  lactulose 10 g/15 mL oral solution: 1 tab(s) orally once a day, As Needed  LORazepam: 0.2 milligram(s) intravenous every 4 hours, As Needed agitation  losartan 50 mg oral tablet: 1 tab(s) orally once a day  pantoprazole 40 mg intravenous injection: 40 milligram(s) intravenous once a day  tamsulosin 0.4 mg oral capsule: 1 cap(s) orally once a day (at bedtime)  tenofovir disoproxil fumarate 300 mg oral tablet: 1 tab(s) orally once a day  ursodiol 250 mg oral tablet: 1 tab(s) orally 3 times a day

## 2020-01-24 LAB
ANION GAP SERPL CALC-SCNC: 14 MMO/L — SIGNIFICANT CHANGE UP (ref 7–14)
BASOPHILS # BLD AUTO: 0.05 K/UL — SIGNIFICANT CHANGE UP (ref 0–0.2)
BASOPHILS NFR BLD AUTO: 0.1 % — SIGNIFICANT CHANGE UP (ref 0–2)
BUN SERPL-MCNC: 10 MG/DL — SIGNIFICANT CHANGE UP (ref 7–23)
CALCIUM SERPL-MCNC: 9.3 MG/DL — SIGNIFICANT CHANGE UP (ref 8.4–10.5)
CHLORIDE SERPL-SCNC: 95 MMOL/L — LOW (ref 98–107)
CO2 SERPL-SCNC: 20 MMOL/L — LOW (ref 22–31)
CREAT SERPL-MCNC: 0.65 MG/DL — SIGNIFICANT CHANGE UP (ref 0.5–1.3)
EOSINOPHIL # BLD AUTO: 0.03 K/UL — SIGNIFICANT CHANGE UP (ref 0–0.5)
EOSINOPHIL NFR BLD AUTO: 0.1 % — SIGNIFICANT CHANGE UP (ref 0–6)
GLUCOSE SERPL-MCNC: 92 MG/DL — SIGNIFICANT CHANGE UP (ref 70–99)
HCT VFR BLD CALC: 22.1 % — LOW (ref 39–50)
HCT VFR BLD CALC: 22.5 % — LOW (ref 39–50)
HGB BLD-MCNC: 6.9 G/DL — CRITICAL LOW (ref 13–17)
HGB BLD-MCNC: 7.1 G/DL — LOW (ref 13–17)
IMM GRANULOCYTES NFR BLD AUTO: 0.1 % — SIGNIFICANT CHANGE UP (ref 0–1.5)
INR BLD: 1.26 — HIGH (ref 0.88–1.17)
LYMPHOCYTES # BLD AUTO: 48.47 K/UL — HIGH (ref 1–3.3)
LYMPHOCYTES # BLD AUTO: 94.2 % — HIGH (ref 13–44)
MAGNESIUM SERPL-MCNC: 2 MG/DL — SIGNIFICANT CHANGE UP (ref 1.6–2.6)
MANUAL SMEAR VERIFICATION: SIGNIFICANT CHANGE UP
MCHC RBC-ENTMCNC: 22.4 PG — LOW (ref 27–34)
MCHC RBC-ENTMCNC: 22.8 PG — LOW (ref 27–34)
MCHC RBC-ENTMCNC: 31.2 % — LOW (ref 32–36)
MCHC RBC-ENTMCNC: 31.6 % — LOW (ref 32–36)
MCV RBC AUTO: 71.8 FL — LOW (ref 80–100)
MCV RBC AUTO: 72.1 FL — LOW (ref 80–100)
MONOCYTES # BLD AUTO: 0.24 K/UL — SIGNIFICANT CHANGE UP (ref 0–0.9)
MONOCYTES NFR BLD AUTO: 0.5 % — LOW (ref 2–14)
NEUTROPHILS # BLD AUTO: 2.63 K/UL — SIGNIFICANT CHANGE UP (ref 1.8–7.4)
NEUTROPHILS NFR BLD AUTO: 5 % — LOW (ref 43–77)
NRBC # FLD: 0 K/UL — SIGNIFICANT CHANGE UP (ref 0–0)
NRBC # FLD: 0.03 K/UL — SIGNIFICANT CHANGE UP (ref 0–0)
PHOSPHATE SERPL-MCNC: 3 MG/DL — SIGNIFICANT CHANGE UP (ref 2.5–4.5)
PLATELET # BLD AUTO: 174 K/UL — SIGNIFICANT CHANGE UP (ref 150–400)
PLATELET # BLD AUTO: 175 K/UL — SIGNIFICANT CHANGE UP (ref 150–400)
PMV BLD: 11.2 FL — SIGNIFICANT CHANGE UP (ref 7–13)
PMV BLD: SIGNIFICANT CHANGE UP FL (ref 7–13)
POTASSIUM SERPL-MCNC: 4.4 MMOL/L — SIGNIFICANT CHANGE UP (ref 3.5–5.3)
POTASSIUM SERPL-SCNC: 4.4 MMOL/L — SIGNIFICANT CHANGE UP (ref 3.5–5.3)
PROTHROM AB SERPL-ACNC: 14.5 SEC — HIGH (ref 9.8–13.1)
RBC # BLD: 3.08 M/UL — LOW (ref 4.2–5.8)
RBC # BLD: 3.12 M/UL — LOW (ref 4.2–5.8)
RBC # FLD: 20.6 % — HIGH (ref 10.3–14.5)
RBC # FLD: 20.9 % — HIGH (ref 10.3–14.5)
SODIUM SERPL-SCNC: 129 MMOL/L — LOW (ref 135–145)
WBC # BLD: 51.47 K/UL — CRITICAL HIGH (ref 3.8–10.5)
WBC # BLD: 64.64 K/UL — CRITICAL HIGH (ref 3.8–10.5)
WBC # FLD AUTO: 51.47 K/UL — CRITICAL HIGH (ref 3.8–10.5)
WBC # FLD AUTO: 64.64 K/UL — CRITICAL HIGH (ref 3.8–10.5)

## 2020-01-24 PROCEDURE — 93010 ELECTROCARDIOGRAM REPORT: CPT

## 2020-01-24 PROCEDURE — 99233 SBSQ HOSP IP/OBS HIGH 50: CPT

## 2020-01-24 PROCEDURE — 99232 SBSQ HOSP IP/OBS MODERATE 35: CPT | Mod: GC

## 2020-01-24 RX ADMIN — Medication 1 TABLET(S): at 19:35

## 2020-01-24 RX ADMIN — Medication 100 MILLIGRAM(S): at 07:07

## 2020-01-24 RX ADMIN — ENOXAPARIN SODIUM 40 MILLIGRAM(S): 100 INJECTION SUBCUTANEOUS at 13:24

## 2020-01-24 RX ADMIN — Medication 125 MILLIGRAM(S): at 00:02

## 2020-01-24 RX ADMIN — Medication 50 MILLILITER(S): at 19:36

## 2020-01-24 RX ADMIN — Medication 125 MILLIGRAM(S): at 13:24

## 2020-01-24 RX ADMIN — Medication 125 MILLIGRAM(S): at 07:08

## 2020-01-24 RX ADMIN — Medication 125 MILLIGRAM(S): at 19:36

## 2020-01-24 RX ADMIN — Medication 1 TABLET(S): at 08:34

## 2020-01-24 RX ADMIN — Medication 50 MILLILITER(S): at 13:23

## 2020-01-24 RX ADMIN — Medication 1 TABLET(S): at 13:24

## 2020-01-24 RX ADMIN — Medication 3 MILLILITER(S): at 16:12

## 2020-01-24 RX ADMIN — OXYCODONE HYDROCHLORIDE 5 MILLIGRAM(S): 5 TABLET ORAL at 19:39

## 2020-01-24 RX ADMIN — Medication 3 MILLIGRAM(S): at 22:33

## 2020-01-24 RX ADMIN — ONDANSETRON 4 MILLIGRAM(S): 8 TABLET, FILM COATED ORAL at 23:46

## 2020-01-24 RX ADMIN — TAMSULOSIN HYDROCHLORIDE 0.4 MILLIGRAM(S): 0.4 CAPSULE ORAL at 21:45

## 2020-01-24 RX ADMIN — PANTOPRAZOLE SODIUM 40 MILLIGRAM(S): 20 TABLET, DELAYED RELEASE ORAL at 13:23

## 2020-01-24 RX ADMIN — Medication 3 MILLILITER(S): at 22:19

## 2020-01-24 RX ADMIN — Medication 125 MILLIGRAM(S): at 23:46

## 2020-01-24 RX ADMIN — TENOFOVIR DISOPROXIL FUMARATE 300 MILLIGRAM(S): 300 TABLET, FILM COATED ORAL at 13:23

## 2020-01-24 RX ADMIN — OXYCODONE HYDROCHLORIDE 5 MILLIGRAM(S): 5 TABLET ORAL at 20:09

## 2020-01-24 RX ADMIN — Medication 3 MILLILITER(S): at 09:44

## 2020-01-24 RX ADMIN — Medication 3 MILLILITER(S): at 04:34

## 2020-01-24 RX ADMIN — Medication 50 MILLILITER(S): at 00:02

## 2020-01-24 RX ADMIN — Medication 50 MILLILITER(S): at 07:08

## 2020-01-24 NOTE — PROGRESS NOTE ADULT - PROBLEM SELECTOR PLAN 1
Holding off on paracentesis given active colitis.  Abdomen is increasing in size, but similar today compared with yesterday.  Abdomen remains soft and patient has no respiratory distress.  Monitoring volume status and Na.  No signs of encephalopathy

## 2020-01-24 NOTE — PROGRESS NOTE ADULT - SUBJECTIVE AND OBJECTIVE BOX
Pt has been doing better, diarrhea is less, no abdominal pain, no nausea, no fevers or chills. Swelling over the legs is also lower. Eating home made food. ROS is otherwise unremarkable.         Meds:  acetaminophen   Tablet .. 650 milliGRAM(s) Oral every 6 hours PRN  albumin human 25% IVPB 100 milliLiter(s) IV Intermittent every 6 hours  ALBUTerol    90 MICROgram(s) HFA Inhaler 1 Puff(s) Inhalation every 4 hours PRN  albuterol/ipratropium for Nebulization 3 milliLiter(s) Nebulizer every 6 hours  benzocaine 15 mG/menthol 3.6 mG (Sugar-Free) Lozenge 1 Lozenge Oral every 4 hours PRN  enoxaparin Injectable 40 milliGRAM(s) SubCutaneous daily  guaiFENesin   Syrup  (Sugar-Free) 100 milliGRAM(s) Oral every 6 hours PRN  melatonin 3 milliGRAM(s) Oral at bedtime PRN  ondansetron   Disintegrating Tablet 4 milliGRAM(s) Oral four times a day  oxyCODONE    IR 5 milliGRAM(s) Oral every 6 hours PRN  oxyCODONE    IR 10 milliGRAM(s) Oral every 6 hours PRN  pantoprazole  Injectable 40 milliGRAM(s) IV Push daily  potassium acid phosphate/sodium acid phosphate tablet (K-PHOS No. 2) 1 Tablet(s) Oral three times a day with meals  tamsulosin 0.4 milliGRAM(s) Oral at bedtime  tenofovir disoproxil fumarate (VIREAD) 300 milliGRAM(s) Oral daily  tiotropium 18 MICROgram(s) Capsule 1 Capsule(s) Inhalation daily  vancomycin    Solution 125 milliGRAM(s) Oral every 6 hours      Vital Signs Last 24 Hrs  T(C): 36.8 (24 Jan 2020 07:15), Max: 37 (23 Jan 2020 18:04)  T(F): 98.2 (24 Jan 2020 07:15), Max: 98.6 (23 Jan 2020 18:04)  HR: 93 (24 Jan 2020 07:15) (82 - 107)  BP: 156/82 (24 Jan 2020 07:15) (132/72 - 156/82)  BP(mean): --  RR: 18 (24 Jan 2020 07:15) (16 - 18)  SpO2: 99% (24 Jan 2020 07:15) (94% - 100%)                          7.3    46.94 )-----------( 181      ( 23 Jan 2020 10:00 )             23.9       01-23    128<L>  |  95<L>  |  13  ----------------------------<  89  4.4   |  21<L>  |  0.70    Ca    9.0      23 Jan 2020 07:00  Phos  3.0     01-23  Mg     2.0     01-23                PT/INR - ( 23 Jan 2020 07:01 )   PT: 14.6 SEC;   INR: 1.30

## 2020-01-24 NOTE — PROGRESS NOTE ADULT - ASSESSMENT
79M with multiple and significant medical history as above and CLL, never required Rx for it, here with SBO and perforation, s/p surgery, Path showing just serosal adhesions without any malignancy, etiology unclear, did not need any transfusion, will recommend:  - continue Rx as per medicine  - slow advancing of diet as tolerated, continue ondansetron 4 mg half hour before each meal to minimize nausea  - stool for c. 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

## 2020-01-24 NOTE — PROGRESS NOTE ADULT - ATTENDING COMMENTS
He has an increase in ascites without abdominal pain or SOB. I would hold off therapeutic paracentesis until he has completed treatment for C diff. I spoke with he and his wife about the plan and answered their questions

## 2020-01-24 NOTE — PROGRESS NOTE ADULT - ASSESSMENT
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 Hepatology consulted for management of ascites.    Impression:  # Decompensated Hep B liver cirrhosis. MELD-Na:  HE: AOx3, no asterixis  Ascites: Moderate ascites seen on CT (1/14/20)  HCC: No lesions on CT (1/14/20)  EV: Last EGD reportedly 2 years ago, records not available  # Hepatitis B: Hep B eAg positive, high HBV viremia, genotype D. On Viread  # C. Diff: On PO vancomycin  # Hyponatremia: Improving off diuretics, suspect hypovolemic hyponatremia  # SBO complicated with perforation s/p Ex lap with SB resection and anastomosis (1/7/20): No signs of obstructions.       Recommendation:  - Continue Albumin 25 g (25%) q6h daily    - Continue Viread   - Continue Vancomycin po for total of 10 days   - Dietitian follow for appropriate calorie and protein intake   - Keep diuretics on hold  for now given hyponatremia and restart (lasix and aldactone) when hyponatremia resolves  - Supportive care per primary team    Rebecca Banks MD  Gastroenterology Fellow  [phone]  Please page on call fellow on weekends and after 5pm on weekdays

## 2020-01-24 NOTE — PROGRESS NOTE ADULT - ASSESSMENT
Patient is a 78yo M with CLL, chronic HBV with cirrhosis previously compensated, HTN presenting 1/6 for abdominal pain secondary to SBO c/b perforation s/p ex lap now with decompensated cirrhosis with ascites and LE edema, and hyponatremia

## 2020-01-24 NOTE — PROGRESS NOTE ADULT - SUBJECTIVE AND OBJECTIVE BOX
Patient is a 79y old  Male who presents with a chief complaint of abdominal pain (24 Jan 2020 08:04)      SUBJECTIVE / OVERNIGHT EVENTS:  Patient seen and examined.  No acute events overnight.  No current complaints.   BMs are more formed.    MEDICATIONS  (STANDING):  albumin human 25% IVPB 100 milliLiter(s) IV Intermittent every 6 hours  albuterol/ipratropium for Nebulization 3 milliLiter(s) Nebulizer every 6 hours  enoxaparin Injectable 40 milliGRAM(s) SubCutaneous daily  ondansetron   Disintegrating Tablet 4 milliGRAM(s) Oral four times a day  pantoprazole  Injectable 40 milliGRAM(s) IV Push daily  potassium acid phosphate/sodium acid phosphate tablet (K-PHOS No. 2) 1 Tablet(s) Oral three times a day with meals  tamsulosin 0.4 milliGRAM(s) Oral at bedtime  tenofovir disoproxil fumarate (VIREAD) 300 milliGRAM(s) Oral daily  tiotropium 18 MICROgram(s) Capsule 1 Capsule(s) Inhalation daily  vancomycin    Solution 125 milliGRAM(s) Oral every 6 hours    MEDICATIONS  (PRN):  acetaminophen   Tablet .. 650 milliGRAM(s) Oral every 6 hours PRN Mild Pain (1 - 3)  ALBUTerol    90 MICROgram(s) HFA Inhaler 1 Puff(s) Inhalation every 4 hours PRN Shortness of Breath and/or Wheezing  benzocaine 15 mG/menthol 3.6 mG (Sugar-Free) Lozenge 1 Lozenge Oral every 4 hours PRN Sore Throat  guaiFENesin   Syrup  (Sugar-Free) 100 milliGRAM(s) Oral every 6 hours PRN Cough  melatonin 3 milliGRAM(s) Oral at bedtime PRN Insomnia  oxyCODONE    IR 5 milliGRAM(s) Oral every 6 hours PRN Moderate Pain (4 - 6)  oxyCODONE    IR 10 milliGRAM(s) Oral every 6 hours PRN Severe Pain (7 - 10)      Vital Signs Last 24 Hrs  T(C): 36.3 (24 Jan 2020 17:17), Max: 37 (23 Jan 2020 18:04)  T(F): 97.4 (24 Jan 2020 17:17), Max: 98.6 (23 Jan 2020 18:04)  HR: 102 (24 Jan 2020 17:17) (79 - 102)  BP: 163/89 (24 Jan 2020 17:17) (148/78 - 165/83)  BP(mean): --  RR: 18 (24 Jan 2020 17:17) (17 - 18)  SpO2: 98% (24 Jan 2020 17:17) (94% - 100%)  CAPILLARY BLOOD GLUCOSE        I&O's Summary    23 Jan 2020 07:01  -  24 Jan 2020 07:00  --------------------------------------------------------  IN: 1160 mL / OUT: 1950 mL / NET: -790 mL    24 Jan 2020 07:01  -  24 Jan 2020 17:37  --------------------------------------------------------  IN: 240 mL / OUT: 300 mL / NET: -60 mL        PHYSICAL EXAM:  GENERAL: NAD, well-developed  HEENT: MMM, conjunctiva and sclera clear  NECK: Supple, No JVD  CHEST/LUNG: Clear to auscultation bilaterally; No wheeze  HEART: Regular rate and rhythm; No murmurs, rubs, or gallops  ABDOMEN: Distended, increased from prior but soft and nontender; Bowel sounds present, +fluid wave. Incision site c/d/i  : +scrotal edema  EXTREMITIES:  2+ LE edema, No clubbing or cyanosis  PSYCH: AAOx3  NEUROLOGY: non-focal  SKIN: No rashes or lesions    LABS:                        7.1    64.64 )-----------( 175      ( 24 Jan 2020 13:44 )             22.5     01-24    129<L>  |  95<L>  |  10  ----------------------------<  92  4.4   |  20<L>  |  0.65    Ca    9.3      24 Jan 2020 07:10  Phos  3.0     01-24  Mg     2.0     01-24      PT/INR - ( 24 Jan 2020 07:10 )   PT: 14.5 SEC;   INR: 1.26

## 2020-01-24 NOTE — PROVIDER CONTACT NOTE (CRITICAL VALUE NOTIFICATION) - ASSESSMENT
Pt A&Ox4, VS stable and afebrile. No s/s of respiratory distress noted. Pt resting comfortably in bed.

## 2020-01-24 NOTE — PROGRESS NOTE ADULT - SUBJECTIVE AND OBJECTIVE BOX
Chief Complaint:  Patient is a 79y old  Male who presents with a chief complaint of abdominal pain (23 Jan 2020 17:53)    Interval Events:   No bowel movements overnight  Feels well    Allergies:  Beef (Unknown)  No Known Drug Allergies    Hospital Medications:  acetaminophen   Tablet .. 650 milliGRAM(s) Oral every 6 hours PRN  albumin human 25% IVPB 100 milliLiter(s) IV Intermittent every 6 hours  ALBUTerol    90 MICROgram(s) HFA Inhaler 1 Puff(s) Inhalation every 4 hours PRN  albuterol/ipratropium for Nebulization 3 milliLiter(s) Nebulizer every 6 hours  benzocaine 15 mG/menthol 3.6 mG (Sugar-Free) Lozenge 1 Lozenge Oral every 4 hours PRN  enoxaparin Injectable 40 milliGRAM(s) SubCutaneous daily  guaiFENesin   Syrup  (Sugar-Free) 100 milliGRAM(s) Oral every 6 hours PRN  melatonin 3 milliGRAM(s) Oral at bedtime PRN  ondansetron   Disintegrating Tablet 4 milliGRAM(s) Oral four times a day  oxyCODONE    IR 5 milliGRAM(s) Oral every 6 hours PRN  oxyCODONE    IR 10 milliGRAM(s) Oral every 6 hours PRN  pantoprazole  Injectable 40 milliGRAM(s) IV Push daily  potassium acid phosphate/sodium acid phosphate tablet (K-PHOS No. 2) 1 Tablet(s) Oral three times a day with meals  tamsulosin 0.4 milliGRAM(s) Oral at bedtime  tenofovir disoproxil fumarate (VIREAD) 300 milliGRAM(s) Oral daily  tiotropium 18 MICROgram(s) Capsule 1 Capsule(s) Inhalation daily  vancomycin    Solution 125 milliGRAM(s) Oral every 6 hours    PMHX/PSHX:  Cirrhosis  HTN (hypertension)  H/O gastritis  CLL (chronic lymphocytic leukemia)  No significant past surgical history      ROS:   General:  No fevers, chills  ENT:  No sore throat or dysphagia  CV:  No pain or palpitations  Resp:  No dyspnea, cough or  wheezing  GI:  No pain, No nausea, No vomiting, No rectal bleeding, No tarry stools,  Skin:  No rash or edema    PHYSICAL EXAM:   Vital Signs:  Vital Signs Last 24 Hrs  T(C): 36.8 (24 Jan 2020 07:15), Max: 37 (23 Jan 2020 18:04)  T(F): 98.2 (24 Jan 2020 07:15), Max: 98.6 (23 Jan 2020 18:04)  HR: 93 (24 Jan 2020 07:15) (82 - 107)  BP: 156/82 (24 Jan 2020 07:15) (132/72 - 156/82)  BP(mean): --  RR: 18 (24 Jan 2020 07:15) (16 - 18)  SpO2: 99% (24 Jan 2020 07:15) (94% - 100%)  Daily     Daily     GENERAL:  NAD, Appears stated age  HEENT:  NC/AT,  conjunctivae clear and pink, sclera -anicteric  CHEST:  Normal Effort, Breath sounds clear  HEART:  RRR, S1 + S2, no murmurs  ABDOMEN:  Soft, non-tender, + distended, BS+  EXTEREMITIES:  no cyanosis  SKIN:  Warm & Dry.  NEURO:  Alert, oriented    LABS:                        7.3    46.94 )-----------( 181      ( 23 Jan 2020 10:00 )             23.9     Mean Cell Volume: 72.6 fL (01-23-20 @ 10:00)    01-23    128<L>  |  95<L>  |  13  ----------------------------<  89  4.4   |  21<L>  |  0.70    Ca    9.0      23 Jan 2020 07:00  Phos  3.0     01-23  Mg     2.0     01-23    PT/INR - ( 23 Jan 2020 07:01 )   PT: 14.6 SEC;   INR: 1.30                             7.3    46.94 )-----------( 181      ( 23 Jan 2020 10:00 )             23.9                         6.6    42.29 )-----------( 169      ( 23 Jan 2020 07:00 )             21.4                         7.4    47.61 )-----------( 168      ( 22 Jan 2020 05:45 )             23.9       Imaging:

## 2020-01-25 LAB
ANION GAP SERPL CALC-SCNC: 12 MMO/L — SIGNIFICANT CHANGE UP (ref 7–14)
ANISOCYTOSIS BLD QL: SIGNIFICANT CHANGE UP
BASOPHILS # BLD AUTO: 0.04 K/UL — SIGNIFICANT CHANGE UP (ref 0–0.2)
BASOPHILS NFR BLD AUTO: 0.1 % — SIGNIFICANT CHANGE UP (ref 0–2)
BASOPHILS NFR SPEC: 0 % — SIGNIFICANT CHANGE UP (ref 0–2)
BLASTS # FLD: 0 % — SIGNIFICANT CHANGE UP (ref 0–0)
BUN SERPL-MCNC: 11 MG/DL — SIGNIFICANT CHANGE UP (ref 7–23)
CALCIUM SERPL-MCNC: 9.3 MG/DL — SIGNIFICANT CHANGE UP (ref 8.4–10.5)
CHLORIDE SERPL-SCNC: 95 MMOL/L — LOW (ref 98–107)
CO2 SERPL-SCNC: 20 MMOL/L — LOW (ref 22–31)
CREAT SERPL-MCNC: 0.65 MG/DL — SIGNIFICANT CHANGE UP (ref 0.5–1.3)
DACRYOCYTES BLD QL SMEAR: SIGNIFICANT CHANGE UP
EOSINOPHIL # BLD AUTO: 0.04 K/UL — SIGNIFICANT CHANGE UP (ref 0–0.5)
EOSINOPHIL NFR BLD AUTO: 0.1 % — SIGNIFICANT CHANGE UP (ref 0–6)
EOSINOPHIL NFR FLD: 0 % — SIGNIFICANT CHANGE UP (ref 0–6)
GIANT PLATELETS BLD QL SMEAR: PRESENT — SIGNIFICANT CHANGE UP
GLUCOSE SERPL-MCNC: 122 MG/DL — HIGH (ref 70–99)
HCT VFR BLD CALC: 21.4 % — LOW (ref 39–50)
HGB BLD-MCNC: 6.7 G/DL — CRITICAL LOW (ref 13–17)
HYPOCHROMIA BLD QL: SIGNIFICANT CHANGE UP
IMM GRANULOCYTES NFR BLD AUTO: 0.1 % — SIGNIFICANT CHANGE UP (ref 0–1.5)
LYMPHOCYTES # BLD AUTO: 57.71 K/UL — HIGH (ref 1–3.3)
LYMPHOCYTES # BLD AUTO: 94.2 % — HIGH (ref 13–44)
LYMPHOCYTES NFR SPEC AUTO: 95.6 % — HIGH (ref 13–44)
MAGNESIUM SERPL-MCNC: 2 MG/DL — SIGNIFICANT CHANGE UP (ref 1.6–2.6)
MCHC RBC-ENTMCNC: 22.8 PG — LOW (ref 27–34)
MCHC RBC-ENTMCNC: 31.3 % — LOW (ref 32–36)
MCV RBC AUTO: 72.8 FL — LOW (ref 80–100)
METAMYELOCYTES # FLD: 0 % — SIGNIFICANT CHANGE UP (ref 0–1)
MICROCYTES BLD QL: SIGNIFICANT CHANGE UP
MONOCYTES # BLD AUTO: 0.16 K/UL — SIGNIFICANT CHANGE UP (ref 0–0.9)
MONOCYTES NFR BLD AUTO: 0.3 % — LOW (ref 2–14)
MONOCYTES NFR BLD: 0 % — LOW (ref 2–9)
MYELOCYTES NFR BLD: 0 % — SIGNIFICANT CHANGE UP (ref 0–0)
NEUTROPHIL AB SER-ACNC: 2.6 % — LOW (ref 43–77)
NEUTROPHILS # BLD AUTO: 3.24 K/UL — SIGNIFICANT CHANGE UP (ref 1.8–7.4)
NEUTROPHILS NFR BLD AUTO: 5.2 % — LOW (ref 43–77)
NEUTS BAND # BLD: 0 % — SIGNIFICANT CHANGE UP (ref 0–6)
NRBC # FLD: 0 K/UL — SIGNIFICANT CHANGE UP (ref 0–0)
OTHER - HEMATOLOGY %: 0 — SIGNIFICANT CHANGE UP
PHOSPHATE SERPL-MCNC: 2.8 MG/DL — SIGNIFICANT CHANGE UP (ref 2.5–4.5)
PLATELET # BLD AUTO: 174 K/UL — SIGNIFICANT CHANGE UP (ref 150–400)
PLATELET COUNT - ESTIMATE: NORMAL — SIGNIFICANT CHANGE UP
PMV BLD: 11.1 FL — SIGNIFICANT CHANGE UP (ref 7–13)
POIKILOCYTOSIS BLD QL AUTO: SIGNIFICANT CHANGE UP
POTASSIUM SERPL-MCNC: 4.6 MMOL/L — SIGNIFICANT CHANGE UP (ref 3.5–5.3)
POTASSIUM SERPL-SCNC: 4.6 MMOL/L — SIGNIFICANT CHANGE UP (ref 3.5–5.3)
PROMYELOCYTES # FLD: 0 % — SIGNIFICANT CHANGE UP (ref 0–0)
RBC # BLD: 2.94 M/UL — LOW (ref 4.2–5.8)
RBC # FLD: 21.1 % — HIGH (ref 10.3–14.5)
SCHISTOCYTES BLD QL AUTO: SIGNIFICANT CHANGE UP
SMUDGE CELLS # BLD: PRESENT — SIGNIFICANT CHANGE UP
SODIUM SERPL-SCNC: 127 MMOL/L — LOW (ref 135–145)
TARGETS BLD QL SMEAR: SLIGHT — SIGNIFICANT CHANGE UP
VARIANT LYMPHS # BLD: 1.8 % — SIGNIFICANT CHANGE UP
WBC # BLD: 61.27 K/UL — CRITICAL HIGH (ref 3.8–10.5)
WBC # FLD AUTO: 61.27 K/UL — CRITICAL HIGH (ref 3.8–10.5)

## 2020-01-25 PROCEDURE — 71045 X-RAY EXAM CHEST 1 VIEW: CPT | Mod: 26

## 2020-01-25 PROCEDURE — 99233 SBSQ HOSP IP/OBS HIGH 50: CPT

## 2020-01-25 RX ORDER — FUROSEMIDE 40 MG
20 TABLET ORAL ONCE
Refills: 0 | Status: COMPLETED | OUTPATIENT
Start: 2020-01-25 | End: 2020-01-25

## 2020-01-25 RX ADMIN — Medication 50 MILLILITER(S): at 13:41

## 2020-01-25 RX ADMIN — Medication 3 MILLILITER(S): at 04:00

## 2020-01-25 RX ADMIN — Medication 3 MILLILITER(S): at 11:00

## 2020-01-25 RX ADMIN — OXYCODONE HYDROCHLORIDE 10 MILLIGRAM(S): 5 TABLET ORAL at 12:40

## 2020-01-25 RX ADMIN — Medication 3 MILLIGRAM(S): at 20:49

## 2020-01-25 RX ADMIN — TAMSULOSIN HYDROCHLORIDE 0.4 MILLIGRAM(S): 0.4 CAPSULE ORAL at 20:50

## 2020-01-25 RX ADMIN — Medication 50 MILLILITER(S): at 07:07

## 2020-01-25 RX ADMIN — OXYCODONE HYDROCHLORIDE 10 MILLIGRAM(S): 5 TABLET ORAL at 11:44

## 2020-01-25 RX ADMIN — Medication 125 MILLIGRAM(S): at 17:55

## 2020-01-25 RX ADMIN — TENOFOVIR DISOPROXIL FUMARATE 300 MILLIGRAM(S): 300 TABLET, FILM COATED ORAL at 11:37

## 2020-01-25 RX ADMIN — Medication 1 TABLET(S): at 11:36

## 2020-01-25 RX ADMIN — Medication 20 MILLIGRAM(S): at 19:37

## 2020-01-25 RX ADMIN — ENOXAPARIN SODIUM 40 MILLIGRAM(S): 100 INJECTION SUBCUTANEOUS at 11:36

## 2020-01-25 RX ADMIN — Medication 1 TABLET(S): at 17:55

## 2020-01-25 RX ADMIN — Medication 1 TABLET(S): at 09:55

## 2020-01-25 RX ADMIN — Medication 50 MILLILITER(S): at 18:00

## 2020-01-25 RX ADMIN — PANTOPRAZOLE SODIUM 40 MILLIGRAM(S): 20 TABLET, DELAYED RELEASE ORAL at 11:36

## 2020-01-25 RX ADMIN — ONDANSETRON 4 MILLIGRAM(S): 8 TABLET, FILM COATED ORAL at 17:55

## 2020-01-25 RX ADMIN — Medication 3 MILLILITER(S): at 16:04

## 2020-01-25 RX ADMIN — Medication 125 MILLIGRAM(S): at 07:06

## 2020-01-25 RX ADMIN — Medication 125 MILLIGRAM(S): at 11:37

## 2020-01-25 RX ADMIN — Medication 3 MILLILITER(S): at 21:15

## 2020-01-25 RX ADMIN — Medication 50 MILLILITER(S): at 01:53

## 2020-01-25 NOTE — PROGRESS NOTE ADULT - SUBJECTIVE AND OBJECTIVE BOX
PROGRESS NOTE:     Patient is a 79y old  Male who presents with a chief complaint of abdominal pain (24 Jan 2020 17:37)      SUBJECTIVE / OVERNIGHT EVENTS:  Patient seen and examined this morning. No overnight events. Denies any chest pain, shortness of breath, fevers, chills, nausea or vomiting.    ADDITIONAL REVIEW OF SYSTEMS:  Stools reportedly not as soft.    MEDICATIONS  (STANDING):  albumin human 25% IVPB 100 milliLiter(s) IV Intermittent every 6 hours  albuterol/ipratropium for Nebulization 3 milliLiter(s) Nebulizer every 6 hours  enoxaparin Injectable 40 milliGRAM(s) SubCutaneous daily  ondansetron   Disintegrating Tablet 4 milliGRAM(s) Oral four times a day  pantoprazole  Injectable 40 milliGRAM(s) IV Push daily  potassium acid phosphate/sodium acid phosphate tablet (K-PHOS No. 2) 1 Tablet(s) Oral three times a day with meals  tamsulosin 0.4 milliGRAM(s) Oral at bedtime  tenofovir disoproxil fumarate (VIREAD) 300 milliGRAM(s) Oral daily  tiotropium 18 MICROgram(s) Capsule 1 Capsule(s) Inhalation daily  vancomycin    Solution 125 milliGRAM(s) Oral every 6 hours    MEDICATIONS  (PRN):  acetaminophen   Tablet .. 650 milliGRAM(s) Oral every 6 hours PRN Mild Pain (1 - 3)  ALBUTerol    90 MICROgram(s) HFA Inhaler 1 Puff(s) Inhalation every 4 hours PRN Shortness of Breath and/or Wheezing  benzocaine 15 mG/menthol 3.6 mG (Sugar-Free) Lozenge 1 Lozenge Oral every 4 hours PRN Sore Throat  guaiFENesin   Syrup  (Sugar-Free) 100 milliGRAM(s) Oral every 6 hours PRN Cough  melatonin 3 milliGRAM(s) Oral at bedtime PRN Insomnia  oxyCODONE    IR 5 milliGRAM(s) Oral every 6 hours PRN Moderate Pain (4 - 6)  oxyCODONE    IR 10 milliGRAM(s) Oral every 6 hours PRN Severe Pain (7 - 10)      CAPILLARY BLOOD GLUCOSE        I&O's Summary    24 Jan 2020 07:01  -  25 Jan 2020 07:00  --------------------------------------------------------  IN: 900 mL / OUT: 1451 mL / NET: -551 mL        PHYSICAL EXAM:  Vital Signs Last 24 Hrs  T(C): 36.8 (25 Jan 2020 07:05), Max: 36.8 (24 Jan 2020 14:47)  T(F): 98.2 (25 Jan 2020 07:05), Max: 98.3 (24 Jan 2020 14:47)  HR: 96 (25 Jan 2020 07:05) (79 - 105)  BP: 149/80 (25 Jan 2020 07:05) (148/78 - 165/83)  BP(mean): --  RR: 19 (25 Jan 2020 07:05) (17 - 19)  SpO2: 97% (25 Jan 2020 07:05) (96% - 99%)    CONSTITUTIONAL: NAD, well-developed  RESPIRATORY: Normal respiratory effort; lungs are clear to auscultation bilaterally  CARDIOVASCULAR: Regular rate and rhythm, normal S1 and S2, no murmur/rub/gallop; No lower extremity edema; Peripheral pulses are 2+ bilaterally  ABDOMEN:: Distended, soft and nontender; Bowel sounds present, +fluid wave. Incision site c/d/i  MUSCLOSKELETAL: no clubbing or cyanosis of digits; no joint swelling or tenderness to palpation  PSYCH: A+O to person, place, and time; affect appropriate    LABS:                        7.1    64.64 )-----------( 175      ( 24 Jan 2020 13:44 )             22.5     01-25    127<L>  |  95<L>  |  11  ----------------------------<  122<H>  4.6   |  20<L>  |  0.65    Ca    9.3      25 Jan 2020 07:15  Phos  2.8     01-25  Mg     2.0     01-25      PT/INR - ( 24 Jan 2020 07:10 )   PT: 14.5 SEC;   INR: 1.26                      RADIOLOGY & ADDITIONAL TESTS:  Results Reviewed:   Imaging Personally Reviewed:  Electrocardiogram Personally Reviewed:    COORDINATION OF CARE:  Care Discussed with Consultants/Other Providers [Y/N]:  Prior or Outpatient Records Reviewed [Y/N]:

## 2020-01-25 NOTE — PROGRESS NOTE ADULT - PROBLEM SELECTOR PLAN 1
Holding off on paracentesis given active colitis.  Abdomen is increasing in size, but similar today compared with yesterday.  Abdomen remains soft and patient has no respiratory distress.  Monitoring volume status and Na.  No signs of encephalopathy Holding off on paracentesis given active colitis.  Abdomen is increasing in size  Monitoring volume status and Na.  No signs of encephalopathy

## 2020-01-25 NOTE — PROGRESS NOTE ADULT - MOTOR
no focal deficits

## 2020-01-25 NOTE — PROGRESS NOTE ADULT - SUBJECTIVE AND OBJECTIVE BOX
Pt has been SOB today, nebulizer and oxygen helped a little, more now after transfusion of a unit of blood, is uncomfortable, cxr done, results pending. No fevers or chills and a detailed ROS is otherwise unchanged to unremarkable. His daughter is with him today. Earlier, I spoke to the pt's wife and PA.         Meds:  acetaminophen   Tablet .. 650 milliGRAM(s) Oral every 6 hours PRN  albumin human 25% IVPB 100 milliLiter(s) IV Intermittent every 6 hours  ALBUTerol    90 MICROgram(s) HFA Inhaler 1 Puff(s) Inhalation every 4 hours PRN  albuterol/ipratropium for Nebulization 3 milliLiter(s) Nebulizer every 6 hours  benzocaine 15 mG/menthol 3.6 mG (Sugar-Free) Lozenge 1 Lozenge Oral every 4 hours PRN  enoxaparin Injectable 40 milliGRAM(s) SubCutaneous daily  furosemide   Injectable 20 milliGRAM(s) IV Push once  guaiFENesin   Syrup  (Sugar-Free) 100 milliGRAM(s) Oral every 6 hours PRN  melatonin 3 milliGRAM(s) Oral at bedtime PRN  ondansetron   Disintegrating Tablet 4 milliGRAM(s) Oral four times a day  oxyCODONE    IR 5 milliGRAM(s) Oral every 6 hours PRN  oxyCODONE    IR 10 milliGRAM(s) Oral every 6 hours PRN  pantoprazole  Injectable 40 milliGRAM(s) IV Push daily  potassium acid phosphate/sodium acid phosphate tablet (K-PHOS No. 2) 1 Tablet(s) Oral three times a day with meals  tamsulosin 0.4 milliGRAM(s) Oral at bedtime  tenofovir disoproxil fumarate (VIREAD) 300 milliGRAM(s) Oral daily  tiotropium 18 MICROgram(s) Capsule 1 Capsule(s) Inhalation daily  vancomycin    Solution 125 milliGRAM(s) Oral every 6 hours      Vital Signs Last 24 Hrs  T(C): 36.6 (25 Jan 2020 17:57), Max: 36.9 (25 Jan 2020 10:01)  T(F): 97.8 (25 Jan 2020 17:57), Max: 98.4 (25 Jan 2020 10:01)  HR: 97 (25 Jan 2020 17:57) (86 - 105)  BP: 150/87 (25 Jan 2020 17:57) (149/80 - 171/97)  BP(mean): --  RR: 17 (25 Jan 2020 17:57) (16 - 19)  SpO2: 100% (25 Jan 2020 17:57) (96% - 100%)                          6.7    61.27 )-----------( 174      ( 25 Jan 2020 07:15 )             21.4       01-25    127<L>  |  95<L>  |  11  ----------------------------<  122<H>  4.6   |  20<L>  |  0.65    Ca    9.3      25 Jan 2020 07:15  Phos  2.8     01-25  Mg     2.0     01-25                PT/INR - ( 24 Jan 2020 07:10 )   PT: 14.5 SEC;   INR: 1.26

## 2020-01-25 NOTE — PROGRESS NOTE ADULT - ASSESSMENT
79M with multiple and significant medical history as above and CLL, never required Rx for it, here with SBO and perforation, s/p surgery, Path showing just serosal adhesions without any malignancy, etiology unclear, did not need any transfusion, will recommend:  - acute issue of SOB - likely multifactorial, will give a dose of lasix, increase oxygen as needed to keep him comfortable, CTPA, if no better  - s/p transfusion, monitor Hgb and keep it ~ 8.  - continue Rx as per medicine  - slow advancing of diet as tolerated, continue ondansetron 4 mg half hour before each meal to minimize nausea  - stool for c. 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  - the prognosis overall becoming guarded    - call for any questions - [phone]

## 2020-01-26 DIAGNOSIS — R18.8 OTHER ASCITES: ICD-10-CM

## 2020-01-26 LAB
ANION GAP SERPL CALC-SCNC: 15 MMO/L — HIGH (ref 7–14)
BASOPHILS # BLD AUTO: 0.04 K/UL — SIGNIFICANT CHANGE UP (ref 0–0.2)
BASOPHILS NFR BLD AUTO: 0.1 % — SIGNIFICANT CHANGE UP (ref 0–2)
BLD GP AB SCN SERPL QL: NEGATIVE — SIGNIFICANT CHANGE UP
BUN SERPL-MCNC: 13 MG/DL — SIGNIFICANT CHANGE UP (ref 7–23)
CALCIUM SERPL-MCNC: 9.9 MG/DL — SIGNIFICANT CHANGE UP (ref 8.4–10.5)
CHLORIDE SERPL-SCNC: 94 MMOL/L — LOW (ref 98–107)
CO2 SERPL-SCNC: 21 MMOL/L — LOW (ref 22–31)
CREAT SERPL-MCNC: 0.65 MG/DL — SIGNIFICANT CHANGE UP (ref 0.5–1.3)
EOSINOPHIL # BLD AUTO: 0.09 K/UL — SIGNIFICANT CHANGE UP (ref 0–0.5)
EOSINOPHIL NFR BLD AUTO: 0.1 % — SIGNIFICANT CHANGE UP (ref 0–6)
GLUCOSE SERPL-MCNC: 126 MG/DL — HIGH (ref 70–99)
HCT VFR BLD CALC: 27.6 % — LOW (ref 39–50)
HGB BLD-MCNC: 8.6 G/DL — LOW (ref 13–17)
IMM GRANULOCYTES NFR BLD AUTO: 0.2 % — SIGNIFICANT CHANGE UP (ref 0–1.5)
LYMPHOCYTES # BLD AUTO: 62.4 K/UL — HIGH (ref 1–3.3)
LYMPHOCYTES # BLD AUTO: 93.7 % — HIGH (ref 13–44)
MAGNESIUM SERPL-MCNC: 2 MG/DL — SIGNIFICANT CHANGE UP (ref 1.6–2.6)
MANUAL SMEAR VERIFICATION: SIGNIFICANT CHANGE UP
MCHC RBC-ENTMCNC: 23.2 PG — LOW (ref 27–34)
MCHC RBC-ENTMCNC: 31.2 % — LOW (ref 32–36)
MCV RBC AUTO: 74.6 FL — LOW (ref 80–100)
MONOCYTES # BLD AUTO: 0.09 K/UL — SIGNIFICANT CHANGE UP (ref 0–0.9)
MONOCYTES NFR BLD AUTO: 0.1 % — LOW (ref 2–14)
NEUTROPHILS # BLD AUTO: 3.89 K/UL — SIGNIFICANT CHANGE UP (ref 1.8–7.4)
NEUTROPHILS NFR BLD AUTO: 5.8 % — LOW (ref 43–77)
NRBC # FLD: 0.02 K/UL — SIGNIFICANT CHANGE UP (ref 0–0)
PHOSPHATE SERPL-MCNC: 2.9 MG/DL — SIGNIFICANT CHANGE UP (ref 2.5–4.5)
PLATELET # BLD AUTO: 173 K/UL — SIGNIFICANT CHANGE UP (ref 150–400)
PMV BLD: 11 FL — SIGNIFICANT CHANGE UP (ref 7–13)
POTASSIUM SERPL-MCNC: 4.3 MMOL/L — SIGNIFICANT CHANGE UP (ref 3.5–5.3)
POTASSIUM SERPL-SCNC: 4.3 MMOL/L — SIGNIFICANT CHANGE UP (ref 3.5–5.3)
RBC # BLD: 3.7 M/UL — LOW (ref 4.2–5.8)
RBC # FLD: 21.7 % — HIGH (ref 10.3–14.5)
RH IG SCN BLD-IMP: POSITIVE — SIGNIFICANT CHANGE UP
SODIUM SERPL-SCNC: 130 MMOL/L — LOW (ref 135–145)
WBC # BLD: 66.61 K/UL — CRITICAL HIGH (ref 3.8–10.5)
WBC # FLD AUTO: 66.61 K/UL — CRITICAL HIGH (ref 3.8–10.5)

## 2020-01-26 PROCEDURE — 99232 SBSQ HOSP IP/OBS MODERATE 35: CPT

## 2020-01-26 PROCEDURE — 99221 1ST HOSP IP/OBS SF/LOW 40: CPT | Mod: GC

## 2020-01-26 RX ORDER — FUROSEMIDE 40 MG
40 TABLET ORAL ONCE
Refills: 0 | Status: COMPLETED | OUTPATIENT
Start: 2020-01-26 | End: 2020-01-26

## 2020-01-26 RX ORDER — ONDANSETRON 8 MG/1
4 TABLET, FILM COATED ORAL
Refills: 0 | Status: DISCONTINUED | OUTPATIENT
Start: 2020-01-26 | End: 2020-01-29

## 2020-01-26 RX ORDER — FUROSEMIDE 40 MG
20 TABLET ORAL ONCE
Refills: 0 | Status: COMPLETED | OUTPATIENT
Start: 2020-01-26 | End: 2020-01-26

## 2020-01-26 RX ADMIN — Medication 1 TABLET(S): at 17:35

## 2020-01-26 RX ADMIN — Medication 50 MILLILITER(S): at 05:48

## 2020-01-26 RX ADMIN — Medication 50 MILLILITER(S): at 17:35

## 2020-01-26 RX ADMIN — Medication 125 MILLIGRAM(S): at 17:35

## 2020-01-26 RX ADMIN — Medication 50 MILLILITER(S): at 00:09

## 2020-01-26 RX ADMIN — Medication 3 MILLILITER(S): at 03:29

## 2020-01-26 RX ADMIN — Medication 0.25 MILLIGRAM(S): at 23:36

## 2020-01-26 RX ADMIN — OXYCODONE HYDROCHLORIDE 5 MILLIGRAM(S): 5 TABLET ORAL at 06:45

## 2020-01-26 RX ADMIN — Medication 1 TABLET(S): at 10:04

## 2020-01-26 RX ADMIN — Medication 20 MILLIGRAM(S): at 23:12

## 2020-01-26 RX ADMIN — Medication 125 MILLIGRAM(S): at 00:09

## 2020-01-26 RX ADMIN — Medication 1 TABLET(S): at 11:49

## 2020-01-26 RX ADMIN — TENOFOVIR DISOPROXIL FUMARATE 300 MILLIGRAM(S): 300 TABLET, FILM COATED ORAL at 11:49

## 2020-01-26 RX ADMIN — Medication 3 MILLILITER(S): at 15:48

## 2020-01-26 RX ADMIN — Medication 3 MILLILITER(S): at 22:11

## 2020-01-26 RX ADMIN — Medication 125 MILLIGRAM(S): at 11:49

## 2020-01-26 RX ADMIN — PANTOPRAZOLE SODIUM 40 MILLIGRAM(S): 20 TABLET, DELAYED RELEASE ORAL at 11:49

## 2020-01-26 RX ADMIN — OXYCODONE HYDROCHLORIDE 10 MILLIGRAM(S): 5 TABLET ORAL at 11:49

## 2020-01-26 RX ADMIN — TAMSULOSIN HYDROCHLORIDE 0.4 MILLIGRAM(S): 0.4 CAPSULE ORAL at 21:38

## 2020-01-26 RX ADMIN — ONDANSETRON 4 MILLIGRAM(S): 8 TABLET, FILM COATED ORAL at 00:09

## 2020-01-26 RX ADMIN — Medication 50 MILLILITER(S): at 11:48

## 2020-01-26 RX ADMIN — Medication 3 MILLIGRAM(S): at 23:36

## 2020-01-26 RX ADMIN — Medication 125 MILLIGRAM(S): at 05:48

## 2020-01-26 RX ADMIN — OXYCODONE HYDROCHLORIDE 5 MILLIGRAM(S): 5 TABLET ORAL at 06:02

## 2020-01-26 RX ADMIN — ENOXAPARIN SODIUM 40 MILLIGRAM(S): 100 INJECTION SUBCUTANEOUS at 11:49

## 2020-01-26 RX ADMIN — Medication 125 MILLIGRAM(S): at 23:12

## 2020-01-26 RX ADMIN — Medication 40 MILLIGRAM(S): at 13:21

## 2020-01-26 RX ADMIN — OXYCODONE HYDROCHLORIDE 10 MILLIGRAM(S): 5 TABLET ORAL at 12:40

## 2020-01-26 RX ADMIN — Medication 50 MILLILITER(S): at 23:36

## 2020-01-26 RX ADMIN — Medication 3 MILLILITER(S): at 09:30

## 2020-01-26 NOTE — PROGRESS NOTE ADULT - NEGATIVE HEMATOLOGY SYMPTOMS
no nose bleeding/no gum bleeding
no gum bleeding/no nose bleeding
no gum bleeding/no nose bleeding
no nose bleeding/no gum bleeding
no nose bleeding/no gum bleeding
no gum bleeding/no nose bleeding
no nose bleeding/no gum bleeding

## 2020-01-26 NOTE — PROGRESS NOTE ADULT - NEUROLOGICAL DETAILS
alert and oriented x 3

## 2020-01-26 NOTE — PROGRESS NOTE ADULT - SUBJECTIVE AND OBJECTIVE BOX
PROGRESS NOTE:     Patient is a 79y old  Male who presents with a chief complaint of abdominal pain (25 Jan 2020 19:27)      SUBJECTIVE / OVERNIGHT EVENTS:  Patient seen and examined this morning. Overnight and yesterday in the evening the patient complained of worsening breathing.     ADDITIONAL REVIEW OF SYSTEMS:  No fevers or chills.    MEDICATIONS  (STANDING):  albumin human 25% IVPB 100 milliLiter(s) IV Intermittent every 6 hours  albuterol/ipratropium for Nebulization 3 milliLiter(s) Nebulizer every 6 hours  enoxaparin Injectable 40 milliGRAM(s) SubCutaneous daily  ondansetron   Disintegrating Tablet 4 milliGRAM(s) Oral four times a day  pantoprazole  Injectable 40 milliGRAM(s) IV Push daily  potassium acid phosphate/sodium acid phosphate tablet (K-PHOS No. 2) 1 Tablet(s) Oral three times a day with meals  tamsulosin 0.4 milliGRAM(s) Oral at bedtime  tenofovir disoproxil fumarate (VIREAD) 300 milliGRAM(s) Oral daily  tiotropium 18 MICROgram(s) Capsule 1 Capsule(s) Inhalation daily  vancomycin    Solution 125 milliGRAM(s) Oral every 6 hours    MEDICATIONS  (PRN):  acetaminophen   Tablet .. 650 milliGRAM(s) Oral every 6 hours PRN Mild Pain (1 - 3)  ALBUTerol    90 MICROgram(s) HFA Inhaler 1 Puff(s) Inhalation every 4 hours PRN Shortness of Breath and/or Wheezing  benzocaine 15 mG/menthol 3.6 mG (Sugar-Free) Lozenge 1 Lozenge Oral every 4 hours PRN Sore Throat  guaiFENesin   Syrup  (Sugar-Free) 100 milliGRAM(s) Oral every 6 hours PRN Cough  melatonin 3 milliGRAM(s) Oral at bedtime PRN Insomnia  oxyCODONE    IR 5 milliGRAM(s) Oral every 6 hours PRN Moderate Pain (4 - 6)  oxyCODONE    IR 10 milliGRAM(s) Oral every 6 hours PRN Severe Pain (7 - 10)      CAPILLARY BLOOD GLUCOSE        I&O's Summary    25 Jan 2020 07:01  -  26 Jan 2020 07:00  --------------------------------------------------------  IN: 1440 mL / OUT: 2100 mL / NET: -660 mL        PHYSICAL EXAM:  Vital Signs Last 24 Hrs  T(C): 36.7 (26 Jan 2020 05:20), Max: 36.9 (25 Jan 2020 10:01)  T(F): 98 (26 Jan 2020 05:20), Max: 98.4 (25 Jan 2020 10:01)  HR: 95 (26 Jan 2020 05:20) (81 - 103)  BP: 146/75 (26 Jan 2020 05:20) (146/75 - 171/97)  BP(mean): --  RR: 18 (26 Jan 2020 05:20) (16 - 19)  SpO2: 100% (26 Jan 2020 05:20) (96% - 100%)    CONSTITUTIONAL: Sickly appearing, with noticeable distended abdomen.    RESPIRATORY: Patient in some distress with respiration, lungs are clear bilaterally.  CARDIOVASCULAR: Regular rate and rhythm, normal S1 and S2, no murmur/rub/gallop; No lower extremity edema; Peripheral pulses are 2+ bilaterally  ABDOMEN:: Continues to be Distended, nontender; Bowel sounds present, +fluid wave. Incision site c/d/i  MUSCLOSKELETAL: no clubbing or cyanosis of digits; no joint swelling or tenderness to palpation  PSYCH: A+O to person, place, and time; affect appropriate    LABS:                        8.6    66.61 )-----------( 173      ( 26 Jan 2020 06:35 )             27.6     01-26    130<L>  |  94<L>  |  13  ----------------------------<  126<H>  4.3   |  21<L>  |  0.65    Ca    9.9      26 Jan 2020 06:35  Phos  2.9     01-26  Mg     2.0     01-26                  RADIOLOGY & ADDITIONAL TESTS:  Results Reviewed:   Imaging Personally Reviewed:  Electrocardiogram Personally Reviewed:    COORDINATION OF CARE:  Care Discussed with Consultants/Other Providers [Y/N]:  Prior or Outpatient Records Reviewed [Y/N]:

## 2020-01-26 NOTE — CONSULT NOTE ADULT - ASSESSMENT
79M with untreated CLL, cirrhosis, + ascites, and chronic HepB presents with abdominal pain, N/V, and poor appetite found to have peritonitis with SBO and underwent an ex-lap REESE, SBR/A. Post op course has been c/b worsening ascites in setting of decompensated cirrhosis.     D/W Dr. Sidhu, cardio attending: would continue lasix 20 mg IV BID as this seemed to work well and can increase as needed to lasix 40 mg IV BID. Cardiology will follow. 79M with untreated CLL, cirrhosis, + ascites, and chronic HepB presents with abdominal pain, N/V, and poor appetite found to have peritonitis with SBO and underwent an ex-lap REESE, SBR/A. Post op course has been c/b worsening ascites in setting of decompensated cirrhosis.     D/W Dr. Sidhu, cardio attending: would continue lasix 20 mg IV BID as this seemed to work well and can increase as needed to lasix 40 mg IV BID.  Strict I & O please. Cardiology will follow.

## 2020-01-26 NOTE — PROGRESS NOTE ADULT - PROBLEM SELECTOR PLAN 2
abdomen markedly distended, compromising breathing  Will need to start to use diuretics carefully   Trying to hold off on a tap b/o recent history of SBO w/bowel perf and concurrent infection with C.diff. abdomen markedly distended, compromising breathing  Will need to start to use diuretics carefully, will order 40 mg of IV lasix today.  Trying to hold off on a tap b/o recent history of SBO w/bowel perf and concurrent infection with C.diff.

## 2020-01-26 NOTE — PROGRESS NOTE ADULT - ASSESSMENT
Patient is a 80yo M with CLL, chronic HBV with cirrhosis previously compensated, HTN presenting 1/6 for abdominal pain secondary to SBO c/b perforation s/p ex lap now with decompensated cirrhosis with ascites and LE edema, and hyponatremia

## 2020-01-26 NOTE — PROGRESS NOTE ADULT - SUBJECTIVE AND OBJECTIVE BOX
Pt did well last night and until recently, ate something and wife tried to make him sit, he started feeling a lot of breathing difficulty, has been given lasix, called for nebulizer treatment. No pain and rest of the ROS unchanged to unremarkable.         Meds  acetaminophen   Tablet .. 650 milliGRAM(s) Oral every 6 hours PRN  albumin human 25% IVPB 100 milliLiter(s) IV Intermittent every 6 hours  ALBUTerol    90 MICROgram(s) HFA Inhaler 1 Puff(s) Inhalation every 4 hours PRN  albuterol/ipratropium for Nebulization 3 milliLiter(s) Nebulizer every 6 hours  benzocaine 15 mG/menthol 3.6 mG (Sugar-Free) Lozenge 1 Lozenge Oral every 4 hours PRN  enoxaparin Injectable 40 milliGRAM(s) SubCutaneous daily  guaiFENesin   Syrup  (Sugar-Free) 100 milliGRAM(s) Oral every 6 hours PRN  melatonin 3 milliGRAM(s) Oral at bedtime PRN  ondansetron   Disintegrating Tablet 4 milliGRAM(s) Oral four times a day PRN  oxyCODONE    IR 5 milliGRAM(s) Oral every 6 hours PRN  oxyCODONE    IR 10 milliGRAM(s) Oral every 6 hours PRN  pantoprazole  Injectable 40 milliGRAM(s) IV Push daily  potassium acid phosphate/sodium acid phosphate tablet (K-PHOS No. 2) 1 Tablet(s) Oral three times a day with meals  tamsulosin 0.4 milliGRAM(s) Oral at bedtime  tenofovir disoproxil fumarate (VIREAD) 300 milliGRAM(s) Oral daily  tiotropium 18 MICROgram(s) Capsule 1 Capsule(s) Inhalation daily  vancomycin    Solution 125 milliGRAM(s) Oral every 6 hours      Vital Signs Last 24 Hrs  T(C): 36.8 (26 Jan 2020 13:19), Max: 36.8 (25 Jan 2020 16:20)  T(F): 98.2 (26 Jan 2020 13:19), Max: 98.3 (25 Jan 2020 16:20)  HR: 98 (26 Jan 2020 13:19) (81 - 103)  BP: 176/101 (26 Jan 2020 13:19) (146/75 - 176/101)  BP(mean): --  RR: 16 (26 Jan 2020 13:19) (16 - 19)  SpO2: 97% (26 Jan 2020 13:19) (97% - 100%)                          8.6    66.61 )-----------( 173      ( 26 Jan 2020 06:35 )             27.6       01-26    130<L>  |  94<L>  |  13  ----------------------------<  126<H>  4.3   |  21<L>  |  0.65    Ca    9.9      26 Jan 2020 06:35  Phos  2.9     01-26  Mg     2.0     01-26          CxR: FINDINGS/  IMPRESSION:    Pulmonary edema and bilateral pleural effusions.                  LIYA CANCINO M.D., ATTENDING RADIOLOGIST  This document has been electronically signed. Jan 26 2020 10:07AM

## 2020-01-26 NOTE — PROVIDER CONTACT NOTE (CRITICAL VALUE NOTIFICATION) - ASSESSMENT
A/ox4. OOBx1 with walker. Voids; incontinent with penis pouch. Pain managed throughout shift with appropriate pain medications. NC for Comfort measures.

## 2020-01-26 NOTE — PROGRESS NOTE ADULT - ASSESSMENT
79M with multiple and significant medical history as above and CLL, never required Rx for it, here with SBO and perforation, s/p surgery, Path showing just serosal adhesions without any malignancy, etiology unclear, did not need any transfusion, will recommend:  - acute issue of SOB - likely multifactorial, responded to a dose of lasix last night, increase oxygen as needed to keep him comfortable, continue diuresis, fluid restriction  - s/p transfusion, monitor Hgb and keep it ~ 8.  - continue Rx as per medicine, consider cardiology consult  - slow advancing of diet as tolerated, continue ondansetron 4 mg half hour before each meal to minimize nausea  - stool for c. 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  - the prognosis overall becoming guarded    - call for any questions - [phone] 79M with multiple and significant medical history as above and CLL, never required Rx for it, here with SBO and perforation, s/p surgery, Path showing just serosal adhesions without any malignancy, etiology unclear, did not need any transfusion, will recommend:  - acute issue of SOB - likely multifactorial, responded to a dose of lasix last night, increase oxygen as needed to keep him comfortable, continue diuresis, fluid restriction  - s/p transfusion, monitor Hgb and keep it ~ 8.  - continue Rx as per medicine, consider cardiology consult  - slow advancing of diet as tolerated, continue ondansetron 4 mg half hour before each meal to minimize nausea  - stool for c. 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  - the prognosis overall becoming guarded  - discussed the issues in great detail with the pt, daughter, son on the phone, brother in law in [place] on the phone and then RN, PA and Dr Vernon. Pt has been suffering a lot along with the family members and has not improved significantly because of one problem after another. the role of palliative care, hospice also discussed, they are all in agreemnent for that, will recommend, continuing active treatment, call palliative care team. Family members will be arriving in next few days.    - call for any questions - [phone]

## 2020-01-26 NOTE — CONSULT NOTE ADULT - SUBJECTIVE AND OBJECTIVE BOX
Date of Admission: 1/7/2020    CHIEF COMPLAINT: abdominal pain    HISTORY OF PRESENT ILLNESS: 79M with untreated CLL, cirrhosis, + ascites, and chronic HepB presents with abdominal pain, N/V, and poor appetite found to have peritonitis with SBO and underwent an ex-lap REESE, SBR/A. Post op course has been c/b worsening ascites in setting of decompensated cirrhosis. Paracentesis was attempted without success with plan to try again however patient then developed Cdiff infection and is now on a 14 day course of vancomycin. On 1/25 patient c/o worsening SOB and ARRIAGA, lasix 20 mg IV was given with apparent good diuresis per I/O flow sheet. Today patient received lasix 40 mg IV X 1 with ? effect (given at 1:30PM). Cardiology is consulted for volume status management.       Allergies    Beef (Unknown)  No Known Drug Allergies    Intolerances    	    MEDICATIONS:  enoxaparin Injectable 40 milliGRAM(s) SubCutaneous daily  tamsulosin 0.4 milliGRAM(s) Oral at bedtime    tenofovir disoproxil fumarate (VIREAD) 300 milliGRAM(s) Oral daily  vancomycin    Solution 125 milliGRAM(s) Oral every 6 hours    ALBUTerol    90 MICROgram(s) HFA Inhaler 1 Puff(s) Inhalation every 4 hours PRN  albuterol/ipratropium for Nebulization 3 milliLiter(s) Nebulizer every 6 hours  guaiFENesin   Syrup  (Sugar-Free) 100 milliGRAM(s) Oral every 6 hours PRN  tiotropium 18 MICROgram(s) Capsule 1 Capsule(s) Inhalation daily    acetaminophen   Tablet .. 650 milliGRAM(s) Oral every 6 hours PRN  melatonin 3 milliGRAM(s) Oral at bedtime PRN  ondansetron   Disintegrating Tablet 4 milliGRAM(s) Oral four times a day PRN  oxyCODONE    IR 5 milliGRAM(s) Oral every 6 hours PRN  oxyCODONE    IR 10 milliGRAM(s) Oral every 6 hours PRN    pantoprazole  Injectable 40 milliGRAM(s) IV Push daily      albumin human 25% IVPB 100 milliLiter(s) IV Intermittent every 6 hours  benzocaine 15 mG/menthol 3.6 mG (Sugar-Free) Lozenge 1 Lozenge Oral every 4 hours PRN  potassium acid phosphate/sodium acid phosphate tablet (K-PHOS No. 2) 1 Tablet(s) Oral three times a day with meals      PAST MEDICAL & SURGICAL HISTORY:  Cirrhosis  HTN (hypertension)  H/O gastritis  CLL (chronic lymphocytic leukemia)  No significant past surgical history      FAMILY HISTORY:  FH: CHF (congestive heart failure): MOTHER HAD CHF      SOCIAL HISTORY:    Smoker  denies  Alcohol denies  Drugs denies      REVIEW OF SYSTEMS:  See HPI. Otherwise, 10 point ROS done and otherwise negative.    PHYSICAL EXAM:  T(C): 36.8 (01-26-20 @ 13:19), Max: 36.8 (01-25-20 @ 16:20)  HR: 98 (01-26-20 @ 13:19) (81 - 103)  BP: 176/101 (01-26-20 @ 13:19) (146/75 - 176/101)  RR: 16 (01-26-20 @ 13:19) (16 - 19)  SpO2: 97% (01-26-20 @ 13:19) (97% - 100%)  Wt(kg): --  I&O's Summary    25 Jan 2020 07:01  -  26 Jan 2020 07:00  --------------------------------------------------------  IN: 1440 mL / OUT: 2100 mL / NET: -660 mL        Appearance: ill appearing but NAD at present, skin W & D	  HEENT:   Normal oral mucosa, PERRL, EOMI	  Cardiovascular: nl S1S2, ?mild JVD  Respiratory: CTA B/L	  Psychiatry: A & O x 3, Mood & affect appropriate  Gastrointestinal:  large, taut, nontender but + distention. BS +  Skin: No rashes, No ecchymoses, No cyanosis	  Neurologic: Non-focal  Extremities: no C/C/E        LABS:	 	                        8.6    66.61 )-----------( 173      ( 26 Jan 2020 06:35 )             27.6       01-26    130<L>  |  94<L>  |  13  ----------------------------<  126<H>  4.3   |  21<L>  |  0.65  01-25    127<L>  |  95<L>  |  11  ----------------------------<  122<H>  4.6   |  20<L>  |  0.65    Ca    9.9      26 Jan 2020 06:35  Ca    9.3      25 Jan 2020 07:15  Phos  2.9     01-26  Phos  2.8     01-25  Mg     2.0     01-26  Mg     2.0     01-25    TELEMETRY: not on telemetry  	    ECG:  NSR

## 2020-01-26 NOTE — PROGRESS NOTE ADULT - PROBLEM SELECTOR PLAN 1
Holding off on paracentesis given active colitis.  Abdomen is increasing in size  Monitoring volume status and Na.  No signs of encephalopathy

## 2020-01-27 DIAGNOSIS — Z71.89 OTHER SPECIFIED COUNSELING: ICD-10-CM

## 2020-01-27 DIAGNOSIS — R53.81 OTHER MALAISE: ICD-10-CM

## 2020-01-27 DIAGNOSIS — Z51.5 ENCOUNTER FOR PALLIATIVE CARE: ICD-10-CM

## 2020-01-27 LAB
ANION GAP SERPL CALC-SCNC: 16 MMO/L — HIGH (ref 7–14)
BASOPHILS # BLD AUTO: 0.08 K/UL — SIGNIFICANT CHANGE UP (ref 0–0.2)
BASOPHILS NFR BLD AUTO: 0.1 % — SIGNIFICANT CHANGE UP (ref 0–2)
BUN SERPL-MCNC: 21 MG/DL — SIGNIFICANT CHANGE UP (ref 7–23)
CALCIUM SERPL-MCNC: 10.4 MG/DL — SIGNIFICANT CHANGE UP (ref 8.4–10.5)
CHLORIDE SERPL-SCNC: 94 MMOL/L — LOW (ref 98–107)
CO2 SERPL-SCNC: 22 MMOL/L — SIGNIFICANT CHANGE UP (ref 22–31)
CREAT SERPL-MCNC: 0.72 MG/DL — SIGNIFICANT CHANGE UP (ref 0.5–1.3)
EOSINOPHIL # BLD AUTO: 0.07 K/UL — SIGNIFICANT CHANGE UP (ref 0–0.5)
EOSINOPHIL NFR BLD AUTO: 0.1 % — SIGNIFICANT CHANGE UP (ref 0–6)
GLUCOSE SERPL-MCNC: 122 MG/DL — HIGH (ref 70–99)
HCT VFR BLD CALC: 30 % — LOW (ref 39–50)
HGB BLD-MCNC: 9.2 G/DL — LOW (ref 13–17)
IMM GRANULOCYTES NFR BLD AUTO: 0.2 % — SIGNIFICANT CHANGE UP (ref 0–1.5)
LYMPHOCYTES # BLD AUTO: 88.61 K/UL — HIGH (ref 1–3.3)
LYMPHOCYTES # BLD AUTO: 92.5 % — HIGH (ref 13–44)
MAGNESIUM SERPL-MCNC: 2 MG/DL — SIGNIFICANT CHANGE UP (ref 1.6–2.6)
MANUAL SMEAR VERIFICATION: SIGNIFICANT CHANGE UP
MCHC RBC-ENTMCNC: 22.9 PG — LOW (ref 27–34)
MCHC RBC-ENTMCNC: 30.7 % — LOW (ref 32–36)
MCV RBC AUTO: 74.8 FL — LOW (ref 80–100)
MONOCYTES # BLD AUTO: 0.78 K/UL — SIGNIFICANT CHANGE UP (ref 0–0.9)
MONOCYTES NFR BLD AUTO: 0.8 % — LOW (ref 2–14)
NEUTROPHILS # BLD AUTO: 6.08 K/UL — SIGNIFICANT CHANGE UP (ref 1.8–7.4)
NEUTROPHILS NFR BLD AUTO: 6.3 % — LOW (ref 43–77)
NRBC # FLD: 0 K/UL — SIGNIFICANT CHANGE UP (ref 0–0)
PHOSPHATE SERPL-MCNC: 3.1 MG/DL — SIGNIFICANT CHANGE UP (ref 2.5–4.5)
PLATELET # BLD AUTO: 207 K/UL — SIGNIFICANT CHANGE UP (ref 150–400)
PMV BLD: 11.7 FL — SIGNIFICANT CHANGE UP (ref 7–13)
POTASSIUM SERPL-MCNC: 4.1 MMOL/L — SIGNIFICANT CHANGE UP (ref 3.5–5.3)
POTASSIUM SERPL-SCNC: 4.1 MMOL/L — SIGNIFICANT CHANGE UP (ref 3.5–5.3)
RBC # BLD: 4.01 M/UL — LOW (ref 4.2–5.8)
RBC # FLD: 22.5 % — HIGH (ref 10.3–14.5)
SODIUM SERPL-SCNC: 132 MMOL/L — LOW (ref 135–145)
WBC # BLD: 95.83 K/UL — CRITICAL HIGH (ref 3.8–10.5)
WBC # FLD AUTO: 95.83 K/UL — CRITICAL HIGH (ref 3.8–10.5)

## 2020-01-27 PROCEDURE — 99223 1ST HOSP IP/OBS HIGH 75: CPT

## 2020-01-27 PROCEDURE — 99233 SBSQ HOSP IP/OBS HIGH 50: CPT

## 2020-01-27 PROCEDURE — 99497 ADVNCD CARE PLAN 30 MIN: CPT

## 2020-01-27 PROCEDURE — 99497 ADVNCD CARE PLAN 30 MIN: CPT | Mod: 25

## 2020-01-27 PROCEDURE — 74018 RADEX ABDOMEN 1 VIEW: CPT | Mod: 26

## 2020-01-27 PROCEDURE — 99232 SBSQ HOSP IP/OBS MODERATE 35: CPT | Mod: GC

## 2020-01-27 RX ORDER — FUROSEMIDE 40 MG
40 TABLET ORAL DAILY
Refills: 0 | Status: DISCONTINUED | OUTPATIENT
Start: 2020-01-27 | End: 2020-01-29

## 2020-01-27 RX ORDER — SPIRONOLACTONE 25 MG/1
100 TABLET, FILM COATED ORAL DAILY
Refills: 0 | Status: DISCONTINUED | OUTPATIENT
Start: 2020-01-27 | End: 2020-01-29

## 2020-01-27 RX ADMIN — Medication 50 MILLILITER(S): at 18:01

## 2020-01-27 RX ADMIN — Medication 125 MILLIGRAM(S): at 05:58

## 2020-01-27 RX ADMIN — TAMSULOSIN HYDROCHLORIDE 0.4 MILLIGRAM(S): 0.4 CAPSULE ORAL at 22:02

## 2020-01-27 RX ADMIN — Medication 0.25 MILLIGRAM(S): at 22:03

## 2020-01-27 RX ADMIN — Medication 1 TABLET(S): at 18:00

## 2020-01-27 RX ADMIN — Medication 50 MILLILITER(S): at 12:34

## 2020-01-27 RX ADMIN — Medication 3 MILLILITER(S): at 15:54

## 2020-01-27 RX ADMIN — OXYCODONE HYDROCHLORIDE 5 MILLIGRAM(S): 5 TABLET ORAL at 06:37

## 2020-01-27 RX ADMIN — Medication 125 MILLIGRAM(S): at 18:00

## 2020-01-27 RX ADMIN — Medication 3 MILLIGRAM(S): at 22:03

## 2020-01-27 RX ADMIN — Medication 50 MILLILITER(S): at 05:56

## 2020-01-27 RX ADMIN — TENOFOVIR DISOPROXIL FUMARATE 300 MILLIGRAM(S): 300 TABLET, FILM COATED ORAL at 13:34

## 2020-01-27 RX ADMIN — Medication 1 TABLET(S): at 10:58

## 2020-01-27 RX ADMIN — Medication 0.25 MILLIGRAM(S): at 13:36

## 2020-01-27 RX ADMIN — ENOXAPARIN SODIUM 40 MILLIGRAM(S): 100 INJECTION SUBCUTANEOUS at 13:35

## 2020-01-27 RX ADMIN — PANTOPRAZOLE SODIUM 40 MILLIGRAM(S): 20 TABLET, DELAYED RELEASE ORAL at 13:35

## 2020-01-27 RX ADMIN — OXYCODONE HYDROCHLORIDE 5 MILLIGRAM(S): 5 TABLET ORAL at 06:07

## 2020-01-27 RX ADMIN — Medication 40 MILLIGRAM(S): at 19:56

## 2020-01-27 RX ADMIN — Medication 125 MILLIGRAM(S): at 13:52

## 2020-01-27 RX ADMIN — Medication 3 MILLILITER(S): at 09:33

## 2020-01-27 RX ADMIN — Medication 1 TABLET(S): at 13:34

## 2020-01-27 RX ADMIN — Medication 3 MILLILITER(S): at 23:03

## 2020-01-27 NOTE — PROGRESS NOTE ADULT - SUBJECTIVE AND OBJECTIVE BOX
PROGRESS NOTE:     Patient is a 79y old  Male who presents with a chief complaint of abdominal pain (27 Jan 2020 08:42)      SUBJECTIVE / OVERNIGHT EVENTS:  Patient seen and examined this morning with family at bedside. He states that he feels the same since yesterday, as his abdomen is still distended and he occasionally feels short of breath.     Advance directive was discussed at length with the patient. This conversation included current condition, prognosis. Discussed desires by patient for further care and wishes were expressed. Once again, Family present at the bedside. Patient decided that he would like to be DNR/DNI and is interested in learning more about hospice. Palliative fellow was also present during the discussion. Length of Conversation ~35min.    ADDITIONAL REVIEW OF SYSTEMS:  No fevers or chills     MEDICATIONS  (STANDING):  albumin human 25% IVPB 100 milliLiter(s) IV Intermittent every 6 hours  albuterol/ipratropium for Nebulization 3 milliLiter(s) Nebulizer every 6 hours  enoxaparin Injectable 40 milliGRAM(s) SubCutaneous daily  pantoprazole  Injectable 40 milliGRAM(s) IV Push daily  potassium acid phosphate/sodium acid phosphate tablet (K-PHOS No. 2) 1 Tablet(s) Oral three times a day with meals  tamsulosin 0.4 milliGRAM(s) Oral at bedtime  tenofovir disoproxil fumarate (VIREAD) 300 milliGRAM(s) Oral daily  tiotropium 18 MICROgram(s) Capsule 1 Capsule(s) Inhalation daily  vancomycin    Solution 125 milliGRAM(s) Oral every 6 hours    MEDICATIONS  (PRN):  acetaminophen   Tablet .. 650 milliGRAM(s) Oral every 6 hours PRN Mild Pain (1 - 3)  ALBUTerol    90 MICROgram(s) HFA Inhaler 1 Puff(s) Inhalation every 4 hours PRN Shortness of Breath and/or Wheezing  benzocaine 15 mG/menthol 3.6 mG (Sugar-Free) Lozenge 1 Lozenge Oral every 4 hours PRN Sore Throat  guaiFENesin   Syrup  (Sugar-Free) 100 milliGRAM(s) Oral every 6 hours PRN Cough  melatonin 3 milliGRAM(s) Oral at bedtime PRN Insomnia  ondansetron   Disintegrating Tablet 4 milliGRAM(s) Oral four times a day PRN Nausea  oxyCODONE    IR 5 milliGRAM(s) Oral every 6 hours PRN Moderate Pain (4 - 6)  oxyCODONE    IR 10 milliGRAM(s) Oral every 6 hours PRN Severe Pain (7 - 10)      CAPILLARY BLOOD GLUCOSE        I&O's Summary    26 Jan 2020 07:01  -  27 Jan 2020 07:00  --------------------------------------------------------  IN: 1030 mL / OUT: 1550 mL / NET: -520 mL    27 Jan 2020 07:01  -  27 Jan 2020 14:41  --------------------------------------------------------  IN: 240 mL / OUT: 200 mL / NET: 40 mL        PHYSICAL EXAM:  Vital Signs Last 24 Hrs  T(C): 36.7 (27 Jan 2020 13:50), Max: 36.8 (26 Jan 2020 21:29)  T(F): 98 (27 Jan 2020 13:50), Max: 98.2 (26 Jan 2020 21:29)  HR: 99 (27 Jan 2020 13:50) (90 - 107)  BP: 150/80 (27 Jan 2020 13:50) (150/80 - 174/89)  BP(mean): --  RR: 17 (27 Jan 2020 13:50) (16 - 20)  SpO2: 97% (27 Jan 2020 13:50) (96% - 100%)    CONSTITUTIONAL: NAD, well-developed  RESPIRATORY: Normal respiratory effort; lungs are clear to auscultation bilaterally  CARDIOVASCULAR: Regular rate and rhythm, normal S1 and S2, no murmur/rub/gallop; No lower extremity edema; Peripheral pulses are 2+ bilaterally  ABDOMEN: Nontender to palpation, normoactive bowel sounds, no rebound/guarding; No hepatosplenomegaly  MUSCLOSKELETAL: no clubbing or cyanosis of digits; no joint swelling or tenderness to palpation  PSYCH: A+O to person, place, and time; affect appropriate    LABS:                        9.2    95.83 )-----------( 207      ( 27 Jan 2020 06:20 )             30.0     01-27    132<L>  |  94<L>  |  21  ----------------------------<  122<H>  4.1   |  22  |  0.72    Ca    10.4      27 Jan 2020 06:20  Phos  3.1     01-27  Mg     2.0     01-27                  RADIOLOGY & ADDITIONAL TESTS:  Results Reviewed:   Imaging Personally Reviewed:  Electrocardiogram Personally Reviewed:    COORDINATION OF CARE:  Care Discussed with Consultants/Other Providers [Y/N]: Case discussed with palliative care fellow and Dr. Dean. Patient would like to be DNR/DNI  Prior or Outpatient Records Reviewed [Y/N]:

## 2020-01-27 NOTE — PROGRESS NOTE ADULT - SUBJECTIVE AND OBJECTIVE BOX
Overnight Events: NAEON. Pt met with pall care and medicine attending and chose DNR/DNI and wanted a symptom focus approach.         Review Of Systems: No chest pain, shortness of breath, or palpitations  CONSTITUTIONAL: no fevers or chills  EYES/ENT: No visual changes;  No vertigo or throat pain   NECK: No pain or stiffness  RESPIRATORY: No cough, wheezing. No shortness of breath  CARDIOVASCULAR: No chest pain or palpitations  GASTROINTESTINAL: No abdominal or epigastric pain. No nausea, vomiting. No diarrhea. No melena.  GENITOURINARY: No dysuria, frequency or hematuria  NEUROLOGICAL: No numbness or weakness  SKIN: No itching, burning, rashes, or lesions   All other review of systems is negative unless indicated above.     Current Meds:  acetaminophen   Tablet .. 650 milliGRAM(s) Oral every 6 hours PRN  albumin human 25% IVPB 100 milliLiter(s) IV Intermittent every 6 hours  ALBUTerol    90 MICROgram(s) HFA Inhaler 1 Puff(s) Inhalation every 4 hours PRN  albuterol/ipratropium for Nebulization 3 milliLiter(s) Nebulizer every 6 hours  benzocaine 15 mG/menthol 3.6 mG (Sugar-Free) Lozenge 1 Lozenge Oral every 4 hours PRN  enoxaparin Injectable 40 milliGRAM(s) SubCutaneous daily  furosemide    Tablet 40 milliGRAM(s) Oral daily  guaiFENesin   Syrup  (Sugar-Free) 100 milliGRAM(s) Oral every 6 hours PRN  melatonin 3 milliGRAM(s) Oral at bedtime PRN  ondansetron   Disintegrating Tablet 4 milliGRAM(s) Oral four times a day PRN  oxyCODONE    IR 5 milliGRAM(s) Oral every 6 hours PRN  oxyCODONE    IR 10 milliGRAM(s) Oral every 6 hours PRN  pantoprazole  Injectable 40 milliGRAM(s) IV Push daily  potassium acid phosphate/sodium acid phosphate tablet (K-PHOS No. 2) 1 Tablet(s) Oral three times a day with meals  spironolactone 100 milliGRAM(s) Oral daily  tamsulosin 0.4 milliGRAM(s) Oral at bedtime  tenofovir disoproxil fumarate (VIREAD) 300 milliGRAM(s) Oral daily  tiotropium 18 MICROgram(s) Capsule 1 Capsule(s) Inhalation daily  vancomycin    Solution 125 milliGRAM(s) Oral every 6 hours      Vitals:  T(F): 99 (01-27), Max: 99 (01-27)  HR: 100 (01-27) (90 - 109)  BP: 161/92 (01-27) (150/80 - 169/97)  RR: 18 (01-27)  SpO2: 99% (01-27)  I&O's Summary    26 Jan 2020 07:01  -  27 Jan 2020 07:00  --------------------------------------------------------  IN: 1030 mL / OUT: 1550 mL / NET: -520 mL    27 Jan 2020 07:01  -  27 Jan 2020 23:40  --------------------------------------------------------  IN: 410 mL / OUT: 625 mL / NET: -215 mL        Physical Exam:  Appearance: No acute distress; well appearing  Eyes: PERRL, EOMI, pink conjunctiva  HEENT: Normal oral mucosa  Cardiovascular: RRR, S1, S2, no murmurs, rubs, or gallops; no edema; no JVD  Respiratory: Clear to auscultation bilaterally  Gastrointestinal: Abd distended  Musculoskeletal: No clubbing; no joint deformity   Neurologic: Non-focal  Lymphatic: No lymphadenopathy  Psychiatry: AAOx3, mood & affect appropriate  Skin: No rashes, ecchymoses, or cyanosis                          9.2    95.83 )-----------( 207      ( 27 Jan 2020 06:20 )             30.0     01-27    132<L>  |  94<L>  |  21  ----------------------------<  122<H>  4.1   |  22  |  0.72    Ca    10.4      27 Jan 2020 06:20  Phos  3.1     01-27  Mg     2.0     01-27        Assessment and Recommendations:  79 year old man with h/o CLL, chronic HBV with cirrhosis, recent presentation for abd pain 1/6 2/2 SBO c/b perofration now s/p ex lap who re-presented with decompensated cirrhosis with ascites. Cardiology consulted mainly for diuretic guidance.    #cirrhosis with ascites  -pt is currently on lasix 40 PO and aldactone 100 daily  -defer to hepatology consult team for diuretic management in setting of liver cirrhosis and no known cardiac history  -given patient is now DNR/DNI and wishes a symptom focus approach, TTE will likely not     No further inpatient cardiac work-up necessary. Please call back with questions      Ezio Mansfield MD  Cardiology Fellow  All Cardiology service information can be found 24/7 on [email or web address], password: [email or web address]    Patient seen and examined at bedside. Note is preliminary until signed by attending.

## 2020-01-27 NOTE — PROGRESS NOTE ADULT - PROBLEM SELECTOR PLAN 2
abdomen markedly distended, compromising breathing at times  Will need to start to use diuretics carefully, cardiology consulted for recs  Trying to hold off on a tap b/o recent history of SBO w/bowel perf and concurrent infection with C.diff.

## 2020-01-27 NOTE — PROGRESS NOTE ADULT - SUBJECTIVE AND OBJECTIVE BOX
We are following the patient for Ascites management      GI HPI Today:  No complaints. His abdomen is tense and patient SOB and on 3 L Oxygen   Hemodynamically stable and no fevers     PAST MEDICAL & SURGICAL HISTORY  Cirrhosis  HTN (hypertension)  H/O gastritis  CLL (chronic lymphocytic leukemia)  No significant past surgical history      ALLERGIES:  Beef (Unknown)  No Known Drug Allergies      MEDICATIONS:  MEDICATIONS  (STANDING):  albumin human 25% IVPB 100 milliLiter(s) IV Intermittent every 6 hours  albuterol/ipratropium for Nebulization 3 milliLiter(s) Nebulizer every 6 hours  enoxaparin Injectable 40 milliGRAM(s) SubCutaneous daily  pantoprazole  Injectable 40 milliGRAM(s) IV Push daily  potassium acid phosphate/sodium acid phosphate tablet (K-PHOS No. 2) 1 Tablet(s) Oral three times a day with meals  tamsulosin 0.4 milliGRAM(s) Oral at bedtime  tenofovir disoproxil fumarate (VIREAD) 300 milliGRAM(s) Oral daily  tiotropium 18 MICROgram(s) Capsule 1 Capsule(s) Inhalation daily  vancomycin    Solution 125 milliGRAM(s) Oral every 6 hours    MEDICATIONS  (PRN):  acetaminophen   Tablet .. 650 milliGRAM(s) Oral every 6 hours PRN Mild Pain (1 - 3)  ALBUTerol    90 MICROgram(s) HFA Inhaler 1 Puff(s) Inhalation every 4 hours PRN Shortness of Breath and/or Wheezing  benzocaine 15 mG/menthol 3.6 mG (Sugar-Free) Lozenge 1 Lozenge Oral every 4 hours PRN Sore Throat  guaiFENesin   Syrup  (Sugar-Free) 100 milliGRAM(s) Oral every 6 hours PRN Cough  melatonin 3 milliGRAM(s) Oral at bedtime PRN Insomnia  ondansetron   Disintegrating Tablet 4 milliGRAM(s) Oral four times a day PRN Nausea  oxyCODONE    IR 5 milliGRAM(s) Oral every 6 hours PRN Moderate Pain (4 - 6)  oxyCODONE    IR 10 milliGRAM(s) Oral every 6 hours PRN Severe Pain (7 - 10)      REVIEW OF SYSTEMS  General:  No fevers  Eyes:  No reported pain   ENT:  No sore throat   NECK: No stiffness   CV:  No chest pain   Resp:  ++ shortness of breath  GI:  See HPI  :  No dysuria  Muscle:  ++  weakness  Neuro:  No tingling  Endocrine:  No polyuria  Heme:  No ecchymosis        VITALS:   T(F): 97.7 (01-27 @ 05:54), Max: 98.4 (01-25 @ 10:01)  HR: 103 (01-27 @ 05:54) (79 - 107)  BP: 156/92 (01-27 @ 05:54) (146/75 - 176/101)  BP(mean): --  RR: 20 (01-27 @ 05:54) (16 - 20)  SpO2: 98% (01-27 @ 05:54) (96% - 100%)        PHYSICAL EXAM:  GENERAL:  Appears in no distress  HEENT:  Conjunctivae Anicteric   CHEST:  Full & symmetric excursion  HEART:  NS1, S2,   ABDOMEN:  Soft, non-tender, +++ distended  EXTREMITIES  no edema  SKIN:  No rash  NEURO:  Alert, no asterixis          Blood Work :                        9.2    x     )-----------( 207      ( 27 Jan 2020 06:20 )             30.0     PT/INR - ( 24 Jan 2020 07:10 )  INR: 1.26            01-26    130<L>  |  94<L>  |  13  ----------------------------<  126<H>  4.3   |  21<L>  |  0.65    Ca    9.9      26 Jan 2020 06:35  Phos  2.9     01-26  Mg     2.0     01-26      CBC -  ( 27 Jan 2020 06:20 )  Hemoglobin : 9.2    CBC -  ( 26 Jan 2020 06:35 )  Hemoglobin : 8.6    CBC -  ( 25 Jan 2020 07:15 )  Hemoglobin : 6.7    CBC -  ( 24 Jan 2020 13:44 )  Hemoglobin : 7.1    CBC -  ( 24 Jan 2020 07:10 )  Hemoglobin : 6.9    CBC -  ( 23 Jan 2020 10:00 )  Hemoglobin : 7.3          RADIOLOGY: We are following the patient for Ascites management      GI HPI Today:  No complaints. His abdomen is tense and patient SOB and on 3 L Oxygen   Hemodynamically stable and no fevers     PAST MEDICAL & SURGICAL HISTORY  Cirrhosis  HTN (hypertension)  H/O gastritis  CLL (chronic lymphocytic leukemia)  No significant past surgical history      ALLERGIES:  Beef (Unknown)  No Known Drug Allergies      MEDICATIONS:  MEDICATIONS  (STANDING):  albumin human 25% IVPB 100 milliLiter(s) IV Intermittent every 6 hours  albuterol/ipratropium for Nebulization 3 milliLiter(s) Nebulizer every 6 hours  enoxaparin Injectable 40 milliGRAM(s) SubCutaneous daily  pantoprazole  Injectable 40 milliGRAM(s) IV Push daily  potassium acid phosphate/sodium acid phosphate tablet (K-PHOS No. 2) 1 Tablet(s) Oral three times a day with meals  tamsulosin 0.4 milliGRAM(s) Oral at bedtime  tenofovir disoproxil fumarate (VIREAD) 300 milliGRAM(s) Oral daily  tiotropium 18 MICROgram(s) Capsule 1 Capsule(s) Inhalation daily  vancomycin    Solution 125 milliGRAM(s) Oral every 6 hours    MEDICATIONS  (PRN):  acetaminophen   Tablet .. 650 milliGRAM(s) Oral every 6 hours PRN Mild Pain (1 - 3)  ALBUTerol    90 MICROgram(s) HFA Inhaler 1 Puff(s) Inhalation every 4 hours PRN Shortness of Breath and/or Wheezing  benzocaine 15 mG/menthol 3.6 mG (Sugar-Free) Lozenge 1 Lozenge Oral every 4 hours PRN Sore Throat  guaiFENesin   Syrup  (Sugar-Free) 100 milliGRAM(s) Oral every 6 hours PRN Cough  melatonin 3 milliGRAM(s) Oral at bedtime PRN Insomnia  ondansetron   Disintegrating Tablet 4 milliGRAM(s) Oral four times a day PRN Nausea  oxyCODONE    IR 5 milliGRAM(s) Oral every 6 hours PRN Moderate Pain (4 - 6)  oxyCODONE    IR 10 milliGRAM(s) Oral every 6 hours PRN Severe Pain (7 - 10)      REVIEW OF SYSTEMS  General:  No fevers  Eyes:  No reported pain   ENT:  No sore throat   NECK: No stiffness   CV:  No chest pain   Resp:  ++ shortness of breath  GI:  See HPI  :  No dysuria  Muscle:  ++  weakness  Neuro:  No tingling  Endocrine:  No polyuria  Heme:  No ecchymosis        VITALS:   T(F): 97.7 (01-27 @ 05:54), Max: 98.4 (01-25 @ 10:01)  HR: 103 (01-27 @ 05:54) (79 - 107)  BP: 156/92 (01-27 @ 05:54) (146/75 - 176/101)  BP(mean): --  RR: 20 (01-27 @ 05:54) (16 - 20)  SpO2: 98% (01-27 @ 05:54) (96% - 100%)        PHYSICAL EXAM:  GENERAL:  Appears in no distress  HEENT:  Conjunctivae Anicteric   CHEST:  Full & symmetric excursion  HEART:  NS1, S2,   ABDOMEN:  Soft, non-tender, +++ distended  EXTREMITIES  ++ edema  SKIN:  No rash  NEURO:  Alert, no asterixis          Blood Work :                        9.2    x     )-----------( 207      ( 27 Jan 2020 06:20 )             30.0     PT/INR - ( 24 Jan 2020 07:10 )  INR: 1.26            01-26    130<L>  |  94<L>  |  13  ----------------------------<  126<H>  4.3   |  21<L>  |  0.65    Ca    9.9      26 Jan 2020 06:35  Phos  2.9     01-26  Mg     2.0     01-26      CBC -  ( 27 Jan 2020 06:20 )  Hemoglobin : 9.2    CBC -  ( 26 Jan 2020 06:35 )  Hemoglobin : 8.6    CBC -  ( 25 Jan 2020 07:15 )  Hemoglobin : 6.7    CBC -  ( 24 Jan 2020 13:44 )  Hemoglobin : 7.1    CBC -  ( 24 Jan 2020 07:10 )  Hemoglobin : 6.9    CBC -  ( 23 Jan 2020 10:00 )  Hemoglobin : 7.3

## 2020-01-27 NOTE — PROGRESS NOTE ADULT - ASSESSMENT
79M with multiple and significant medical history as above and CLL, never required Rx for it, here with SBO and perforation, s/p surgery, Path showing just serosal adhesions without any malignancy, etiology unclear, did not need any transfusion, will recommend:  - acute issue of SOB - likely multifactorial, responded to a dose of lasix last night, increase oxygen as needed to keep him comfortable, continue diuresis, fluid restriction  - s/p transfusion, monitor Hgb and keep it ~ 8.  - continue Rx as per medicine, consider cardiology consult  - slow advancing of diet as tolerated, continue ondansetron 4 mg half hour before each meal to minimize nausea  - stool for c. 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  - the prognosis is very poor overall  - on 1.26.2020, discussed the issues in great detail with the pt, daughter, son on the phone, brother in law in [place] on the phone and then RN, PA and Dr Vernon. Pt has been suffering a lot along with the family members and has not improved significantly because of one problem after another. the role of palliative care, hospice also discussed, they are all in agreemnent for that, will recommend, continuing active treatment, call palliative care team. Family members will be arriving in next few days. DNR/DNI issues also discussed.    - call for any questions - [phone]

## 2020-01-27 NOTE — PROGRESS NOTE ADULT - ASSESSMENT
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 Hepatology consulted for management of ascites.    Impression:  # Decompensated Hep B liver cirrhosis. MELD-Na:8 on jan 18   HE: AOx3, no asterixis  Ascites: Moderate ascites seen on CT (1/14/20)  HCC: No lesions on CT (1/14/20)  EV: Last EGD reportedly 2 years ago, records not available  # Hepatitis B: Hep B eAg positive, high HBV viremia, genotype D. On Viread  # C. Diff: On PO vancomycin  # Hyponatremia: Improving off diuretics  # SBO complicated with perforation s/p Ex lap with SB resection and anastomosis (1/7/20): No signs of obstructions.       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 Hepatology consulted for management of ascites.    Impression:  # Decompensated Hep B liver cirrhosis. MELD-Na:8 on jan 18   HE: AOx3, no asterixis  Ascites: Moderate ascites seen on CT (1/14/20)  HCC: No lesions on CT (1/14/20)  EV: Last EGD reportedly 2 years ago, records not available  # Hepatitis B: Hep B eAg positive, high HBV viremia, genotype D. On Viread  # C. Diff: On PO vancomycin  # Hyponatremia: Improving off diuretics  # SBO complicated with perforation s/p Ex lap with SB resection and anastomosis (1/7/20): No signs of obstructions.       Recommendation:  - Get CMP and INR level daily   - Low sodium diet   - Please get IR guided diagnostic and Therapeutic paracentesis (send fluid for alb , cell count, culture, TP, ldh, glucose) )   - Please add spironolactone 100 mg daily with lasix 40 po daily   - Follow electrolytes daily   - Continue Albumin 25 g (25%) q6h daily    - Continue Viread   - Continue Vancomycin po for total of 10 days (today day 7)   - Dietitian follow for appropriate calorie and protein intake   - Supportive care per primary team      Cheikh Callie Richard  GI Fellow  Pager# [phone] 79 year old man hx of HTN, CLL (diagnosed in 2013, treatment naive), 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 Hepatology consulted for management of ascites.    Impression:  # Decompensated Hep B liver cirrhosis. MELD-Na:8 on jan 18   HE: AOx3, no asterixis  Ascites: Moderate ascites seen on CT (1/14/20)  HCC: No lesions on CT (1/14/20)  EV: Last EGD reportedly 2 years ago, records not available  # Hepatitis B: Hep B eAg positive, high HBV viremia, genotype D. On Viread  # C. Diff: On PO vancomycin  # Hyponatremia: Improving off diuretics  # SBO complicated with perforation s/p Ex lap with SB resection and anastomosis (1/7/20): No signs of obstructions.     # SOB secondary to Pulmonary edema/ effusion and increasing ascites. On lasix and awaiting tap   Recommendation:  - Get CMP and INR level daily   - Low sodium diet   - Please get IR guided diagnostic and Therapeutic paracentesis (send fluid for alb , cell count, culture, TP, ldh, glucose)   - Please get 2 d echo   - Please add spironolactone 100 mg daily with lasix 40 po daily   - Follow electrolytes daily   - Continue Albumin 25 g (25%) q6h daily    - Continue Viread   - Continue Vancomycin po for total of 10 days (today day 7)   - Dietitian follow for appropriate calorie and protein intake   - Supportive care per primary team      Cheikh Callie Richard  GI Fellow  Pager# [phone] 79 year old man hx of HTN, CLL (diagnosed in 2013, treatment naive), 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 Hepatology consulted for management of ascites.    Impression:  # Decompensated Hep B liver cirrhosis. MELD-Na:8 on jan 18   HE: AOx3, no asterixis  Ascites: Moderate ascites seen on CT (1/14/20)  HCC: No lesions on CT (1/14/20)  EV: Last EGD reportedly 2 years ago, records not available  # Hepatitis B: Hep B eAg positive, high HBV viremia, genotype D. On Viread  # C. Diff: On PO vancomycin  # Hyponatremia: Improving off diuretics  # SBO complicated with perforation s/p Ex lap with SB resection and anastomosis (1/7/20): No signs of obstructions.     # SOB secondary to Pulmonary edema/ effusion and increasing ascites. On lasix and awaiting tap   Recommendation:  - Get CMP and INR level daily   - Low sodium diet   - Please get IR guided diagnostic and Therapeutic paracentesis (send fluid for alb , cell count, culture, TP, ldh, glucose)   - Please get 2 d echo   - Please add spironolactone 100 mg daily and add lasix 40 po once daily   - Follow electrolytes daily   - Continue Albumin 25 g (25%) q6h daily    - Continue Viread   - Continue Vancomycin po for total of 10 days (today day 7)   - Dietitian follow for appropriate calorie and protein intake   - Supportive care per primary team      Cheikh Callie Richard  GI Fellow  Pager# [phone] 79 year old man hx of HTN, CLL (diagnosed in 2013, treatment naive), 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 Hepatology consulted for management of ascites.    Impression:  # Decompensated Hep B liver cirrhosis. MELD-Na:8 on jan 18   HE: AOx3, no asterixis  Ascites: Moderate ascites seen on CT (1/14/20)  HCC: No lesions on CT (1/14/20)  EV: Last EGD reportedly 2 years ago, records not available  # Hepatitis B: Hep B eAg positive, high HBV viremia, genotype D. On Viread  # C. Diff: On PO vancomycin  # Hyponatremia: Improving off diuretics  # SBO complicated with perforation s/p Ex lap with SB resection and anastomosis (1/7/20): No signs of obstructions.     # SOB secondary to Pulmonary edema/ effusion and increasing ascites. On lasix and awaiting tap   Recommendation:  - Get abdominal xray before TAP   - Get CMP and INR level daily   - Low sodium diet   - Please get IR guided diagnostic and Therapeutic paracentesis (send fluid for alb , cell count, culture, TP, ldh, glucose)   - Please get 2 d echo   - Please add spironolactone 100 mg daily and add lasix 40 po once daily   - Follow electrolytes daily   - Continue Albumin 25 g (25%) q6h daily    - Continue Viread   - Continue Vancomycin po for total of 10 days (today day 7)   - Dietitian follow for appropriate calorie and protein intake   - Supportive care per primary team      Cheikh Callie Richard  GI Fellow  Pager# [phone] 79 year old man hx of HTN, CLL (diagnosed in 2013, treatment naive), previously compensated Hep B cirrhosis (Viread started 11/2019) presented with abdominal pain in the setting SBO, now s/p Ex Lap with 15 cm of small bowel removed. Hepatology consulted for management of new ascites, and hyponatremia with min. Na 125. Course complicated by c. difficile colitis, on PO vancomycin since 1/20.    Impression:  # Decompensated Hep B liver cirrhosis. MELD-Na: 8 on Jan 18   HE: AOx3, no asterixis  Ascites: Moderate ascites seen on CT (1/14/20)  HCC: No lesions on CT (1/14/20)  EV: Last EGD reportedly 2 years ago, records not available  # Hepatitis B: Hep B eAg positive, high HBV viremia, genotype D. On Viread  # C. Diff: On PO vancomycin  # Hyponatremia: Improving off diuretics  # SBO complicated with perforation s/p Ex lap with SB resection and anastomosis (1/7/20): No signs of obstructions.     # SOB secondary to Pulmonary edema/ effusion and increasing ascites. On lasix and awaiting tap   Recommendation:  - Get abdominal xray before TAP   - Get CMP and INR level daily   - Low sodium diet   - Please get IR guided diagnostic and Therapeutic paracentesis (send fluid for alb , cell count, culture, TP, ldh, glucose)   - Please get 2 d echo   - Please add spironolactone 100 mg daily and add lasix 40 po once daily   - Follow electrolytes daily   - Continue Albumin 25 g (25%) q6h daily    - Continue Viread   - Continue Vancomycin po for total of 10 days (today day 7)   - Dietitian follow for appropriate calorie and protein intake   - Supportive care per primary team      Cheikh Callie Richard  GI Fellow  Pager# [phone]

## 2020-01-27 NOTE — PROGRESS NOTE ADULT - ATTENDING COMMENTS
- very distended, tense abdomen with midline surgical wound in dressing, very tympanitic. Dullness of posterior 1/3 of abdomen (supine) consistent with mild-moderate ascites. Normoactive bowel sounds of occas. increased pitch. Nontender.    - hyponatremia, improved with IV albumin q6hrs since 1/21  - ascites, not tapped as initially small, then out of concern that bowel perforation would be deleterious given c. diff. colitis  - mod. pulm edema seen on CXR 1/25    Recommendations:  -- would get abdominal x-ray to evaluate cause of very distended bowels  -- diagnostic paracentesis if now amenable, would then also remove most of the fluid  -- continue lasix, add spironolactone 100 mg/d, daily CMP. Stop giving potassium.  -- change diet to low sodium

## 2020-01-27 NOTE — CONSULT NOTE ADULT - PROBLEM SELECTOR RECOMMENDATION 3
Holding off on paracentesis given active colitis.  Abdomen is increasing in size  Monitoring volume status and Na.  No signs of encephalopathy.   Hepatology on board.

## 2020-01-27 NOTE — CONSULT NOTE ADULT - SUBJECTIVE AND OBJECTIVE BOX
HPI:  Patient is an 79 year old male with a PMH of CLL (untreated), cirrhosis, chronic hepatitis B, and gastritis who is presenting with abdominal pain since yesterday.  The pain has been getting worse, and he has had increasing abdominal distention during this time.  He had similar pain in September, where he came to [facility] for an ileus.  He denies having any fevers or chills but did have nausea as well as vomiting this morning.  His last BM was this morning and it was normal.  His last EGD was 2 years ago which showed gastritis and his last colonoscopy was 10 years ago and unremarkable. (07 Jan 2020 02:06)    PERTINENT PM/SXH:   Cirrhosis  HTN (hypertension)  H/O gastritis  CLL (chronic lymphocytic leukemia)    No significant past surgical history    FAMILY HISTORY:  FH: CHF (congestive heart failure): MOTHER HAD CHF    ITEMS NOT CHECKED ARE NOT PRESENT    SOCIAL HISTORY:   Significant other/partner:  [ x ] Carson Soria  Children:  [ x ] Juancarlos Soria, Prateekvaleriyasminondina [facility] Pent[employer]al/Spirituality:  Substance hx:  [ ]   Tobacco hx:  [ ]   Alcohol hx: [ ]   Home Opioid hx:  [  ] I-Stop Reference No: #: [id number]   Rx Written	Rx Dispensed	Drug	Quantity	Days Supply	Prescriber Name			  11/14/2019	11/15/2019	dronabinol 2.5 mg capsule 	20	10	Neil Dean MD			  Living Situation: [ x ]Home  [ ]Long term care  [ ]Rehab [ ]Other    ADVANCE DIRECTIVES:    DNR  Yes  MOLST  [ x ]  Living Will  [ ]   DECISION MAKER(s): Patient  [ ] Health Care Proxy(s)  [ ] Surrogate(s)  [ ] Guardian           Name(s): Phone Number(s):  Carson Garciael - [phone]  [facility] #[phone]  [facility][phone]    BASELINE (I)ADL(s) (prior to admission):  [place]: [ x ]Total  [ ] Moderate [ ]Dependent    Allergies    Beef (Unknown)  No Known Drug Allergies    Intolerances    MEDICATIONS  (STANDING):  albumin human 25% IVPB 100 milliLiter(s) IV Intermittent every 6 hours  albuterol/ipratropium for Nebulization 3 milliLiter(s) Nebulizer every 6 hours  enoxaparin Injectable 40 milliGRAM(s) SubCutaneous daily  furosemide    Tablet 40 milliGRAM(s) Oral daily  pantoprazole  Injectable 40 milliGRAM(s) IV Push daily  potassium acid phosphate/sodium acid phosphate tablet (K-PHOS No. 2) 1 Tablet(s) Oral three times a day with meals  spironolactone 100 milliGRAM(s) Oral daily  tamsulosin 0.4 milliGRAM(s) Oral at bedtime  tenofovir disoproxil fumarate (VIREAD) 300 milliGRAM(s) Oral daily  tiotropium 18 MICROgram(s) Capsule 1 Capsule(s) Inhalation daily  vancomycin    Solution 125 milliGRAM(s) Oral every 6 hours    MEDICATIONS  (PRN):  acetaminophen   Tablet .. 650 milliGRAM(s) Oral every 6 hours PRN Mild Pain (1 - 3)  ALBUTerol    90 MICROgram(s) HFA Inhaler 1 Puff(s) Inhalation every 4 hours PRN Shortness of Breath and/or Wheezing  benzocaine 15 mG/menthol 3.6 mG (Sugar-Free) Lozenge 1 Lozenge Oral every 4 hours PRN Sore Throat  guaiFENesin   Syrup  (Sugar-Free) 100 milliGRAM(s) Oral every 6 hours PRN Cough  melatonin 3 milliGRAM(s) Oral at bedtime PRN Insomnia  ondansetron   Disintegrating Tablet 4 milliGRAM(s) Oral four times a day PRN Nausea  oxyCODONE    IR 5 milliGRAM(s) Oral every 6 hours PRN Moderate Pain (4 - 6)  oxyCODONE    IR 10 milliGRAM(s) Oral every 6 hours PRN Severe Pain (7 - 10)      PRESENT SYMPTOMS: [ ]Unable to obtain due to poor mentation   Source if other than patient:  [ ]Family   [ ]Team     Pain: [ ] yes [ x ] no  QOL impact -   Location -                    Aggravating factors -  Quality -  Radiation -  Timing-  Severity (0-10 scale):  Minimal acceptable level (0-10 scale):     PAIN AD Score:     [email or web address] (press ctrl +  left click to view)    Dyspnea:                           [ x ]Mild [ ]Moderate [ ]Severe  Anxiety:                             [ ]Mild [ ]Moderate [ ]Severe  Fatigue:                             [ x ]Mild [ ]Moderate [ ]Severe  Nausea:                             [ ]Mild [ ]Moderate [ ]Severe  Loss of appetite:              [ ]Mild [ ]Moderate [ ]Severe  Constipation:                    [ ]Mild [ ]Moderate [ ]Severe    Other Symptoms:  [ x ]All other review of systems negative     Karnofsky Performance Score/Palliative Performance Status Version 2:         30-40%      http://npcrc.org/files/news/palliative_performance_scale_ppsv2.pdf    PHYSICAL EXAM:  Vital Signs Last 24 Hrs  T(C): 36.7 (27 Jan 2020 13:50), Max: 36.8 (26 Jan 2020 21:29)  T(F): 98 (27 Jan 2020 13:50), Max: 98.2 (26 Jan 2020 21:29)  HR: 99 (27 Jan 2020 13:50) (90 - 107)  BP: 150/80 (27 Jan 2020 13:50) (150/80 - 174/89)  BP(mean): --  RR: 17 (27 Jan 2020 13:50) (16 - 20)  SpO2: 97% (27 Jan 2020 13:50) (96% - 100%) I&O's Summary    26 Jan 2020 07:01  -  27 Jan 2020 07:00  --------------------------------------------------------  IN: 1030 mL / OUT: 1550 mL / NET: -520 mL    27 Jan 2020 07:01  -  27 Jan 2020 15:17  --------------------------------------------------------  IN: 240 mL / OUT: 200 mL / NET: 40 mL      GENERAL:  [ x ]Alert  [ x ]Oriented x3   [ ]Lethargic  [ ]Cachexia  [ ]Unarousable  [ x ]Verbal  [ ]Non-Verbal  Behavioral:   [ ] Anxiety  [ ] Delirium [ ] Agitation [ ] Other  HEENT:  [ x ]Normal   [ ]Dry mouth   [ ]ET Tube/Trach  [ ]Oral lesions  PULMONARY:   [ x ]Clear [ ]Tachypnea  [ ]Audible excessive secretions   [ ]Rhonchi        [ ]Right [ ]Left [ ]Bilateral  [ ]Crackles        [ ]Right [ ]Left [ ]Bilateral  [ ]Wheezing     [ ]Right [ ]Left [ ]Bilateral  CARDIOVASCULAR:    [ x ]Regular [ ]Irregular [ ]Tachy  [ ]Nicolas [ ]Murmur [ ]Other  GASTROINTESTINAL:  [ ]Soft  [ x ]Distended   [ x ]+BS  [ x ]Non tender [ ]Tender  [ ]PEG [ ]OGT/ NGT  Last BM: 1/25/20  GENITOURINARY:  [ x ]Normal [ ] Incontinent   [ ]Oliguria/Anuria   [ ]Bernabe  MUSCULOSKELETAL:   [ ]Normal   [ x ]Weakness  [ ]Bed/Wheelchair bound [ ]Edema  NEUROLOGIC:   [ x ]No focal deficits  [ ] Cognitive impairment  [ ] Dysphagia [ ]Dysarthria [ ] Paresis [ ]Other   SKIN:   [ x ]Normal   [ ]Pressure ulcer(s)  [ ]Rash    CRITICAL CARE:  [ ] Shock Present  [ ]Septic [ ]Cardiogenic [ ]Neurologic [ ]Hypovolemic  [ ]  Vasopressors [ ]  Inotropes   [ ] Respiratory failure present [ ] mechanical ventilation [ ] non-invasive ventilatory support [ ] High flow  [ ] Acute  [ ] Chronic [ ] Hypoxic  [ ] Hypercarbic [ ] Other  [ x ] Other organ failure Liver    LABS:                        9.2    95.83 )-----------( 207      ( 27 Jan 2020 06:20 )             30.0   01-27    132<L>  |  94<L>  |  21  ----------------------------<  122<H>  4.1   |  22  |  0.72    Ca    10.4      27 Jan 2020 06:20  Phos  3.1     01-27  Mg     2.0     01-27    RADIOLOGY & ADDITIONAL STUDIES:  < from: Xray Chest 1 View- PORTABLE-Urgent (01.25.20 @ 16:36) >  EXAM:  XR CHEST PORTABLE URGENT 1V        PROCEDURE DATE:  Jan 25 2020         INTERPRETATION:  XR CHEST URGENT. AP view.    INDICATION: SOB    COMPARISON: 1/12/2020    FINDINGS/  IMPRESSION:    Pulmonary edema and bilateral pleural effusions.    < end of copied text >    < from: CT Abdomen and Pelvis w/ Oral Cont and w/ IV Cont (01.14.20 @ 14:39) >  EXAM:  CT ABDOMEN AND PELVIS OC IC        PROCEDURE DATE:  Jan 14 2020         INTERPRETATION:  CLINICAL INFORMATION: rule out sbo / collection. 79M w/free air s/p ex lap susy, bowel resection not progressing, distended tender ADMDIAG1: K63.1 K63.1/    COMPARISON: 1.6.20.    PROCEDURE:   CT of the Abdomen and Pelvis was performed with intravenous contrast.   Intravenous contrast: 90 ml Omnipaque 350. 10 ml discarded.  Oral contrast: positive contrast was administered.  Sagittal and coronal reformats were performed.    FINDINGS:    LOWER CHEST: Small pleural effusions.     LIVER: Cirrhotic liver.   BILE DUCTS: Normal caliber.  GALLBLADDER: Within normal limits.  SPLEEN: Enlarged.   PANCREAS: Within normal limits.  ADRENALS: Within normal limits.   KIDNEYS/URETERS: Within normal limits.     BLADDER: Within normal limits.   REPRODUCTIVE ORGANS: Within normal limits.    BOWEL: No bowel obstruction. Small bowel anastamosis.  Duodenal diverticula.  PERITONEUM: Moderate ascites.  No free air.  VESSELS:  Within normal limits.  RETROPERITONEUM/LYMPH NODES: No lymphadenopathy.     ABDOMINAL WALL: Postsurgical changes. Anasarca.  BONES: Within normal limits.     IMPRESSION: No bowel obstruction or free air.    Moderate ascites.    Cirrhotic liver and splenomegaly.    Small bilateral pleural effusions.    < end of copied text >      PROTEIN CALORIE MALNUTRITION PRESENT: [ ] Yes [ ] No  [ ] PPSV2 < or = to 30% [ ] significant weight loss  [ ] poor nutritional intake [ ] catabolic state [ ] anasarca     Albumin, Serum: 2.8 g/dL (01-18-20 @ 06:10)  Artificial Nutrition [ ]     REFERRALS:   [ ]Chaplaincy  [ x ] Hospice  [ ]Child Life  [ x ]Social Work  [ ]Case management [ ]Holistic Therapy     Goals of Care Document:   Care Coordination Assessment [C. Provider] (01-10-20 @ 13:44)   Progress Notes - Care Coordination [C. Provider] (01-17-20 @ 14:08)

## 2020-01-27 NOTE — CONSULT NOTE ADULT - ASSESSMENT
80 y/o M. PMH CLL, chronic HBV with cirrhosis previously compensated, HTN. Admitted on 1/7/20. P/w Abdominal pain secondary to SBO c/b perforation s/p ex lap now with decompensated cirrhosis with ascites and LE edema, and hyponatremia. Palliate care consulted to discuss goals of care.

## 2020-01-27 NOTE — CONSULT NOTE ADULT - PROBLEM SELECTOR RECOMMENDATION 5
On Vancomycin 125mg PO QID x10-14 days total, started January 20th.  Management as per primary team.

## 2020-01-27 NOTE — CONSULT NOTE ADULT - PROBLEM SELECTOR RECOMMENDATION 7
Met with patient and family at bedside. Discussion regarding future goals of care, patient wishes to continue with a symptom focus approach with comfort. MOLST filled and placed in chart DNR/DNI. HCP on the chart 1[facility] and [facility] Children. Inquire about hospice services. Emotional support provided.

## 2020-01-27 NOTE — PROVIDER CONTACT NOTE (CRITICAL VALUE NOTIFICATION) - ASSESSMENT
A/ox4. OOBx2 with walker. Voids; incontinent with penis pouch. Pain managed throughout shift with appropriate pain medications. NC for Comfort measures.

## 2020-01-27 NOTE — CONSULT NOTE ADULT - ATTENDING COMMENTS
Chalo Soria is an 79 year old man with untreated CLL, cirrhosis with ascites, and chronic HepB. He presented with abdominal pain, N/V, and poor appetite. He was found to have peritonitis with SBO and underwent an exploratory laparotomy. Post op course has been complicated by worsening ascites in setting of cirrhosis. There is also C. Diff colitis. Paracentesis was not done due to active colitis. Labs noted hyponatremia with plasma sodium 130 mmol/L on 1/26/2020, likely due to hypervolemia. Serum potassium was 4.3 mmol/L on 1/26/2020. There was good initial urine output with furosemide (Lasix) 20 mg IV. Would continue conservative dosing to avoid large, rapid fluid shifts. Standing regimen: enoxaparin 40 mg subcutaneously once daily, tamsulosin 0.4 mg daily at bedtime, tenofovir disoproxil fumarate (VIREAD) 300 mg daily, [id number] mg Oral every 6 hours, albuterol 90 mCg HFA Inhaler 1 Puff Inhalation every 4 hours as needed, albuterol/ipratropium for Nebulization 3 mL Nebulizer every 6 hours, tiotropium 18 mCg Capsule 1 Capsule(s) Inhalation daily, ondansetron disintegrating tablet 4 mg four times daily as needed for nausea, oxycodone IR 5 mg Oral every 6 hours as needed, oxycodone   IR 10 mg Oral every 6 hours as needed, pantoprazole  40 mg IV Push daily, albumin human 25% IVPB 100 mL IV Intermittent every 6 hours and potassium acid phosphate/sodium acid phosphate tablet (K-PHOS No. 2) 1 Tablet(s) Oral three times daily with meals.
Patient was seen and examined with GI fellow on rounds on 1/16/2020. Agree with above.   An 78 y/o man with h/o HTN, gastritis, CLL (diagnosed in 2013, treatment naive)  with hx of chronic Hep B cirrhosis, eAg positive, with high HBV viremia,  genotype D with no mutation on TDF presented with abdominal pain found to have SBO complicated by perforation S/P Ex Lap with SB resection and primary anastomosis was seen for ascites. Ascites may be resulted from post surgery, or decompensated cirrhosis.   Will recommend diagnostic paracentesis, albumin infusion, trend liver tests, INR, HBV DNA, diuretics when electrolytes normalize
This is an 79 year old male with a history of untreated CLL, cirrhosis 2/2 chronic HBV and HTN admitted for perforated SBO s/p ex-lap with SBR and REESE. Patient doing well post-op, however noted to be hypervolemic with increasing abdominal girth and lower extremity edema. Suspect edema and abdominal distension is from volume overload from use of IVF.    Recommend:  -C/w Lasix 40mg IV daily for now  -Hold IVF  -Monitor intake and output, daily weights  -Hepatology consult  -Can consider paracentesis if volume overload does not improve with diuretics alone  -Trend daily CMP, PT/INR
Patient seen and examined.  Agree with fellow note.

## 2020-01-27 NOTE — PROGRESS NOTE ADULT - SUBJECTIVE AND OBJECTIVE BOX
Pt was seen in am, son with him. Pt c/o distension and discomfort abdomen along with severe fatigue. Gets SOB on and off. no fevers, chills, no visible bleeding and ROS is otherwise unchanged.         Meds:  acetaminophen   Tablet .. 650 milliGRAM(s) Oral every 6 hours PRN  albumin human 25% IVPB 100 milliLiter(s) IV Intermittent every 6 hours  ALBUTerol    90 MICROgram(s) HFA Inhaler 1 Puff(s) Inhalation every 4 hours PRN  albuterol/ipratropium for Nebulization 3 milliLiter(s) Nebulizer every 6 hours  benzocaine 15 mG/menthol 3.6 mG (Sugar-Free) Lozenge 1 Lozenge Oral every 4 hours PRN  enoxaparin Injectable 40 milliGRAM(s) SubCutaneous daily  furosemide    Tablet 40 milliGRAM(s) Oral daily  guaiFENesin   Syrup  (Sugar-Free) 100 milliGRAM(s) Oral every 6 hours PRN  melatonin 3 milliGRAM(s) Oral at bedtime PRN  ondansetron   Disintegrating Tablet 4 milliGRAM(s) Oral four times a day PRN  oxyCODONE    IR 5 milliGRAM(s) Oral every 6 hours PRN  oxyCODONE    IR 10 milliGRAM(s) Oral every 6 hours PRN  pantoprazole  Injectable 40 milliGRAM(s) IV Push daily  potassium acid phosphate/sodium acid phosphate tablet (K-PHOS No. 2) 1 Tablet(s) Oral three times a day with meals  spironolactone 100 milliGRAM(s) Oral daily  tamsulosin 0.4 milliGRAM(s) Oral at bedtime  tenofovir disoproxil fumarate (VIREAD) 300 milliGRAM(s) Oral daily  tiotropium 18 MICROgram(s) Capsule 1 Capsule(s) Inhalation daily  vancomycin    Solution 125 milliGRAM(s) Oral every 6 hours      Vital Signs Last 24 Hrs  T(C): 37.2 (27 Jan 2020 18:02), Max: 37.2 (27 Jan 2020 18:02)  T(F): 98.9 (27 Jan 2020 18:02), Max: 98.9 (27 Jan 2020 18:02)  HR: 98 (27 Jan 2020 18:23) (90 - 109)  BP: 150/83 (27 Jan 2020 18:02) (150/80 - 174/89)  BP(mean): --  RR: 18 (27 Jan 2020 18:02) (16 - 20)  SpO2: 94% (27 Jan 2020 18:02) (94% - 100%)                          9.2    95.83 )-----------( 207      ( 27 Jan 2020 06:20 )             30.0       01-27    132<L>  |  94<L>  |  21  ----------------------------<  122<H>  4.1   |  22  |  0.72    Ca    10.4      27 Jan 2020 06:20  Phos  3.1     01-27  Mg     2.0     01-27

## 2020-01-27 NOTE — CONSULT NOTE ADULT - PROBLEM SELECTOR RECOMMENDATION 9
Abdomen markedly distended, compromising breathing at times.   On diuretics. Trying to hold off on a tap b/o recent history of SBO w/bowel perf and concurrent infection with C.diff. Management as per primary team.

## 2020-01-28 DIAGNOSIS — R52 PAIN, UNSPECIFIED: ICD-10-CM

## 2020-01-28 LAB
ALBUMIN SERPL ELPH-MCNC: 5.8 G/DL — HIGH (ref 3.3–5)
ALP SERPL-CCNC: 84 U/L — SIGNIFICANT CHANGE UP (ref 40–120)
ALT FLD-CCNC: 45 U/L — HIGH (ref 4–41)
ANION GAP SERPL CALC-SCNC: 17 MMO/L — HIGH (ref 7–14)
ANION GAP SERPL CALC-SCNC: 17 MMO/L — HIGH (ref 7–14)
AST SERPL-CCNC: 43 U/L — HIGH (ref 4–40)
BILIRUB SERPL-MCNC: 2.4 MG/DL — HIGH (ref 0.2–1.2)
BLD GP AB SCN SERPL QL: NEGATIVE — SIGNIFICANT CHANGE UP
BUN SERPL-MCNC: 29 MG/DL — HIGH (ref 7–23)
BUN SERPL-MCNC: 29 MG/DL — HIGH (ref 7–23)
CALCIUM SERPL-MCNC: 10.5 MG/DL — SIGNIFICANT CHANGE UP (ref 8.4–10.5)
CALCIUM SERPL-MCNC: 10.5 MG/DL — SIGNIFICANT CHANGE UP (ref 8.4–10.5)
CHLORIDE SERPL-SCNC: 95 MMOL/L — LOW (ref 98–107)
CHLORIDE SERPL-SCNC: 95 MMOL/L — LOW (ref 98–107)
CO2 SERPL-SCNC: 22 MMOL/L — SIGNIFICANT CHANGE UP (ref 22–31)
CO2 SERPL-SCNC: 22 MMOL/L — SIGNIFICANT CHANGE UP (ref 22–31)
CREAT SERPL-MCNC: 0.64 MG/DL — SIGNIFICANT CHANGE UP (ref 0.5–1.3)
CREAT SERPL-MCNC: 0.64 MG/DL — SIGNIFICANT CHANGE UP (ref 0.5–1.3)
GLUCOSE SERPL-MCNC: 126 MG/DL — HIGH (ref 70–99)
GLUCOSE SERPL-MCNC: 126 MG/DL — HIGH (ref 70–99)
HCT VFR BLD CALC: 25.1 % — LOW (ref 39–50)
HGB BLD-MCNC: 8 G/DL — LOW (ref 13–17)
INR BLD: 1.31 — HIGH (ref 0.88–1.17)
MAGNESIUM SERPL-MCNC: 2 MG/DL — SIGNIFICANT CHANGE UP (ref 1.6–2.6)
MAGNESIUM SERPL-MCNC: 2 MG/DL — SIGNIFICANT CHANGE UP (ref 1.6–2.6)
MCHC RBC-ENTMCNC: 24 PG — LOW (ref 27–34)
MCHC RBC-ENTMCNC: 31.9 % — LOW (ref 32–36)
MCV RBC AUTO: 75.4 FL — LOW (ref 80–100)
NRBC # FLD: 0.02 K/UL — SIGNIFICANT CHANGE UP (ref 0–0)
PHOSPHATE SERPL-MCNC: 3.8 MG/DL — SIGNIFICANT CHANGE UP (ref 2.5–4.5)
PHOSPHATE SERPL-MCNC: 3.8 MG/DL — SIGNIFICANT CHANGE UP (ref 2.5–4.5)
PLATELET # BLD AUTO: 155 K/UL — SIGNIFICANT CHANGE UP (ref 150–400)
PMV BLD: 12 FL — SIGNIFICANT CHANGE UP (ref 7–13)
POTASSIUM SERPL-MCNC: 3.4 MMOL/L — LOW (ref 3.5–5.3)
POTASSIUM SERPL-MCNC: 3.4 MMOL/L — LOW (ref 3.5–5.3)
POTASSIUM SERPL-SCNC: 3.4 MMOL/L — LOW (ref 3.5–5.3)
POTASSIUM SERPL-SCNC: 3.4 MMOL/L — LOW (ref 3.5–5.3)
PROT SERPL-MCNC: 8 G/DL — SIGNIFICANT CHANGE UP (ref 6–8.3)
PROTHROM AB SERPL-ACNC: 15.1 SEC — HIGH (ref 9.8–13.1)
RBC # BLD: 3.33 M/UL — LOW (ref 4.2–5.8)
RBC # FLD: 22.8 % — HIGH (ref 10.3–14.5)
RH IG SCN BLD-IMP: POSITIVE — SIGNIFICANT CHANGE UP
SODIUM SERPL-SCNC: 134 MMOL/L — LOW (ref 135–145)
SODIUM SERPL-SCNC: 134 MMOL/L — LOW (ref 135–145)
WBC # BLD: 65.53 K/UL — CRITICAL HIGH (ref 3.8–10.5)
WBC # FLD AUTO: 65.53 K/UL — CRITICAL HIGH (ref 3.8–10.5)

## 2020-01-28 PROCEDURE — 45378 DIAGNOSTIC COLONOSCOPY: CPT | Mod: GC

## 2020-01-28 PROCEDURE — 74018 RADEX ABDOMEN 1 VIEW: CPT | Mod: 26

## 2020-01-28 PROCEDURE — 99233 SBSQ HOSP IP/OBS HIGH 50: CPT

## 2020-01-28 PROCEDURE — 99232 SBSQ HOSP IP/OBS MODERATE 35: CPT | Mod: GC

## 2020-01-28 PROCEDURE — 43235 EGD DIAGNOSTIC BRUSH WASH: CPT | Mod: GC

## 2020-01-28 RX ORDER — FUROSEMIDE 40 MG
20 TABLET ORAL ONCE
Refills: 0 | Status: COMPLETED | OUTPATIENT
Start: 2020-01-28 | End: 2020-01-28

## 2020-01-28 RX ORDER — POTASSIUM CHLORIDE 20 MEQ
40 PACKET (EA) ORAL ONCE
Refills: 0 | Status: COMPLETED | OUTPATIENT
Start: 2020-01-28 | End: 2020-01-28

## 2020-01-28 RX ORDER — MORPHINE SULFATE 50 MG/1
2 CAPSULE, EXTENDED RELEASE ORAL ONCE
Refills: 0 | Status: DISCONTINUED | OUTPATIENT
Start: 2020-01-28 | End: 2020-01-28

## 2020-01-28 RX ORDER — HYDRALAZINE HCL 50 MG
5 TABLET ORAL ONCE
Refills: 0 | Status: COMPLETED | OUTPATIENT
Start: 2020-01-28 | End: 2020-01-28

## 2020-01-28 RX ADMIN — Medication 5 MILLIGRAM(S): at 23:34

## 2020-01-28 RX ADMIN — Medication 50 MILLILITER(S): at 00:57

## 2020-01-28 RX ADMIN — TENOFOVIR DISOPROXIL FUMARATE 300 MILLIGRAM(S): 300 TABLET, FILM COATED ORAL at 11:30

## 2020-01-28 RX ADMIN — Medication 50 MILLILITER(S): at 11:30

## 2020-01-28 RX ADMIN — Medication 40 MILLIEQUIVALENT(S): at 10:59

## 2020-01-28 RX ADMIN — Medication 125 MILLIGRAM(S): at 11:30

## 2020-01-28 RX ADMIN — Medication 20 MILLIGRAM(S): at 21:40

## 2020-01-28 RX ADMIN — PANTOPRAZOLE SODIUM 40 MILLIGRAM(S): 20 TABLET, DELAYED RELEASE ORAL at 11:31

## 2020-01-28 RX ADMIN — Medication 3 MILLILITER(S): at 10:23

## 2020-01-28 RX ADMIN — MORPHINE SULFATE 2 MILLIGRAM(S): 50 CAPSULE, EXTENDED RELEASE ORAL at 21:54

## 2020-01-28 RX ADMIN — OXYCODONE HYDROCHLORIDE 10 MILLIGRAM(S): 5 TABLET ORAL at 19:48

## 2020-01-28 RX ADMIN — Medication 3 MILLILITER(S): at 05:19

## 2020-01-28 RX ADMIN — OXYCODONE HYDROCHLORIDE 5 MILLIGRAM(S): 5 TABLET ORAL at 12:01

## 2020-01-28 RX ADMIN — Medication 40 MILLIGRAM(S): at 09:43

## 2020-01-28 RX ADMIN — Medication 1 TABLET(S): at 13:41

## 2020-01-28 RX ADMIN — OXYCODONE HYDROCHLORIDE 10 MILLIGRAM(S): 5 TABLET ORAL at 20:30

## 2020-01-28 RX ADMIN — ENOXAPARIN SODIUM 40 MILLIGRAM(S): 100 INJECTION SUBCUTANEOUS at 11:30

## 2020-01-28 RX ADMIN — Medication 125 MILLIGRAM(S): at 17:44

## 2020-01-28 RX ADMIN — Medication 50 MILLILITER(S): at 06:59

## 2020-01-28 RX ADMIN — SPIRONOLACTONE 100 MILLIGRAM(S): 25 TABLET, FILM COATED ORAL at 09:43

## 2020-01-28 RX ADMIN — Medication 3 MILLILITER(S): at 15:06

## 2020-01-28 RX ADMIN — MORPHINE SULFATE 2 MILLIGRAM(S): 50 CAPSULE, EXTENDED RELEASE ORAL at 22:10

## 2020-01-28 RX ADMIN — Medication 1 TABLET(S): at 09:43

## 2020-01-28 RX ADMIN — Medication 50 MILLILITER(S): at 17:44

## 2020-01-28 RX ADMIN — Medication 125 MILLIGRAM(S): at 00:57

## 2020-01-28 RX ADMIN — OXYCODONE HYDROCHLORIDE 5 MILLIGRAM(S): 5 TABLET ORAL at 11:31

## 2020-01-28 RX ADMIN — Medication 125 MILLIGRAM(S): at 06:59

## 2020-01-28 RX ADMIN — TAMSULOSIN HYDROCHLORIDE 0.4 MILLIGRAM(S): 0.4 CAPSULE ORAL at 21:40

## 2020-01-28 RX ADMIN — Medication 1 TABLET(S): at 17:44

## 2020-01-28 NOTE — PROGRESS NOTE ADULT - ASSESSMENT
79 year old man hx of HTN, CLL (diagnosed in 2013, treatment naive), previously compensated Hep B cirrhosis (Viread started 11/2019) presented with abdominal pain in the setting SBO, now s/p Ex Lap with 15 cm of small bowel removed. Hepatology consulted for management of new ascites, and hyponatremia with min. Na 125. Course complicated by c. difficile colitis, on PO vancomycin since 1/20.    Impression:  # Decompensated Hep B liver cirrhosis. MELD-Na: 16 on Jan 28   HE: AOx3, no asterixis  Ascites: Moderate ascites seen on CT (1/14/20)  HCC: No lesions on CT (1/14/20)  EV: Last EGD reportedly 2 years ago, records not available  # Hepatitis B: Hep B eAg positive, high HBV viremia, genotype D. On Viread  # C. Diff: On PO vancomycin today day 8   # Hyponatremia: Improving   # SBO complicated with perforation s/p Ex lap with SB resection and anastomosis (1/7/20): No signs of obstructions.     # SOB secondary to Pulmonary edema/ effusion and increasing ascites. On lasix and awaiting tap   # Pt DNR/ DNI. Palliative care following     Recommendation:  - awaiting official reading of abd xray   - Get CMP and INR level daily   - Change diet to Low sodium diet   - Please get IR guided diagnostic and Therapeutic paracentesis (send fluid for alb , cell count, culture, TP, ldh, glucose)   - Continue  spironolactone 100 mg daily and add lasix 40 po once daily   - Follow electrolytes daily and watch for Hyperkalemia   - Continue Albumin 25 g (25%) q6h daily    - Continue Viread   - Continue Vancomycin po for total of 10 days (today day 8)   - Dietitian follow for appropriate calorie and protein intake   - Supportive care per primary team      Cheikh Callie Richard  GI Fellow  Pager# [phone] 79 year old man hx of HTN, CLL (diagnosed in 2013, treatment naive), previously compensated Hep B cirrhosis (Viread started 11/2019) presented with abdominal pain in the setting SBO, now s/p Ex Lap with 15 cm of small bowel removed. Hepatology consulted for management of new ascites, and hyponatremia with min. Na 125. Course complicated by c. difficile colitis, on PO vancomycin since 1/20.    Impression:  # Decompensated Hep B liver cirrhosis. MELD-Na: 16 on Jan 28   HE: AOx3, no asterixis  Ascites: Moderate ascites seen on CT (1/14/20). Not tapped as it was initially too small, then deferred out of concern for complications due to C. difficile colitis.  # Hyponatremia: Improved to > 130, getting albumin daily since 1/21  HCC: No lesions on CT (1/14/20)  EV: Last EGD reportedly 2 years ago, records not available  # Hepatitis B: Hep B eAg positive, high HBV viremia, genotype D. On Viread  # C. Diff: On PO vancomycin today day 8      # SBO complicated with perforation s/p Ex lap with SB resection and anastomosis (1/7/20): No signs of obstructions.     # SOB secondary to Pulmonary edema/ effusion and increasing ascites. On lasix and awaiting tap   # Pt DNR/ DNI. Palliative care following     Recommendation:  - await official reading of abd xray   - Please get IR guided diagnostic and Therapeutic paracentesis (send fluid for alb , cell count, culture, TP, ldh, glucose)   - Get CMP and INR level daily    - Continue  spironolactone 100 mg daily and lasix 40 po once daily, and albumin 25 g q6hrs	  - Follow electrolytes daily and watch for Hyperkalemia   - Continue Viread   - Continue Vancomycin po for total of 10 days (today day 8)   - Dietitian follow for appropriate calorie and protein intake   - Supportive care per primary team      Cheikh Callie Richard  GI Fellow  Pager# [phone]

## 2020-01-28 NOTE — CHART NOTE - NSCHARTNOTEFT_GEN_A_CORE
Notified by RN that patient is having severe abdominal pain 8/10 even after receiving his PRN 10mg Oxycodone IR.    Patient is an 79M hx of untreated CLL, cirrhosis, chronic hep B, gastritis who p/w peritonitis 2/2 SBO and perforation s/p ex-lap REESE, SBR/A (1/7). Hospital course c/b C. diff colitis, on PO Vanco, decompensated hepatic cirrhosis on albumin , hepatology following, with worsening ascites; awaiting paracentesis. Pt's status has been worsening, and pt was made DNR/DNI on this admission with Palliative care following.     The patient's abdomen remains tender, tense and distended, he is awaiting a therapeutic and diagnostic paracentesis. The paracentesis is planned once pt completes PO Vanco regimen, which is planned to complete on 1/30 for C.diff colitis. Surgery B team came to assess pt's abdomen and ordered an abdominal X-ray. I spoke with Dr. Perez who said giving 2mg IVP morphine for pain now is okay from surgical standpoint. Surgery will continue to follow patient. Pt's blood pressure elevated at 171/94, feeling mildly SOB. Will dose Lasix 20IVP. Will follow up Ab X-ray and follow up with surgery regarding further plan of care. Son at bedside made aware of plan.     Vital Signs Last 24 Hrs  T(C): 36.9 (28 Jan 2020 21:51), Max: 37.2 (28 Jan 2020 17:47)  T(F): 98.4 (28 Jan 2020 21:51), Max: 98.9 (28 Jan 2020 17:47)  HR: 101 (28 Jan 2020 21:51) (91 - 110)  BP: 171/94 (28 Jan 2020 21:51) (156/88 - 171/94)  RR: 18 (28 Jan 2020 21:51) (18 - 21)  SpO2: 95% (28 Jan 2020 21:51) (95% - 100%)

## 2020-01-28 NOTE — PROGRESS NOTE ADULT - ASSESSMENT
78 y/o M. PMH CLL, chronic HBV with cirrhosis previously compensated, HTN. Admitted on 1/7/20. P/w Abdominal pain secondary to SBO c/b perforation s/p ex lap. Hospital course c/b C diff, in addition to decompensated cirrhosis with ascites and LE edema, and hyponatremia. Palliate care consulted to discuss goals of care.

## 2020-01-28 NOTE — CHART NOTE - NSCHARTNOTEFT_GEN_A_CORE
Source: Patient [ ]    Family [X ]  wife at bedside   other [X ] electronic chart, RN    Diet : Diet, Regular:   1000mL Fluid Restriction ([id number])  Supplement Feeding Modality:  Oral  Ensure Enlive Cans or Servings Per Day:  3       Frequency:  Daily (01-18-20 @ 22:39)    Patient seen for nutrition follow-up. Met with patient and wife at bedside. Patient resting at time of visit. Collateral received from wife and nursing. Patient PO intake declined. Taking minimal po intake and po supplement with encouragement from his spouse. + C. DIff noted. +Abdominal pain per wife. Otherwise, no nausea or vomiting reported. Attempted to obtain any new food preferences but none to requested at this time. RDN remains available, staff, family and patient made aware. Per chart review and [facility] [place] discussion, hospice referral noted.       Weight (kg): 57.2 (07 Jan 2020 02:15) no new weight recorded.         Pertinent Medications: albumin human 25% IVPB  albuterol/ipratropium for Nebulization  furosemide    Tablet  pantoprazole  Injectable  potassium acid phosphate/sodium acid phosphate tablet (K-PHOS No. 2)  spironolactone  tamsulosin  tiotropium 18 MICROgram(s) Capsule    Pertinent Labs:  01-28 Na134 mmol/L<L> Glu 126 mg/dL<H> K+ 3.4 mmol/L<L> Cr  0.64 mg/dL BUN 29 mg/dL<H> 01-28 Phos 3.8 mg/dL 01-28 Alb 5.8 g/dL<H>      Skin: mid abdomen surgical wound noted.   No edema noted.     Estimated Needs:   [X ] no change since previous assessment  [ ] recalculated:       Previous Nutrition Diagnosis:   [X ] Malnutrition, severe     Nutrition Diagnosis is [ X] ongoing  [ ] resolved [ ] not applicable          Additional Recommendations:   1. Continue current diet order as tolerated. Fluid restriction per MD discretion.   2. Monitor weights, labs, BM's, skin integrity, p.o. intake.   3. Please Encourage po intake, assist with meals and menu selections, provide alternatives PRN.  4. Nutrition and  Department will honor food and beverage preferences of patient in an effort to maximize level of nutrient intake.

## 2020-01-28 NOTE — PROGRESS NOTE ADULT - SUBJECTIVE AND OBJECTIVE BOX
PROGRESS NOTE:     Patient is a 79y old  Male who presents with a chief complaint of abdominal pain (28 Jan 2020 10:46)      SUBJECTIVE / OVERNIGHT EVENTS:  Patient seen and examined this morning. States that he feels the same, he admits to trouble breathing and worsening abdominal swelling.     ADDITIONAL REVIEW OF SYSTEMS:  No fevers or chills    MEDICATIONS  (STANDING):  albumin human 25% IVPB 100 milliLiter(s) IV Intermittent every 6 hours  albuterol/ipratropium for Nebulization 3 milliLiter(s) Nebulizer every 6 hours  enoxaparin Injectable 40 milliGRAM(s) SubCutaneous daily  furosemide    Tablet 40 milliGRAM(s) Oral daily  pantoprazole  Injectable 40 milliGRAM(s) IV Push daily  potassium acid phosphate/sodium acid phosphate tablet (K-PHOS No. 2) 1 Tablet(s) Oral three times a day with meals  spironolactone 100 milliGRAM(s) Oral daily  tamsulosin 0.4 milliGRAM(s) Oral at bedtime  tenofovir disoproxil fumarate (VIREAD) 300 milliGRAM(s) Oral daily  tiotropium 18 MICROgram(s) Capsule 1 Capsule(s) Inhalation daily  vancomycin    Solution 125 milliGRAM(s) Oral every 6 hours    MEDICATIONS  (PRN):  acetaminophen   Tablet .. 650 milliGRAM(s) Oral every 6 hours PRN Mild Pain (1 - 3)  ALBUTerol    90 MICROgram(s) HFA Inhaler 1 Puff(s) Inhalation every 4 hours PRN Shortness of Breath and/or Wheezing  benzocaine 15 mG/menthol 3.6 mG (Sugar-Free) Lozenge 1 Lozenge Oral every 4 hours PRN Sore Throat  guaiFENesin   Syrup  (Sugar-Free) 100 milliGRAM(s) Oral every 6 hours PRN Cough  melatonin 3 milliGRAM(s) Oral at bedtime PRN Insomnia  ondansetron   Disintegrating Tablet 4 milliGRAM(s) Oral four times a day PRN Nausea  oxyCODONE    IR 5 milliGRAM(s) Oral every 6 hours PRN Moderate Pain (4 - 6)  oxyCODONE    IR 10 milliGRAM(s) Oral every 6 hours PRN Severe Pain (7 - 10)      CAPILLARY BLOOD GLUCOSE        I&O's Summary    27 Jan 2020 07:01  -  28 Jan 2020 07:00  --------------------------------------------------------  IN: 610 mL / OUT: 775 mL / NET: -165 mL    28 Jan 2020 07:01  -  28 Jan 2020 15:17  --------------------------------------------------------  IN: 460 mL / OUT: 250 mL / NET: 210 mL        PHYSICAL EXAM:  Vital Signs Last 24 Hrs  T(C): 36.4 (28 Jan 2020 13:46), Max: 37.2 (27 Jan 2020 18:02)  T(F): 97.6 (28 Jan 2020 13:46), Max: 99 (27 Jan 2020 21:34)  HR: 100 (28 Jan 2020 15:09) (91 - 110)  BP: 164/85 (28 Jan 2020 13:46) (150/83 - 164/85)  BP(mean): --  RR: 19 (28 Jan 2020 13:46) (18 - 21)  SpO2: 96% (28 Jan 2020 15:09) (94% - 100%)    CONSTITUTIONAL: NAD, well-developed  RESPIRATORY: Normal respiratory effort; lungs are clear to auscultation bilaterally  CARDIOVASCULAR: Regular rate and rhythm, normal S1 and S2, no murmur/rub/gallop; No lower extremity edema; Peripheral pulses are 2+ bilaterally  ABDOMEN: Nontender to palpation, normoactive bowel sounds, no rebound/guarding; No hepatosplenomegaly  MUSCLOSKELETAL: no clubbing or cyanosis of digits; no joint swelling or tenderness to palpation  PSYCH: A+O to person, place, and time; affect appropriate      LABS:                        8.0    65.53 )-----------( 155      ( 28 Jan 2020 07:40 )             25.1     01-28    134<L>  |  95<L>  |  29<H>  ----------------------------<  126<H>  3.4<L>   |  22  |  0.64    Ca    10.5      28 Jan 2020 07:40  Phos  3.8     01-28  Mg     2.0     01-28    TPro  8.0  /  Alb  5.8<H>  /  TBili  2.4<H>  /  DBili  x   /  AST  43<H>  /  ALT  45<H>  /  AlkPhos  84  01-28    PT/INR - ( 28 Jan 2020 07:40 )   PT: 15.1 SEC;   INR: 1.31                      RADIOLOGY & ADDITIONAL TESTS:  Results Reviewed:   Imaging Personally Reviewed:  Electrocardiogram Personally Reviewed:    COORDINATION OF CARE:  Care Discussed with Consultants/Other Providers [Y/N]: Case discussed during interdisciplinary rounds with social work and case management  Case discussed with Dr. Dean. Hepatology team paged to discuss patient care	    Prior or Outpatient Records Reviewed [Y/N]:

## 2020-01-28 NOTE — PROGRESS NOTE ADULT - PROBLEM SELECTOR PLAN 1
Secondary to abdominal distension.   C/w Oxy IR  PO 5 mg q6h PRN Pain  C/w Oxy IR PO 10 mg q6h PRN Pain.  Recommend bowel regimen with Senna to avoid opioid induced constipation. Secondary to abdominal distension.   Used 1 PRN of Oxy in the last 24 hrs.  C/w Oxy IR  PO 5 mg q6h PRN Pain  C/w Oxy IR PO 10 mg q6h PRN Pain.  Recommend bowel regimen with Senna to avoid opioid induced constipation.

## 2020-01-28 NOTE — PROGRESS NOTE ADULT - SUBJECTIVE AND OBJECTIVE BOX
We are following the patient for Ascites and decompensated cirrhosis      GI HPI Today:  No overnight events   Pt passing flatus and last BM was yesterday   No diarrhea or abd pain   No fever and Hemodynamically stable      PAST MEDICAL & SURGICAL HISTORY  Cirrhosis  HTN (hypertension)  H/O gastritis  CLL (chronic lymphocytic leukemia)  No significant past surgical history      ALLERGIES:  Beef (Unknown)  No Known Drug Allergies      MEDICATIONS:  MEDICATIONS  (STANDING):  albumin human 25% IVPB 100 milliLiter(s) IV Intermittent every 6 hours  albuterol/ipratropium for Nebulization 3 milliLiter(s) Nebulizer every 6 hours  enoxaparin Injectable 40 milliGRAM(s) SubCutaneous daily  furosemide    Tablet 40 milliGRAM(s) Oral daily  pantoprazole  Injectable 40 milliGRAM(s) IV Push daily  potassium acid phosphate/sodium acid phosphate tablet (K-PHOS No. 2) 1 Tablet(s) Oral three times a day with meals  spironolactone 100 milliGRAM(s) Oral daily  tamsulosin 0.4 milliGRAM(s) Oral at bedtime  tenofovir disoproxil fumarate (VIREAD) 300 milliGRAM(s) Oral daily  tiotropium 18 MICROgram(s) Capsule 1 Capsule(s) Inhalation daily  vancomycin    Solution 125 milliGRAM(s) Oral every 6 hours    MEDICATIONS  (PRN):  acetaminophen   Tablet .. 650 milliGRAM(s) Oral every 6 hours PRN Mild Pain (1 - 3)  ALBUTerol    90 MICROgram(s) HFA Inhaler 1 Puff(s) Inhalation every 4 hours PRN Shortness of Breath and/or Wheezing  benzocaine 15 mG/menthol 3.6 mG (Sugar-Free) Lozenge 1 Lozenge Oral every 4 hours PRN Sore Throat  guaiFENesin   Syrup  (Sugar-Free) 100 milliGRAM(s) Oral every 6 hours PRN Cough  melatonin 3 milliGRAM(s) Oral at bedtime PRN Insomnia  ondansetron   Disintegrating Tablet 4 milliGRAM(s) Oral four times a day PRN Nausea  oxyCODONE    IR 5 milliGRAM(s) Oral every 6 hours PRN Moderate Pain (4 - 6)  oxyCODONE    IR 10 milliGRAM(s) Oral every 6 hours PRN Severe Pain (7 - 10)      REVIEW OF SYSTEMS  General:  No fevers  Eyes:  No reported pain   ENT:  No sore throat   NECK: No stiffness   CV:  No chest pain   Resp:  No shortness of breath  GI:  See HPI  :  No dysuria  Muscle:  ++  weakness  Neuro:  No tingling  Endocrine:  No polyuria  Heme:  No ecchymosis        VITALS:   T(F): 98.6 (01-28 @ 06:00), Max: 99 (01-27 @ 21:34)  HR: 100 (01-28 @ 06:00) (81 - 109)  BP: 158/85 (01-28 @ 06:00) (146/75 - 176/101)  BP(mean): --  RR: 20 (01-28 @ 06:00) (16 - 21)  SpO2: 96% (01-28 @ 06:00) (94% - 100%)        PHYSICAL EXAM:  GENERAL:  Appears in no distress  HEENT:  Conjunctivae Anicteric   CHEST:  Full & symmetric excursion  HEART:  NS1, S2,   ABDOMEN:  Soft, non-tender, +++distended  EXTEREMITIES:  ++ edema  SKIN:  No rash  NEURO:  Alert          Blood Work :                        8.0    65.53 )-----------( 155      ( 28 Jan 2020 07:40 )             25.1     PT/INR - ( 28 Jan 2020 07:40 )  INR: 1.31            01-28    134<L>  |  95<L>  |  29<H>  ----------------------------<  126<H>  3.4<L>   |  22  |  0.64    Ca    10.5      28 Jan 2020 07:40  Phos  3.8     01-28  Mg     2.0     01-28      CBC -  ( 28 Jan 2020 07:40 )  Hemoglobin : 8.0    CBC -  ( 27 Jan 2020 06:20 )  Hemoglobin : 9.2    CBC -  ( 26 Jan 2020 06:35 )  Hemoglobin : 8.6    CBC -  ( 25 Jan 2020 07:15 )  Hemoglobin : 6.7    CBC -  ( 24 Jan 2020 13:44 )  Hemoglobin : 7.1      LIVER FUNCTIONS - ( 28 Jan 2020 07:40 )  Alb: 5.8 [3.3 - 5.0] / Pro: 8.0 [6.0 - 8.3] / ALK PHOS: 84 [40 - 120] / ALT: 45 [4 - 41] / AST: 43 [4 - 40] / GGT: x         RADIOLOGY:  Abd xray pending

## 2020-01-28 NOTE — CHART NOTE - NSCHARTNOTEFT_GEN_A_CORE
Patient family stopped surgical service in [facility] asking for exam of patient, who he says had increasing abdominal pain and distention. Patient seen and examined at bedside. Reporting significant pain, says abdomen has been growing and feels tight. Nausea, no emesis. Vital signs stable, patient afebrile. Appears uncomfortable. Abdomen very distended, tight, minimally compressible. Tender to palpation.     Vital Signs Last 24 Hrs  T(C): 37.2 (28 Jan 2020 17:47), Max: 37.2 (27 Jan 2020 21:34)  T(F): 98.9 (28 Jan 2020 17:47), Max: 99 (27 Jan 2020 21:34)  HR: 96 (28 Jan 2020 18:11) (91 - 110)  BP: 156/88 (28 Jan 2020 17:47) (156/88 - 164/85)  BP(mean): --  RR: 18 (28 Jan 2020 17:47) (18 - 21)  SpO2: 97% (28 Jan 2020 17:47) (96% - 100%)    Recent Abd XR from yesterday with distended loops of bowel. No perforation  Repeat Abd XR ordered now. Will continue to monitor.       B Team [id number] Patient family stopped surgical service in [facility] asking for exam of patient, who he says had increasing abdominal pain and distention. Patient seen and examined at bedside. Reporting significant pain, says abdomen has been growing and feels tight. Nausea, no emesis. +/- bowel function. Vital signs stable, patient afebrile. Appears uncomfortable. Abdomen very distended, tight, minimally compressible. Tender to palpation.     Vital Signs Last 24 Hrs  T(C): 37.2 (28 Jan 2020 17:47), Max: 37.2 (27 Jan 2020 21:34)  T(F): 98.9 (28 Jan 2020 17:47), Max: 99 (27 Jan 2020 21:34)  HR: 96 (28 Jan 2020 18:11) (91 - 110)  BP: 156/88 (28 Jan 2020 17:47) (156/88 - 164/85)  BP(mean): --  RR: 18 (28 Jan 2020 17:47) (18 - 21)  SpO2: 97% (28 Jan 2020 17:47) (96% - 100%)    Recent Abd XR from yesterday with distended loops of bowel. No perforation  Repeat Abd XR ordered now. Will continue to monitor.       B Team [id number]

## 2020-01-28 NOTE — PROGRESS NOTE ADULT - SUBJECTIVE AND OBJECTIVE BOX
SUBJECTIVE AND OBJECTIVE:    INTERVAL HPI/OVERNIGHT EVENTS:  Patient with increase abdominal distension and pain.   Used 1 PRN of Oxy 5 mg in the last 24 hours.  Has not passed a BM since 1/25  Wife at bedside.    DNR on chart: Yes  Yes    Allergies    Beef (Unknown)  No Known Drug Allergies    Intolerances    MEDICATIONS  (STANDING):  albumin human 25% IVPB 100 milliLiter(s) IV Intermittent every 6 hours  albuterol/ipratropium for Nebulization 3 milliLiter(s) Nebulizer every 6 hours  enoxaparin Injectable 40 milliGRAM(s) SubCutaneous daily  furosemide    Tablet 40 milliGRAM(s) Oral daily  pantoprazole  Injectable 40 milliGRAM(s) IV Push daily  potassium acid phosphate/sodium acid phosphate tablet (K-PHOS No. 2) 1 Tablet(s) Oral three times a day with meals  potassium chloride    Tablet ER 40 milliEquivalent(s) Oral once  spironolactone 100 milliGRAM(s) Oral daily  tamsulosin 0.4 milliGRAM(s) Oral at bedtime  tenofovir disoproxil fumarate (VIREAD) 300 milliGRAM(s) Oral daily  tiotropium 18 MICROgram(s) Capsule 1 Capsule(s) Inhalation daily  vancomycin    Solution 125 milliGRAM(s) Oral every 6 hours    MEDICATIONS  (PRN):  acetaminophen   Tablet .. 650 milliGRAM(s) Oral every 6 hours PRN Mild Pain (1 - 3)  ALBUTerol    90 MICROgram(s) HFA Inhaler 1 Puff(s) Inhalation every 4 hours PRN Shortness of Breath and/or Wheezing  benzocaine 15 mG/menthol 3.6 mG (Sugar-Free) Lozenge 1 Lozenge Oral every 4 hours PRN Sore Throat  guaiFENesin   Syrup  (Sugar-Free) 100 milliGRAM(s) Oral every 6 hours PRN Cough  melatonin 3 milliGRAM(s) Oral at bedtime PRN Insomnia  ondansetron   Disintegrating Tablet 4 milliGRAM(s) Oral four times a day PRN Nausea  oxyCODONE    IR 5 milliGRAM(s) Oral every 6 hours PRN Moderate Pain (4 - 6)  oxyCODONE    IR 10 milliGRAM(s) Oral every 6 hours PRN Severe Pain (7 - 10)      ITEMS UNCHECKED ARE NOT PRESENT    PRESENT SYMPTOMS: [ ]Unable to obtain due to poor mentation   Source if other than patient:  [ ]Family   [ ]Team     Pain:  [ x ] yes [ ] no  QOL impact - Moderate   Location - Abdomen                   Aggravating factors - Touch, movement  Quality - Pressure  Radiation - None  Timing- Constant  Severity (0-10 scale): 4/10 in intensity  Minimal acceptable level (0-10 scale): 2/10     Dyspnea:                           [ x ]Mild [ ]Moderate [ ]Severe  Anxiety:                             [ ]Mild [ ]Moderate [ ]Severe  Fatigue:                             [ ]Mild [ ]Moderate [ ]Severe  Nausea:                             [ ]Mild [ ]Moderate [ ]Severe  Loss of appetite:              [ x ]Mild [ ]Moderate [ ]Severe  Constipation:                    [ x ]Mild [ ]Moderate [ ]Severe    PAIN AD Score:	  [email or web address] (Ctrl + left click to view)    Other Symptoms:  [ x ]All other review of systems negative     Karnofsky Performance Score/Palliative Performance Status Version 2:        30-40%    [email or web address]    PHYSICAL EXAM:  Vital Signs Last 24 Hrs  T(C): 36.8 (28 Jan 2020 10:06), Max: 37.2 (27 Jan 2020 18:02)  T(F): 98.3 (28 Jan 2020 10:06), Max: 99 (27 Jan 2020 21:34)  HR: 91 (28 Jan 2020 10:26) (91 - 109)  BP: 157/88 (28 Jan 2020 10:06) (150/80 - 161/92)  BP(mean): --  RR: 20 (28 Jan 2020 10:06) (17 - 21)  SpO2: 100% (28 Jan 2020 10:26) (94% - 100%) I&O's Summary    27 Jan 2020 07:01  -  28 Jan 2020 07:00  --------------------------------------------------------  IN: 610 mL / OUT: 775 mL / NET: -165 mL    GENERAL:  [ x ]Alert  [ x ]Oriented x3   [ ]Lethargic  [ ]Cachexia  [ ]Unarousable  [ x ]Verbal  [ ]Non-Verbal  Behavioral:   [ ] Anxiety  [ ] Delirium [ ] Agitation [ ] Other  HEENT:  [ x ]Normal   [ ]Dry mouth   [ ]ET Tube/Trach  [ ]Oral lesions  PULMONARY:   [ x ]Clear [ ]Tachypnea  [ ]Audible excessive secretions   [ ]Rhonchi        [ ]Right [ ]Left [ ]Bilateral  [ ]Crackles        [ ]Right [ ]Left [ ]Bilateral  [ ]Wheezing     [ ]Right [ ]Left [ ]Bilateral  CARDIOVASCULAR:    [ x ]Regular [ ]Irregular [ ]Tachy  [ ]Nicolas [ ]Murmur [ ]Other  GASTROINTESTINAL:  [ ]Soft  [ x ]Distended   [ x ]+BS  [  ]Non tender [ x ]Tender  [ ]PEG [ ]OGT/ NGT  Last BM: 1/25/20  GENITOURINARY:  [ x ]Normal [ ] Incontinent   [ ]Oliguria/Anuria   [ ]Bernabe  MUSCULOSKELETAL:   [ ]Normal   [ x ]Weakness  [ ]Bed/Wheelchair bound [ ]Edema  NEUROLOGIC:   [ x ]No focal deficits  [ ] Cognitive impairment  [ ] Dysphagia [ ]Dysarthria [ ] Paresis [ ]Other   SKIN:   [ x ]Normal   [ ]Pressure ulcer(s)  [ ]Rash    CRITICAL CARE:  [ ] Shock Present  [ ]Septic [ ]Cardiogenic [ ]Neurologic [ ]Hypovolemic  [ ]  Vasopressors [ ]  Inotropes   [ ] Respiratory failure present [ ] mechanical ventilation [ ] non-invasive ventilatory support [ ] High flow  [ ] Acute  [ ] Chronic [ ] Hypoxic  [ ] Hypercarbic [ ] Other  [ x ] Other organ failure Liver  LABS:                        8.0    65.53 )-----------( 155      ( 28 Jan 2020 07:40 )             25.1   01-28    134<L>  |  95<L>  |  29<H>  ----------------------------<  126<H>  3.4<L>   |  22  |  0.64    Ca    10.5      28 Jan 2020 07:40  Phos  3.8     01-28  Mg     2.0     01-28    TPro  8.0  /  Alb  5.8<H>  /  TBili  2.4<H>  /  DBili  x   /  AST  43<H>  /  ALT  45<H>  /  AlkPhos  84  01-28  PT/INR - ( 28 Jan 2020 07:40 )   PT: 15.1 SEC;   INR: 1.31       RADIOLOGY & ADDITIONAL STUDIES:  None new    Protein Calorie Malnutrition Present: [ ] yes [ ] no  [ x ] PPSV2 < or = 30%  [ ] significant weight loss [ ] poor nutritional intake [ ] anasarca [ ] catabolic state Albumin, Serum: 5.8 g/dL (01-28-20 @ 07:40)  Artificial Nutrition [ ]     REFERRALS:   [ ]Chaplaincy  [ x ] Hospice  [ ]Child Life  [ x ]Social Work  [ ]Case management [ ]Holistic Therapy     Goals of Care Document:    Care Coordination Assessment [C. Provider] (01-10-20 @ 13:44)   Provider Contact Note (Other) [CARMELINA Pierre] (01-28-20 @ 05:05)

## 2020-01-28 NOTE — PROGRESS NOTE ADULT - PROBLEM SELECTOR PLAN 2
abdomen markedly distended, compromising breathing at times  Will need to start to use diuretics carefully  Previously was trying to hold off on a tap b/o recent history of SBO w/bowel perf and concurrent infection with C.diff, will discuss with hepatology if benefits now outweigh the risks. And if so will ask IR to see if they can assist with tap.

## 2020-01-29 LAB
ANION GAP SERPL CALC-SCNC: 17 MMO/L — HIGH (ref 7–14)
BUN SERPL-MCNC: 36 MG/DL — HIGH (ref 7–23)
CALCIUM SERPL-MCNC: 10.8 MG/DL — HIGH (ref 8.4–10.5)
CHLORIDE SERPL-SCNC: 97 MMOL/L — LOW (ref 98–107)
CO2 SERPL-SCNC: 22 MMOL/L — SIGNIFICANT CHANGE UP (ref 22–31)
CREAT SERPL-MCNC: 0.75 MG/DL — SIGNIFICANT CHANGE UP (ref 0.5–1.3)
GLUCOSE SERPL-MCNC: 112 MG/DL — HIGH (ref 70–99)
HCT VFR BLD CALC: 26.3 % — LOW (ref 39–50)
HGB BLD-MCNC: 8.2 G/DL — LOW (ref 13–17)
MAGNESIUM SERPL-MCNC: 1.9 MG/DL — SIGNIFICANT CHANGE UP (ref 1.6–2.6)
MCHC RBC-ENTMCNC: 23.4 PG — LOW (ref 27–34)
MCHC RBC-ENTMCNC: 31.2 % — LOW (ref 32–36)
MCV RBC AUTO: 74.9 FL — LOW (ref 80–100)
NRBC # FLD: 0 K/UL — SIGNIFICANT CHANGE UP (ref 0–0)
PHOSPHATE SERPL-MCNC: 3.4 MG/DL — SIGNIFICANT CHANGE UP (ref 2.5–4.5)
PLATELET # BLD AUTO: 134 K/UL — LOW (ref 150–400)
PMV BLD: SIGNIFICANT CHANGE UP FL (ref 7–13)
POTASSIUM SERPL-MCNC: 3.6 MMOL/L — SIGNIFICANT CHANGE UP (ref 3.5–5.3)
POTASSIUM SERPL-SCNC: 3.6 MMOL/L — SIGNIFICANT CHANGE UP (ref 3.5–5.3)
RBC # BLD: 3.51 M/UL — LOW (ref 4.2–5.8)
RBC # FLD: 22.9 % — HIGH (ref 10.3–14.5)
SODIUM SERPL-SCNC: 136 MMOL/L — SIGNIFICANT CHANGE UP (ref 135–145)
WBC # BLD: 64.41 K/UL — CRITICAL HIGH (ref 3.8–10.5)
WBC # FLD AUTO: 64.41 K/UL — CRITICAL HIGH (ref 3.8–10.5)

## 2020-01-29 PROCEDURE — 99232 SBSQ HOSP IP/OBS MODERATE 35: CPT | Mod: GC

## 2020-01-29 PROCEDURE — 74177 CT ABD & PELVIS W/CONTRAST: CPT | Mod: 26

## 2020-01-29 PROCEDURE — 71045 X-RAY EXAM CHEST 1 VIEW: CPT | Mod: 26

## 2020-01-29 PROCEDURE — 93306 TTE W/DOPPLER COMPLETE: CPT | Mod: 26

## 2020-01-29 PROCEDURE — 99233 SBSQ HOSP IP/OBS HIGH 50: CPT

## 2020-01-29 RX ORDER — FUROSEMIDE 40 MG
40 TABLET ORAL DAILY
Refills: 0 | Status: DISCONTINUED | OUTPATIENT
Start: 2020-01-29 | End: 2020-01-31

## 2020-01-29 RX ORDER — OXYCODONE HYDROCHLORIDE 5 MG/1
10 TABLET ORAL EVERY 6 HOURS
Refills: 0 | Status: DISCONTINUED | OUTPATIENT
Start: 2020-01-29 | End: 2020-01-29

## 2020-01-29 RX ORDER — SPIRONOLACTONE 25 MG/1
100 TABLET, FILM COATED ORAL DAILY
Refills: 0 | Status: DISCONTINUED | OUTPATIENT
Start: 2020-01-29 | End: 2020-01-30

## 2020-01-29 RX ORDER — FUROSEMIDE 40 MG
40 TABLET ORAL DAILY
Refills: 0 | Status: DISCONTINUED | OUTPATIENT
Start: 2020-01-29 | End: 2020-01-29

## 2020-01-29 RX ORDER — VANCOMYCIN HCL 1 G
125 VIAL (EA) INTRAVENOUS EVERY 6 HOURS
Refills: 0 | Status: DISCONTINUED | OUTPATIENT
Start: 2020-01-29 | End: 2020-01-31

## 2020-01-29 RX ORDER — FUROSEMIDE 40 MG
40 TABLET ORAL ONCE
Refills: 0 | Status: DISCONTINUED | OUTPATIENT
Start: 2020-01-29 | End: 2020-01-29

## 2020-01-29 RX ORDER — TAMSULOSIN HYDROCHLORIDE 0.4 MG/1
0.4 CAPSULE ORAL AT BEDTIME
Refills: 0 | Status: DISCONTINUED | OUTPATIENT
Start: 2020-01-29 | End: 2020-01-29

## 2020-01-29 RX ORDER — MORPHINE SULFATE 50 MG/1
2 CAPSULE, EXTENDED RELEASE ORAL EVERY 4 HOURS
Refills: 0 | Status: DISCONTINUED | OUTPATIENT
Start: 2020-01-29 | End: 2020-01-30

## 2020-01-29 RX ORDER — OXYCODONE HYDROCHLORIDE 5 MG/1
5 TABLET ORAL EVERY 6 HOURS
Refills: 0 | Status: DISCONTINUED | OUTPATIENT
Start: 2020-01-29 | End: 2020-01-29

## 2020-01-29 RX ORDER — HYDROMORPHONE HYDROCHLORIDE 2 MG/ML
0.5 INJECTION INTRAMUSCULAR; INTRAVENOUS; SUBCUTANEOUS EVERY 4 HOURS
Refills: 0 | Status: DISCONTINUED | OUTPATIENT
Start: 2020-01-29 | End: 2020-01-31

## 2020-01-29 RX ADMIN — Medication 1 TABLET(S): at 10:27

## 2020-01-29 RX ADMIN — Medication 125 MILLIGRAM(S): at 19:43

## 2020-01-29 RX ADMIN — OXYCODONE HYDROCHLORIDE 10 MILLIGRAM(S): 5 TABLET ORAL at 14:06

## 2020-01-29 RX ADMIN — Medication 125 MILLIGRAM(S): at 14:05

## 2020-01-29 RX ADMIN — OXYCODONE HYDROCHLORIDE 10 MILLIGRAM(S): 5 TABLET ORAL at 14:36

## 2020-01-29 RX ADMIN — PANTOPRAZOLE SODIUM 40 MILLIGRAM(S): 20 TABLET, DELAYED RELEASE ORAL at 12:08

## 2020-01-29 RX ADMIN — Medication 3 MILLILITER(S): at 16:35

## 2020-01-29 RX ADMIN — Medication 3 MILLILITER(S): at 10:47

## 2020-01-29 RX ADMIN — Medication 50 MILLILITER(S): at 12:07

## 2020-01-29 RX ADMIN — TENOFOVIR DISOPROXIL FUMARATE 300 MILLIGRAM(S): 300 TABLET, FILM COATED ORAL at 12:08

## 2020-01-29 RX ADMIN — Medication 125 MILLIGRAM(S): at 03:01

## 2020-01-29 RX ADMIN — SPIRONOLACTONE 100 MILLIGRAM(S): 25 TABLET, FILM COATED ORAL at 06:26

## 2020-01-29 RX ADMIN — ENOXAPARIN SODIUM 40 MILLIGRAM(S): 100 INJECTION SUBCUTANEOUS at 12:25

## 2020-01-29 RX ADMIN — OXYCODONE HYDROCHLORIDE 10 MILLIGRAM(S): 5 TABLET ORAL at 07:00

## 2020-01-29 RX ADMIN — Medication 50 MILLILITER(S): at 19:41

## 2020-01-29 RX ADMIN — Medication 0.25 MILLIGRAM(S): at 01:54

## 2020-01-29 RX ADMIN — Medication 1 TABLET(S): at 19:42

## 2020-01-29 RX ADMIN — Medication 1 TABLET(S): at 12:08

## 2020-01-29 RX ADMIN — OXYCODONE HYDROCHLORIDE 10 MILLIGRAM(S): 5 TABLET ORAL at 06:26

## 2020-01-29 RX ADMIN — Medication 50 MILLILITER(S): at 06:26

## 2020-01-29 RX ADMIN — Medication 40 MILLIGRAM(S): at 06:26

## 2020-01-29 RX ADMIN — Medication 50 MILLILITER(S): at 00:08

## 2020-01-29 NOTE — GOALS OF CARE CONVERSATION - ADVANCED CARE PLANNING - NS PRO AD PATIENT TYPE
Medical Orders for Life-Sustaining Treatment (MOLST)/Living Will/Do Not Resuscitate (DNR)/Health Care Proxy (HCP)
Do Not Resuscitate (DNR)/Living Will/Health Care Proxy (HCP)/Medical Orders for Life-Sustaining Treatment (MOLST)
Medical Orders for Life-Sustaining Treatment (MOLST)/Living Will/Health Care Proxy (HCP)/Do Not Resuscitate (DNR)

## 2020-01-29 NOTE — CHART NOTE - NSCHARTNOTEFT_GEN_A_CORE
Spoke with Heme/Onc Attg Dr. Dean, advised to change Lasix and PRN pain meds to IV, will change and continue to monitor.

## 2020-01-29 NOTE — PROGRESS NOTE ADULT - NEGATIVE RESPIRATORY AND THORAX SYMPTOMS
no cough
no cough
no cough/no dyspnea
no dyspnea/no cough
no dyspnea/no hemoptysis
no cough
no cough
no cough/no dyspnea
no dyspnea/no cough
no hemoptysis/no cough/no dyspnea

## 2020-01-29 NOTE — PROGRESS NOTE ADULT - ASSESSMENT
79 year old male with a PMH of CLL (untreated), cirrhosis (Child B at time of admission), chronic hepatitis B, and gastritis found to have SBO with pneumoperitoneum s/p ex lap, REESE and small bowel resection 1/7. Initial POC c/b ileus that resolved with time and cirrhosis/hypnatremia that medicine had been managing. Surgery called last night for increasing distention in the setting of decreased bowel movements. Patient likely has ileus but 3 weeks post operatively, obstruction vs sricture cannot be ruled out.    Plan:  - please obtain CT with IV/PO contrast to evaluate GI tract  - can continue NPO/ NGT for now  - will discuss findings with medical team    B team surgery  [id number]

## 2020-01-29 NOTE — PROGRESS NOTE ADULT - ASSESSMENT
79 year old man hx of HTN, CLL (diagnosed in 2013, treatment naive), previously compensated Hep B cirrhosis (Viread started 11/2019) presented with abdominal pain in the setting SBO, now s/p Ex Lap with 15 cm of small bowel removed. Hepatology consulted for management of new ascites, and hyponatremia with min. Na 125. Course complicated by c. difficile colitis, on PO vancomycin since 1/20.    Impression:  # Decompensated Hep B liver cirrhosis. MELD-Na: 16 on Jan 28   HE: AOX0 today   Ascites: Moderate ascites seen on CT (1/14/20). Not tapped as it was initially too small, then deferred out of concern for complications due to C. difficile colitis.  # Hyponatremia: Improved to > 130, getting albumin daily since 1/21  HCC: No lesions on CT (1/14/20)  EV: Last EGD reportedly 2 years ago, records not available  # Hepatitis B: Hep B eAg positive, high HBV viremia, genotype D. On Viread  # C. Diff: On PO vancomycin today day 9      # SBO complicated with perforation s/p Ex lap with SB resection and anastomosis (1/7/20). Pt awaiting CT abdomen for severe abd pain   # SOB secondary to Pulmonary edema/ effusion and increasing ascites. On lasix 40 and aldactone 100   # Pt DNR/ DNI. Palliative care following     Recommendation:  - Please get CT abdomen and pelvis with contrast (IV and PO contrast)   - Surgery follow up   - If CT abdomen did not show any acute pathology (obstruction/ perforation..) then get IR guided diagnostic and Therapeutic paracentesis (send fluid for alb , cell count, culture, TP, ldh, glucose)   - Keep NPO for now and if CT negative for acute pathology then  start lactulose via NG tube 30 gm q 6h   - Get CMP and INR level daily    - Continue  spironolactone 100 mg daily and lasix 40 po once daily, and albumin 25 g q6hrs	  - Follow electrolytes daily and watch for Hyperkalemia   - Continue Viread   - Continue Vancomycin po for total of 10 days (today day 8)   - Dietitian follow for appropriate calorie and protein intake   - Supportive care per primary team      Cheikh Callie Richard  GI Fellow  Pager# [phone] 79 year old man hx of HTN, CLL (diagnosed in 2013, treatment naive), previously compensated Hep B cirrhosis (Viread started 11/2019) presented with abdominal pain in the setting SBO, now s/p Ex Lap with 15 cm of small bowel removed. Hepatology consulted for management of new ascites, and hyponatremia with min. Na 125. Course complicated by c. difficile colitis, on PO vancomycin since 1/20.    Impression:  # Decompensated Hep B liver cirrhosis. MELD-Na: 16 on Jan 28   HE: AOX0 today   Ascites: Moderate ascites seen on CT (1/14/20). Not tapped as it was initially too small, then deferred out of concern for complications due to C. difficile colitis.  # Hyponatremia: Improved to > 130, getting albumin daily since 1/21  HCC: No lesions on CT (1/14/20)  EV: Last EGD reportedly 2 years ago, records not available  # Hepatitis B: Hep B eAg positive, high HBV viremia, genotype D. On Viread  # C. Diff: On PO vancomycin today day 9      # SBO complicated with perforation s/p Ex lap with SB resection and anastomosis (1/7/20). Pt awaiting CT abdomen for severe abd pain   # SOB secondary to Pulmonary edema/ effusion and increasing ascites. On lasix 40 and aldactone 100   # Pt DNR/ DNI. Palliative care following     Recommendation:  - frequent turns of patient to help mobilize gas  - Please get CT abdomen and pelvis with contrast (IV and PO contrast)   - Surgery follow up   - If CT abdomen did not show any acute pathology (obstruction/ perforation..) then get IR guided diagnostic and Therapeutic paracentesis (send fluid for alb , cell count, culture, TP, ldh, glucose)     - Keep NPO for now and if CT negative for acute pathology then  start lactulose via NG tube 30 gm q 6h   - Get CMP and INR level daily    - Continue  spironolactone 100 mg daily and lasix 40 po once daily, and albumin 25 g q6hrs	  - Follow electrolytes daily and watch for Hyperkalemia   - Continue Viread; stop if hospice  - Continue Vancomycin po for total of 10 days (today day 8)         Cheikh Callie Richard  GI Fellow  Pager# [phone]

## 2020-01-29 NOTE — PROGRESS NOTE ADULT - SUBJECTIVE AND OBJECTIVE BOX
POD #: 22    Last night surgery was updated that the patient was increasingly distended and uncomfortable. Patient was seen and noted to be quite distended. abdominal xray showed dilated loops of bowel. NGT was placed with minimal output.    SUBJECTIVE:  Reports a small amount of meredith-umbilical pain  Denies nausea, vomiting  Is passing gas but hasn't had a bowel movement in having bowel movements in 3d  Tolerating diet  Ambulating independently    OBJECTIVE:  Vital Signs Last 24 Hrs  T(C): 36.4 (29 Jan 2020 06:24), Max: 37.2 (28 Jan 2020 17:47)  T(F): 97.6 (29 Jan 2020 06:24), Max: 98.9 (28 Jan 2020 17:47)  HR: 98 (29 Jan 2020 06:24) (91 - 110)  BP: 162/86 (29 Jan 2020 06:24) (149/75 - 171/94)  BP(mean): --  RR: 18 (29 Jan 2020 06:24) (18 - 20)  SpO2: 96% (29 Jan 2020 06:24) (95% - 100%)      Physical Examination:  GEN: NAD, resting quietly  NEURO: AAOx3, CN II-XII grossly intact, no focal deficits  PULM: symmetric chest rise bilaterally, no increased WOB  ABD: soft, nontender, tympanitic and distended, midline incision CDI, healing well with beefy red tissue. No stool felt in the rectal vault on SRINIVAS  EXTR: no cyanosis or edema, moving all extremities    LABS:                        8.2    64.41 )-----------( 134      ( 29 Jan 2020 07:25 )             26.3       01-29    136  |  97<L>  |  36<H>  ----------------------------<  112<H>  3.6   |  22  |  0.75    Ca    10.8<H>      29 Jan 2020 07:25  Phos  3.4     01-29  Mg     1.9     01-29    TPro  8.0  /  Alb  5.8<H>  /  TBili  2.4<H>  /  DBili  x   /  AST  43<H>  /  ALT  45<H>  /  AlkPhos  84  01-28

## 2020-01-29 NOTE — GOALS OF CARE CONVERSATION - ADVANCED CARE PLANNING - NS PRO AD PATIENT TYPE ON CHART
Do Not Resuscitate (DNR)/Medical Orders for Life-Sustaining Treatment (MOLST)/Health Care Proxy (HCP)/Living Will
Do Not Resuscitate (DNR)/Health Care Proxy (HCP)/Medical Orders for Life-Sustaining Treatment (MOLST)/Living Will
Health Care Proxy (HCP)/Do Not Resuscitate (DNR)/Medical Orders for Life-Sustaining Treatment (MOLST)/Living Will

## 2020-01-29 NOTE — PROGRESS NOTE ADULT - ASSESSMENT
79M with multiple and significant medical history as above and CLL, never required Rx for it, here with SBO and perforation, s/p surgery, Path showing just serosal adhesions without any malignancy, etiology unclear, did not need any transfusion, will recommend:  - acute issue of SOB and belly distension, has NGT, going for further tests as per family wishes - spoke to the wife in detail, they think they want to continue active Rx while here.  - s/p transfusion, monitor Hgb and keep it ~ 8.  - continue Rx as per medicine, consider cardiology consult  - slow advancing of diet as tolerated, continue ondansetron 4 mg half hour before each meal to minimize nausea  - stool for c. 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  - the prognosis is very poor overall  - on 1.26.2020, discussed the issues in great detail with the pt, daughter, son on the phone, brother in law in [place] on the phone and then RN, PA and Dr Vernon. Pt has been suffering a lot along with the family members and has not improved significantly because of one problem after another. the role of palliative care, hospice also discussed, they are all in agreement for that, will recommend, continuing active treatment, call palliative care team. Family members will be arriving in next few days. DNR/DNI issues also discussed, he is DNR    - call for any questions - [phone]

## 2020-01-29 NOTE — PROGRESS NOTE ADULT - PROBLEM SELECTOR PLAN 2
abdomen markedly distended, compromising breathing at times  Previously was trying to hold off on a tap b/o recent history of SBO w/bowel perf and concurrent infection with C.diff, will discuss with hepatology if benefits now outweigh the risks. And if so will ask IR to see if they can assist with tap.

## 2020-01-29 NOTE — PROGRESS NOTE ADULT - EXTREMITIES COMMENTS
bilateral venofynes on
both legs have venodynes on them
trace pedal edema
trace edema both ankles, both legs in venodynes
venodynes both legs
both legs in venodynes
both legs in venodynes

## 2020-01-29 NOTE — PROGRESS NOTE ADULT - PROBLEM SELECTOR PLAN 1
Abdomen is increasing in size  Monitoring volume status and Na.  No signs of encephalopathy  Progressing abdominal pain, surgery reconsulted, will follow up the recommended Ct scan of the abdomen/pelvis

## 2020-01-29 NOTE — PROGRESS NOTE ADULT - SUBJECTIVE AND OBJECTIVE BOX
PROGRESS NOTE:     Patient is a 79y old  Male who presents with a chief complaint of abdominal pain (29 Jan 2020 09:15)      SUBJECTIVE / OVERNIGHT EVENTS:  Patient seen and examined this morning. Overnight events noted. Patient with increasing abdominal pain, seen by surgery, NG placed, pending CT abdomenn/pelvis w/oral contrast.     ADDITIONAL REVIEW OF SYSTEMS:  Denies fevers, chills, n/v    MEDICATIONS  (STANDING):  albumin human 25% IVPB 100 milliLiter(s) IV Intermittent every 6 hours  albuterol/ipratropium for Nebulization 3 milliLiter(s) Nebulizer every 6 hours  enoxaparin Injectable 40 milliGRAM(s) SubCutaneous daily  furosemide    Tablet 40 milliGRAM(s) Oral daily  pantoprazole  Injectable 40 milliGRAM(s) IV Push daily  potassium acid phosphate/sodium acid phosphate tablet (K-PHOS No. 2) 1 Tablet(s) Oral three times a day with meals  spironolactone 100 milliGRAM(s) Oral daily  tenofovir disoproxil fumarate (VIREAD) 300 milliGRAM(s) Oral daily  tiotropium 18 MICROgram(s) Capsule 1 Capsule(s) Inhalation daily  vancomycin    Solution 125 milliGRAM(s) Oral every 6 hours    MEDICATIONS  (PRN):  acetaminophen   Tablet .. 650 milliGRAM(s) Oral every 6 hours PRN Mild Pain (1 - 3)  ALBUTerol    90 MICROgram(s) HFA Inhaler 1 Puff(s) Inhalation every 4 hours PRN Shortness of Breath and/or Wheezing  oxyCODONE    IR 5 milliGRAM(s) Oral every 6 hours PRN Moderate Pain (4 - 6)  oxyCODONE    IR 10 milliGRAM(s) Oral every 6 hours PRN Severe Pain (7 - 10)      CAPILLARY BLOOD GLUCOSE      POCT Blood Glucose.: 116 mg/dL (29 Jan 2020 07:22)    I&O's Summary    28 Jan 2020 07:01  -  29 Jan 2020 07:00  --------------------------------------------------------  IN: 1030 mL / OUT: 1050 mL / NET: -20 mL    29 Jan 2020 07:01  -  29 Jan 2020 14:12  --------------------------------------------------------  IN: 60 mL / OUT: 175 mL / NET: -115 mL        PHYSICAL EXAM:  Vital Signs Last 24 Hrs  T(C): 36.8 (29 Jan 2020 10:25), Max: 37.2 (28 Jan 2020 17:47)  T(F): 98.3 (29 Jan 2020 10:25), Max: 98.9 (28 Jan 2020 17:47)  HR: 94 (29 Jan 2020 10:47) (94 - 110)  BP: 161/79 (29 Jan 2020 10:25) (149/75 - 171/94)  BP(mean): --  RR: 18 (29 Jan 2020 10:25) (18 - 18)  SpO2: 95% (29 Jan 2020 10:47) (95% - 97%)    CONSTITUTIONAL:Sickly appearing, with noticeable distended abdomen.   RESPIRATORY: Normal respiratory effort; occasional wheeze b/l  CARDIOVASCULAR: Regular rate and rhythm, normal S1 and S2, no murmur/rub/gallop; No lower extremity edema; Peripheral pulses are 2+ bilaterally  ABDOMEN: : Continues to be Distended, nontender; Bowel sounds present, +fluid wave. Incision site c/d/i  MUSCLOSKELETAL: no clubbing or cyanosis of digits; no joint swelling or tenderness to palpation  PSYCH: A+O to person, place, and time; affect appropriate    LABS:                        8.2    64.41 )-----------( 134      ( 29 Jan 2020 07:25 )             26.3     01-29    136  |  97<L>  |  36<H>  ----------------------------<  112<H>  3.6   |  22  |  0.75    Ca    10.8<H>      29 Jan 2020 07:25  Phos  3.4     01-29  Mg     1.9     01-29    TPro  8.0  /  Alb  5.8<H>  /  TBili  2.4<H>  /  DBili  x   /  AST  43<H>  /  ALT  45<H>  /  AlkPhos  84  01-28    PT/INR - ( 28 Jan 2020 07:40 )   PT: 15.1 SEC;   INR: 1.31                      RADIOLOGY & ADDITIONAL TESTS:  Results Reviewed:   Imaging Personally Reviewed:  Electrocardiogram Personally Reviewed:    COORDINATION OF CARE:  Care Discussed with Consultants/Other Providers [Y/N]:  Prior or Outpatient Records Reviewed [Y/N]:

## 2020-01-29 NOTE — PROGRESS NOTE ADULT - SUBJECTIVE AND OBJECTIVE BOX
We are following the patient for Ascites      GI HPI Today:  Pt complained of severe abd pain yesterday and got pain meds and became lethargic   as per family pt passing flatus but no BM since 3 days   Hemodynamically stable and no fever     PAST MEDICAL & SURGICAL HISTORY  Cirrhosis  HTN (hypertension)  H/O gastritis  CLL (chronic lymphocytic leukemia)  No significant past surgical history      ALLERGIES:  Beef (Unknown)  No Known Drug Allergies      MEDICATIONS:  MEDICATIONS  (STANDING):  albumin human 25% IVPB 100 milliLiter(s) IV Intermittent every 6 hours  albuterol/ipratropium for Nebulization 3 milliLiter(s) Nebulizer every 6 hours  enoxaparin Injectable 40 milliGRAM(s) SubCutaneous daily  furosemide    Tablet 40 milliGRAM(s) Oral daily  pantoprazole  Injectable 40 milliGRAM(s) IV Push daily  potassium acid phosphate/sodium acid phosphate tablet (K-PHOS No. 2) 1 Tablet(s) Oral three times a day with meals  spironolactone 100 milliGRAM(s) Oral daily  tenofovir disoproxil fumarate (VIREAD) 300 milliGRAM(s) Oral daily  tiotropium 18 MICROgram(s) Capsule 1 Capsule(s) Inhalation daily    MEDICATIONS  (PRN):  acetaminophen   Tablet .. 650 milliGRAM(s) Oral every 6 hours PRN Mild Pain (1 - 3)  ALBUTerol    90 MICROgram(s) HFA Inhaler 1 Puff(s) Inhalation every 4 hours PRN Shortness of Breath and/or Wheezing  oxyCODONE    IR 5 milliGRAM(s) Oral every 6 hours PRN Moderate Pain (4 - 6)  oxyCODONE    IR 10 milliGRAM(s) Oral every 6 hours PRN Severe Pain (7 - 10)      REVIEW OF SYSTEMS  unable to obtain        VITALS:   T(F): 97.6 (01-29 @ 06:24), Max: 99 (01-27 @ 21:34)  HR: 98 (01-29 @ 06:24) (81 - 110)  BP: 162/86 (01-29 @ 06:24) (146/75 - 176/101)  BP(mean): --  RR: 18 (01-29 @ 06:24) (16 - 21)  SpO2: 96% (01-29 @ 06:24) (94% - 100%)        PHYSICAL EXAM:  GENERAL:  Appears in distress  HEENT:  Conjunctivae Anicteric   CHEST:  Full & symmetric excursion  HEART:  NS1, S2,   ABDOMEN:  Soft, severely distended   EXTEREMITIES:  no edema  SKIN:  No rash  NEURO:  Alert, confused and not responding to comments          Blood Work :                        8.2    64.41 )-----------( 134      ( 29 Jan 2020 07:25 )             26.3     PT/INR - ( 28 Jan 2020 07:40 )  INR: 1.31            01-29    136  |  97<L>  |  36<H>  ----------------------------<  112<H>  3.6   |  22  |  0.75    Ca    10.8<H>      29 Jan 2020 07:25  Phos  3.4     01-29  Mg     1.9     01-29      CBC -  ( 29 Jan 2020 07:25 )  Hemoglobin : 8.2    CBC -  ( 28 Jan 2020 07:40 )  Hemoglobin : 8.0    CBC -  ( 27 Jan 2020 06:20 )  Hemoglobin : 9.2    CBC -  ( 26 Jan 2020 06:35 )  Hemoglobin : 8.6      LIVER FUNCTIONS - ( 28 Jan 2020 07:40 )  Alb: 5.8 [3.3 - 5.0] / Pro: 8.0 [6.0 - 8.3] / ALK PHOS: 84 [40 - 120] / ALT: 45 [4 - 41] / AST: 43 [4 - 40] / GGT: x

## 2020-01-29 NOTE — PROGRESS NOTE ADULT - SUBJECTIVE AND OBJECTIVE BOX
Events since last visit noted. Pt has NGT, going for echo and CT a/p also scheduled. Wife is with him. Pt is too weak, not able to do much or talk.         Meds:  acetaminophen   Tablet .. 650 milliGRAM(s) Oral every 6 hours PRN  albumin human 25% IVPB 100 milliLiter(s) IV Intermittent every 6 hours  ALBUTerol    90 MICROgram(s) HFA Inhaler 1 Puff(s) Inhalation every 4 hours PRN  albuterol/ipratropium for Nebulization 3 milliLiter(s) Nebulizer every 6 hours  enoxaparin Injectable 40 milliGRAM(s) SubCutaneous daily  furosemide    Tablet 40 milliGRAM(s) Oral daily  oxyCODONE    IR 5 milliGRAM(s) Oral every 6 hours PRN  oxyCODONE    IR 10 milliGRAM(s) Oral every 6 hours PRN  pantoprazole  Injectable 40 milliGRAM(s) IV Push daily  potassium acid phosphate/sodium acid phosphate tablet (K-PHOS No. 2) 1 Tablet(s) Oral three times a day with meals  spironolactone 100 milliGRAM(s) Oral daily  tenofovir disoproxil fumarate (VIREAD) 300 milliGRAM(s) Oral daily  tiotropium 18 MICROgram(s) Capsule 1 Capsule(s) Inhalation daily  vancomycin    Solution 125 milliGRAM(s) Oral every 6 hours      Vital Signs Last 24 Hrs  T(C): 37 (29 Jan 2020 14:13), Max: 37 (29 Jan 2020 14:13)  T(F): 98.6 (29 Jan 2020 14:13), Max: 98.6 (29 Jan 2020 14:13)  HR: 96 (29 Jan 2020 16:35) (94 - 102)  BP: 165/85 (29 Jan 2020 14:13) (149/75 - 171/94)  BP(mean): --  RR: 18 (29 Jan 2020 14:13) (18 - 18)  SpO2: 96% (29 Jan 2020 16:35) (95% - 97%)                          8.2    64.41 )-----------( 134      ( 29 Jan 2020 07:25 )             26.3       01-29    136  |  97<L>  |  36<H>  ----------------------------<  112<H>  3.6   |  22  |  0.75    Ca    10.8<H>      29 Jan 2020 07:25  Phos  3.4     01-29  Mg     1.9     01-29    TPro  8.0  /  Alb  5.8<H>  /  TBili  2.4<H>  /  DBili  x   /  AST  43<H>  /  ALT  45<H>  /  AlkPhos  84  01-28              PT/INR - ( 28 Jan 2020 07:40 )   PT: 15.1 SEC;   INR: 1.31

## 2020-01-30 LAB
ANION GAP SERPL CALC-SCNC: 18 MMO/L — HIGH (ref 7–14)
ANION GAP SERPL CALC-SCNC: 19 MMO/L — HIGH (ref 7–14)
APTT BLD: 30.7 SEC — SIGNIFICANT CHANGE UP (ref 27.5–36.3)
BASE EXCESS BLDV CALC-SCNC: -1.1 MMOL/L — SIGNIFICANT CHANGE UP
BUN SERPL-MCNC: 38 MG/DL — HIGH (ref 7–23)
BUN SERPL-MCNC: 46 MG/DL — HIGH (ref 7–23)
CALCIUM SERPL-MCNC: 11.1 MG/DL — HIGH (ref 8.4–10.5)
CALCIUM SERPL-MCNC: 11.2 MG/DL — HIGH (ref 8.4–10.5)
CHLORIDE SERPL-SCNC: 104 MMOL/L — SIGNIFICANT CHANGE UP (ref 98–107)
CHLORIDE SERPL-SCNC: 99 MMOL/L — SIGNIFICANT CHANGE UP (ref 98–107)
CO2 SERPL-SCNC: 20 MMOL/L — LOW (ref 22–31)
CO2 SERPL-SCNC: 22 MMOL/L — SIGNIFICANT CHANGE UP (ref 22–31)
CREAT SERPL-MCNC: 0.74 MG/DL — SIGNIFICANT CHANGE UP (ref 0.5–1.3)
CREAT SERPL-MCNC: 0.93 MG/DL — SIGNIFICANT CHANGE UP (ref 0.5–1.3)
GAS PNL BLDV: 145 MMOL/L — SIGNIFICANT CHANGE UP (ref 136–146)
GLUCOSE BLDV-MCNC: 95 MG/DL — SIGNIFICANT CHANGE UP (ref 70–99)
GLUCOSE SERPL-MCNC: 104 MG/DL — HIGH (ref 70–99)
GLUCOSE SERPL-MCNC: 93 MG/DL — SIGNIFICANT CHANGE UP (ref 70–99)
HCO3 BLDV-SCNC: 24 MMOL/L — SIGNIFICANT CHANGE UP (ref 20–27)
HCT VFR BLD CALC: 25.1 % — LOW (ref 39–50)
HCT VFR BLD CALC: 26.3 % — LOW (ref 39–50)
HCT VFR BLDV CALC: 24.5 % — LOW (ref 39–51)
HGB BLD-MCNC: 7.8 G/DL — LOW (ref 13–17)
HGB BLD-MCNC: 7.9 G/DL — LOW (ref 13–17)
HGB BLDV-MCNC: 7.8 G/DL — LOW (ref 13–17)
INR BLD: 1.36 — HIGH (ref 0.88–1.17)
INR BLD: 1.49 — HIGH (ref 0.88–1.17)
MAGNESIUM SERPL-MCNC: 1.8 MG/DL — SIGNIFICANT CHANGE UP (ref 1.6–2.6)
MAGNESIUM SERPL-MCNC: 2 MG/DL — SIGNIFICANT CHANGE UP (ref 1.6–2.6)
MCHC RBC-ENTMCNC: 23.4 PG — LOW (ref 27–34)
MCHC RBC-ENTMCNC: 23.7 PG — LOW (ref 27–34)
MCHC RBC-ENTMCNC: 29.7 % — LOW (ref 32–36)
MCHC RBC-ENTMCNC: 31.5 % — LOW (ref 32–36)
MCV RBC AUTO: 75.4 FL — LOW (ref 80–100)
MCV RBC AUTO: 78.7 FL — LOW (ref 80–100)
NRBC # FLD: 0 K/UL — SIGNIFICANT CHANGE UP (ref 0–0)
NRBC # FLD: 0 K/UL — SIGNIFICANT CHANGE UP (ref 0–0)
PCO2 BLDV: 33 MMHG — LOW (ref 41–51)
PH BLDV: 7.45 PH — HIGH (ref 7.32–7.43)
PHOSPHATE SERPL-MCNC: 2.4 MG/DL — LOW (ref 2.5–4.5)
PHOSPHATE SERPL-MCNC: 3.2 MG/DL — SIGNIFICANT CHANGE UP (ref 2.5–4.5)
PLATELET # BLD AUTO: 106 K/UL — LOW (ref 150–400)
PLATELET # BLD AUTO: 141 K/UL — LOW (ref 150–400)
PMV BLD: SIGNIFICANT CHANGE UP FL (ref 7–13)
PMV BLD: SIGNIFICANT CHANGE UP FL (ref 7–13)
PO2 BLDV: 70 MMHG — HIGH (ref 35–40)
POTASSIUM BLDV-SCNC: 3 MMOL/L — LOW (ref 3.4–4.5)
POTASSIUM SERPL-MCNC: 3.1 MMOL/L — LOW (ref 3.5–5.3)
POTASSIUM SERPL-MCNC: 3.5 MMOL/L — SIGNIFICANT CHANGE UP (ref 3.5–5.3)
POTASSIUM SERPL-SCNC: 3.1 MMOL/L — LOW (ref 3.5–5.3)
POTASSIUM SERPL-SCNC: 3.5 MMOL/L — SIGNIFICANT CHANGE UP (ref 3.5–5.3)
PROTHROM AB SERPL-ACNC: 15.6 SEC — HIGH (ref 9.8–13.1)
PROTHROM AB SERPL-ACNC: 16.7 SEC — HIGH (ref 9.8–13.1)
RBC # BLD: 3.33 M/UL — LOW (ref 4.2–5.8)
RBC # BLD: 3.34 M/UL — LOW (ref 4.2–5.8)
RBC # FLD: 22.9 % — HIGH (ref 10.3–14.5)
RBC # FLD: 22.9 % — HIGH (ref 10.3–14.5)
SAO2 % BLDV: 94.1 % — HIGH (ref 60–85)
SODIUM SERPL-SCNC: 139 MMOL/L — SIGNIFICANT CHANGE UP (ref 135–145)
SODIUM SERPL-SCNC: 143 MMOL/L — SIGNIFICANT CHANGE UP (ref 135–145)
WBC # BLD: 57.92 K/UL — CRITICAL HIGH (ref 3.8–10.5)
WBC # BLD: 67.3 K/UL — CRITICAL HIGH (ref 3.8–10.5)
WBC # FLD AUTO: 57.92 K/UL — CRITICAL HIGH (ref 3.8–10.5)
WBC # FLD AUTO: 67.3 K/UL — CRITICAL HIGH (ref 3.8–10.5)

## 2020-01-30 PROCEDURE — 99233 SBSQ HOSP IP/OBS HIGH 50: CPT

## 2020-01-30 PROCEDURE — 74018 RADEX ABDOMEN 1 VIEW: CPT | Mod: 26

## 2020-01-30 PROCEDURE — 99232 SBSQ HOSP IP/OBS MODERATE 35: CPT | Mod: GC

## 2020-01-30 RX ORDER — POLYETHYLENE GLYCOL 3350 17 G/17G
17 POWDER, FOR SOLUTION ORAL AT BEDTIME
Refills: 0 | Status: DISCONTINUED | OUTPATIENT
Start: 2020-01-30 | End: 2020-01-31

## 2020-01-30 RX ORDER — SPIRONOLACTONE 25 MG/1
100 TABLET, FILM COATED ORAL ONCE
Refills: 0 | Status: DISCONTINUED | OUTPATIENT
Start: 2020-01-30 | End: 2020-01-30

## 2020-01-30 RX ORDER — LACTULOSE 10 G/15ML
30 SOLUTION ORAL EVERY 6 HOURS
Refills: 0 | Status: DISCONTINUED | OUTPATIENT
Start: 2020-01-30 | End: 2020-01-30

## 2020-01-30 RX ORDER — PIPERACILLIN AND TAZOBACTAM 4; .5 G/20ML; G/20ML
3.38 INJECTION, POWDER, LYOPHILIZED, FOR SOLUTION INTRAVENOUS ONCE
Refills: 0 | Status: COMPLETED | OUTPATIENT
Start: 2020-01-30 | End: 2020-01-30

## 2020-01-30 RX ORDER — FUROSEMIDE 40 MG
40 TABLET ORAL ONCE
Refills: 0 | Status: COMPLETED | OUTPATIENT
Start: 2020-01-30 | End: 2020-01-30

## 2020-01-30 RX ORDER — POTASSIUM CHLORIDE 20 MEQ
10 PACKET (EA) ORAL
Refills: 0 | Status: COMPLETED | OUTPATIENT
Start: 2020-01-30 | End: 2020-01-30

## 2020-01-30 RX ORDER — HYDROMORPHONE HYDROCHLORIDE 2 MG/ML
0.5 INJECTION INTRAMUSCULAR; INTRAVENOUS; SUBCUTANEOUS ONCE
Refills: 0 | Status: DISCONTINUED | OUTPATIENT
Start: 2020-01-30 | End: 2020-01-30

## 2020-01-30 RX ORDER — SPIRONOLACTONE 25 MG/1
100 TABLET, FILM COATED ORAL ONCE
Refills: 0 | Status: COMPLETED | OUTPATIENT
Start: 2020-01-30 | End: 2020-01-30

## 2020-01-30 RX ORDER — SPIRONOLACTONE 25 MG/1
200 TABLET, FILM COATED ORAL ONCE
Refills: 0 | Status: DISCONTINUED | OUTPATIENT
Start: 2020-01-31 | End: 2020-01-31

## 2020-01-30 RX ORDER — VANCOMYCIN HCL 1 G
1000 VIAL (EA) INTRAVENOUS ONCE
Refills: 0 | Status: COMPLETED | OUTPATIENT
Start: 2020-01-30 | End: 2020-01-30

## 2020-01-30 RX ORDER — ALBUTEROL 90 UG/1
2.5 AEROSOL, METERED ORAL ONCE
Refills: 0 | Status: COMPLETED | OUTPATIENT
Start: 2020-01-30 | End: 2020-01-30

## 2020-01-30 RX ORDER — ACETAMINOPHEN 500 MG
1000 TABLET ORAL ONCE
Refills: 0 | Status: COMPLETED | OUTPATIENT
Start: 2020-01-30 | End: 2020-01-30

## 2020-01-30 RX ORDER — SPIRONOLACTONE 25 MG/1
100 TABLET, FILM COATED ORAL DAILY
Refills: 0 | Status: DISCONTINUED | OUTPATIENT
Start: 2020-02-01 | End: 2020-01-31

## 2020-01-30 RX ADMIN — Medication 40 MILLIGRAM(S): at 05:43

## 2020-01-30 RX ADMIN — Medication 50 MILLILITER(S): at 23:01

## 2020-01-30 RX ADMIN — ALBUTEROL 2.5 MILLIGRAM(S): 90 AEROSOL, METERED ORAL at 20:17

## 2020-01-30 RX ADMIN — Medication 100 MILLIEQUIVALENT(S): at 11:01

## 2020-01-30 RX ADMIN — Medication 1000 MILLIGRAM(S): at 22:33

## 2020-01-30 RX ADMIN — HYDROMORPHONE HYDROCHLORIDE 0.5 MILLIGRAM(S): 2 INJECTION INTRAMUSCULAR; INTRAVENOUS; SUBCUTANEOUS at 23:31

## 2020-01-30 RX ADMIN — Medication 400 MILLIGRAM(S): at 22:03

## 2020-01-30 RX ADMIN — SPIRONOLACTONE 100 MILLIGRAM(S): 25 TABLET, FILM COATED ORAL at 14:24

## 2020-01-30 RX ADMIN — Medication 0.25 MILLIGRAM(S): at 14:24

## 2020-01-30 RX ADMIN — PIPERACILLIN AND TAZOBACTAM 200 GRAM(S): 4; .5 INJECTION, POWDER, LYOPHILIZED, FOR SOLUTION INTRAVENOUS at 23:00

## 2020-01-30 RX ADMIN — Medication 0.25 MILLIGRAM(S): at 19:54

## 2020-01-30 RX ADMIN — Medication 250 MILLIGRAM(S): at 23:01

## 2020-01-30 RX ADMIN — SPIRONOLACTONE 100 MILLIGRAM(S): 25 TABLET, FILM COATED ORAL at 05:43

## 2020-01-30 RX ADMIN — Medication 50 MILLILITER(S): at 18:28

## 2020-01-30 RX ADMIN — Medication 125 MILLIGRAM(S): at 18:28

## 2020-01-30 RX ADMIN — TENOFOVIR DISOPROXIL FUMARATE 300 MILLIGRAM(S): 300 TABLET, FILM COATED ORAL at 18:28

## 2020-01-30 RX ADMIN — Medication 50 MILLILITER(S): at 12:28

## 2020-01-30 RX ADMIN — Medication 50 MILLILITER(S): at 05:41

## 2020-01-30 RX ADMIN — Medication 125 MILLIGRAM(S): at 05:43

## 2020-01-30 RX ADMIN — LACTULOSE 30 GRAM(S): 10 SOLUTION ORAL at 05:43

## 2020-01-30 RX ADMIN — Medication 125 MILLIGRAM(S): at 00:11

## 2020-01-30 RX ADMIN — Medication 125 MILLIGRAM(S): at 12:29

## 2020-01-30 RX ADMIN — HYDROMORPHONE HYDROCHLORIDE 0.5 MILLIGRAM(S): 2 INJECTION INTRAMUSCULAR; INTRAVENOUS; SUBCUTANEOUS at 21:52

## 2020-01-30 RX ADMIN — HYDROMORPHONE HYDROCHLORIDE 0.5 MILLIGRAM(S): 2 INJECTION INTRAMUSCULAR; INTRAVENOUS; SUBCUTANEOUS at 21:22

## 2020-01-30 RX ADMIN — Medication 40 MILLIGRAM(S): at 19:55

## 2020-01-30 RX ADMIN — ENOXAPARIN SODIUM 40 MILLIGRAM(S): 100 INJECTION SUBCUTANEOUS at 12:29

## 2020-01-30 RX ADMIN — Medication 50 MILLILITER(S): at 00:11

## 2020-01-30 RX ADMIN — PANTOPRAZOLE SODIUM 40 MILLIGRAM(S): 20 TABLET, DELAYED RELEASE ORAL at 12:29

## 2020-01-30 RX ADMIN — Medication 3 MILLILITER(S): at 15:29

## 2020-01-30 RX ADMIN — Medication 100 MILLIEQUIVALENT(S): at 13:40

## 2020-01-30 RX ADMIN — Medication 3 MILLILITER(S): at 08:55

## 2020-01-30 RX ADMIN — Medication 100 MILLIEQUIVALENT(S): at 12:27

## 2020-01-30 NOTE — RAPID RESPONSE TEAM SUMMARY - NSOTHERINTERVENTIONSRRT_GEN_ALL_CORE
A&P:  Pt found to be in sepsis, unclear source. We got blood clxs, gave tylenol, and started broad spectrum abx.   -f/u blood clx and labs  -abx as per primary team  -c/w Dilaudid for WOB  -CT head if in line with GOC of family and primary team

## 2020-01-30 NOTE — PROGRESS NOTE ADULT - PROBLEM SELECTOR PLAN 1
Secondary to abdominal distension.   Used 2 PRN of Oxy in the last 24 hrs.  PO Meds were changed to IV formulation.  Recommendations to maintain only 1 opioid formulation, we can

## 2020-01-30 NOTE — PROGRESS NOTE ADULT - ASSESSMENT
79 year old man hx of HTN, CLL (diagnosed in 2013, treatment naive), previously compensated Hep B cirrhosis (Viread started 11/2019) presented with abdominal pain in the setting SBO, now s/p Ex Lap with 15 cm of small bowel removed. Hepatology consulted for management of new ascites, and hyponatremia with min. Na 125. Course complicated by c. difficile colitis, on PO vancomycin since 1/20.    Impression:  # Decompensated Hep B liver cirrhosis. MELD-Na: 16 on Jan 28   HE: AOX0 today   Ascites: Moderate ascites seen on CT (1/14/20). Tap initially deferred due to C. Diff infection.   HCC: No lesions on CT (1/14/20)  EV: Last EGD reportedly 2 years ago, records not available  # Hepatitis B: Hep B eAg positive, high HBV viremia, genotype D. On Viread  # C. Diff: On PO vancomycin   # Hyponatremia: Suspect hypovolemic hyponatremia - Improved with Albumin  # SBO complicated with perforation s/p Ex lap with SB resection and anastomosis (1/7/20). Pt awaiting CT abdomen for severe abd pain   # SOB secondary to Pulmonary edema/ effusion and increasing ascites. On lasix 40 and aldactone 100   # Pt DNR/ DNI. Palliative care following     Recommendation:  - Frequent turns of patient to help mobilize gas  - Surgery follow up   - Obtain diagnostic and Therapeutic paracentesis (send fluid for alb , cell count, culture, TP, ldh, glucose). Would also start lactulose via NG tube 30 gm q 6h   - Get CMP and INR level daily    - Continue  spironolactone 100 mg daily and lasix 40 po once daily, and albumin 25 g q6hrs   - Continue Viread; stop if hospice  - Continue Vancomycin po for total of 10 days 79 year old man hx of HTN, CLL (diagnosed in 2013, treatment naive), previously compensated Hep B cirrhosis (Viread started 11/2019) presented with abdominal pain in the setting SBO, now s/p Ex Lap with 15 cm of small bowel removed. Hepatology consulted for management of new ascites, and hyponatremia with min. Na 125. Course complicated by c. difficile colitis, on PO vancomycin since 1/20.    Impression:  # Decompensated Hep B liver cirrhosis. MELD-Na: 16 on Jan 28   HE: AOX0 today   Ascites: Moderate ascites seen on CT (1/14/20). Tap initially deferred due to C. Diff infection.   HCC: No lesions on CT (1/14/20)  EV: Last EGD reportedly 2 years ago, records not available  # Hepatitis B: Hep B eAg positive, high HBV viremia, genotype D. On Viread  # C. Diff: On PO vancomycin   # Hyponatremia: Suspect hypovolemic hyponatremia - Improved with Albumin  # SBO complicated with perforation s/p Ex lap with SB resection and anastomosis (1/7/20). Pt awaiting CT abdomen for severe abd pain   # SOB secondary to Pulmonary edema/ effusion and increasing ascites. On lasix 40 and aldactone 100   # Pt DNR/ DNI. Palliative care following     Recommendation:  - Frequent turns of patient to help mobilize gas  - Surgery follow up   - Obtain diagnostic and Therapeutic paracentesis (send fluid for alb , cell count, culture, TP, ldh, glucose).  - Continue Lactulose - Titrate to 3-4 BMs daily  - Get CMP and INR level daily    - Continue  spironolactone 100 mg daily and lasix 40 po once daily, and albumin 25 g q6hrs   - Continue Viread; stop if hospice  - Continue Vancomycin po for total of 10 days    Rebecca Banks MD  Gastroenterology Fellow  [phone]  Please page on call fellow on weekends and after 5pm on weekdays 79 year old man hx of HTN, CLL (diagnosed in 2013, treatment naive), previously compensated Hep B cirrhosis (Viread started 11/2019) presented with abdominal pain in the setting SBO, now s/p Ex Lap with 15 cm of small bowel removed. Hepatology consulted for management of new ascites, and hyponatremia with min. Na 125. Course complicated by c. difficile colitis, on PO vancomycin since 1/20.    Impression:  # Decompensated Hep B liver cirrhosis. MELD-Na: 16 on Jan 28   HE: AOX0 today   Ascites: Moderate ascites seen on CT (1/14/20). Tap initially deferred due to C. Diff infection.   HCC: No lesions on CT (1/14/20)  EV: Last EGD reportedly 2 years ago, records not available  # Hepatitis B: Hep B eAg positive, high HBV viremia, genotype D. On Viread  # C. Diff: On PO vancomycin   # Hyponatremia: Suspect hypovolemic hyponatremia - Improved with Albumin  # SBO complicated with perforation s/p Ex lap with SB resection and anastomosis (1/7/20). Pt awaiting CT abdomen for severe abd pain   # SOB secondary to Pulmonary edema/ effusion and increasing ascites. On lasix 40 and aldactone 100   # Pt DNR/ DNI. Palliative care following     Recommendation:  - Frequent turns of patient to help mobilize gas  - Surgery follow up   - Obtain diagnostic and Therapeutic paracentesis (send fluid for alb , cell count, culture, TP, ldh, glucose).  - Would switch Lactulose to Miralax given abdominal distention  - Limit opioids  - Get CMP and INR level daily    - Continue spironolactone 100 mg daily and lasix 40 po once daily  - Stop Albumin given albumin 5.8 2 days ago and stable Cr.  - Continue Viread; stop if hospice  - Continue Vancomycin po for total of 10 days    Rebecca Banks MD  Gastroenterology Fellow  [phone]  Please page on call fellow on weekends and after 5pm on weekdays 79 year old man hx of HTN, CLL (diagnosed in 2013, treatment naive), previously compensated Hep B cirrhosis (Viread started 11/2019) presented with abdominal pain in the setting SBO, now s/p Ex Lap with 15 cm of small bowel removed. Hepatology consulted for management of new ascites, and hyponatremia with min. Na 125. Course complicated by c. difficile colitis, on PO vancomycin since 1/20.    Impression:  # Decompensated Hep B liver cirrhosis. MELD-Na: 16 on Jan 28   HE: AOX0 today   Ascites: Moderate ascites seen on CT (1/14/20). Tap initially deferred due to C. Diff infection.   HCC: No lesions on CT (1/14/20)  EV: Last EGD reportedly 2 years ago, records not available  # Hepatitis B: Hep B eAg positive, high HBV viremia, genotype D. On Viread  # C. Diff: On PO vancomycin   # Hyponatremia: Suspect hypovolemic hyponatremia - Improved with Albumin  # SBO complicated with perforation s/p Ex lap with SB resection and anastomosis (1/7/20). Pt awaiting CT abdomen for severe abd pain   # SOB secondary to Pulmonary edema/ effusion and increasing ascites. On lasix 40 and aldactone 100   # Pt DNR/ DNI. Palliative care following     Recommendation:  - Frequent turns of patient to help mobilize gas  - Surgery follow up   - Obtain diagnostic paracentesis (send fluid for alb , cell count, culture, TP, ldh, glucose).  - Would switch Lactulose to Miralax given abdominal distention  - Limit opioids  - Get CMP and INR level daily    - Continue spironolactone 100 mg daily and lasix 40 po once daily  - Stop Albumin given albumin 5.8 2 days ago and stable Cr.  - Continue Viread; stop if hospice  - Continue Vancomycin po for total of 10 days    Rebecca Banks MD  Gastroenterology Fellow  [phone]  Please page on call fellow on weekends and after 5pm on weekdays 79 year old man hx of HTN, CLL (diagnosed in 2013, treatment naive), previously compensated Hep B cirrhosis (Viread started 11/2019) presented with abdominal pain in the setting SBO, now s/p Ex Lap with 15 cm of small bowel removed. Hepatology consulted for management of new ascites, and hyponatremia with min. Na 125. Course complicated by c. difficile colitis, on PO vancomycin since 1/20.    Impression:  # Decompensated Hep B liver cirrhosis. MELD-Na: 16 on Jan 28   HE: AOX0 today   Ascites: Moderate ascites seen on CT (1/14/20). Tap initially deferred due to C. Diff infection.   HCC: No lesions on CT (1/14/20)  EV: Last EGD reportedly 2 years ago, records not available  # Hepatitis B: Hep B eAg positive, high HBV viremia, genotype D. On Viread  # C. Diff: On PO vancomycin   # Hyponatremia: Suspect hypovolemic hyponatremia - Improved with Albumin  # SBO complicated with perforation s/p Ex lap with SB resection and anastomosis (1/7/20). Pt awaiting CT abdomen for severe abd pain   # SOB secondary to Pulmonary edema/ effusion and increasing ascites. On lasix 40 and aldactone 100   # Pt DNR/ DNI. Palliative care following     Recommendation:  - Frequent turns of patient to help mobilize gas  - Surgery follow up   - Obtain diagnostic paracentesis (send fluid for alb , cell count, culture, TP, ldh, glucose).  - Would switch Lactulose to Miralax given abdominal distention  - Limit opioids  - Get CMP and INR level daily    - Continue spironolactone 100 mg daily and lasix 40 IV once daily  === Would give additional dose of Aldactone 100mg today and given Aldactone 200mg PO tomorrow, then reduce back to 100mg PO qd on Saturday  - Stop Albumin given albumin 5.8 2 days ago and stable Cr.  - Continue Viread; stop if hospice  - Continue Vancomycin po for total of 10 days    Rebecca Banks MD  Gastroenterology Fellow  [phone]  Please page on call fellow on weekends and after 5pm on weekdays 79 year old man hx of HTN, CLL (diagnosed in 2013, treatment naive), previously compensated Hep B cirrhosis (Viread started 11/2019) presented with abdominal pain in the setting SBO, now s/p Ex Lap with 15 cm of small bowel removed. Hepatology consulted for management of new ascites, and hyponatremia with min. Na 125. Course complicated by c. difficile colitis, on PO vancomycin since 1/20.    Impression:  # Decompensated Hep B liver cirrhosis. MELD-Na: 16 on Jan 28   HE: AOX0 today   Ascites: Moderate ascites seen on CT (1/14/20). Tap initially deferred due to C. Diff infection.   HCC: No lesions on CT (1/14/20)  EV: Last EGD reportedly 2 years ago, records not available  # Hepatitis B: Hep B eAg positive, high HBV viremia, genotype D. On Viread  # C. Diff: On PO vancomycin   # Hyponatremia: Suspect hypovolemic hyponatremia - Improved with Albumin  # SBO complicated with perforation s/p Ex lap with SB resection and anastomosis (1/7/20). Pt awaiting CT abdomen for severe abd pain   # SOB secondary to Pulmonary edema/ effusion and increasing ascites. On lasix 40 and aldactone 100   # Pt DNR/ DNI. Palliative care following     Recommendation:  - Frequent turns of patient to help mobilize gas    - Obtain diagnostic paracentesis (send fluid for alb , cell count, culture, TP, ldh, glucose).  - Would switch Lactulose to Miralax given abdominal distention  - Limit opioids    - Continue spironolactone 100 mg daily and lasix 40 IV once daily  === Would give additional dose of Aldactone 100mg today and given Aldactone 200mg PO tomorrow, then reduce back to 100mg PO qd on Saturday  - Stop Albumin given albumin 5.8 2 days ago and stable Cr.  - Continue Viread; stop if hospice  - Continue Vancomycin po for total of 10 days    Rebecca Banks MD  Gastroenterology Fellow  [phone]  Please page on call fellow on weekends and after 5pm on weekdays

## 2020-01-30 NOTE — PROGRESS NOTE ADULT - ASSESSMENT
79 year old male with a PMH of CLL (untreated), cirrhosis (Child B at time of admission), chronic hepatitis B, and gastritis found to have SBO with pneumoperitoneum s/p ex lap, REESE and small bowel resection 1/7. Initial POC c/b ileus that resolved with time and cirrhosis/hypnatremia that medicine had been managing. Surgery called last night for increasing distention in the setting of decreased bowel movements.  CT negative for obstruction or stricture.    Plan:  - please obtain KUB to evaluate contrast in the colon  - can continue NPO/ NGT for now  - NGT output continues to be low, clamped on AM rounds, will follow up at 10:30    B team surgery  [id number]

## 2020-01-30 NOTE — CHART NOTE - NSCHARTNOTEFT_GEN_A_CORE
NGT dc'd earlier in evening, then called by RN for acute onset of tachycardia and tachypnea.  On arrival found to have audible stenorous upper sounds.  Per son at bedside this occurred earlier today.  Given furosemide 40 IV x 1, lorazepam 0.25mg IV x 1, and albuterol neb x 1.  ECG pending.  Still tachypneic, no further audible upper airway adventitious sounds.  Pt awake, appears nervous, minimally vocal and/or responsive to questions.  CXR pending.  Son asking for medical team to have a plan in place in the event these episodes occur at home while in hospice.  Will continue to follow.

## 2020-01-30 NOTE — PROGRESS NOTE ADULT - PROBLEM SELECTOR PLAN 1
Abdomen is increasing in size  Monitoring volume status and Na.  No signs of encephalopathy  Progressing abdominal pain, surgery reconsulted, will follow up the recommended Ct scan of the abdomen/pelvis Abdomen is increasing in size  Monitoring volume status and Na.  No signs of encephalopathy  Progressing abdominal pain, surgery reconsulted, will follow up the recommended Ct scan of the abdomen/pelvis shows increased ascites, no signs of perforation. As per surgery, will obtain KUB to see if there is still contrast

## 2020-01-30 NOTE — PROGRESS NOTE ADULT - SUBJECTIVE AND OBJECTIVE BOX
PROGRESS NOTE:     Patient is a 79y old  Male who presents with a chief complaint of abdominal pain (29 Jan 2020 17:52)      SUBJECTIVE / OVERNIGHT EVENTS:  Patient seen and examined this morning. Continues to have abdominal discomfort as well as shortness of breath.     ADDITIONAL REVIEW OF SYSTEMS:  No fevers or chills.    MEDICATIONS  (STANDING):  albumin human 25% IVPB 100 milliLiter(s) IV Intermittent every 6 hours  albuterol/ipratropium for Nebulization 3 milliLiter(s) Nebulizer every 6 hours  enoxaparin Injectable 40 milliGRAM(s) SubCutaneous daily  furosemide   Injectable 40 milliGRAM(s) IV Push daily  lactulose Syrup 30 Gram(s) Oral every 6 hours  pantoprazole  Injectable 40 milliGRAM(s) IV Push daily  potassium acid phosphate/sodium acid phosphate tablet (K-PHOS No. 2) 1 Tablet(s) Oral three times a day with meals  potassium chloride  10 mEq/100 mL IVPB 10 milliEquivalent(s) IV Intermittent every 1 hour  spironolactone 100 milliGRAM(s) Oral daily  tenofovir disoproxil fumarate (VIREAD) 300 milliGRAM(s) Oral daily  tiotropium 18 MICROgram(s) Capsule 1 Capsule(s) Inhalation daily  vancomycin    Solution 125 milliGRAM(s) Oral every 6 hours    MEDICATIONS  (PRN):  acetaminophen   Tablet .. 650 milliGRAM(s) Oral every 6 hours PRN Mild Pain (1 - 3)  ALBUTerol    90 MICROgram(s) HFA Inhaler 1 Puff(s) Inhalation every 4 hours PRN Shortness of Breath and/or Wheezing  HYDROmorphone  Injectable 0.5 milliGRAM(s) IV Push every 4 hours PRN Severe Pain (7 - 10)  LORazepam   Injectable 0.25 milliGRAM(s) IV Push once PRN Anxiety/insomnia  morphine  - Injectable 2 milliGRAM(s) IV Push every 4 hours PRN Moderate Pain (4 - 6)      CAPILLARY BLOOD GLUCOSE      POCT Blood Glucose.: 106 mg/dL (30 Jan 2020 06:41)  POCT Blood Glucose.: 100 mg/dL (29 Jan 2020 23:49)  POCT Blood Glucose.: 100 mg/dL (29 Jan 2020 19:21)    I&O's Summary    29 Jan 2020 07:01  -  30 Jan 2020 07:00  --------------------------------------------------------  IN: 480 mL / OUT: 1025 mL / NET: -545 mL        PHYSICAL EXAM:  Vital Signs Last 24 Hrs  T(C): 36.8 (30 Jan 2020 05:40), Max: 37.1 (29 Jan 2020 21:59)  T(F): 98.2 (30 Jan 2020 05:40), Max: 98.8 (29 Jan 2020 21:59)  HR: 94 (30 Jan 2020 08:55) (86 - 98)  BP: 157/78 (30 Jan 2020 05:40) (150/76 - 166/80)  BP(mean): --  RR: 18 (30 Jan 2020 05:40) (17 - 18)  SpO2: 97% (30 Jan 2020 05:40) (95% - 97%)    CONSTITUTIONAL:Sickly appearing, with noticeable distended abdomen.   RESPIRATORY: Normal respiratory effort; occasional wheeze b/l  CARDIOVASCULAR: Regular rate and rhythm, normal S1 and S2, no murmur/rub/gallop; No lower extremity edema; Peripheral pulses are 2+ bilaterally  ABDOMEN: : Continues to be Distended, nontender; Bowel sounds present, +fluid wave. Incision site c/d/i  MUSCLOSKELETAL: no clubbing or cyanosis of digits; no joint swelling or tenderness to palpation  PSYCH: A+O to person, place, and time; affect appropriate    LABS:                        7.9    57.92 )-----------( 141      ( 30 Jan 2020 06:45 )             25.1     01-30    139  |  99  |  38<H>  ----------------------------<  104<H>  3.1<L>   |  22  |  0.74    Ca    11.1<H>      30 Jan 2020 06:45  Phos  3.2     01-30  Mg     2.0     01-30      PT/INR - ( 30 Jan 2020 06:45 )   PT: 15.6 SEC;   INR: 1.36                      RADIOLOGY & ADDITIONAL TESTS:  Results Reviewed:   EXAM:  CT ABDOMEN AND PELVIS OC IC        PROCEDURE DATE:  Jan 29 2020         INTERPRETATION:  CLINICAL INFORMATION: Cirrhosis. Small bowel obstruction with NG tube. Distended abdomen. Positive for C. difficile.    COMPARISON: 1/14/2020    PROCEDURE:   CT of the Abdomen and Pelvis was performed with intravenous contrast.   Intravenous contrast: 90 ml Omnipaque 350. 10 ml discarded.  Oral contrast: positive contrast was administered.  Sagittal and coronal reformats were performed.    FINDINGS:    LOWER CHEST: Bilateral pleural effusions, right greater than left. Passive basilar atelectasis.    LIVER: Cirrhotic morphology. No suspicious liver lesions.  BILE DUCTS: Normal caliber.  GALLBLADDER: Within normal limits.  SPLEEN: Splenomegaly.  PANCREAS: Within normal limits.  ADRENALS: Within normal limits.  KIDNEYS/URETERS: Within normal limits.    BLADDER: Within normal limits.  REPRODUCTIVE ORGANS: Prostate within normal limits.    BOWEL: Nasogastric tube terminates in the stomach. No evidence of bowel obstruction or abnormal bowel wall thickening. Patent small bowel anastomosis. Appendix is normal. Duodenal diverticulum. Colonic diverticulosis.  PERITONEUM: Moderate ascites. No pneumoperitoneum.  VESSELS: Atherosclerotic calcifications.  RETROPERITONEUM/LYMPH NODES: No lymphadenopathy.    ABDOMINAL WALL: Postoperative changes. Bilateral inguinal hernia. Anasarca.  BONES: Degenerative changes.    IMPRESSION:     Cirrhosis and portal hypertension.    Moderate ascites, increased from prior exam.    Nasogastric tube in the stomach. No evidence of bowel obstruction.                Imaging Personally Reviewed:  Electrocardiogram Personally Reviewed:    COORDINATION OF CARE:  Care Discussed with Consultants/Other Providers [Y/N]:  Prior or Outpatient Records Reviewed [Y/N]:

## 2020-01-30 NOTE — PROGRESS NOTE ADULT - SUBJECTIVE AND OBJECTIVE BOX
POD #: 23    No acute events overnight    SUBJECTIVE:  Reports no pain this AM  Denies nausea, vomiting  Is passing gas but not having bowel movements  NPO      OBJECTIVE:  Vital Signs Last 24 Hrs  T(C): 36.8 (30 Jan 2020 05:40), Max: 37.1 (29 Jan 2020 21:59)  T(F): 98.2 (30 Jan 2020 05:40), Max: 98.8 (29 Jan 2020 21:59)  HR: 94 (30 Jan 2020 08:55) (86 - 98)  BP: 157/78 (30 Jan 2020 05:40) (150/76 - 166/80)  BP(mean): --  RR: 18 (30 Jan 2020 05:40) (17 - 18)  SpO2: 97% (30 Jan 2020 05:40) (95% - 97%)      Physical Examination:  GEN: NAD, resting quietly  NEURO: AAOx3, CN II-XII grossly intact, no focal deficits  PULM: symmetric chest rise bilaterally, no increased WOB  ABD: soft, nontender, tympanitic and distended, midline incision CDI, healing well with beefy red tissue.    LABS:                        7.9    57.92 )-----------( 141      ( 30 Jan 2020 06:45 )             25.1       01-30    139  |  99  |  38<H>  ----------------------------<  104<H>  3.1<L>   |  22  |  0.74    Ca    11.1<H>      30 Jan 2020 06:45  Phos  3.2     01-30  Mg     2.0     01-30          IMAGING:  [< from: CT Abdomen and Pelvis w/ Oral Cont and w/ IV Cont (01.29.20 @ 21:10) >    BOWEL: Nasogastric tube terminates in the stomach. No evidence of bowel obstruction or abnormal bowel wall thickening. Patent small bowel anastomosis. Appendix is normal. Duodenal diverticulum. Colonic diverticulosis.  PERITONEUM: Moderate ascites. No pneumoperitoneum.  VESSELS: Atherosclerotic calcifications.  RETROPERITONEUM/LYMPH NODES: No lymphadenopathy.    ABDOMINAL WALL: Postoperative changes. Bilateral inguinal hernia. Anasarca.  BONES: Degenerative changes.    IMPRESSION:     Cirrhosis and portal hypertension.    Moderate ascites, increased fromprior exam.    Nasogastric tube in the stomach. No evidence of bowel obstruction.    < end of copied text >  ]

## 2020-01-30 NOTE — RAPID RESPONSE TEAM SUMMARY - NSADDTLFINDINGSRRT_GEN_ALL_CORE
RRT called for increased WOB and AMS. Nurse noted that pt became SOB and then was non responsive. On arrival, pt's HR was in  110's, BP in the 160's/90's, RR in the 30's, O2 sat in the high 90's, febrile to 101. On exam, lungs clear, heart was tachy, pt was non-verbal, with increased WOB.

## 2020-01-30 NOTE — PROGRESS NOTE ADULT - PROBLEM SELECTOR PLAN 2
abdomen markedly distended, compromising breathing at times  Previously was trying to hold off on a tap b/o recent history of SBO w/bowel perf and concurrent infection with C.diff. Will ask IR to see if they can assist with tap.

## 2020-01-30 NOTE — PROGRESS NOTE ADULT - PROBLEM SELECTOR PLAN 2
Abdomen markedly distended, compromising breathing at times.   On diuretics. Trying to hold off on a tap b/o recent history of SBO w/bowel perf and concurrent infection with C.diff. Management as per primary team. We can discontinue morphine and keep the Dilaudid 0.5 mg IV q4h PRN pain.   Recommend tylenol to be dosed at Max 2G per day due to his liver impairment.

## 2020-01-30 NOTE — PROGRESS NOTE ADULT - SUBJECTIVE AND OBJECTIVE BOX
Chief Complaint:  Patient is a 79y old  Male who presents with a chief complaint of abdominal pain (30 Jan 2020 09:44)    Interval Events:   No acute overnight events.  No bowel movements + Flatus  No abdominal pain, nausea, vomiting, diarrhea     Allergies:  Beef (Unknown)  No Known Drug Allergies    Hospital Medications:  acetaminophen   Tablet .. 650 milliGRAM(s) Oral every 6 hours PRN  albumin human 25% IVPB 100 milliLiter(s) IV Intermittent every 6 hours  ALBUTerol    90 MICROgram(s) HFA Inhaler 1 Puff(s) Inhalation every 4 hours PRN  albuterol/ipratropium for Nebulization 3 milliLiter(s) Nebulizer every 6 hours  enoxaparin Injectable 40 milliGRAM(s) SubCutaneous daily  furosemide   Injectable 40 milliGRAM(s) IV Push daily  HYDROmorphone  Injectable 0.5 milliGRAM(s) IV Push every 4 hours PRN  lactulose Syrup 30 Gram(s) Oral every 6 hours  LORazepam   Injectable 0.25 milliGRAM(s) IV Push once PRN  morphine  - Injectable 2 milliGRAM(s) IV Push every 4 hours PRN  pantoprazole  Injectable 40 milliGRAM(s) IV Push daily  potassium acid phosphate/sodium acid phosphate tablet (K-PHOS No. 2) 1 Tablet(s) Oral three times a day with meals  potassium chloride  10 mEq/100 mL IVPB 10 milliEquivalent(s) IV Intermittent every 1 hour  spironolactone 100 milliGRAM(s) Oral daily  tenofovir disoproxil fumarate (VIREAD) 300 milliGRAM(s) Oral daily  tiotropium 18 MICROgram(s) Capsule 1 Capsule(s) Inhalation daily  vancomycin    Solution 125 milliGRAM(s) Oral every 6 hours    PMHX/PSHX:  Cirrhosis  HTN (hypertension)  H/O gastritis  CLL (chronic lymphocytic leukemia)  No significant past surgical history    ROS:   General:  No fevers, chills  ENT:  No sore throat or dysphagia  CV:  No pain or palpitations  Resp:  No dyspnea, cough or  wheezing  GI:  No pain, No nausea, No vomiting, No rectal bleeding, No tarry stools,  Skin:  No rash or edema    PHYSICAL EXAM:   Vital Signs:  Vital Signs Last 24 Hrs  T(C): 36.8 (30 Jan 2020 05:40), Max: 37.1 (29 Jan 2020 21:59)  T(F): 98.2 (30 Jan 2020 05:40), Max: 98.8 (29 Jan 2020 21:59)  HR: 94 (30 Jan 2020 08:55) (86 - 98)  BP: 157/78 (30 Jan 2020 05:40) (150/76 - 166/80)  BP(mean): --  RR: 18 (30 Jan 2020 05:40) (17 - 18)  SpO2: 97% (30 Jan 2020 05:40) (95% - 97%)  Daily     Daily     GENERAL:  NAD, Appears stated age  HEENT:  NC/AT,  conjunctivae clear and pin  CHEST:  Normal Effort, Breath sounds clear  HEART:  RRR, S1 + S2  ABDOMEN:  Soft, non-tender, distended, BS+  EXTEREMITIES:  no cyanosis  SKIN:  Warm & Dry.  NEURO:  Alert, oriented    LABS:                        7.9    57.92 )-----------( 141      ( 30 Jan 2020 06:45 )             25.1     Mean Cell Volume: 75.4 fL (01-30-20 @ 06:45)    01-30    139  |  99  |  38<H>  ----------------------------<  104<H>  3.1<L>   |  22  |  0.74    Ca    11.1<H>      30 Jan 2020 06:45  Phos  3.2     01-30  Mg     2.0     01-30        PT/INR - ( 30 Jan 2020 06:45 )   PT: 15.6 SEC;   INR: 1.36                                      7.9    57.92 )-----------( 141      ( 30 Jan 2020 06:45 )             25.1                         8.2    64.41 )-----------( 134      ( 29 Jan 2020 07:25 )             26.3                         8.0    65.53 )-----------( 155      ( 28 Jan 2020 07:40 )             25.1       Imaging:

## 2020-01-30 NOTE — PROGRESS NOTE ADULT - SUBJECTIVE AND OBJECTIVE BOX
SUBJECTIVE AND OBJECTIVE:    INTERVAL HPI/OVERNIGHT EVENTS:  Patient with abdomianl distension.   NGT in place, clamped at 5 am.   Family at bedside.   Used 2 PRN of Oxy 10 mg in the last 24 hours.  PO meds were changed to IV formulation.    DNR on chart: Yes  Yes    Allergies    Beef (Unknown)  No Known Drug Allergies    Intolerances    MEDICATIONS  (STANDING):  albumin human 25% IVPB 100 milliLiter(s) IV Intermittent every 6 hours  albuterol/ipratropium for Nebulization 3 milliLiter(s) Nebulizer every 6 hours  enoxaparin Injectable 40 milliGRAM(s) SubCutaneous daily  furosemide   Injectable 40 milliGRAM(s) IV Push daily  lactulose Syrup 30 Gram(s) Oral every 6 hours  pantoprazole  Injectable 40 milliGRAM(s) IV Push daily  potassium acid phosphate/sodium acid phosphate tablet (K-PHOS No. 2) 1 Tablet(s) Oral three times a day with meals  potassium chloride  10 mEq/100 mL IVPB 10 milliEquivalent(s) IV Intermittent every 1 hour  spironolactone 100 milliGRAM(s) Oral daily  tenofovir disoproxil fumarate (VIREAD) 300 milliGRAM(s) Oral daily  tiotropium 18 MICROgram(s) Capsule 1 Capsule(s) Inhalation daily  vancomycin    Solution 125 milliGRAM(s) Oral every 6 hours    MEDICATIONS  (PRN):  acetaminophen   Tablet .. 650 milliGRAM(s) Oral every 6 hours PRN Mild Pain (1 - 3)  ALBUTerol    90 MICROgram(s) HFA Inhaler 1 Puff(s) Inhalation every 4 hours PRN Shortness of Breath and/or Wheezing  HYDROmorphone  Injectable 0.5 milliGRAM(s) IV Push every 4 hours PRN Severe Pain (7 - 10)  LORazepam   Injectable 0.25 milliGRAM(s) IV Push once PRN Anxiety/insomnia  morphine  - Injectable 2 milliGRAM(s) IV Push every 4 hours PRN Moderate Pain (4 - 6)      ITEMS UNCHECKED ARE NOT PRESENT    PRESENT SYMPTOMS: [ ]Unable to obtain due to poor mentation   Source if other than patient:  [ ]Family   [ ]Team     Pain:  [ x ] yes [ ] no  QOL impact - Moderate   Location - Abdomen                   Aggravating factors - Touch, movement  Quality - Pressure  Radiation - None  Timing- Constant  Severity (0-10 scale): 4/10 in intensity  Minimal acceptable level (0-10 scale): 2/10     Dyspnea:                           [ x ]Mild [ ]Moderate [ ]Severe  Anxiety:                             [ ]Mild [ ]Moderate [ ]Severe  Fatigue:                             [ ]Mild [ ]Moderate [ ]Severe  Nausea:                             [ ]Mild [ ]Moderate [ ]Severe  Loss of appetite:              [ x ]Mild [ ]Moderate [ ]Severe  Constipation:                    [ x ]Mild [ ]Moderate [ ]Severe    PAIN AD Score:	  [email or web address] (Ctrl + left click to view)    Other Symptoms:  [ x ]All other review of systems negative     Karnofsky Performance Score/Palliative Performance Status Version 2:        30-40%     [email or web address]    PHYSICAL EXAM:  Vital Signs Last 24 Hrs  T(C): 36.9 (30 Jan 2020 10:59), Max: 37.1 (29 Jan 2020 21:59)  T(F): 98.5 (30 Jan 2020 10:59), Max: 98.8 (29 Jan 2020 21:59)  HR: 83 (30 Jan 2020 10:59) (83 - 98)  BP: 160/77 (30 Jan 2020 10:59) (150/76 - 166/80)  BP(mean): --  RR: 16 (30 Jan 2020 10:59) (16 - 18)  SpO2: 99% (30 Jan 2020 10:59) (96% - 99%) I&O's Summary    29 Jan 2020 07:01  -  30 Jan 2020 07:00  --------------------------------------------------------  IN: 480 mL / OUT: 1025 mL / NET: -545 mL    30 Jan 2020 07:01  -  30 Jan 2020 11:16  --------------------------------------------------------  IN: 0 mL / OUT: 600 mL / NET: -600 mL     GENERAL:  [ x ]Alert  [ x ]Oriented x3   [ ]Lethargic  [ ]Cachexia  [ ]Unarousable  [ x ]Verbal  [ ]Non-Verbal  Behavioral:   [ ] Anxiety  [ ] Delirium [ ] Agitation [ ] Other  HEENT:  [ x ]Normal   [ ]Dry mouth   [ ]ET Tube/Trach  [ ]Oral lesions  PULMONARY:   [ x ]Clear [ ]Tachypnea  [ ]Audible excessive secretions   [ ]Rhonchi        [ ]Right [ ]Left [ ]Bilateral  [ ]Crackles        [ ]Right [ ]Left [ ]Bilateral  [ ]Wheezing     [ ]Right [ ]Left [ ]Bilateral  CARDIOVASCULAR:    [ x ]Regular [ ]Irregular [ ]Tachy  [ ]Nicolas [ ]Murmur [ ]Other  GASTROINTESTINAL:  [ ]Soft  [ x ]Distended   [ x ]+BS  [  ]Non tender [ x ]Tender  [ ]PEG [ ]OGT/ NGT  Last BM: 1/25/20  GENITOURINARY:  [ x ]Normal [ ] Incontinent   [ ]Oliguria/Anuria   [ ]Bernabe  MUSCULOSKELETAL:   [ ]Normal   [ x ]Weakness  [ ]Bed/Wheelchair bound [ ]Edema  NEUROLOGIC:   [ x ]No focal deficits  [ ] Cognitive impairment  [ ] Dysphagia [ ]Dysarthria [ ] Paresis [ ]Other   SKIN:   [ x ]Normal   [ ]Pressure ulcer(s)  [ ]Rash    CRITICAL CARE:  [ ] Shock Present  [ ]Septic [ ]Cardiogenic [ ]Neurologic [ ]Hypovolemic  [ ]  Vasopressors [ ]  Inotropes   [ ] Respiratory failure present [ ] mechanical ventilation [ ] non-invasive ventilatory support [ ] High flow  [ ] Acute  [ ] Chronic [ ] Hypoxic  [ ] Hypercarbic [ ] Other  [ x ] Other organ failure Liver  LABS:                        7.9    57.92 )-----------( 141      ( 30 Jan 2020 06:45 )             25.1   01-30    139  |  99  |  38<H>  ----------------------------<  104<H>  3.1<L>   |  22  |  0.74    Ca    11.1<H>      30 Jan 2020 06:45  Phos  3.2     01-30  Mg     2.0     01-30    PT/INR - ( 30 Jan 2020 06:45 )   PT: 15.6 SEC;   INR: 1.36       RADIOLOGY & ADDITIONAL STUDIES:    Protein Calorie Malnutrition Present: [ ] yes [ ] no  [ x ] PPSV2 < or = 30%  [ ] significant weight loss [ ] poor nutritional intake [ ] anasarca [ ] catabolic state Albumin, Serum: 5.8 g/dL (01-28-20 @ 07:40)  Artificial Nutrition [ ]     REFERRALS:   [ ]Chaplaincy  [ ] Hospice  [ ]Child Life  [ ]Social Work  [ ]Case management [ ]Holistic Therapy     Goals of Care Document:    Care Coordination Assessment [C. Provider] (01-10-20 @ 13:44)   Progress Note Adult-Hepatology Fellow [MITZY Banks] (01-30-20 @ 09:59)

## 2020-01-31 ENCOUNTER — TRANSCRIPTION ENCOUNTER (OUTPATIENT)
Age: 80
End: 2020-01-31

## 2020-01-31 VITALS
TEMPERATURE: 98 F | OXYGEN SATURATION: 100 % | SYSTOLIC BLOOD PRESSURE: 154 MMHG | DIASTOLIC BLOOD PRESSURE: 83 MMHG | RESPIRATION RATE: 16 BRPM | HEART RATE: 86 BPM

## 2020-01-31 PROBLEM — K74.60 UNSPECIFIED CIRRHOSIS OF LIVER: Chronic | Status: ACTIVE | Noted: 2020-01-07

## 2020-01-31 LAB
ANION GAP SERPL CALC-SCNC: 18 MMO/L — HIGH (ref 7–14)
APTT BLD: 34.9 SEC — SIGNIFICANT CHANGE UP (ref 27.5–36.3)
BUN SERPL-MCNC: 54 MG/DL — HIGH (ref 7–23)
CALCIUM SERPL-MCNC: 10.7 MG/DL — HIGH (ref 8.4–10.5)
CHLORIDE SERPL-SCNC: 102 MMOL/L — SIGNIFICANT CHANGE UP (ref 98–107)
CO2 SERPL-SCNC: 22 MMOL/L — SIGNIFICANT CHANGE UP (ref 22–31)
CREAT SERPL-MCNC: 1.37 MG/DL — HIGH (ref 0.5–1.3)
GLUCOSE SERPL-MCNC: 132 MG/DL — HIGH (ref 70–99)
HCT VFR BLD CALC: 23.6 % — LOW (ref 39–50)
HGB BLD-MCNC: 7.2 G/DL — LOW (ref 13–17)
INR BLD: 1.44 — HIGH (ref 0.88–1.17)
MANUAL SMEAR VERIFICATION: SIGNIFICANT CHANGE UP
MCHC RBC-ENTMCNC: 23.8 PG — LOW (ref 27–34)
MCHC RBC-ENTMCNC: 30.5 % — LOW (ref 32–36)
MCV RBC AUTO: 77.9 FL — LOW (ref 80–100)
NRBC # FLD: 0 K/UL — SIGNIFICANT CHANGE UP (ref 0–0)
PLATELET # BLD AUTO: 66 K/UL — LOW (ref 150–400)
PLATELET COUNT - ESTIMATE: SIGNIFICANT CHANGE UP
PMV BLD: SIGNIFICANT CHANGE UP FL (ref 7–13)
POTASSIUM SERPL-MCNC: 3.6 MMOL/L — SIGNIFICANT CHANGE UP (ref 3.5–5.3)
POTASSIUM SERPL-SCNC: 3.6 MMOL/L — SIGNIFICANT CHANGE UP (ref 3.5–5.3)
PROTHROM AB SERPL-ACNC: 16.6 SEC — HIGH (ref 9.8–13.1)
RBC # BLD: 3.03 M/UL — LOW (ref 4.2–5.8)
RBC # FLD: 22.8 % — HIGH (ref 10.3–14.5)
SODIUM SERPL-SCNC: 142 MMOL/L — SIGNIFICANT CHANGE UP (ref 135–145)
SPECIMEN SOURCE: SIGNIFICANT CHANGE UP
WBC # BLD: 73.28 K/UL — CRITICAL HIGH (ref 3.8–10.5)
WBC # FLD AUTO: 73.28 K/UL — CRITICAL HIGH (ref 3.8–10.5)

## 2020-01-31 PROCEDURE — 99233 SBSQ HOSP IP/OBS HIGH 50: CPT

## 2020-01-31 PROCEDURE — 99239 HOSP IP/OBS DSCHRG MGMT >30: CPT

## 2020-01-31 PROCEDURE — 99231 SBSQ HOSP IP/OBS SF/LOW 25: CPT | Mod: GC

## 2020-01-31 RX ORDER — HYDROMORPHONE HYDROCHLORIDE 2 MG/ML
1 INJECTION INTRAMUSCULAR; INTRAVENOUS; SUBCUTANEOUS
Qty: 0 | Refills: 0 | DISCHARGE
Start: 2020-01-31

## 2020-01-31 RX ORDER — HYDROMORPHONE HYDROCHLORIDE 2 MG/ML
1 INJECTION INTRAMUSCULAR; INTRAVENOUS; SUBCUTANEOUS ONCE
Refills: 0 | Status: DISCONTINUED | OUTPATIENT
Start: 2020-01-31 | End: 2020-01-31

## 2020-01-31 RX ORDER — HYDROMORPHONE HYDROCHLORIDE 2 MG/ML
1 INJECTION INTRAMUSCULAR; INTRAVENOUS; SUBCUTANEOUS EVERY 4 HOURS
Refills: 0 | Status: DISCONTINUED | OUTPATIENT
Start: 2020-01-31 | End: 2020-01-31

## 2020-01-31 RX ORDER — PANTOPRAZOLE SODIUM 20 MG/1
40 TABLET, DELAYED RELEASE ORAL
Qty: 0 | Refills: 0 | DISCHARGE
Start: 2020-01-31

## 2020-01-31 RX ORDER — IPRATROPIUM/ALBUTEROL SULFATE 18-103MCG
3 AEROSOL WITH ADAPTER (GRAM) INHALATION
Qty: 0 | Refills: 0 | DISCHARGE
Start: 2020-01-31

## 2020-01-31 RX ORDER — ROBINUL 0.2 MG/ML
1 INJECTION INTRAMUSCULAR; INTRAVENOUS EVERY 4 HOURS
Refills: 0 | Status: DISCONTINUED | OUTPATIENT
Start: 2020-01-31 | End: 2020-01-31

## 2020-01-31 RX ORDER — VANCOMYCIN HCL 1 G
5 VIAL (EA) INTRAVENOUS
Qty: 100 | Refills: 0
Start: 2020-01-31 | End: 2020-02-03

## 2020-01-31 RX ORDER — ROBINUL 0.2 MG/ML
1 INJECTION INTRAMUSCULAR; INTRAVENOUS
Qty: 0 | Refills: 0 | DISCHARGE
Start: 2020-01-31

## 2020-01-31 RX ORDER — HYDROMORPHONE HYDROCHLORIDE 2 MG/ML
0.5 INJECTION INTRAMUSCULAR; INTRAVENOUS; SUBCUTANEOUS ONCE
Refills: 0 | Status: DISCONTINUED | OUTPATIENT
Start: 2020-01-31 | End: 2020-01-31

## 2020-01-31 RX ORDER — HYDROMORPHONE HYDROCHLORIDE 2 MG/ML
2 INJECTION INTRAMUSCULAR; INTRAVENOUS; SUBCUTANEOUS EVERY 4 HOURS
Refills: 0 | Status: DISCONTINUED | OUTPATIENT
Start: 2020-01-31 | End: 2020-01-31

## 2020-01-31 RX ADMIN — Medication 50 MILLILITER(S): at 05:11

## 2020-01-31 RX ADMIN — HYDROMORPHONE HYDROCHLORIDE 0.5 MILLIGRAM(S): 2 INJECTION INTRAMUSCULAR; INTRAVENOUS; SUBCUTANEOUS at 05:41

## 2020-01-31 RX ADMIN — HYDROMORPHONE HYDROCHLORIDE 0.5 MILLIGRAM(S): 2 INJECTION INTRAMUSCULAR; INTRAVENOUS; SUBCUTANEOUS at 00:01

## 2020-01-31 RX ADMIN — Medication 3 MILLILITER(S): at 09:49

## 2020-01-31 RX ADMIN — Medication 3 MILLILITER(S): at 03:29

## 2020-01-31 RX ADMIN — HYDROMORPHONE HYDROCHLORIDE 1 MILLIGRAM(S): 2 INJECTION INTRAMUSCULAR; INTRAVENOUS; SUBCUTANEOUS at 00:11

## 2020-01-31 RX ADMIN — Medication 40 MILLIGRAM(S): at 05:10

## 2020-01-31 RX ADMIN — HYDROMORPHONE HYDROCHLORIDE 0.5 MILLIGRAM(S): 2 INJECTION INTRAMUSCULAR; INTRAVENOUS; SUBCUTANEOUS at 05:11

## 2020-01-31 RX ADMIN — HYDROMORPHONE HYDROCHLORIDE 1 MILLIGRAM(S): 2 INJECTION INTRAMUSCULAR; INTRAVENOUS; SUBCUTANEOUS at 00:41

## 2020-01-31 RX ADMIN — HYDROMORPHONE HYDROCHLORIDE 0.5 MILLIGRAM(S): 2 INJECTION INTRAMUSCULAR; INTRAVENOUS; SUBCUTANEOUS at 06:29

## 2020-01-31 RX ADMIN — HYDROMORPHONE HYDROCHLORIDE 0.5 MILLIGRAM(S): 2 INJECTION INTRAMUSCULAR; INTRAVENOUS; SUBCUTANEOUS at 05:59

## 2020-01-31 NOTE — PROGRESS NOTE ADULT - SUBJECTIVE AND OBJECTIVE BOX
Chief Complaint:  Patient is a 79y old  Male who presents with a chief complaint of abdominal pain (31 Jan 2020 09:31)    Interval Events:   No acute overnight events.  Reports abdominal pain    Allergies:  Beef (Unknown)  No Known Drug Allergies    Hospital Medications:  albumin human 25% IVPB 100 milliLiter(s) IV Intermittent every 6 hours  ALBUTerol    90 MICROgram(s) HFA Inhaler 1 Puff(s) Inhalation every 4 hours PRN  albuterol/ipratropium for Nebulization 3 milliLiter(s) Nebulizer every 6 hours  enoxaparin Injectable 40 milliGRAM(s) SubCutaneous daily  furosemide   Injectable 40 milliGRAM(s) IV Push daily  glycopyrrolate 1 milliGRAM(s) Oral every 4 hours  HYDROmorphone  Injectable 1 milliGRAM(s) IV Push once  HYDROmorphone  Injectable 2 milliGRAM(s) IV Push every 4 hours PRN  LORazepam   Injectable 0.2 milliGRAM(s) IV Push every 2 hours PRN  pantoprazole  Injectable 40 milliGRAM(s) IV Push daily  polyethylene glycol 3350 17 Gram(s) Oral at bedtime  potassium acid phosphate/sodium acid phosphate tablet (K-PHOS No. 2) 1 Tablet(s) Oral three times a day with meals  spironolactone 200 milliGRAM(s) Oral once  tenofovir disoproxil fumarate (VIREAD) 300 milliGRAM(s) Oral daily  tiotropium 18 MICROgram(s) Capsule 1 Capsule(s) Inhalation daily  vancomycin    Solution 125 milliGRAM(s) Oral every 6 hours    PMHX/PSHX:  Cirrhosis  HTN (hypertension)  H/O gastritis  CLL (chronic lymphocytic leukemia)  No significant past surgical history    ROS:   General:  No fevers, chills  ENT:  No sore throat or dysphagia  CV:  No pain or palpitations  Resp:  No dyspnea, cough or  wheezing  GI:  No pain, No nausea, No vomiting, No rectal bleeding, No tarry stools,  Skin:  No rash or edema    PHYSICAL EXAM:   Vital Signs:  Vital Signs Last 24 Hrs  T(C): 36.4 (31 Jan 2020 10:53), Max: 38.3 (30 Jan 2020 22:40)  T(F): 97.5 (31 Jan 2020 10:53), Max: 101 (30 Jan 2020 22:40)  HR: 86 (31 Jan 2020 10:53) (84 - 133)  BP: 154/83 (31 Jan 2020 10:53) (129/76 - 197/111)  BP(mean): --  RR: 16 (31 Jan 2020 10:53) (12 - 31)  SpO2: 100% (31 Jan 2020 10:53) (95% - 100%)  Daily     Daily     GENERAL:  NAD, Appears stated age  HEENT:  NC/AT,  conjunctivae clear and pin  CHEST:  Normal Effort, Breath sounds clear  HEART:  RRR, S1 + S2  ABDOMEN:  Soft, non-tender, distended, BS+  EXTEREMITIES:  no cyanosis  SKIN:  Warm & Dry.  NEURO:  Alert, oriented    LABS:                        7.2    73.28 )-----------( 66       ( 31 Jan 2020 06:45 )             23.6     Mean Cell Volume: 77.9 fL (01-31-20 @ 06:45)    01-31    142  |  102  |  54<H>  ----------------------------<  132<H>  3.6   |  22  |  1.37<H>    Ca    10.7<H>      31 Jan 2020 06:45  Phos  2.4     01-30  Mg     1.8     01-30    PT/INR - ( 31 Jan 2020 06:45 )   PT: 16.6 SEC;   INR: 1.44        PTT - ( 31 Jan 2020 06:45 )  PTT:34.9 SEC                        7.2    73.28 )-----------( 66       ( 31 Jan 2020 06:45 )             23.6                         7.8    67.30 )-----------( 106      ( 30 Jan 2020 21:40 )             26.3                         7.9    57.92 )-----------( 141      ( 30 Jan 2020 06:45 )             25.1                         8.2    64.41 )-----------( 134      ( 29 Jan 2020 07:25 )             26.3       Imaging:

## 2020-01-31 NOTE — PROGRESS NOTE ADULT - PROBLEM SELECTOR PLAN 3
1/27 Advance directive was discussed at length with the patient. This conversation included current condition, prognosis. Discussed desires by patient for further care and wishes were expressed. Once again, Family present at the bedside. Patient decided that he would like to be DNR/DNI and is interested in learning more about hospice. Palliative fellow was also present during the discussion. Length of Conversation ~35min.
Advance directive was discussed at length with the patient. This conversation included current condition, prognosis. Discussed desires by patient for further care and wishes were expressed. Once again, Family present at the bedside. Patient decided that he would like to be DNR/DNI and is interested in learning more about hospice. Palliative fellow was also present during the discussion. Length of Conversation ~35min.
In part due to hypervolemia due to cirrhosis  Improving with IV albumin  Reassess diuretics daily - likely will need to resume in coming days  Monitoring volume status and BMP
Non Severe (WBCs chronically elevated from CLL) C. Dif + insetting of diarrhea and recent antibx.  -Vancomycin 125mg PO QID x10  -monitor abdomen for worsening pain, if diarrhea/abdominal pain not improving consider abd xray/CT to eval for pseudomembranous colitis.
Patient with advanced disease.  No disease modifying interventions.
Patient with advanced disease.  No disease modifying interventions.
c/w Vancomycin 125mg PO QID [id number] total  BMs are improving  Leukocytosis unreliable marker given CLL
c/w Vancomycin 125mg PO QID x10
c/w Vancomycin 125mg PO QID x10-14 days total  BMs are improving  Leukocytosis unreliable marker given CLL
c/w Vancomycin 125mg PO QID x10-14 days total  BMs are improving  Leukocytosis unreliable marker given CLL
c/w Vancomycin 125mg PO QID [id number]
1/27 Advance directive was discussed at length with the patient. This conversation included current condition, prognosis. Discussed desires by patient for further care and wishes were expressed. Once again, Family present at the bedside. Patient decided that he would like to be DNR/DNI and is interested in learning more about hospice. Palliative fellow was also present during the discussion. Length of Conversation ~35min.

## 2020-01-31 NOTE — PROGRESS NOTE ADULT - RESPIRATORY AND THORAX
details…

## 2020-01-31 NOTE — PROGRESS NOTE ADULT - NEGATIVE CARDIOVASCULAR SYMPTOMS
no chest pain
no chest pain/no palpitations
no palpitations/no chest pain
no palpitations/no chest pain
no chest pain/no palpitations
no palpitations/no chest pain
no palpitations/no chest pain
no chest pain/no palpitations
no palpitations/no chest pain

## 2020-01-31 NOTE — PROGRESS NOTE ADULT - SUBJECTIVE AND OBJECTIVE BOX
Events since wednesday noted, spoke to the family and medical team several times yesterday. Pt has deteriorated further, currently on mask, waiting for dilaudid drip, overall appears comfortable, has periods of discomfort. Wife and son with him.        Meds:  albumin human 25% IVPB 100 milliLiter(s) IV Intermittent every 6 hours  ALBUTerol    90 MICROgram(s) HFA Inhaler 1 Puff(s) Inhalation every 4 hours PRN  albuterol/ipratropium for Nebulization 3 milliLiter(s) Nebulizer every 6 hours  enoxaparin Injectable 40 milliGRAM(s) SubCutaneous daily  furosemide   Injectable 40 milliGRAM(s) IV Push daily  HYDROmorphone  Injectable 1 milliGRAM(s) IV Push once  HYDROmorphone  Injectable 2 milliGRAM(s) IV Push every 4 hours PRN  pantoprazole  Injectable 40 milliGRAM(s) IV Push daily  polyethylene glycol 3350 17 Gram(s) Oral at bedtime  potassium acid phosphate/sodium acid phosphate tablet (K-PHOS No. 2) 1 Tablet(s) Oral three times a day with meals  spironolactone 200 milliGRAM(s) Oral once  tenofovir disoproxil fumarate (VIREAD) 300 milliGRAM(s) Oral daily  tiotropium 18 MICROgram(s) Capsule 1 Capsule(s) Inhalation daily  vancomycin    Solution 125 milliGRAM(s) Oral every 6 hours      Vital Signs Last 24 Hrs  T(C): 36.4 (31 Jan 2020 05:10), Max: 38.3 (30 Jan 2020 22:40)  T(F): 97.6 (31 Jan 2020 05:10), Max: 101 (30 Jan 2020 22:40)  HR: 99 (31 Jan 2020 06:13) (83 - 133)  BP: 150/86 (31 Jan 2020 06:13) (129/76 - 197/111)  BP(mean): --  RR: 12 (31 Jan 2020 06:13) (12 - 31)  SpO2: 100% (31 Jan 2020 06:13) (95% - 100%)                          7.2    73.28 )-----------( x        ( 31 Jan 2020 06:45 )             23.6       01-30    143  |  104  |  46<H>  ----------------------------<  93  3.5   |  20<L>  |  0.93    Ca    11.2<H>      30 Jan 2020 21:40  Phos  2.4     01-30  Mg     1.8     01-30                PT/INR - ( 31 Jan 2020 06:45 )   PT: 16.6 SEC;   INR: 1.44          PTT - ( 31 Jan 2020 06:45 )  PTT:34.9 SEC    CT a/p: IMPRESSION:     Cirrhosis and portal hypertension.    Moderate ascites, increased fromprior exam.    Nasogastric tube in the stomach. No evidence of bowel obstruction.                      BRANDY CARL M.D., ATTENDING RADIOLOGIST  This document has been electronically signed. Jan 30 2020  8:32AM

## 2020-01-31 NOTE — PROGRESS NOTE ADULT - PROBLEM SELECTOR PLAN 6
Monitor WBC  Heme is following
On Vancomycin 125mg PO QID x10-14 days total, started January 20th.  Management as per primary team.
On Vancomycin 125mg PO QID x10-14 days total, started January 20th.  Management as per primary team.
VTE PPx with lovenox   Plan discussed with patient and family.   d/w ACP
VTE PPx with lovenox - will hold in preparation for future paracentesis  Plan discussed with patient and family.   d/w ACP d/w RN
c/w tenofovir as ordered  Hepatology is following

## 2020-01-31 NOTE — PROGRESS NOTE ADULT - MUSCULOSKELETAL
details…

## 2020-01-31 NOTE — PROGRESS NOTE ADULT - GIT GEN HX ROS MEA POS PC
abdominal pain
abdominal pain/nausea/diarrhea
abdominal pain
abdominal pain/diarrhea
diarrhea
diarrhea/much less
abdominal pain

## 2020-01-31 NOTE — PROVIDER CONTACT NOTE (CRITICAL VALUE NOTIFICATION) - ACTION/TREATMENT ORDERED:
provider notified
team notified, no actions at this time
team notified, no actions at this time
Continue to monitor
Continue to monitor patient.
MD aware, pt with CLL
MD aware, will give one unit PRBC
MD notified
MD notified  nno at this time
No actions at this time. Will do a repeat CBC later on in the day.
Team aware. No actions/treatment at this time.
Team aware. No actions/treatment at this time.
continue to monitor
team notified, no actions at this time
team notified, repeat cbc

## 2020-01-31 NOTE — PROVIDER CONTACT NOTE (OTHER) - ASSESSMENT
Patient complaining of having a "hard time breathing". Vital signs stable taken at 12:51, /82 HR 99 O2 Saturation: 98%. Noted a slight wheeze when patient is speaking. No respiratory distress noted
Pt. c/o 7/10 pain received 2mg morphine IVP and lasix 20mg IVP with no improvement on BP but pt. denies pain at this time. Abd. distended and firm. xray done
/85, temp 97.6, HR 92, RR 19, O2 sat 97% on room air. Pt denies chest pain, headaches. Pt with history of elevated BP
A&O x4. Tachypneic, SOB at times, denies chest pain Voids
A&O x4. Tachypneic, SOB at times, denies chest pain Voids
A&Ox4, complaints of SOB, anxiety
A&Ox4, pt resting in bed making attempts to sleep
A&Ox4. intermittent shortness of breath at baseline. no complaints of chest pain/headache
A/ox4. OOB x2 assist; pt is weak/fatigued. No c/o pain. On O2 for SOB and Comfort measures.
Patient A&ox4. Denies pain, SOB, dizziness, nausea or vomiting.
Patient alert and oriented times 4. Vitals signs stable.
Patient alert and oriented x4, other vitals stable. Pt asleep in bed.
Patient asymptomatic
Patient is A&Ox4, VSS, afebrile. Midline incision w/ staples c/d/i w/ scant serosang drainage noted. With 3+ edema to scrotum and b/l legs. Pt resting comfortably in bed.
Patient resting
Patient vs stable. afebrile
Patient with 3+ generalised edema
Patient with Ng tube in place, 3+ edema noted generalised, abdomen distended
Pt Vital signs stable, afebrile
Pt Vital signs stable, afebrile, pt denies any chest pain, or muscle pain.
Pt responsive only to noxious stimuli. on face tent. plan is comfort care. IV antibx infusing
Pt responsive only to noxious stimuli. on face tent. plan is comfort care. No signs of distress at this time. Family at bedside
pt A&Ox4. Vs stable. Worsening scrotal and penile edema, reports burning with urination. No complaint of distress at this time
pt A&Ox4. Vs stable. adequate urine output
resting comfortably in bed at this time, occasional shortness of breath. No complaints of headache or chest pain

## 2020-01-31 NOTE — PROGRESS NOTE ADULT - PROBLEM SELECTOR PLAN 7
Met with patient and wife at bedside. Patient complaining of abdominal pain, recommendations as above.   Discussion held on 1/27/20 regarding advanced directives, MOLST was filled and placed in chart, patient is DNR/DNI. HCP in chart Wife is primary and chidren are alternative. Goals is to be able to go home and focus on improving the quality of life.  Hospice referral has been made. Emotional support provided.
Met with patient and wife at bedside. Patient complaining of abdominal pain, recommendations as above.   Discussion held on 1/27/20 regarding advanced directives, MOLST was filled and placed in chart, patient is DNR/DNI. HCP in chart Wife is primary and chidren are alternative. Goals is to be able to go home and focus on improving the quality of life. Hospice referral has been made. Emotional support provided.
Monitor WBC  Heme is following
Transitions of Care Status:  1.  Name of PCP: Dr. العراقي  2.  PCP Contacted on Admission: [ ] Y    [X ] N Recently transferred to medicine  3.  PCP contacted at Discharge: [ ] Y    [ ] N    [X ] N/A  4.  Post-Discharge Appointment Date and Location: TBD  5.  Summary of Handoff given to PCP: ESDRAS
VTE PPx with lovenox  Plan discussed with patient and family.   d/w ACP
Monitor WBC  Heme is following

## 2020-01-31 NOTE — PROGRESS NOTE ADULT - ASSESSMENT
79M with multiple and significant medical history as above and CLL, never required Rx for it, here with SBO and perforation, s/p surgery, Path showing just serosal adhesions without any malignancy, etiology unclear, did not need any transfusion, will recommend:  - continue Rx as per comfort care team/hospice team - pt not in a condition to be transferred either home or any other facility  - discussed the issues in great detail again with the son and all his questions answered, family is prepared for hospice and do not want any more tests  - continue Rx as per medicine, consider cardiology consult  - slow advancing of diet as tolerated, continue ondansetron 4 mg half hour before each meal to minimize nausea  - stool for c. 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 Pt has been suffering a lot along with the family members and has not improved significantly because of one problem after another. the role of palliative care, hospice also discussed, they are all in agreement for that, will recommend, continuing active treatment, call palliative care team. Family members will be arriving in next few days. DNR/DNI issues also discussed, he is DNR.  - will continue as his physician while he is on hospice    - call for any questions - [phone]

## 2020-01-31 NOTE — PROGRESS NOTE ADULT - PROBLEM SELECTOR PROBLEM 5
CLL (chronic lymphocytic leukemia)
Chronic viral hepatitis B without delta agent and without coma
Clostridium difficile infection
Debility
Debility
Clostridium difficile infection

## 2020-01-31 NOTE — PROGRESS NOTE ADULT - SUBJECTIVE AND OBJECTIVE BOX
PROGRESS NOTE:     Patient is a 79y old  Male who presents with a chief complaint of abdominal pain (31 Jan 2020 08:00)      SUBJECTIVE / OVERNIGHT EVENTS:  Patient seen and examined this morning. Continues to have abdominal discomfort as well as occasional shortness of breath. RRT overnight due to acute change in mental status and increased work of breathing.     ADDITIONAL REVIEW OF SYSTEMS:  No fevers or chills.    MEDICATIONS  (STANDING):  albumin human 25% IVPB 100 milliLiter(s) IV Intermittent every 6 hours  albuterol/ipratropium for Nebulization 3 milliLiter(s) Nebulizer every 6 hours  enoxaparin Injectable 40 milliGRAM(s) SubCutaneous daily  furosemide   Injectable 40 milliGRAM(s) IV Push daily  HYDROmorphone  Injectable 1 milliGRAM(s) IV Push once  pantoprazole  Injectable 40 milliGRAM(s) IV Push daily  polyethylene glycol 3350 17 Gram(s) Oral at bedtime  potassium acid phosphate/sodium acid phosphate tablet (K-PHOS No. 2) 1 Tablet(s) Oral three times a day with meals  spironolactone 200 milliGRAM(s) Oral once  tenofovir disoproxil fumarate (VIREAD) 300 milliGRAM(s) Oral daily  tiotropium 18 MICROgram(s) Capsule 1 Capsule(s) Inhalation daily  vancomycin    Solution 125 milliGRAM(s) Oral every 6 hours    MEDICATIONS  (PRN):  ALBUTerol    90 MICROgram(s) HFA Inhaler 1 Puff(s) Inhalation every 4 hours PRN Shortness of Breath and/or Wheezing  HYDROmorphone  Injectable 2 milliGRAM(s) IV Push every 4 hours PRN moderate and severe Pain      CAPILLARY BLOOD GLUCOSE      POCT Blood Glucose.: 139 mg/dL (31 Jan 2020 06:41)  POCT Blood Glucose.: 159 mg/dL (30 Jan 2020 23:20)  POCT Blood Glucose.: 101 mg/dL (30 Jan 2020 21:34)  POCT Blood Glucose.: 105 mg/dL (30 Jan 2020 17:51)  POCT Blood Glucose.: 106 mg/dL (30 Jan 2020 12:31)    I&O's Summary    30 Jan 2020 07:01  -  31 Jan 2020 07:00  --------------------------------------------------------  IN: 950 mL / OUT: 1230 mL / NET: -280 mL        PHYSICAL EXAM:  Vital Signs Last 24 Hrs  T(C): 36.4 (31 Jan 2020 05:10), Max: 38.3 (30 Jan 2020 22:40)  T(F): 97.6 (31 Jan 2020 05:10), Max: 101 (30 Jan 2020 22:40)  HR: 99 (31 Jan 2020 06:13) (83 - 133)  BP: 150/86 (31 Jan 2020 06:13) (129/76 - 197/111)  BP(mean): --  RR: 12 (31 Jan 2020 06:13) (12 - 31)  SpO2: 100% (31 Jan 2020 06:13) (95% - 100%)    CONSTITUTIONAL:Sickly appearing, with distended abdomen. Very pleasant  RESPIRATORY: Normal respiratory effort; occasional wheeze b/l  CARDIOVASCULAR: Regular rate and rhythm, normal S1 and S2, no murmur/rub/gallop; No lower extremity edema; Peripheral pulses are 2+ bilaterally  ABDOMEN: : Continues to be Distended, nontender; Bowel sounds present, +fluid wave. Incision site c/d/i  MUSCLOSKELETAL: no clubbing or cyanosis of digits; no joint swelling or tenderness to palpation  PSYCH: A+O to person, place, and time; affect appropriate      LABS:                        7.2    73.28 )-----------( 66       ( 31 Jan 2020 06:45 )             23.6     01-31    142  |  102  |  54<H>  ----------------------------<  132<H>  3.6   |  22  |  1.37<H>    Ca    10.7<H>      31 Jan 2020 06:45  Phos  2.4     01-30  Mg     1.8     01-30      PT/INR - ( 31 Jan 2020 06:45 )   PT: 16.6 SEC;   INR: 1.44          PTT - ( 31 Jan 2020 06:45 )  PTT:34.9 SEC            RADIOLOGY & ADDITIONAL TESTS:  Results Reviewed:   Imaging Personally Reviewed:  Electrocardiogram Personally Reviewed:    COORDINATION OF CARE:  Care Discussed with Consultants/Other Providers [Y/N]:  Prior or Outpatient Records Reviewed [Y/N]: PROGRESS NOTE:     Patient is a 79y old  Male who presents with a chief complaint of abdominal pain (31 Jan 2020 08:00)      SUBJECTIVE / OVERNIGHT EVENTS:  Patient seen and examined this morning. Continues to have abdominal discomfort as well as occasional shortness of breath. RRT overnight due to acute change in mental status and increased work of breathing. Patient now requesting strictly hospice care.     ADDITIONAL REVIEW OF SYSTEMS:  No fevers or chills.    MEDICATIONS  (STANDING):  albumin human 25% IVPB 100 milliLiter(s) IV Intermittent every 6 hours  albuterol/ipratropium for Nebulization 3 milliLiter(s) Nebulizer every 6 hours  enoxaparin Injectable 40 milliGRAM(s) SubCutaneous daily  furosemide   Injectable 40 milliGRAM(s) IV Push daily  HYDROmorphone  Injectable 1 milliGRAM(s) IV Push once  pantoprazole  Injectable 40 milliGRAM(s) IV Push daily  polyethylene glycol 3350 17 Gram(s) Oral at bedtime  potassium acid phosphate/sodium acid phosphate tablet (K-PHOS No. 2) 1 Tablet(s) Oral three times a day with meals  spironolactone 200 milliGRAM(s) Oral once  tenofovir disoproxil fumarate (VIREAD) 300 milliGRAM(s) Oral daily  tiotropium 18 MICROgram(s) Capsule 1 Capsule(s) Inhalation daily  vancomycin    Solution 125 milliGRAM(s) Oral every 6 hours    MEDICATIONS  (PRN):  ALBUTerol    90 MICROgram(s) HFA Inhaler 1 Puff(s) Inhalation every 4 hours PRN Shortness of Breath and/or Wheezing  HYDROmorphone  Injectable 2 milliGRAM(s) IV Push every 4 hours PRN moderate and severe Pain      CAPILLARY BLOOD GLUCOSE      POCT Blood Glucose.: 139 mg/dL (31 Jan 2020 06:41)  POCT Blood Glucose.: 159 mg/dL (30 Jan 2020 23:20)  POCT Blood Glucose.: 101 mg/dL (30 Jan 2020 21:34)  POCT Blood Glucose.: 105 mg/dL (30 Jan 2020 17:51)  POCT Blood Glucose.: 106 mg/dL (30 Jan 2020 12:31)    I&O's Summary    30 Jan 2020 07:01  -  31 Jan 2020 07:00  --------------------------------------------------------  IN: 950 mL / OUT: 1230 mL / NET: -280 mL        PHYSICAL EXAM:  Vital Signs Last 24 Hrs  T(C): 36.4 (31 Jan 2020 05:10), Max: 38.3 (30 Jan 2020 22:40)  T(F): 97.6 (31 Jan 2020 05:10), Max: 101 (30 Jan 2020 22:40)  HR: 99 (31 Jan 2020 06:13) (83 - 133)  BP: 150/86 (31 Jan 2020 06:13) (129/76 - 197/111)  BP(mean): --  RR: 12 (31 Jan 2020 06:13) (12 - 31)  SpO2: 100% (31 Jan 2020 06:13) (95% - 100%)    CONSTITUTIONAL:Sickly appearing, with distended abdomen. Very pleasant  RESPIRATORY: Normal respiratory effort; occasional wheeze b/l  CARDIOVASCULAR: Regular rate and rhythm, normal S1 and S2, no murmur/rub/gallop; No lower extremity edema; Peripheral pulses are 2+ bilaterally  ABDOMEN: : Continues to be Distended, nontender; Bowel sounds present, +fluid wave. Incision site c/d/i  MUSCLOSKELETAL: no clubbing or cyanosis of digits; no joint swelling or tenderness to palpation  PSYCH: A+O to person, place, and time; affect appropriate      LABS:                        7.2    73.28 )-----------( 66       ( 31 Jan 2020 06:45 )             23.6     01-31    142  |  102  |  54<H>  ----------------------------<  132<H>  3.6   |  22  |  1.37<H>    Ca    10.7<H>      31 Jan 2020 06:45  Phos  2.4     01-30  Mg     1.8     01-30      PT/INR - ( 31 Jan 2020 06:45 )   PT: 16.6 SEC;   INR: 1.44          PTT - ( 31 Jan 2020 06:45 )  PTT:34.9 SEC            RADIOLOGY & ADDITIONAL TESTS:  Results Reviewed:   Imaging Personally Reviewed:  Electrocardiogram Personally Reviewed:    COORDINATION OF CARE:  Care Discussed with Consultants/Other Providers [Y/N]:  Prior or Outpatient Records Reviewed [Y/N]:

## 2020-01-31 NOTE — PROVIDER CONTACT NOTE (CRITICAL VALUE NOTIFICATION) - RECOMMENDATIONS
notify md
notify md
Continue to monitor?
Discussed with MD
MD made aware
MD notified
Team notified. No recommendations at this time.
Team notified. No recommendations at this time.
continue to monitor
notify MD
transfuse?

## 2020-01-31 NOTE — PROGRESS NOTE ADULT - RS GEN PE MLT RESP DETAILS PC
breath sounds equal
breath sounds equal
no rhonchi/no rales/breath sounds equal
breath sounds equal/no rhonchi/no rales
breath sounds equal/rales
breath sounds equal/rales
no rales/breath sounds equal/no rhonchi
no rhonchi/breath sounds equal/no rales
breath sounds equal/no rales/no rhonchi
breath sounds equal/no rhonchi/no rales
no rales/breath sounds equal/no rhonchi
no rales/no rhonchi/breath sounds equal
no rales/no rhonchi/breath sounds equal
wheezes/rhonchi/bilateral, diffuse
breath sounds equal/no rhonchi/no rales
no rales/breath sounds equal/no rhonchi

## 2020-01-31 NOTE — PROGRESS NOTE ADULT - NEGATIVE MUSCULOSKELETAL SYMPTOMS
no back pain

## 2020-01-31 NOTE — PROGRESS NOTE ADULT - PROVIDER SPECIALTY LIST ADULT
Anesthesia
Cardiology
Heme/Onc
Hepatology
Hospitalist
Internal Medicine
Palliative Care
Palliative Care
Surgery
Hospitalist

## 2020-01-31 NOTE — PROGRESS NOTE ADULT - GASTROINTESTINAL DETAILS
soft
soft
soft/no distention/nontender
soft
soft
soft/nontender
nontender/no distention/soft
nontender/soft
soft
soft
soft/nontender
nontender/soft
soft/nontender

## 2020-01-31 NOTE — PROGRESS NOTE ADULT - MS EXT PE MLT D E PC
pedal edema

## 2020-01-31 NOTE — PROGRESS NOTE ADULT - REASON FOR ADMISSION
abdominal pain

## 2020-01-31 NOTE — PROGRESS NOTE ADULT - GIT ABD PE PAL DETAILS PC
distended
distended
bowel sounds active/distended
distended
distended/bowel sounds ++
distended/tender
distended

## 2020-01-31 NOTE — PROVIDER CONTACT NOTE (OTHER) - RECOMMENDATIONS
Nebulizer treatement to help with breathing. Encourage incentive spirometer
Notify MD
Continue to monitor?
Discussed with MD
Discussed with PA   Patient's urine was very dark
MD made aware
MD notify
MD to see pt
Md texted paged
Modify PO meds to IV.
Notify MD
Notify MD
Notify MD, Medications: Potassium?
Notify Md of elevated HR and RR
Notify team
Provider notified
Provider notified
Team made aware. Will continue to monitor urine output.
confirm with MD
continue to monitor
continue to monitor
give dose of spirnoloactolone, continue to monitor
monitor pt

## 2020-01-31 NOTE — PROGRESS NOTE ADULT - PROBLEM SELECTOR PROBLEM 2
Decompensated hepatic cirrhosis
Hyponatremia
Other ascites

## 2020-01-31 NOTE — PROGRESS NOTE ADULT - ASSESSMENT
79 year old man hx of HTN, CLL (diagnosed in 2013, treatment naive), previously compensated Hep B cirrhosis (Viread started 11/2019) presented with abdominal pain in the setting SBO, now s/p Ex Lap with 15 cm of small bowel removed. Hepatology consulted for management of new ascites, and hyponatremia with min. Na 125. Course complicated by c. difficile colitis, on PO vancomycin since 1/20.    Impression:  # Decompensated Hep B liver cirrhosis. MELD-Na: 16 on Jan 28   HE: AOX0 today   Ascites: Moderate ascites seen on CT (1/14/20). Tap initially deferred due to C. Diff infection.   HCC: No lesions on CT (1/14/20)  EV: Last EGD reportedly 2 years ago, records not available  # Hepatitis B: Hep B eAg positive, high HBV viremia, genotype D. On Viread  # C. Diff: On PO vancomycin   # Hyponatremia: Suspect hypovolemic hyponatremia - Improved with Albumin  # SBO complicated with perforation s/p Ex lap with SB resection and anastomosis (1/7/20). Pt awaiting CT abdomen for severe abd pain   # SOB secondary to Pulmonary edema/ effusion and increasing ascites. On lasix 40 and aldactone 100   # Pt DNR/ DNI. Palliative care following     Recommendation:  - Frequent turns of patient to help mobilize gas  - Diagnostic paracentesis (send fluid for alb , cell count, culture, TP, ldh, glucose)  - Continue Miralax  - Limit opioids  - Spironolactone 100 mg daily tomorrow and continue lasix 40 IV once daily  - Stop Viread given plans for hospice  - Supportive care per primary team    Rebecca Banks MD  Gastroenterology Fellow  [phone]  Please page on call fellow on weekends and after 5pm on weekdays

## 2020-01-31 NOTE — DISCHARGE NOTE NURSING/CASE MANAGEMENT/SOCIAL WORK - PATIENT PORTAL LINK FT
You can access the FollowMyHealth Patient Portal offered by [facility] by registering at the following website: [email or web address]. By joining [facility]’s FollowMyHealth portal, you will also be able to view your health information using other applications (apps) compatible with our system.

## 2020-01-31 NOTE — PROGRESS NOTE ADULT - PROBLEM SELECTOR PROBLEM 6
CLL (chronic lymphocytic leukemia)
Chronic viral hepatitis B without delta agent and without coma
Clostridium difficile infection
Clostridium difficile infection
Prophylactic measure
Chronic viral hepatitis B without delta agent and without coma

## 2020-01-31 NOTE — PROGRESS NOTE ADULT - CVS HE PE MLT D E PC
regular rate and rhythm

## 2020-01-31 NOTE — PROGRESS NOTE ADULT - PROBLEM SELECTOR PROBLEM 4
Chronic viral hepatitis B without delta agent and without coma
Clostridium difficile infection
Decompensated hepatic cirrhosis
Decompensated hepatic cirrhosis
Hyponatremia

## 2020-01-31 NOTE — PROGRESS NOTE ADULT - PROBLEM SELECTOR PROBLEM 1
Decompensated hepatic cirrhosis
Electrolyte abnormality
Pain
Pain
Decompensated hepatic cirrhosis

## 2020-01-31 NOTE — PROGRESS NOTE ADULT - PROBLEM SELECTOR PLAN 4
Holding off on paracentesis given active colitis.  Abdomen is increasing in size  Monitoring volume status and Na.  No signs of encephalopathy.   Hepatology on board.
Holding off on paracentesis given active colitis.  Abdomen is increasing in size  Monitoring volume status and Na.  No signs of encephalopathy.   Hepatology on board.
Improved  S/P IV albumin
Improved  S/P IV albumin
In part due to hypervolemia due to cirrhosis  Improving with IV albumin  Monitoring volume status and BMP
In part due to hypervolemia due to cirrhosis  Improving with IV albumin  Monitoring volume status and BMP
c/w Vancomycin 125mg PO QID x10-14 days total  BMs are improving  Leukocytosis unreliable marker given CLL
c/w tenofovir  Hepatology following
c/w tenofovir as ordered  Hepatology following
c/w tenofovir as ordered  Hepatology following
c/w tenofovir as ordered  Hepatology is following
c/w tenofovir as ordered  Hepatology is following
Improved  S/P IV albumin

## 2020-01-31 NOTE — PROVIDER CONTACT NOTE (CRITICAL VALUE NOTIFICATION) - SITUATION
Patient WBC 47.37, no distress noted, MD made aware, nothing to be done at present, will continue to monitor
H/H 6.8/21.8
Patient's WBC is 108.04
Pt WBC is 66.61.
WBC 46.94
WBC 47.61
WBC 59.65
WBC 65.53
WBC 67.3
WBC 73.28
WBC 95.83
WBC = 51.47 and Hgb = 6.9
critical result WBC 45.44
critical result WBC 50.04
wbc 42.29, h/h 6.6/21.4

## 2020-01-31 NOTE — PROGRESS NOTE ADULT - PROBLEM SELECTOR PLAN 5
Monitor WBC
Monitor WBC
Monitor WBC  Heme following
Monitor WBC  Heme is following
Monitor WBC  Heme is following
PPSV 30-40%.
PPSV 30-40%.
c/w Vancomycin 125mg PO QID x10-14 days total, started January 20th.   BMs are improving  Leukocytosis unreliable marker given CLL
c/w tenofovir as ordered  Hepatology is following
c/w Vancomycin 125mg PO QID x10-14 days total, started January 20th.   BMs are improving  Leukocytosis unreliable marker given CLL

## 2020-01-31 NOTE — PROGRESS NOTE ADULT - PROBLEM SELECTOR PLAN 9
Transitions of Care Status:  1.  Name of PCP: Dr. العراقي  2.  PCP Contacted on Admission: [ ] Y    [X ] N Recently transferred to medicine  3.  PCP contacted at Discharge: [ ] Y    [ ] N    [X ] N/A  4.  Post-Discharge Appointment Date and Location: TBD  5.  Summary of Handoff given to PCP: ESDRAS
.

## 2020-01-31 NOTE — PROVIDER CONTACT NOTE (CRITICAL VALUE NOTIFICATION) - NAME OF MD/NP/PA/DO NOTIFIED:
Jean Paul Kingston MD   beeper [id number]
ARTURO WHITEHEAD)
Alejandro
Bea Fischer NP
Bral
Charity Mendosa MD
Dr Lara Adam
Lei Blunt NP
Marlin
Michaelle Koch
Rosy Teixeira MD
Rosy Teixeira MD [id number]
Saundra De Paz
TIMA GRIGSBY NP
Viktoriya Latham
Zee Frankel

## 2020-01-31 NOTE — PROGRESS NOTE ADULT - PROBLEM SELECTOR PLAN 1
Abdomen is increasing in size  Monitoring volume status and Na.  No signs of encephalopathy  Progressing abdominal pain, surgery reconsulted, will follow up the recommended Ct scan of the abdomen/pelvis shows increased ascites, no signs of perforation. As per surgery, will obtain KUB to see if there is still contrast Abdomen is increasing in size  Monitoring volume status and Na.  No signs of encephalopathy  Progressing abdominal pain, surgery reconsulted, will follow up the recommended Ct scan of the abdomen/pelvis shows increased ascites, no signs of perforation.   Family desires strict hospice care, patient to be discharged to inpatient hospice.

## 2020-01-31 NOTE — PROGRESS NOTE ADULT - PROBLEM SELECTOR PROBLEM 7
Advance directive discussed with patient
CLL (chronic lymphocytic leukemia)
Discharge planning issues
Palliative care encounter
Prophylactic measure
CLL (chronic lymphocytic leukemia)

## 2020-01-31 NOTE — PROGRESS NOTE ADULT - PROBLEM SELECTOR PLAN 8
VTE PPx with lovenox  Plan discussed with patient and family.   d/w ACP
Transitions of Care Status:  1.  Name of PCP: Dr. العراقي  2.  PCP Contacted on Admission: [ ] Y    [X ] N Recently transferred to medicine  3.  PCP contacted at Discharge: [ ] Y    [ ] N    [X ] N/A  4.  Post-Discharge Appointment Date and Location: TBD  5.  Summary of Handoff given to PCP: ESDRAS
VTE PPx with lovenox  Plan discussed with patient and family.   d/w ACP

## 2020-01-31 NOTE — PROGRESS NOTE ADULT - CONSTITUTIONAL DETAILS
respiratory distress
no distress
no distress
respiratory distress
no distress
respiratory distress
no distress
pale/no distress

## 2020-01-31 NOTE — PROGRESS NOTE ADULT - PROBLEM SELECTOR PROBLEM 3
Advance directive discussed with patient
CLL (chronic lymphocytic leukemia)
CLL (chronic lymphocytic leukemia)
Clostridium difficile infection
Hyponatremia
Advance directive discussed with patient

## 2020-02-03 LAB
METHOD TYPE: SIGNIFICANT CHANGE UP
ORGANISM # SPEC MICROSCOPIC CNT: SIGNIFICANT CHANGE UP
ORGANISM # SPEC MICROSCOPIC CNT: SIGNIFICANT CHANGE UP

## 2020-02-05 LAB
ORGANISM # SPEC MICROSCOPIC CNT: SIGNIFICANT CHANGE UP
ORGANISM # SPEC MICROSCOPIC CNT: SIGNIFICANT CHANGE UP

## 2020-02-06 LAB
-  AMIKACIN: SIGNIFICANT CHANGE UP
-  AMPICILLIN/SULBACTAM: SIGNIFICANT CHANGE UP
-  AMPICILLIN: SIGNIFICANT CHANGE UP
-  AZTREONAM: SIGNIFICANT CHANGE UP
-  CEFAZOLIN: SIGNIFICANT CHANGE UP
-  CEFEPIME: SIGNIFICANT CHANGE UP
-  CEFOXITIN: SIGNIFICANT CHANGE UP
-  CEFTAZIDIME: SIGNIFICANT CHANGE UP
-  CEFTRIAXONE: SIGNIFICANT CHANGE UP
-  ERTAPENEM: SIGNIFICANT CHANGE UP
-  GENTAMICIN: SIGNIFICANT CHANGE UP
-  IMIPENEM: SIGNIFICANT CHANGE UP
-  K. PNEUMONIAE GROUP: SIGNIFICANT CHANGE UP
-  LEVOFLOXACIN: SIGNIFICANT CHANGE UP
-  MEROPENEM: SIGNIFICANT CHANGE UP
-  PIPERACILLIN/TAZOBACTAM: SIGNIFICANT CHANGE UP
-  TIGECYCLINE: SIGNIFICANT CHANGE UP
-  TOBRAMYCIN: SIGNIFICANT CHANGE UP
-  TRIMETHOPRIM/SULFAMETHOXAZOLE: SIGNIFICANT CHANGE UP
BACTERIA BLD CULT: SIGNIFICANT CHANGE UP
METHOD TYPE: SIGNIFICANT CHANGE UP
METHOD TYPE: SIGNIFICANT CHANGE UP

## 2020-02-07 ENCOUNTER — APPOINTMENT (OUTPATIENT)
Dept: HEPATOLOGY | Facility: CLINIC | Age: 80
End: 2020-02-07

## 2021-11-29 NOTE — RAPID RESPONSE TEAM SUMMARY - NSSITUATIONBACKGROUNDRRT_GEN_ALL_CORE
78 y/o M. PMH CLL, chronic HBV with cirrhosis previously compensated, HTN. Admitted on 1/7/20. P/w Abdominal pain secondary to SBO c/b perforation s/p ex lap. Hospital course c/b C diff, in addition to decompensated cirrhosis with ascites and LE edema, and hyponatremia. Ongoing GOC discussions, leaning towards hospice.
attending Psychiatrist without NP/Trainee

## 2021-12-03 NOTE — CONSULT NOTE ADULT - CONSULT REASON
CLL, increasing wbc, decreasing hgb
Cirrhosis
Hyponatremia.  Hypophosphatemia.
distension and abd pain
S/P right partial nephrectomy, POD#0  -Management as per   -Pain control  -Bowel regimen  -Incentive spirometer  -OOB and ambulate  -DVT prophylaxis

## 2022-03-05 NOTE — DISCHARGE NOTE NURSING/CASE MANAGEMENT/SOCIAL WORK - NSDCCRNAME_GEN_ALL_CORE_FT
[facility]  [address] [place] [address] [phone]  [facility] [phone], trip # [id number] normal...

## 2022-05-25 NOTE — PROGRESS NOTE ADULT - PROBLEM SELECTOR PLAN 2
Consultation - Stroke   Evie Kelly 67 y o  male MRN: [id number]  Unit/Bed#: -01 Encounter: [id number]      Assessment/Plan     Vertigo  Assessment & Plan  67 y o with HTN and HLD and PMHx of throat cancer in 1992 who presents with vertigo  He states he felt normal aorund 9 pm last night and at 11pm he felt that the room was spinning  His vertigo was worsened with head movement  He also reported that he vomitted x 2  He denied slurred speech, word finding difficulty, facial asymmetry, numbness or weakness  He denied recent trauma or head strike  He attempted to sleep it off, however it persisted into this morning which prompted him to the ED  Upon arrival to [facility], /100  NIH 0  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 with a recommendation to follow up with neurovascular, however negative for  significant stenosis or LVO  Patient was deemed not a tPA candidate  Plan:  - Stroke pathway  - Initiate Aspirin 81 mg  - Switch from Simvastatin home medication to Atorvastatin 40 mg  - MRI brain with and without pending   - Defer echo for now per Attending Neurologist   - Hemoglobin A1c, Lipid Panel pending   - Maintain normotension, no need for permissive HTN    - Euglycemic, normothermic goal  - Continue telemetry  - PT/OT/ST  - Secondary risk factor modification    - Stroke education  - Frequent neuro checks  Continue to monitor and notify neurology with any changes   - STAT CT head for any acute change in neuro exam  - Medical management and supportive care per primary team  Correction of any metabolic or infectious disturbances    - Recommend Neurosurgery consult for 3 mm aneurysm at basilar tip   - Recommend Meclizine PRN   Plan discussed with Attending Neurologist, please see attestation for further input/recommendations  TPA Decision: Patient not a TPA candidate  Unclear time of onset outside appropriate time window  and Symptoms resolved/clearly non disabling        Recommendations for outpatient neurological follow up have yet to be determined  History of Present Illness     Reason for Consult / Principal Problem: Stroke Alert  Hx and PE limited by: N/A  Patient last known well: 9pm 5/24/22  Stroke alert called: [facility]  Neurology time of arrival: 0822  HPI: Jeferson Newton is a 67 y o male with HTN and HLD and PMHx of throat cancer in 1992 who presents with vertigo  He states he felt normal aorund 9 pm last night and at 11pm he felt that the room was spinning  His vertigo was worsened with head movement  He also reported that he vomitted x 2  He denied slurred speech, word finding difficulty, facial asymmetry, numbness or weakness  He denied recent trauma or head strike  He attempted to sleep it off, however it persisted into this morning which prompted him to the ED  Upon arrival to [facility], /100  NIH 0  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 with a recommendation to follow up with neurovascular, however negative for  significant stenosis or LVO  Patient reported that he was not experiencing further vertigo once in the ED  Patient was deemed not a tPA candidate  Consult to Neurology  Consult performed by: CELESTINE Sousa  Consult ordered by: Kg Mancia DO          Review of Systems   Constitutional: Negative for fatigue and fever  Eyes: Negative for photophobia and visual disturbance  Musculoskeletal: Negative for gait problem, neck pain and neck stiffness  Skin: Negative for color change and pallor  Neurological: Negative for dizziness (Denies dizziness at this time), tremors, seizures, syncope, facial asymmetry, speech difficulty, weakness, light-headedness, numbness and headaches  Psychiatric/Behavioral: Negative for confusion  The patient is not nervous/anxious  All other systems reviewed and are negative        Historical Information   Past Medical History:   Diagnosis Date    Hypertension     Throat cancer ([facility] [address] )     1992     Past Surgical History: Procedure Laterality Date    NECK DISSECTION Right     1993    PANCREATICODUODENECTOMY      2017     Social History   Social History     Substance and Sexual Activity   Alcohol Use Yes    Comment: social     Social History     Substance and Sexual Activity   Drug Use Never     E-Cigarette/Vaping    E-Cigarette Use Never User      E-Cigarette/Vaping Substances    Nicotine No     Flavoring No      Social History     Tobacco Use   Smoking Status Former Smoker    Quit date:     Years since quittin 4   Smokeless Tobacco Former User     Family History: History reviewed  No pertinent family history  Review of previous medical records was completed  Meds/Allergies   all current active meds have been reviewed, current meds:   Current Facility-Administered Medications   Medication Dose Route Frequency    [START ON 2022] aspirin chewable tablet 81 mg  81 mg Oral Daily    carvedilol (COREG) tablet 12 5 mg  12 5 mg Oral BID With Meals    enoxaparin (LOVENOX) subcutaneous injection 40 mg  40 mg Subcutaneous Daily    methocarbamol (ROBAXIN) tablet 500 mg  500 mg Oral Q6H PRN    Pancrelipase (Lip-Prot-Amyl) 66163-42126 units CPEP 1 capsule  1 capsule Oral TID With Meals    pravastatin (PRAVACHOL) tablet 40 mg  40 mg Oral Daily With Dinner    and PTA meds:   Prior to Admission Medications   Prescriptions Last Dose Informant Patient Reported? Taking? Ibuprofen 200 MG CAPS   Yes No   Sig: Take 600 mg by mouth as needed   Pancrelipase, Lip-Prot-Amyl, (Zenpep) 95919-75176 units CPEP   Yes Yes   Sig: Take 1 capsule by mouth 3 (three) times a day with meals   carvedilol (COREG) 12 5 mg tablet  Self Yes Yes   Sig: Take 12 5 mg by mouth in the morning and 12 5 mg in the evening  Take with meals     simvastatin (ZOCOR) 20 mg tablet   Yes Yes   Sig: Take 20 mg by mouth every evening      Facility-Administered Medications: None       Allergies   Allergen Reactions    Gadolinium Headache       Objective Vitals:Blood pressure 157/87, pulse 59, temperature (!) 97 3 °F (36 3 °C), temperature source Tympanic, resp  rate 18, height 6' 4" (1 93 m), weight 89 9 kg (198 lb 3 1 oz), SpO2 95 %  ,Body mass index is 24 12 kg/m²  No intake or output data in the 24 hours ending 05/25/22 1243    Invasive Devices: Invasive Devices  Report    Peripheral Intravenous Line  Duration           Peripheral IV 05/25/22 Left Antecubital <1 day                Physical Exam  Vitals reviewed  Constitutional:       General: He is not in acute distress  Appearance: Normal appearance  He is well-developed and normal weight  HENT:      Head: Normocephalic and atraumatic  Right Ear: External ear normal       Left Ear: External ear normal       Nose: Nose normal       Mouth/Throat:      Mouth: Mucous membranes are moist    Eyes:      General: No scleral icterus  Right eye: No discharge  Left eye: No discharge  Extraocular Movements: Extraocular movements intact and EOM normal       Conjunctiva/sclera: Conjunctivae normal       Pupils: Pupils are equal, round, and reactive to light  Cardiovascular:      Rate and Rhythm: Normal rate  Pulmonary:      Effort: Pulmonary effort is normal  No respiratory distress  Musculoskeletal:         General: Normal range of motion  Cervical back: Normal range of motion  Right lower leg: No edema  Left lower leg: No edema  Skin:     General: Skin is warm and dry  Coloration: Skin is not jaundiced or pale  Neurological:      General: No focal deficit present  Mental Status: He is alert and oriented to person, place, and time  Mental status is at baseline  Cranial Nerves: No cranial nerve deficit  Sensory: No sensory deficit  Motor: No weakness  Coordination: Finger-Nose-Finger Test and Heel to [place] zoraida Test normal       Gait: Gait is intact        Deep Tendon Reflexes:      Reflex Scores:       Patellar reflexes are 1+ on the right side and 1+ on the left side  Psychiatric:         Mood and Affect: Mood normal          Speech: Speech normal          Behavior: Behavior normal          Thought Content: Thought content normal          Judgment: Judgment normal        Neurologic Exam     Mental Status   Oriented to person, place, and time  Follows 2 step commands  Attention: normal  Concentration: normal    Speech: speech is normal   Level of consciousness: alert  Knowledge: good  Able to name object  Normal comprehension  Cranial Nerves     CN II   Visual fields full to confrontation  CN III, IV, VI   Pupils are equal, round, and reactive to light  Extraocular motions are normal    Nystagmus: none   Diplopia: none  Conjugate gaze: present    CN V   Facial sensation intact  CN VII   Facial expression full, symmetric  CN VIII   CN VIII normal    Hearing: intact    CN XI   CN XI normal      CN XII   CN XII normal      Motor Exam   Muscle bulk: normal  Overall muscle tone: normal  Right arm pronator drift: absent  Left arm pronator drift: absent    Strength   Strength 5/5 except as noted  Sensory Exam   Light touch normal    Vibration normal    Pinprick normal      Gait, Coordination, and Reflexes     Gait  Gait: normal    Coordination   Finger to nose coordination: normal  Heel to shin coordination: normal    Tremor   Resting tremor: absent  Intention tremor: absent  Action tremor: absent    Reflexes   Reflexes 2+ except as noted  Right patellar: 1+  Left patellar: 1+      NIHSS:  1a Level of Consciousness: 0 = Alert   1b  LOC Questions: 0 = Answers both correctly   1c  LOC Commands: 0 = Obeys both correctly   2  Best Gaze: 0 = Normal   3  Visual: 0 = No visual field loss   4  Facial Palsy: 0=Normal symmetric movement   5a  Motor Right Arm: 0=No drift, limb holds 90 (or 45) degrees for full 10 seconds   5b  Motor Left Arm: 0=No drift, limb holds 90 (or 45) degrees for full 10 seconds   6a   Motor Right Le=No drift, limb holds 90 (or 45) degrees for full 10 seconds   6b  Motor Left Le=No drift, limb holds 90 (or 45) degrees for full 10 seconds   7  Limb Ataxia:  0=Absent   8  Sensory: 0=Normal; no sensory loss   9  Best Language:  0=No aphasia, normal   10  Dysarthria: 0=Normal articulation   11  Extinction and Inattention (formerly Neglect): 0=No abnormality   Total Score: 0     Time NIHSS was completed: 0840 After CTH/CTA     Modified [place] Score:  0 (No baseline symptoms/disability)    Lab Results:   I have personally reviewed pertinent reports  , CBC:   Results from last 7 days   Lab Units 22  0824   WBC Thousand/uL 7 41   RBC Million/uL 5 31   HEMOGLOBIN g/dL 17 5*   HEMATOCRIT % 52 0*   MCV fL 98   PLATELETS Thousands/uL 217   , BMP/CMP:   Results from last 7 days   Lab Units 22  0824   SODIUM mmol/L 139   POTASSIUM mmol/L 4 5   CHLORIDE mmol/L 104   CO2 mmol/L 30   BUN mg/dL 13   CREATININE mg/dL 1 20   CALCIUM mg/dL 8 7   EGFR ml/min/1 73sq m 58   Coagulation:   Results from last 7 days   Lab Units 22  0824   INR  0 98     Imaging Studies: I have personally reviewed pertinent reports  and I have personally reviewed pertinent films in PACS [facility], CTA h/n  EKG, Pathology, and Other Studies: I have personally reviewed pertinent reports     and I have personally reviewed pertinent films in PACS  VTE Prophylaxis: Patient is in ED     Code Status: Level 1 - Full Code In part due to hypervolemia due to cirrhosis  Improving with IV albumin  Reassess diuretics daily - likely will need to resume in coming days  Monitoring volume status and BMP

## 2022-12-26 NOTE — GOALS OF CARE CONVERSATION - ADVANCED CARE PLANNING - CONVERSATION DETAILS
HCN: Pt. approved for home hospice. Spoke with wife. NGT in place-pt. waiting for Cat Scan and further work up. Hospice ready to render care when pt. medically stable for discharge. Will continue to follow. Aamir Carrion RN
[facility] - Consents signed. Awaiting a discharge date in order to have equipment delivered. Pt needs a hospital bed, APAP and O2 at 3L n/c cont and will be ordered from [facility].
In addition to the EM visit, an advance care planning meeting was performed  Start time: 10:30 AM  End time: 11:15 AM  Total time: 45 Min    A face to face meeting to discuss advance care planning was held today regarding: ZAN ROBLES    Primary decision maker: ZAN ROBLES    Alternate/surrogate: Carson Yang    Discussed advance directives including, but not limited to, healthcare proxy and code status.    Family meeting held with patient, family at bedside, Dr Arriaga and myself. Discussion regarding not escalating or pursuing aggressive treatment. Patient wishes a symptom focus approach and information regarding hospice services at home. CODE Status changed from FULL CODE to DNR/DNI, MOLST was filled and place in chart. Family has also a HCP form in the chart. Primary is the wife Carson and the alternative are the children Sapnav and Prateekubeatrice.     Decision regarding code status: DNR/DNI    Documentation completed today: MOLST
119.9

## 2023-05-22 NOTE — PROGRESS NOTE ADULT - PROBLEM SELECTOR PLAN 2
Topical Sulfur Applications Pregnancy And Lactation Text: This medication is considered safe during pregnancy and breast feeding secondary to limited systemic absorption. Olanzapine Counseling- I discussed with the patient the common side effects of olanzapine including but are not limited to: lack of energy, dry mouth, increased appetite, sleepiness, tremor, constipation, dizziness, changes in behavior, or restlessness.  Explained that teenagers are more likely to experience headaches, abdominal pain, pain in the arms or legs, tiredness, and sleepiness.  Serious side effects include but are not limited: increased risk of death in elderly patients who are confused, have memory loss, or dementia-related psychosis; hyperglycemia; increased cholesterol and triglycerides; and weight gain. Eucrisa Pregnancy And Lactation Text: This medication has not been assigned a Pregnancy Risk Category but animal studies failed to show danger with the topical medication. It is unknown if the medication is excreted in breast milk. Xeljewelsz Pregnancy And Lactation Text: This medication is Pregnancy Category D and is not considered safe during pregnancy.  The risk during breast feeding is also uncertain. Cellcept Pregnancy And Lactation Text: This medication is Pregnancy Category D and isn't considered safe during pregnancy. It is unknown if this medication is excreted in breast milk. Simponi Counseling:  I discussed with the patient the risks of golimumab including but not limited to myelosuppression, immunosuppression, autoimmune hepatitis, demyelinating diseases, lymphoma, and serious infections.  The patient understands that monitoring is required including a PPD at baseline and must alert us or the primary physician if symptoms of infection or other concerning signs are noted. Opzelura Counseling:  I discussed with the patient the risks of Opzelura including but not limited to nasopharngitis, bronchitis, ear infection, eosinophila, hives, diarrhea, folliculitis, tonsillitis, and rhinorrhea.  Taken orally, this medication has been linked to serious infections; higher rate of mortality; malignancy and lymphoproliferative disorders; major adverse cardiovascular events; thrombosis; thrombocytopenia, anemia, and neutropenia; and lipid elevations. High Dose Vitamin A Pregnancy And Lactation Text: High dose vitamin A therapy is contraindicated during pregnancy and breast feeding. Doxepin Pregnancy And Lactation Text: This medication is Pregnancy Category C and it isn't known if it is safe during pregnancy. It is also excreted in breast milk and breast feeding isn't recommended. In part due to hypervolemia due to cirrhosis  Improving with IV albumin  Reassess diuretics daily  Monitor volume status  Monitor BMP Use Enhanced Medication Counseling?: No Finasteride Counseling:  I discussed with the patient the risks of use of finasteride including but not limited to decreased libido, decreased ejaculate volume, gynecomastia, and depression. Women should not handle medication.  All of the patient's questions and concerns were addressed. Skyrizi Pregnancy And Lactation Text: The risk during pregnancy and breastfeeding is uncertain with this medication. Opzelura Pregnancy And Lactation Text: There is insufficient data to evaluate drug-associated risk for major birth defects, miscarriage, or other adverse maternal or fetal outcomes.  There is a pregnancy registry that monitors pregnancy outcomes in pregnant persons exposed to the medication during pregnancy.  It is unknown if this medication is excreted in breast milk.  Do not breastfeed during treatment and for about 4 weeks after the last dose. Topical Retinoid counseling:  Patient advised to apply a pea-sized amount only at bedtime and wait 30 minutes after washing their face before applying.  If too drying, patient may add a non-comedogenic moisturizer. The patient verbalized understanding of the proper use and possible adverse effects of retinoids.  All of the patient's questions and concerns were addressed. Gabapentin Pregnancy And Lactation Text: This medication is Pregnancy Category C and isn't considered safe during pregnancy. It is excreted in breast milk. SSKI Counseling:  I discussed with the patient the risks of SSKI including but not limited to thyroid abnormalities, metallic taste, GI upset, fever, headache, acne, arthralgias, paraesthesias, lymphadenopathy, easy bleeding, arrhythmias, and allergic reaction. Itraconazole Pregnancy And Lactation Text: This medication is Pregnancy Category C and it isn't know if it is safe during pregnancy. It is also excreted in breast milk. Erivedge Counseling- I discussed with the patient the risks of Erivedge including but not limited to nausea, vomiting, diarrhea, constipation, weight loss, changes in the sense of taste, decreased appetite, muscle spasms, and hair loss.  The patient verbalized understanding of the proper use and possible adverse effects of Erivedge.  All of the patient's questions and concerns were addressed. Erythromycin Pregnancy And Lactation Text: This medication is Pregnancy Category B and is considered safe during pregnancy. It is also excreted in breast milk. Cimzia Counseling:  I discussed with the patient the risks of Cimzia including but not limited to immunosuppression, allergic reactions and infections.  The patient understands that monitoring is required including a PPD at baseline and must alert us or the primary physician if symptoms of infection or other concerning signs are noted. Sarecycline Counseling: Patient advised regarding possible photosensitivity and discoloration of the teeth, skin, lips, tongue and gums.  Patient instructed to avoid sunlight, if possible.  When exposed to sunlight, patients should wear protective clothing, sunglasses, and sunscreen.  The patient was instructed to call the office immediately if the following severe adverse effects occur:  hearing changes, easy bruising/bleeding, severe headache, or vision changes.  The patient verbalized understanding of the proper use and possible adverse effects of sarecycline.  All of the patient's questions and concerns were addressed. Humira Pregnancy And Lactation Text: This medication is Pregnancy Category B and is considered safe during pregnancy. It is unknown if this medication is excreted in breast milk. Nsaids Counseling: NSAID Counseling: I discussed with the patient that NSAIDs should be taken with food. Prolonged use of NSAIDs can result in the development of stomach ulcers.  Patient advised to stop taking NSAIDs if abdominal pain occurs.  The patient verbalized understanding of the proper use and possible adverse effects of NSAIDs.  All of the patient's questions and concerns were addressed. Cibinqo Counseling: I discussed with the patient the risks of Cibinqo therapy including but not limited to common cold, nausea, headache, cold sores, increased blood CPK levels, dizziness, UTIs, fatigue, acne, and vomitting. Live vaccines should be avoided.  This medication has been linked to serious infections; higher rate of mortality; malignancy and lymphoproliferative disorders; major adverse cardiovascular events; thrombosis; thrombocytopenia and lymphopenia; lipid elevations; and retinal detachment. Cephalexin Counseling: I counseled the patient regarding use of cephalexin as an antibiotic for prophylactic and/or therapeutic purposes. Cephalexin (commonly prescribed under brand name Keflex) is a cephalosporin antibiotic which is active against numerous classes of bacteria, including most skin bacteria. Side effects may include nausea, diarrhea, gastrointestinal upset, rash, hives, yeast infections, and in rare cases, hepatitis, kidney disease, seizures, fever, confusion, neurologic symptoms, and others. Patients with severe allergies to penicillin medications are cautioned that there is about a 10% incidence of cross-reactivity with cephalosporins. When possible, patients with penicillin allergies should use alternatives to cephalosporins for antibiotic therapy. Azelaic Acid Counseling: Patient counseled that medicine may cause skin irritation and to avoid applying near the eyes.  In the event of skin irritation, the patient was advised to reduce the amount of the drug applied or use it less frequently.   The patient verbalized understanding of the proper use and possible adverse effects of azelaic acid.  All of the patient's questions and concerns were addressed. Cibinqo Pregnancy And Lactation Text: It is unknown if this medication will adversely affect pregnancy or breast feeding.  You should not take this medication if you are currently pregnant or planning a pregnancy or while breastfeeding. Hydroquinone Counseling:  Patient advised that medication may result in skin irritation, lightening (hypopigmentation), dryness, and burning.  In the event of skin irritation, the patient was advised to reduce the amount of the drug applied or use it less frequently.  Rarely, spots that are treated with hydroquinone can become darker (pseudoochronosis).  Should this occur, patient instructed to stop medication and call the office. The patient verbalized understanding of the proper use and possible adverse effects of hydroquinone.  All of the patient's questions and concerns were addressed. Wartpeel Counseling:  I discussed with the patient the risks of Wartpeel including but not limited to erythema, scaling, itching, weeping, crusting, and pain. Ilumya Counseling: I discussed with the patient the risks of tildrakizumab including but not limited to immunosuppression, malignancy, posterior leukoencephalopathy syndrome, and serious infections.  The patient understands that monitoring is required including a PPD at baseline and must alert us or the primary physician if symptoms of infection or other concerning signs are noted. Olanzapine Pregnancy And Lactation Text: This medication is pregnancy category C.   There are no adequate and well controlled trials with olanzapine in pregnant females.  Olanzapine should be used during pregnancy only if the potential benefit justifies the potential risk to the fetus.   In a study in lactating healthy women, olanzapine was excreted in breast milk.  It is recommended that women taking olanzapine should not breast feed. Cyclophosphamide Counseling:  I discussed with the patient the risks of cyclophosphamide including but not limited to hair loss, hormonal abnormalities, decreased fertility, abdominal pain, diarrhea, nausea and vomiting, bone marrow suppression and infection. The patient understands that monitoring is required while taking this medication. Hydroxyzine Counseling: Patient advised that the medication is sedating and not to drive a car after taking this medication.  Patient informed of potential adverse effects including but not limited to dry mouth, urinary retention, and blurry vision.  The patient verbalized understanding of the proper use and possible adverse effects of hydroxyzine.  All of the patient's questions and concerns were addressed. Arava Counseling:  Patient counseled regarding adverse effects of Arava including but not limited to nausea, vomiting, abnormalities in liver function tests. Patients may develop mouth sores, rash, diarrhea, and abnormalities in blood counts. The patient understands that monitoring is required including LFTs and blood counts.  There is a rare possibility of scarring of the liver and lung problems that can occur when taking methotrexate. Persistent nausea, loss of appetite, pale stools, dark urine, cough, and shortness of breath should be reported immediately. Patient advised to discontinue Arava treatment and consult with a physician prior to attempting conception. The patient will have to undergo a treatment to eliminate Arava from the body prior to conception. Finasteride Pregnancy And Lactation Text: This medication is absolutely contraindicated during pregnancy. It is unknown if it is excreted in breast milk. Acitretin Counseling:  I discussed with the patient the risks of acitretin including but not limited to hair loss, dry lips/skin/eyes, liver damage, hyperlipidemia, depression/suicidal ideation, photosensitivity.  Serious rare side effects can include but are not limited to pancreatitis, pseudotumor cerebri, bony changes, clot formation/stroke/heart attack.  Patient understands that alcohol is contraindicated since it can result in liver toxicity and significantly prolong the elimination of the drug by many years. Picato Counseling:  I discussed with the patient the risks of Picato including but not limited to erythema, scaling, itching, weeping, crusting, and pain. Arava Pregnancy And Lactation Text: This medication is Pregnancy Category X and is absolutely contraindicated during pregnancy. It is unknown if it is excreted in breast milk. Sski Pregnancy And Lactation Text: This medication is Pregnancy Category D and isn't considered safe during pregnancy. It is excreted in breast milk. Oral Minoxidil Counseling- I discussed with the patient the risks of oral minoxidil including but not limited to shortness of breath, swelling of the feet or ankles, dizziness, lightheadedness, unwanted hair growth and allergic reaction.  The patient verbalized understanding of the proper use and possible adverse effects of oral minoxidil.  All of the patient's questions and concerns were addressed. Hydroxyzine Pregnancy And Lactation Text: This medication is not safe during pregnancy and should not be taken. It is also excreted in breast milk and breast feeding isn't recommended. Skyrizi Counseling: I discussed with the patient the risks of risankizumab-rzaa including but not limited to immunosuppression, and serious infections.  The patient understands that monitoring is required including a PPD at baseline and must alert us or the primary physician if symptoms of infection or other concerning signs are noted. Topical Retinoid Pregnancy And Lactation Text: This medication is Pregnancy Category C. It is unknown if this medication is excreted in breast milk. Metronidazole Counseling:  I discussed with the patient the risks of metronidazole including but not limited to seizures, nausea/vomiting, a metallic taste in the mouth, nausea/vomiting and severe allergy. Cimzia Pregnancy And Lactation Text: This medication crosses the placenta but can be considered safe in certain situations. Cimzia may be excreted in breast milk. Sarecycline Pregnancy And Lactation Text: This medication is Pregnancy Category D and not consider safe during pregnancy. It is also excreted in breast milk. Ketoconazole Counseling:   Patient counseled regarding improving absorption with orange juice.  Adverse effects include but are not limited to breast enlargement, headache, diarrhea, nausea, upset stomach, liver function test abnormalities, taste disturbance, and stomach pain.  There is a rare possibility of liver failure that can occur when taking ketoconazole. The patient understands that monitoring of LFTs may be required, especially at baseline. The patient verbalized understanding of the proper use and possible adverse effects of ketoconazole.  All of the patient's questions and concerns were addressed. Glycopyrrolate Counseling:  I discussed with the patient the risks of glycopyrrolate including but not limited to skin rash, drowsiness, dry mouth, difficulty urinating, and blurred vision. Stelara Counseling:  I discussed with the patient the risks of ustekinumab including but not limited to immunosuppression, malignancy, posterior leukoencephalopathy syndrome, and serious infections.  The patient understands that monitoring is required including a PPD at baseline and must alert us or the primary physician if symptoms of infection or other concerning signs are noted. Ketoconazole Pregnancy And Lactation Text: This medication is Pregnancy Category C and it isn't know if it is safe during pregnancy. It is also excreted in breast milk and breast feeding isn't recommended. Olumiant Counseling: I discussed with the patient the risks of Olumiant therapy including but not limited to upper respiratory tract infections, shingles, cold sores, and nausea. Live vaccines should be avoided.  This medication has been linked to serious infections; higher rate of mortality; malignancy and lymphoproliferative disorders; major adverse cardiovascular events; thrombosis; gastrointestinal perforations; neutropenia; lymphopenia; anemia; liver enzyme elevations; and lipid elevations. 5-Fu Counseling: 5-Fluorouracil Counseling:  I discussed with the patient the risks of 5-fluorouracil including but not limited to erythema, scaling, itching, weeping, crusting, and pain. Tazorac Counseling:  Patient advised that medication is irritating and drying.  Patient may need to apply sparingly and wash off after an hour before eventually leaving it on overnight.  The patient verbalized understanding of the proper use and possible adverse effects of tazorac.  All of the patient's questions and concerns were addressed. Wartpeel Pregnancy And Lactation Text: This medication is Pregnancy Category X and contraindicated in pregnancy and in women who may become pregnant. It is unknown if this medication is excreted in breast milk. Tetracycline Counseling: Patient counseled regarding possible photosensitivity and increased risk for sunburn.  Patient instructed to avoid sunlight, if possible.  When exposed to sunlight, patients should wear protective clothing, sunglasses, and sunscreen.  The patient was instructed to call the office immediately if the following severe adverse effects occur:  hearing changes, easy bruising/bleeding, severe headache, or vision changes.  The patient verbalized understanding of the proper use and possible adverse effects of tetracycline.  All of the patient's questions and concerns were addressed. Patient understands to avoid pregnancy while on therapy due to potential birth defects. Cyclophosphamide Pregnancy And Lactation Text: This medication is Pregnancy Category D and it isn't considered safe during pregnancy. This medication is excreted in breast milk. Azelaic Acid Pregnancy And Lactation Text: This medication is considered safe during pregnancy and breast feeding. Birth Control Pills Counseling: Birth Control Pill Counseling: I discussed with the patient the potential side effects of OCPs including but not limited to increased risk of stroke, heart attack, thrombophlebitis, deep venous thrombosis, hepatic adenomas, breast changes, GI upset, headaches, and depression.  The patient verbalized understanding of the proper use and possible adverse effects of OCPs. All of the patient's questions and concerns were addressed. Birth Control Pills Pregnancy And Lactation Text: This medication should be avoided if pregnant and for the first 30 days post-partum. Imiquimod Counseling:  I discussed with the patient the risks of imiquimod including but not limited to erythema, scaling, itching, weeping, crusting, and pain.  Patient understands that the inflammatory response to imiquimod is variable from person to person and was educated regarded proper titration schedule.  If flu-like symptoms develop, patient knows to discontinue the medication and contact us. Oral Minoxidil Pregnancy And Lactation Text: This medication should only be used when clearly needed if you are pregnant, attempting to become pregnant or breast feeding. Acitretin Pregnancy And Lactation Text: This medication is Pregnancy Category X and should not be given to women who are pregnant or may become pregnant in the future. This medication is excreted in breast milk. Clofazimine Counseling:  I discussed with the patient the risks of clofazimine including but not limited to skin and eye pigmentation, liver damage, nausea/vomiting, gastrointestinal bleeding and allergy. Thalidomide Counseling: I discussed with the patient the risks of thalidomide including but not limited to birth defects, anxiety, weakness, chest pain, dizziness, cough and severe allergy. Libtayo Counseling- I discussed with the patient the risks of Libtayo including but not limited to nausea, vomiting, diarrhea, and bone or muscle pain.  The patient verbalized understanding of the proper use and possible adverse effects of Libtayo.  All of the patient's questions and concerns were addressed. Metronidazole Pregnancy And Lactation Text: This medication is Pregnancy Category B and considered safe during pregnancy.  It is also excreted in breast milk. Cosentyx Counseling:  I discussed with the patient the risks of Cosentyx including but not limited to worsening of Crohn's disease, immunosuppression, allergic reactions and infections.  The patient understands that monitoring is required including a PPD at baseline and must alert us or the primary physician if symptoms of infection or other concerning signs are noted. Glycopyrrolate Pregnancy And Lactation Text: This medication is Pregnancy Category B and is considered safe during pregnancy. It is unknown if it is excreted breast milk. Taltz Counseling: I discussed with the patient the risks of ixekizumab including but not limited to immunosuppression, serious infections, worsening of inflammatory bowel disease and drug reactions.  The patient understands that monitoring is required including a PPD at baseline and must alert us or the primary physician if symptoms of infection or other concerning signs are noted. Terbinafine Counseling: Patient counseling regarding adverse effects of terbinafine including but not limited to headache, diarrhea, rash, upset stomach, liver function test abnormalities, itching, taste/smell disturbance, nausea, abdominal pain, and flatulence.  There is a rare possibility of liver failure that can occur when taking terbinafine.  The patient understands that a baseline LFT and kidney function test may be required. The patient verbalized understanding of the proper use and possible adverse effects of terbinafine.  All of the patient's questions and concerns were addressed. Hydroxychloroquine Counseling:  I discussed with the patient that a baseline ophthalmologic exam is needed at the start of therapy and every year thereafter while on therapy. A CBC may also be warranted for monitoring.  The side effects of this medication were discussed with the patient, including but not limited to agranulocytosis, aplastic anemia, seizures, rashes, retinopathy, and liver toxicity. Patient instructed to call the office should any adverse effect occur.  The patient verbalized understanding of the proper use and possible adverse effects of Plaquenil.  All the patient's questions and concerns were addressed. Tazorac Pregnancy And Lactation Text: This medication is not safe during pregnancy. It is unknown if this medication is excreted in breast milk. Minocycline Counseling: Patient advised regarding possible photosensitivity and discoloration of the teeth, skin, lips, tongue and gums.  Patient instructed to avoid sunlight, if possible.  When exposed to sunlight, patients should wear protective clothing, sunglasses, and sunscreen.  The patient was instructed to call the office immediately if the following severe adverse effects occur:  hearing changes, easy bruising/bleeding, severe headache, or vision changes.  The patient verbalized understanding of the proper use and possible adverse effects of minocycline.  All of the patient's questions and concerns were addressed. Libtayo Pregnancy And Lactation Text: This medication is contraindicated in pregnancy and when breast feeding. Cyclosporine Counseling:  I discussed with the patient the risks of cyclosporine including but not limited to hypertension, gingival hyperplasia,myelosuppression, immunosuppression, liver damage, kidney damage, neurotoxicity, lymphoma, and serious infections. The patient understands that monitoring is required including baseline blood pressure, CBC, CMP, lipid panel and uric acid, and then 1-2 times monthly CMP and blood pressure. Winlevi Counseling:  I discussed with the patient the risks of topical clascoterone including but not limited to erythema, scaling, itching, and stinging. Patient voiced their understanding. Infliximab Counseling:  I discussed with the patient the risks of infliximab including but not limited to myelosuppression, immunosuppression, autoimmune hepatitis, demyelinating diseases, lymphoma, and serious infections.  The patient understands that monitoring is required including a PPD at baseline and must alert us or the primary physician if symptoms of infection or other concerning signs are noted. Benzoyl Peroxide Counseling: Patient counseled that medicine may cause skin irritation and bleach clothing.  In the event of skin irritation, the patient was advised to reduce the amount of the drug applied or use it less frequently.   The patient verbalized understanding of the proper use and possible adverse effects of benzoyl peroxide.  All of the patient's questions and concerns were addressed. Albendazole Counseling:  I discussed with the patient the risks of albendazole including but not limited to cytopenia, kidney damage, nausea/vomiting and severe allergy.  The patient understands that this medication is being used in an off-label manner. Olumiant Pregnancy And Lactation Text: Based on animal studies, Olumiant may cause embryo-fetal harm when administered to pregnant women.  The medication should not be used in pregnancy.  Breastfeeding is not recommended during treatment. Benzoyl Peroxide Pregnancy And Lactation Text: This medication is Pregnancy Category C. It is unknown if benzoyl peroxide is excreted in breast milk. Albendazole Pregnancy And Lactation Text: This medication is Pregnancy Category C and it isn't known if it is safe during pregnancy. It is also excreted in breast milk. Winlevi Pregnancy And Lactation Text: This medication is considered safe during pregnancy and breastfeeding. Otezla Counseling: The side effects of Otezla were discussed with the patient, including but not limited to worsening or new depression, weight loss, diarrhea, nausea, upper respiratory tract infection, and headache. Patient instructed to call the office should any adverse effect occur.  The patient verbalized understanding of the proper use and possible adverse effects of Otezla.  All the patient's questions and concerns were addressed. Rinvoq Counseling: I discussed with the patient the risks of Rinvoq therapy including but not limited to upper respiratory tract infections, shingles, cold sores, bronchitis, nausea, cough, fever, acne, and headache. Live vaccines should be avoided.  This medication has been linked to serious infections; higher rate of mortality; malignancy and lymphoproliferative disorders; major adverse cardiovascular events; thrombosis; thrombocytopenia, anemia, and neutropenia; lipid elevations; liver enzyme elevations; and gastrointestinal perforations. Bexarotene Counseling:  I discussed with the patient the risks of bexarotene including but not limited to hair loss, dry lips/skin/eyes, liver abnormalities, hyperlipidemia, pancreatitis, depression/suicidal ideation, photosensitivity, drug rash/allergic reactions, hypothyroidism, anemia, leukopenia, infection, cataracts, and teratogenicity.  Patient understands that they will need regular blood tests to check lipid profile, liver function tests, white blood cell count, thyroid function tests and pregnancy test if applicable. Cyclosporine Pregnancy And Lactation Text: This medication is Pregnancy Category C and it isn't know if it is safe during pregnancy. This medication is excreted in breast milk. Protopic Counseling: Patient may experience a mild burning sensation during topical application. Protopic is not approved in children less than 2 years of age. There have been case reports of hematologic and skin malignancies in patients using topical calcineurin inhibitors although causality is questionable. Cephalexin Pregnancy And Lactation Text: This medication is Pregnancy Category B and considered safe during pregnancy.  It is also excreted in breast milk but can be used safely for shorter doses. Spironolactone Counseling: Patient advised regarding risks of diarrhea, abdominal pain, hyperkalemia, birth defects (for female patients), liver toxicity and renal toxicity. The patient may need blood work to monitor liver and kidney function and potassium levels while on therapy. The patient verbalized understanding of the proper use and possible adverse effects of spironolactone.  All of the patient's questions and concerns were addressed. Fluconazole Counseling:  Patient counseled regarding adverse effects of fluconazole including but not limited to headache, diarrhea, nausea, upset stomach, liver function test abnormalities, taste disturbance, and stomach pain.  There is a rare possibility of liver failure that can occur when taking fluconazole.  The patient understands that monitoring of LFTs and kidney function test may be required, especially at baseline. The patient verbalized understanding of the proper use and possible adverse effects of fluconazole.  All of the patient's questions and concerns were addressed. Tranexamic Acid Counseling:  Patient advised of the small risk of bleeding problems with tranexamic acid. They were also instructed to call if they developed any nausea, vomiting or diarrhea. All of the patient's questions and concerns were addressed. Protopic Pregnancy And Lactation Text: This medication is Pregnancy Category C. It is unknown if this medication is excreted in breast milk when applied topically. Terbinafine Pregnancy And Lactation Text: This medication is Pregnancy Category B and is considered safe during pregnancy. It is also excreted in breast milk and breast feeding isn't recommended. Spironolactone Pregnancy And Lactation Text: This medication can cause feminization of the male fetus and should be avoided during pregnancy. The active metabolite is also found in breast milk. Hydroxychloroquine Pregnancy And Lactation Text: This medication has been shown to cause fetal harm but it isn't assigned a Pregnancy Risk Category. There are small amounts excreted in breast milk. Odomzo Counseling- I discussed with the patient the risks of Odomzo including but not limited to nausea, vomiting, diarrhea, constipation, weight loss, changes in the sense of taste, decreased appetite, muscle spasms, and hair loss.  The patient verbalized understanding of the proper use and possible adverse effects of Odomzo.  All of the patient's questions and concerns were addressed. Drysol Counseling:  I discussed with the patient the risks of drysol/aluminum chloride including but not limited to skin rash, itching, irritation, burning. Dupixent Counseling: I discussed with the patient the risks of dupilumab including but not limited to eye infection and irritation, cold sores, injection site reactions, worsening of asthma, allergic reactions and increased risk of parasitic infection.  Live vaccines should be avoided while taking dupilumab. Dupilumab will also interact with certain medications such as warfarin and cyclosporine. The patient understands that monitoring is required and they must alert us or the primary physician if symptoms of infection or other concerning signs are noted. Topical Clindamycin Counseling: Patient counseled that this medication may cause skin irritation or allergic reactions.  In the event of skin irritation, the patient was advised to reduce the amount of the drug applied or use it less frequently.   The patient verbalized understanding of the proper use and possible adverse effects of clindamycin.  All of the patient's questions and concerns were addressed. Carac Counseling:  I discussed with the patient the risks of Carac including but not limited to erythema, scaling, itching, weeping, crusting, and pain. Rinvoq Pregnancy And Lactation Text: Based on animal studies, Rinvoq may cause embryo-fetal harm when administered to pregnant women.  The medication should not be used in pregnancy.  Breastfeeding is not recommended during treatment and for 6 days after the last dose. Zyclara Counseling:  I discussed with the patient the risks of imiquimod including but not limited to erythema, scaling, itching, weeping, crusting, and pain.  Patient understands that the inflammatory response to imiquimod is variable from person to person and was educated regarded proper titration schedule.  If flu-like symptoms develop, patient knows to discontinue the medication and contact us. Tremfya Counseling: I discussed with the patient the risks of guselkumab including but not limited to immunosuppression, serious infections, and drug reactions.  The patient understands that monitoring is required including a PPD at baseline and must alert us or the primary physician if symptoms of infection or other concerning signs are noted. Ivermectin Counseling:  Patient instructed to take medication on an empty stomach with a full glass of water.  Patient informed of potential adverse effects including but not limited to nausea, diarrhea, dizziness, itching, and swelling of the extremities or lymph nodes.  The patient verbalized understanding of the proper use and possible adverse effects of ivermectin.  All of the patient's questions and concerns were addressed. Minoxidil Counseling: Minoxidil is a topical medication which can increase blood flow where it is applied. It is uncertain how this medication increases hair growth. Side effects are uncommon and include stinging and allergic reactions. Otezla Pregnancy And Lactation Text: This medication is Pregnancy Category C and it isn't known if it is safe during pregnancy. It is unknown if it is excreted in breast milk. Rituxan Counseling:  I discussed with the patient the risks of Rituxan infusions. Side effects can include infusion reactions, severe drug rashes including mucocutaneous reactions, reactivation of latent hepatitis and other infections and rarely progressive multifocal leukoencephalopathy.  All of the patient's questions and concerns were addressed. Bexarotene Pregnancy And Lactation Text: This medication is Pregnancy Category X and should not be given to women who are pregnant or may become pregnant. This medication should not be used if you are breast feeding. Colchicine Counseling:  Patient counseled regarding adverse effects including but not limited to stomach upset (nausea, vomiting, stomach pain, or diarrhea).  Patient instructed to limit alcohol consumption while taking this medication.  Colchicine may reduce blood counts especially with prolonged use.  The patient understands that monitoring of kidney function and blood counts may be required, especially at baseline. The patient verbalized understanding of the proper use and possible adverse effects of colchicine.  All of the patient's questions and concerns were addressed. Methotrexate Counseling:  Patient counseled regarding adverse effects of methotrexate including but not limited to nausea, vomiting, abnormalities in liver function tests. Patients may develop mouth sores, rash, diarrhea, and abnormalities in blood counts. The patient understands that monitoring is required including LFT's and blood counts.  There is a rare possibility of scarring of the liver and lung problems that can occur when taking methotrexate. Persistent nausea, loss of appetite, pale stools, dark urine, cough, and shortness of breath should be reported immediately. Patient advised to discontinue methotrexate treatment at least three months before attempting to become pregnant.  I discussed the need for folate supplements while taking methotrexate.  These supplements can decrease side effects during methotrexate treatment. The patient verbalized understanding of the proper use and possible adverse effects of methotrexate.  All of the patient's questions and concerns were addressed. Clindamycin Counseling: I counseled the patient regarding use of clindamycin as an antibiotic for prophylactic and/or therapeutic purposes. Clindamycin is active against numerous classes of bacteria, including skin bacteria. Side effects may include nausea, diarrhea, gastrointestinal upset, rash, hives, yeast infections, and in rare cases, colitis. Isotretinoin Counseling: Patient should get monthly blood tests, not donate blood, not drive at night if vision affected, not share medication, and not undergo elective surgery for 6 months after tx completed. Side effects reviewed, pt to contact office should one occur. Rhofade Counseling: Rhofade is a topical medication which can decrease superficial blood flow where applied. Side effects are uncommon and include stinging, redness and allergic reactions. Oxybutynin Counseling:  I discussed with the patient the risks of oxybutynin including but not limited to skin rash, drowsiness, dry mouth, difficulty urinating, and blurred vision. Clindamycin Pregnancy And Lactation Text: This medication can be used in pregnancy if certain situations. Clindamycin is also present in breast milk. Topical Clindamycin Pregnancy And Lactation Text: This medication is Pregnancy Category B and is considered safe during pregnancy. It is unknown if it is excreted in breast milk. Tranexamic Acid Pregnancy And Lactation Text: It is unknown if this medication is safe during pregnancy or breast feeding. Quinolones Counseling:  I discussed with the patient the risks of fluoroquinolones including but not limited to GI upset, allergic reaction, drug rash, diarrhea, dizziness, photosensitivity, yeast infections, liver function test abnormalities, tendonitis/tendon rupture. Dupixent Pregnancy And Lactation Text: This medication likely crosses the placenta but the risk for the fetus is uncertain. This medication is excreted in breast milk. Low Dose Naltrexone Counseling- I discussed with the patient the potential risks and side effects of low dose naltrexone including but not limited to: more vivid dreams, headaches, nausea, vomiting, abdominal pain, fatigue, dizziness, and anxiety. Azithromycin Counseling:  I discussed with the patient the risks of azithromycin including but not limited to GI upset, allergic reaction, drug rash, diarrhea, and yeast infections. Low Dose Naltrexone Pregnancy And Lactation Text: Naltrexone is pregnancy category C.  There have been no adequate and well-controlled studies in pregnant women.  It should be used in pregnancy only if the potential benefit justifies the potential risk to the fetus.   Limited data indicates that naltrexone is minimally excreted into breastmilk. Topical Ketoconazole Counseling: Patient counseled that this medication may cause skin irritation or allergic reactions.  In the event of skin irritation, the patient was advised to reduce the amount of the drug applied or use it less frequently.   The patient verbalized understanding of the proper use and possible adverse effects of ketoconazole.  All of the patient's questions and concerns were addressed. Elidel Counseling: Patient may experience a mild burning sensation during topical application. Elidel is not approved in children less than 2 years of age. There have been case reports of hematologic and skin malignancies in patients using topical calcineurin inhibitors although causality is questionable. Azathioprine Counseling:  I discussed with the patient the risks of azathioprine including but not limited to myelosuppression, immunosuppression, hepatotoxicity, lymphoma, and infections.  The patient understands that monitoring is required including baseline LFTs, Creatinine, possible TPMP genotyping and weekly CBCs for the first month and then every 2 weeks thereafter.  The patient verbalized understanding of the proper use and possible adverse effects of azathioprine.  All of the patient's questions and concerns were addressed. Enbrel Counseling:  I discussed with the patient the risks of etanercept including but not limited to myelosuppression, immunosuppression, autoimmune hepatitis, demyelinating diseases, lymphoma, and infections.  The patient understands that monitoring is required including a PPD at baseline and must alert us or the primary physician if symptoms of infection or other concerning signs are noted. Methotrexate Pregnancy And Lactation Text: This medication is Pregnancy Category X and is known to cause fetal harm. This medication is excreted in breast milk. Rituxan Pregnancy And Lactation Text: This medication is Pregnancy Category C and it isn't know if it is safe during pregnancy. It is unknown if this medication is excreted in breast milk but similar antibodies are known to be excreted. Cimetidine Counseling:  I discussed with the patient the risks of Cimetidine including but not limited to gynecomastia, headache, diarrhea, nausea, drowsiness, arrhythmias, pancreatitis, skin rashes, psychosis, bone marrow suppression and kidney toxicity. Sotyktu Counseling:  I discussed the most common side effects of Sotyktu including: common cold, sore throat, sinus infections, cold sores, canker sores, folliculitis, and acne.? I also discussed more serious side effects of Sotyktu including but not limited to: serious allergic reactions; increased risk for infections such as TB; cancers such as lymphomas; rhabdomyolysis and elevated CPK; and elevated triglycerides and liver enzymes.? Mirvaso Counseling: Mirvaso is a topical medication which can decrease superficial blood flow where applied. Side effects are uncommon and include stinging, redness and allergic reactions. Dutasteride Counseling: Dustasteride Counseling:  I discussed with the patient the risks of use of dutasteride including but not limited to decreased libido, decreased ejaculate volume, and gynecomastia. Women who can become pregnant should not handle medication.  All of the patient's questions and concerns were addressed. Dapsone Counseling: I discussed with the patient the risks of dapsone including but not limited to hemolytic anemia, agranulocytosis, rashes, methemoglobinemia, kidney failure, peripheral neuropathy, headaches, GI upset, and liver toxicity.  Patients who start dapsone require monitoring including baseline LFTs and weekly CBCs for the first month, then every month thereafter.  The patient verbalized understanding of the proper use and possible adverse effects of dapsone.  All of the patient's questions and concerns were addressed. Griseofulvin Counseling:  I discussed with the patient the risks of griseofulvin including but not limited to photosensitivity, cytopenia, liver damage, nausea/vomiting and severe allergy.  The patient understands that this medication is best absorbed when taken with a fatty meal (e.g., ice cream or french fries). Isotretinoin Pregnancy And Lactation Text: This medication is Pregnancy Category X and is considered extremely dangerous during pregnancy. It is unknown if it is excreted in breast milk. Prednisone Counseling:  I discussed with the patient the risks of prolonged use of prednisone including but not limited to weight gain, insomnia, osteoporosis, mood changes, diabetes, susceptibility to infection, glaucoma and high blood pressure.  In cases where prednisone use is prolonged, patients should be monitored with blood pressure checks, serum glucose levels and an eye exam.  Additionally, the patient may need to be placed on GI prophylaxis, PCP prophylaxis, and calcium and vitamin D supplementation and/or a bisphosphonate.  The patient verbalized understanding of the proper use and the possible adverse effects of prednisone.  All of the patient's questions and concerns were addressed. Valtrex Counseling: I discussed with the patient the risks of valacyclovir including but not limited to kidney damage, nausea, vomiting and severe allergy.  The patient understands that if the infection seems to be worsening or is not improving, they are to call. Azithromycin Pregnancy And Lactation Text: This medication is considered safe during pregnancy and is also secreted in breast milk. Doxycycline Counseling:  Patient counseled regarding possible photosensitivity and increased risk for sunburn.  Patient instructed to avoid sunlight, if possible.  When exposed to sunlight, patients should wear protective clothing, sunglasses, and sunscreen.  The patient was instructed to call the office immediately if the following severe adverse effects occur:  hearing changes, easy bruising/bleeding, severe headache, or vision changes.  The patient verbalized understanding of the proper use and possible adverse effects of doxycycline.  All of the patient's questions and concerns were addressed. Rhofade Pregnancy And Lactation Text: This medication has not been assigned a Pregnancy Risk Category. It is unknown if the medication is excreted in breast milk. Xolair Counseling:  Patient informed of potential adverse effects including but not limited to fever, muscle aches, rash and allergic reactions.  The patient verbalized understanding of the proper use and possible adverse effects of Xolair.  All of the patient's questions and concerns were addressed. Bactrim Counseling:  I discussed with the patient the risks of sulfa antibiotics including but not limited to GI upset, allergic reaction, drug rash, diarrhea, dizziness, photosensitivity, and yeast infections.  Rarely, more serious reactions can occur including but not limited to aplastic anemia, agranulocytosis, methemoglobinemia, blood dyscrasias, liver or kidney failure, lung infiltrates or desquamative/blistering drug rashes. Solaraze Counseling:  I discussed with the patient the risks of Solaraze including but not limited to erythema, scaling, itching, weeping, crusting, and pain. Niacinamide Counseling: I recommended taking niacin or niacinamide, also know as vitamin B3, twice daily. Recent evidence suggests that taking vitamin B3 (500 mg twice daily) can reduce the risk of actinic keratoses and non-melanoma skin cancers. Side effects of vitamin B3 include flushing and headache. Valtrex Pregnancy And Lactation Text: this medication is Pregnancy Category B and is considered safe during pregnancy. This medication is not directly found in breast milk but it's metabolite acyclovir is present. Opioid Counseling: I discussed with the patient the potential side effects of opioids including but not limited to addiction, altered mental status, and depression. I stressed avoiding alcohol, benzodiazepines, muscle relaxants and sleep aids unless specifically okayed by a physician. The patient verbalized understanding of the proper use and possible adverse effects of opioids. All of the patient's questions and concerns were addressed. They were instructed to flush the remaining pills down the toilet if they did not need them for pain. Rifampin Counseling: I discussed with the patient the risks of rifampin including but not limited to liver damage, kidney damage, red-orange body fluids, nausea/vomiting and severe allergy. Detail Level: Detailed Siliq Counseling:  I discussed with the patient the risks of Siliq including but not limited to new or worsening depression, suicidal thoughts and behavior, immunosuppression, malignancy, posterior leukoencephalopathy syndrome, and serious infections.  The patient understands that monitoring is required including a PPD at baseline and must alert us or the primary physician if symptoms of infection or other concerning signs are noted. There is also a special program designed to monitor depression which is required with Siliq. Calcipotriene Counseling:  I discussed with the patient the risks of calcipotriene including but not limited to erythema, scaling, itching, and irritation. Sotyktu Pregnancy And Lactation Text: There is insufficient data to evaluate whether or not Sotyktu is safe to use during pregnancy.? ?It is not known if Sotyktu passes into breast milk and whether or not it is safe to use when breastfeeding.?? Calcipotriene Pregnancy And Lactation Text: This medication has not been proven safe during pregnancy. It is unknown if this medication is excreted in breast milk. Dutasteride Pregnancy And Lactation Text: This medication is absolutely contraindicated in women, especially during pregnancy and breast feeding. Feminization of male fetuses is possible if taking while pregnant. Xeljanz Counseling: I discussed with the patient the risks of Xeljanz therapy including increased risk of infection, liver issues, headache, diarrhea, or cold symptoms. Live vaccines should be avoided. They were instructed to call if they have any problems. Nsaids Pregnancy And Lactation Text: These medications are considered safe up to 30 weeks gestation. It is excreted in breast milk. High Dose Vitamin A Counseling: Side effects reviewed, pt to contact office should one occur. Doxepin Counseling:  Patient advised that the medication is sedating and not to drive a car after taking this medication. Patient informed of potential adverse effects including but not limited to dry mouth, urinary retention, and blurry vision.  The patient verbalized understanding of the proper use and possible adverse effects of doxepin.  All of the patient's questions and concerns were addressed. Propranolol Counseling:  I discussed with the patient the risks of propranolol including but not limited to low heart rate, low blood pressure, low blood sugar, restlessness and increased cold sensitivity. They should call the office if they experience any of these side effects. Doxycycline Pregnancy And Lactation Text: This medication is Pregnancy Category D and not consider safe during pregnancy. It is also excreted in breast milk but is considered safe for shorter treatment courses. Adbry Counseling: I discussed with the patient the risks of tralokinumab including but not limited to eye infection and irritation, cold sores, injection site reactions, worsening of asthma, allergic reactions and increased risk of parasitic infection.  Live vaccines should be avoided while taking tralokinumab. The patient understands that monitoring is required and they must alert us or the primary physician if symptoms of infection or other concerning signs are noted. Dapsone Pregnancy And Lactation Text: This medication is Pregnancy Category C and is not considered safe during pregnancy or breast feeding. Griseofulvin Pregnancy And Lactation Text: This medication is Pregnancy Category X and is known to cause serious birth defects. It is unknown if this medication is excreted in breast milk but breast feeding should be avoided. Opioid Pregnancy And Lactation Text: These medications can lead to premature delivery and should be avoided during pregnancy. These medications are also present in breast milk in small amounts. Solaraze Pregnancy And Lactation Text: This medication is Pregnancy Category B and is considered safe. There is some data to suggest avoiding during the third trimester. It is unknown if this medication is excreted in breast milk. Itraconazole Counseling:  I discussed with the patient the risks of itraconazole including but not limited to liver damage, nausea/vomiting, neuropathy, and severe allergy.  The patient understands that this medication is best absorbed when taken with acidic beverages such as non-diet cola or ginger ale.  The patient understands that monitoring is required including baseline LFTs and repeat LFTs at intervals.  The patient understands that they are to contact us or the primary physician if concerning signs are noted. Gabapentin Counseling: I discussed with the patient the risks of gabapentin including but not limited to dizziness, somnolence, fatigue and ataxia. Niacinamide Pregnancy And Lactation Text: These medications are considered safe during pregnancy. Bactrim Pregnancy And Lactation Text: This medication is Pregnancy Category D and is known to cause fetal risk.  It is also excreted in breast milk. Adbry Pregnancy And Lactation Text: It is unknown if this medication will adversely affect pregnancy or breast feeding. Propranolol Pregnancy And Lactation Text: This medication is Pregnancy Category C and it isn't known if it is safe during pregnancy. It is excreted in breast milk. Erythromycin Counseling:  I discussed with the patient the risks of erythromycin including but not limited to GI upset, allergic reaction, drug rash, diarrhea, increase in liver enzymes, and yeast infections. Eucrisa Counseling: Patient may experience a mild burning sensation during topical application. Eucrisa is not approved in children less than 3 months of age. Cellcept Counseling:  I discussed with the patient the risks of mycophenolate mofetil including but not limited to infection/immunosuppression, GI upset, hypokalemia, hypercholesterolemia, bone marrow suppression, lymphoproliferative disorders, malignancy, GI ulceration/bleed/perforation, colitis, interstitial lung disease, kidney failure, progressive multifocal leukoencephalopathy, and birth defects.  The patient understands that monitoring is required including a baseline creatinine and regular CBC testing. In addition, patient must alert us immediately if symptoms of infection or other concerning signs are noted. Rifampin Pregnancy And Lactation Text: This medication is Pregnancy Category C and it isn't know if it is safe during pregnancy. It is also excreted in breast milk and should not be used if you are breast feeding. Humira Counseling:  I discussed with the patient the risks of adalimumab including but not limited to myelosuppression, immunosuppression, autoimmune hepatitis, demyelinating diseases, lymphoma, and serious infections.  The patient understands that monitoring is required including a PPD at baseline and must alert us or the primary physician if symptoms of infection or other concerning signs are noted. Xolair Pregnancy And Lactation Text: This medication is Pregnancy Category B and is considered safe during pregnancy. This medication is excreted in breast milk. Topical Sulfur Applications Counseling: Topical Sulfur Counseling: Patient counseled that this medication may cause skin irritation or allergic reactions.  In the event of skin irritation, the patient was advised to reduce the amount of the drug applied or use it less frequently.   The patient verbalized understanding of the proper use and possible adverse effects of topical sulfur application.  All of the patient's questions and concerns were addressed.

## 2023-11-13 NOTE — PROGRESS NOTE ADULT - PROBLEM SELECTOR PROBLEM 8
----- Message from Emma Fletcher sent at 11/13/2023 12:37 PM CST -----  Regarding: Unread Message Notification  Contact: [phone]  Dr Miriam Garzon has entered an order for you to repeat your labs.  You may call our office at [phone] to make a lab appointment or you may walk into [facility] at your convenience.       ----- Message -----       From:Emma Fletcher       Sent:8/10/2023 10:30 PM CDT         To:Clinical Office Staff    Subject:Lab Results    Thank you,  I like to improve on the Kidney function. Please advice:  Are there any particular foods etc.  that may help me?  And what foods I need to  avoid?  If you have any printed related information I can  from your     Regards  Emma Fletcher      ----- Message -----       From:Jaylene VERAS       Sent:8/10/2023  9:58 AM CDT         To:Emma Fletcher    Subject:Lab Results    Dr. Miriam Beyer has viewed your lab results and states the following; \"Kidney function slightly decreased but cholesterol is great. Continue current meds. Will monitor kidney function-recheck 3 months\".    We will call you in 3 months to remind you that you are due for blood work.    Please reply to this message or call our office at [phone] with any questions or concerns.  
Patient due for repeat labs  Please assist in scheduling nonfasting lab apt  
Patient was contacted on 11/13/23 @ 5:18 pm and states that she will go to [facility] to have her labs done.   
Discharge planning issues
Palliative care encounter
Prophylactic measure

## 2023-12-25 NOTE — PROGRESS NOTE ADULT - PROBLEM/PLAN-8
DISPLAY PLAN FREE TEXT
impairments found/functional limitations in following categories/risk reduction/prevention/rehab potential/therapy frequency/predicted duration of therapy intervention/anticipated equipment needs at discharge/anticipated discharge recommendation
DISPLAY PLAN FREE TEXT

## 2024-04-29 NOTE — PROGRESS NOTE ADULT - SUBJECTIVE AND OBJECTIVE BOX
Matthew Garcia  MAR [id number]    Patient is a 79y old  Male who presents with a chief complaint of abdominal pain (19 Jan 2020 07:11)      SUBJECTIVE / OVERNIGHT EVENTS:    MEDICATIONS  (STANDING):  albumin human 25% IVPB 50 milliLiter(s) IV Intermittent every 6 hours  enoxaparin Injectable 40 milliGRAM(s) SubCutaneous daily  pantoprazole  Injectable 40 milliGRAM(s) IV Push daily  sodium chloride 1 Gram(s) Oral three times a day  spironolactone 100 milliGRAM(s) Oral daily  tamsulosin 0.4 milliGRAM(s) Oral at bedtime  tenofovir disoproxil fumarate (VIREAD) 300 milliGRAM(s) Oral daily    MEDICATIONS  (PRN):  acetaminophen   Tablet .. 650 milliGRAM(s) Oral every 6 hours PRN Mild Pain (1 - 3)  benzocaine 15 mG/menthol 3.6 mG (Sugar-Free) Lozenge 1 Lozenge Oral every 4 hours PRN Sore Throat  melatonin 3 milliGRAM(s) Oral at bedtime PRN Insomnia  oxyCODONE    IR 5 milliGRAM(s) Oral every 6 hours PRN Moderate Pain (4 - 6)  oxyCODONE    IR 10 milliGRAM(s) Oral every 6 hours PRN Severe Pain (7 - 10)      Vital Signs Last 24 Hrs  T(C): 36.5 (19 Jan 2020 09:52), Max: 37.4 (18 Jan 2020 10:33)  T(F): 97.7 (19 Jan 2020 09:52), Max: 99.4 (18 Jan 2020 10:33)  HR: 89 (19 Jan 2020 09:52) (89 - 98)  BP: 116/54 (19 Jan 2020 09:52) (116/54 - 155/81)  BP(mean): --  RR: 17 (19 Jan 2020 09:52) (17 - 20)  SpO2: 95% (19 Jan 2020 09:52) (95% - 100%)  CAPILLARY BLOOD GLUCOSE        I&O's Summary    18 Jan 2020 07:01  -  19 Jan 2020 07:00  --------------------------------------------------------  IN: 820 mL / OUT: 1750 mL / NET: -930 mL        PHYSICAL EXAM:  GENERAL: NAD, well-developed  HEAD:  Atraumatic, Normocephalic  EYES: EOMI, PERRLA, conjunctiva and sclera clear  NECK: Supple, No JVD  CHEST/LUNG: Clear to auscultation bilaterally; No wheeze  HEART: Regular rate and rhythm; No murmurs, rubs, or gallops  ABDOMEN: Soft, Nontender, Nondistended; Bowel sounds present  EXTREMITIES:  2+ Peripheral Pulses, No clubbing, cyanosis, or edema  PSYCH: AAOx3  NEUROLOGY: non-focal  SKIN: No rashes or lesions    LABS:                        7.8    83.85 )-----------( 162      ( 19 Jan 2020 06:10 )             25.2     01-19    125<L>  |  94<L>  |  8   ----------------------------<  96  4.1   |  22  |  0.59    Ca    7.8<L>      19 Jan 2020 06:10  Phos  2.6     01-19  Mg     2.0     01-19    TPro  5.7<L>  /  Alb  2.8<L>  /  TBili  0.9  /  DBili  0.3<H>  /  AST  37  /  ALT  33  /  AlkPhos  142<H>  01-18    PT/INR - ( 18 Jan 2020 06:10 )   PT: 13.6 SEC;   INR: 1.19          PTT - ( 18 Jan 2020 06:10 )  PTT:28.5 SEC Matthew Garcia  MAR [id number]    Patient is a 79y old  Male who presents with a chief complaint of abdominal pain (19 Jan 2020 07:11)      SUBJECTIVE / OVERNIGHT EVENTS:  Patient has not been eating well per wife and not able to tolerate getting out of bed.   Has been having diarrhea and had rectal tube placed  He states the LE edema, scrotal swelling and abdominal swelling has not improved.      MEDICATIONS  (STANDING):  albumin human 25% IVPB 50 milliLiter(s) IV Intermittent every 6 hours  enoxaparin Injectable 40 milliGRAM(s) SubCutaneous daily  pantoprazole  Injectable 40 milliGRAM(s) IV Push daily  sodium chloride 1 Gram(s) Oral three times a day  spironolactone 100 milliGRAM(s) Oral daily  tamsulosin 0.4 milliGRAM(s) Oral at bedtime  tenofovir disoproxil fumarate (VIREAD) 300 milliGRAM(s) Oral daily    MEDICATIONS  (PRN):  acetaminophen   Tablet .. 650 milliGRAM(s) Oral every 6 hours PRN Mild Pain (1 - 3)  benzocaine 15 mG/menthol 3.6 mG (Sugar-Free) Lozenge 1 Lozenge Oral every 4 hours PRN Sore Throat  melatonin 3 milliGRAM(s) Oral at bedtime PRN Insomnia  oxyCODONE    IR 5 milliGRAM(s) Oral every 6 hours PRN Moderate Pain (4 - 6)  oxyCODONE    IR 10 milliGRAM(s) Oral every 6 hours PRN Severe Pain (7 - 10)      Vital Signs Last 24 Hrs  T(C): 36.5 (19 Jan 2020 09:52), Max: 37.4 (18 Jan 2020 10:33)  T(F): 97.7 (19 Jan 2020 09:52), Max: 99.4 (18 Jan 2020 10:33)  HR: 89 (19 Jan 2020 09:52) (89 - 98)  BP: 116/54 (19 Jan 2020 09:52) (116/54 - 155/81)  BP(mean): --  RR: 17 (19 Jan 2020 09:52) (17 - 20)  SpO2: 95% (19 Jan 2020 09:52) (95% - 100%)  CAPILLARY BLOOD GLUCOSE        I&O's Summary    18 Jan 2020 07:01  -  19 Jan 2020 07:00  --------------------------------------------------------  IN: 820 mL / OUT: 1750 mL / NET: -930 mL        PHYSICAL EXAM:  GENERAL: NAD  HEAD:  Atraumatic, Normocephalic  EYES: EOMI, PERRLA, conjunctiva and sclera clear  ENMT: No tonsillar erythema, exudates, or enlargement; Moist mucous membranes, Good dentition, No lesions  NECK: Supple, No JVD, Normal thyroid  NERVOUS SYSTEM:  Alert & Oriented X3, Good concentration; Motor Strength 5/5 B/L upper and lower extremities; DTRs 2+ intact and symmetric  CHEST/LUNG: Clear to percussion bilaterally; No rales, rhonchi, wheezing, or rubs  HEART: Regular rate and rhythm; No murmurs, rubs, or gallops  ABDOMEN: Well healing surgical scar with surgical staples. Distended with mild TTP mid epigastric with fluid wave.  Scrotal and edema of the penis with ? peyronies?  EXTREMITIES:  2+ pitting edema to hips bilaterally  LYMPH: No lymphadenopathy noted  SKIN: No rashes or lesions    LABS:                        7.8    83.85 )-----------( 162      ( 19 Jan 2020 06:10 )             25.2     01-19    125<L>  |  94<L>  |  8   ----------------------------<  96  4.1   |  22  |  0.59    Ca    7.8<L>      19 Jan 2020 06:10  Phos  2.6     01-19  Mg     2.0     01-19    TPro  5.7<L>  /  Alb  2.8<L>  /  TBili  0.9  /  DBili  0.3<H>  /  AST  37  /  ALT  33  /  AlkPhos  142<H>  01-18    PT/INR - ( 18 Jan 2020 06:10 )   PT: 13.6 SEC;   INR: 1.19          PTT - ( 18 Jan 2020 06:10 )  PTT:28.5 SEC 58-year-old male with intermittent left-sided headache.  Of note, patient had recent turbinate surgery on the right side about 10 days ago and [place].  Denies fever or chills, blurry double vision, focal weakness or paresthesias, vomiting or any other additional complaints. Well-appearing male in no acute distress appears very preoccupied/concerned, PERRL, MMM, no epistaxis, no facial tenderness to palpation, no mastoid TTP, neck is supple without meningeal signs. CT head is negative, management was discussed with the patient's surgeon at [facility], Patient was advised to follow-up with his surgeon this Wednesday.  Strict return precautions given.  Patient is happy with the plan. Vital signs were reviewed and appear normal. no pain at present.

## 2024-07-15 NOTE — PATIENT PROFILE ADULT - ANY SIGNIFICANT CHANGE IN ABILITY TO PERFORM THE FOLLOWING ADL SINCE THE ONSET OF PRESENT ILLNESS?
no Render In Strict Bullet Format?: No Initiate Treatment: -\\n- Recommend to avoid shaving \\n-  doxycycline hyclate 100 mg capsule: Take 1 capsule once  daily with food and water for 1 month. Discontinue Regimen: - \\n- metronidazole 0.75 % topical gel Continue Regimen: -\\n- tacrolimus 0.1% topical ointment: Apply to affected area of face twice daily (Refills deferred) Detail Level: Zone

## 2024-12-06 NOTE — PROVIDER CONTACT NOTE (CRITICAL VALUE NOTIFICATION) - PERSON GIVING RESULT:
Cecil Castaneda( )
Jake Reynolds
Michael Bashir
Michael Mary
[place]
lab
TYLOR Reynolds
Dinesh Goncalves / [place]
Olga Lidia Wiggins
No